# Patient Record
Sex: MALE | Race: WHITE | Employment: UNEMPLOYED | URBAN - METROPOLITAN AREA
[De-identification: names, ages, dates, MRNs, and addresses within clinical notes are randomized per-mention and may not be internally consistent; named-entity substitution may affect disease eponyms.]

---

## 2019-06-19 ENCOUNTER — HOSPITAL ENCOUNTER (EMERGENCY)
Age: 59
Discharge: HOME | End: 2019-06-20
Attending: EMERGENCY MEDICINE | Admitting: EMERGENCY MEDICINE
Payer: MEDICARE

## 2019-06-19 ENCOUNTER — APPOINTMENT (OUTPATIENT)
Dept: GENERAL RADIOLOGY | Age: 59
End: 2019-06-19
Attending: EMERGENCY MEDICINE
Payer: MEDICARE

## 2019-06-19 ENCOUNTER — APPOINTMENT (OUTPATIENT)
Dept: CT IMAGING | Age: 59
End: 2019-06-19
Attending: EMERGENCY MEDICINE
Payer: MEDICARE

## 2019-06-19 DIAGNOSIS — R53.1 GENERALIZED WEAKNESS: Primary | ICD-10-CM

## 2019-06-19 DIAGNOSIS — F10.920 ALCOHOLIC INTOXICATION WITHOUT COMPLICATION (HCC): ICD-10-CM

## 2019-06-19 LAB
ALBUMIN SERPL-MCNC: 3.3 G/DL (ref 3.4–5)
ALBUMIN/GLOB SERPL: 0.8 {RATIO} (ref 0.8–1.7)
ALP SERPL-CCNC: 109 U/L (ref 45–117)
ALT SERPL-CCNC: 39 U/L (ref 16–61)
AMPHET UR QL SCN: NEGATIVE
ANION GAP SERPL CALC-SCNC: 11 MMOL/L (ref 3–18)
APPEARANCE UR: ABNORMAL
AST SERPL-CCNC: 47 U/L (ref 15–37)
BARBITURATES UR QL SCN: NEGATIVE
BASOPHILS # BLD: 0.1 K/UL (ref 0–0.1)
BASOPHILS NFR BLD: 1 % (ref 0–2)
BENZODIAZ UR QL: NEGATIVE
BILIRUB SERPL-MCNC: 0.4 MG/DL (ref 0.2–1)
BILIRUB UR QL: NEGATIVE
BUN SERPL-MCNC: 7 MG/DL (ref 7–18)
BUN/CREAT SERPL: 8 (ref 12–20)
CALCIUM SERPL-MCNC: 9.3 MG/DL (ref 8.5–10.1)
CANNABINOIDS UR QL SCN: NEGATIVE
CHLORIDE SERPL-SCNC: 98 MMOL/L (ref 100–108)
CK MB CFR SERPL CALC: 3.3 % (ref 0–4)
CK MB SERPL-MCNC: 1.4 NG/ML (ref 5–25)
CK SERPL-CCNC: 42 U/L (ref 39–308)
CO2 SERPL-SCNC: 27 MMOL/L (ref 21–32)
COCAINE UR QL SCN: NEGATIVE
COLOR UR: YELLOW
CREAT SERPL-MCNC: 0.85 MG/DL (ref 0.6–1.3)
DIFFERENTIAL METHOD BLD: ABNORMAL
EOSINOPHIL # BLD: 0.3 K/UL (ref 0–0.4)
EOSINOPHIL NFR BLD: 4 % (ref 0–5)
ERYTHROCYTE [DISTWIDTH] IN BLOOD BY AUTOMATED COUNT: 14.3 % (ref 11.6–14.5)
ETHANOL SERPL-MCNC: 233 MG/DL (ref 0–3)
GLOBULIN SER CALC-MCNC: 4 G/DL (ref 2–4)
GLUCOSE SERPL-MCNC: 134 MG/DL (ref 74–99)
GLUCOSE UR STRIP.AUTO-MCNC: NEGATIVE MG/DL
HCT VFR BLD AUTO: 42.7 % (ref 36–48)
HDSCOM,HDSCOM: NORMAL
HGB BLD-MCNC: 14.5 G/DL (ref 13–16)
HGB UR QL STRIP: NEGATIVE
INR PPP: 1 (ref 0.8–1.2)
KETONES UR QL STRIP.AUTO: ABNORMAL MG/DL
LEUKOCYTE ESTERASE UR QL STRIP.AUTO: NEGATIVE
LIPASE SERPL-CCNC: 143 U/L (ref 73–393)
LYMPHOCYTES # BLD: 2 K/UL (ref 0.9–3.6)
LYMPHOCYTES NFR BLD: 27 % (ref 21–52)
MAGNESIUM SERPL-MCNC: 2 MG/DL (ref 1.6–2.6)
MCH RBC QN AUTO: 32.4 PG (ref 24–34)
MCHC RBC AUTO-ENTMCNC: 34 G/DL (ref 31–37)
MCV RBC AUTO: 95.3 FL (ref 74–97)
METHADONE UR QL: NEGATIVE
MONOCYTES # BLD: 0.5 K/UL (ref 0.05–1.2)
MONOCYTES NFR BLD: 7 % (ref 3–10)
NEUTS SEG # BLD: 4.5 K/UL (ref 1.8–8)
NEUTS SEG NFR BLD: 61 % (ref 40–73)
NITRITE UR QL STRIP.AUTO: NEGATIVE
OPIATES UR QL: NEGATIVE
PCP UR QL: NEGATIVE
PH UR STRIP: 5 [PH] (ref 5–8)
PLATELET # BLD AUTO: 154 K/UL (ref 135–420)
PMV BLD AUTO: 9.4 FL (ref 9.2–11.8)
POTASSIUM SERPL-SCNC: 4.4 MMOL/L (ref 3.5–5.5)
PROT SERPL-MCNC: 7.3 G/DL (ref 6.4–8.2)
PROT UR STRIP-MCNC: NEGATIVE MG/DL
PROTHROMBIN TIME: 12.5 SEC (ref 11.5–15.2)
RBC # BLD AUTO: 4.48 M/UL (ref 4.7–5.5)
SODIUM SERPL-SCNC: 136 MMOL/L (ref 136–145)
SP GR UR REFRACTOMETRY: 1.01 (ref 1–1.03)
TROPONIN I SERPL-MCNC: <0.02 NG/ML (ref 0–0.04)
TSH SERPL DL<=0.05 MIU/L-ACNC: 2.35 UIU/ML (ref 0.36–3.74)
UROBILINOGEN UR QL STRIP.AUTO: 1 EU/DL (ref 0.2–1)
WBC # BLD AUTO: 7.3 K/UL (ref 4.6–13.2)

## 2019-06-19 PROCEDURE — 85610 PROTHROMBIN TIME: CPT

## 2019-06-19 PROCEDURE — 84443 ASSAY THYROID STIM HORMONE: CPT

## 2019-06-19 PROCEDURE — 99285 EMERGENCY DEPT VISIT HI MDM: CPT

## 2019-06-19 PROCEDURE — 83690 ASSAY OF LIPASE: CPT

## 2019-06-19 PROCEDURE — 71045 X-RAY EXAM CHEST 1 VIEW: CPT

## 2019-06-19 PROCEDURE — 80307 DRUG TEST PRSMV CHEM ANLYZR: CPT

## 2019-06-19 PROCEDURE — 81003 URINALYSIS AUTO W/O SCOPE: CPT

## 2019-06-19 PROCEDURE — 70450 CT HEAD/BRAIN W/O DYE: CPT

## 2019-06-19 PROCEDURE — 82550 ASSAY OF CK (CPK): CPT

## 2019-06-19 PROCEDURE — 85025 COMPLETE CBC W/AUTO DIFF WBC: CPT

## 2019-06-19 PROCEDURE — 80053 COMPREHEN METABOLIC PANEL: CPT

## 2019-06-19 PROCEDURE — 83735 ASSAY OF MAGNESIUM: CPT

## 2019-06-20 VITALS
RESPIRATION RATE: 24 BRPM | WEIGHT: 250 LBS | SYSTOLIC BLOOD PRESSURE: 148 MMHG | BODY MASS INDEX: 32.08 KG/M2 | TEMPERATURE: 97.8 F | DIASTOLIC BLOOD PRESSURE: 83 MMHG | OXYGEN SATURATION: 92 % | HEART RATE: 109 BPM | HEIGHT: 74 IN

## 2019-06-20 NOTE — ED TRIAGE NOTES
Pt brought in by [facility] from home. Per EMS pt was walking up the stairs and fell. Pt states he has been falling lately because his legs feel weak. Pt admits to drinking alcohol. Pt A &O X 3, follows commands, no distress noted.

## 2019-06-20 NOTE — DISCHARGE INSTRUCTIONS
Patient Education   Patient armband removed and shredded    DISCHARGE SUMMARY from Nurse    PATIENT INSTRUCTIONS:    After general anesthesia or intravenous sedation, for 24 hours or while taking prescription Narcotics:  · Limit your activities  · Do not drive and operate hazardous machinery  · Do not make important personal or business decisions  · Do  not drink alcoholic beverages  · If you have not urinated within 8 hours after discharge, please contact your surgeon on call. Report the following to your surgeon:  · Excessive pain, swelling, redness or odor of or around the surgical area  · Temperature over 100.5  · Nausea and vomiting lasting longer than 4 hours or if unable to take medications  · Any signs of decreased circulation or nerve impairment to extremity: change in color, persistent  numbness, tingling, coldness or increase pain  · Any questions    What to do at Home:  Recommended activity: Activity as tolerated,     If you experience any of the following symptoms chest pain, SOB or any other concerns, please follow up with pcp. *  Please give a list of your current medications to your Primary Care Provider. *  Please update this list whenever your medications are discontinued, doses are      changed, or new medications (including over-the-counter products) are added. *  Please carry medication information at all times in case of emergency situations. These are general instructions for a healthy lifestyle:    No smoking/ No tobacco products/ Avoid exposure to second hand smoke  Surgeon General's Warning:  Quitting smoking now greatly reduces serious risk to your health.     Obesity, smoking, and sedentary lifestyle greatly increases your risk for illness    A healthy diet, regular physical exercise & weight monitoring are important for maintaining a healthy lifestyle    You may be retaining fluid if you have a history of heart failure or if you experience any of the following symptoms: Weight gain of 3 pounds or more overnight or 5 pounds in a week, increased swelling in our hands or feet or shortness of breath while lying flat in bed. Please call your doctor as soon as you notice any of these symptoms; do not wait until your next office visit. The discharge information has been reviewed with the patient. The patient verbalized understanding. Discharge medications reviewed with the {[facility] meds reviewed [id number]} and appropriate educational materials and side effects teaching were provided. ___________________________________________________________________________________________________________________________________       Muscle Conditioning: Exercises  Your Care Instructions  Here are some examples of exercises for muscle conditioning. Start each exercise slowly. Ease off the exercise if you start to have pain. Your doctor or physical therapist will tell you when you can start these exercises and which ones will work best for you. How to do the exercises  Wall push-ups    6. Stand facing a wall, about 12 to 18 inches away. 7. Place your hands on the wall at shoulder height. 8. Slowly bend your elbows and bring your face toward the wall, moving your hips and shoulders forward together. 9. Push slowly back to the starting position. 10. Start with 5 repetitions and work up to 8 to 12.  11. Rest for a minute, and repeat the exercise. Knee extension    6. While sitting in a chair, straighten one leg and hold while you slowly count to 5. Be sure you do not lock your knee. 7. Repeat 8 to 12 times. 8. Rest for a minute, and repeat the exercise. 9. Do the same exercise with the other leg. Side-lying leg lift    1. Lie on your side, with your legs extended. Keep your hips straight up and down during this exercise. Do not let your top hip rock toward the back.  Support your head with your hand, and place the other hand on the floor near your waist.  2. Slowly raise your upper leg until it is about in line with your shoulder. Keep your toes pointed forward. 3. Slowly lower your leg to the starting position. 4. Repeat 8 to 12 times. 5. Rest for a minute, and repeat the exercise. 6. Turn to your other side and do the same exercise with your other leg. Shallow standing knee bends    1. Stand with your hands lightly resting on a counter or chair in front of you with your feet shoulder-width apart. 2. Slowly bend your knees so that you squat down just like you were going to sit in a chair. Make sure your knees do not go in front of your toes. 3. Lower yourself about 6 inches. Your heels should remain on the floor at all times. 4. Rise slowly to a standing position. 5. Repeat 8 to 12 times. 6. Rest for a minute, and repeat the exercise. Follow-up care is a key part of your treatment and safety. Be sure to make and go to all appointments, and call your doctor if you are having problems. It's also a good idea to know your test results and keep a list of the medicines you take. Where can you learn more? Go to http://meghan-lan.info/. Enter S567 in the search box to learn more about \"Muscle Conditioning: Exercises. \"  Current as of: August 19, 2018  Content Version: 11.9  © 5482-1131 [employer], [employer]orporated. Care instructions adapted under license by [employer] (which disclaims liability or warranty for this information). If you have questions about a medical condition or this instruction, always ask your healthcare professional. [place] [address] any warranty or liability for your use of this information. Patient Education        Weakness: Care Instructions  Your Care Instructions    Weakness is a lack of physical or muscle strength. You may feel that you need to make extra effort to move your arms, legs, or other muscles. Generalized weakness means that you feel weak in most areas of your body.  Another type of weakness may affect just one muscle or group of muscles. You may feel weak and tired after you have done too much activity, such as taking an extra-long hike. This is not a serious problem. It often goes away on its own. Feeling weak can also be caused by medical conditions like thyroid problems, depression, or a virus. Sometimes the cause can be serious. Your doctor may want to do more tests to try to find the cause of the weakness. The doctor has checked you carefully, but problems can develop later. If you notice any problems or new symptoms, get medical treatment right away. Follow-up care is a key part of your treatment and safety. Be sure to make and go to all appointments, and call your doctor if you are having problems. It's also a good idea to know your test results and keep a list of the medicines you take. How can you care for yourself at home? · Rest when you feel tired. · Be safe with medicines. If your doctor prescribed medicine, take it exactly as prescribed. Call your doctor if you think you are having a problem with your medicine. You will get more details on the specific medicines your doctor prescribes. · Do not skip meals. Eating a balanced diet may increase your energy level. · Get some physical activity every day, but do not get too tired. When should you call for help? Call your doctor now or seek immediate medical care if:    · You have new or worse weakness.     · You are dizzy or lightheaded, or you feel like you may faint.    Watch closely for changes in your health, and be sure to contact your doctor if:    · You do not get better as expected. Where can you learn more? Go to http://meghan-lan.info/. Enter 495 [id number] [id number] in the search box to learn more about \"Weakness: Care Instructions. \"  Current as of: September 23, 2018  Content Version: 11.9  © 3212-5457 [employer].  Care instructions adapted under license by [employer] (which disclaims liability or warranty for this information). If you have questions about a medical condition or this instruction, always ask your healthcare professional. [address] any warranty or liability for your use of this information.

## 2019-06-20 NOTE — ED PROVIDER NOTES
ER17/17    61 y.o. WHITE OR  male    Presents to the ED with   Chief Complaint   Patient presents with   [facility] Extremity Weakness     legs         HPI: 9:14 PM  09:13PM 06-  This record was completed using an electronic medical record, and dictation software. It may contain unintended, unedited transcription errors. This is a 51-year-old male who presents to emergency department for evaluation of feeling weak. Patient is a poor historian. According to the patient, and from the EMS report, the patient was walking to [place] and began to feel weak, and his legs gave out. He denies actual syncope. He admits to drinking at least 4 beers this afternoon. He denies any history of prior syncope, heart attack, or any other medical problems.   Symptoms are constant, moderate, with no other relieving or exacerbating factors    ROS:  14 organ system review of systems is complete and is negative except as addressed in the HPI        Social History:   Social History     Socioeconomic History    Marital status:      Spouse name: Not on file    Number of children: Not on file    Years of education: Not on file    Highest education level: Not on file   Occupational History    Not on file   Social Needs    Financial resource strain: Not on file    Food insecurity:     Worry: Not on file     Inability: Not on file    Transportation needs:     Medical: Not on file     Non-medical: Not on file   Tobacco Use    Smoking status: Not on file   Substance and Sexual Activity    Alcohol use: Not on file    Drug use: Not on file    Sexual activity: Not on file   Lifestyle    Physical activity:     Days per week: Not on file     Minutes per session: Not on file    Stress: Not on file   Relationships    Social connections:     Talks on phone: Not on file     Gets together: Not on file     Attends Zoroastrian service: Not on file     Active member of club or organization: Not on file     Attends meetings of clubs or organizations: Not on file     Relationship status: Not on file    Intimate partner violence:     Fear of current or ex partner: Not on file     Emotionally abused: Not on file     Physically abused: Not on file     Forced sexual activity: Not on file   Other Topics Concern    Not on file   Social History Narrative    Not on file      has no tobacco history on file. Family History: History reviewed. No pertinent family history. Past Medical History: History reviewed. No pertinent past medical history. Past Surgical History: History reviewed. No pertinent surgical history. Primary Care: None    Immunizations:     Medications: No current facility-administered medications for this encounter. No current outpatient medications on file. Allergies: No Known Allergies      Last Cath       Last Stress Test       Prior:ECHO               Physical Exam:  . Patient Vitals for the past 12 hrs:   Temp Pulse Resp BP SpO2   06/20/19 0000 97.5 °F (36.4 °C) (!) 108 29 135/76 91 %   06/19/19 2100 97.2 °F (36.2 °C) (!) 111 29 122/70 91 %   06/19/19 2045 -- (!) 108 24 (!) 104/36 90 %     Gen: Well developed, well nourished 61 y.o. male  HEENT: Normocephalic, atraumatic. No scleral icterus. Extraocular movements intact. .  Normal mucous membranes. Uvula midline. Airway widely patent. Respiratory: No accessory muscle use. No wheeze, No rales, No rhonchi. Normal chest wall excursion. No subcutaneous air, no rib crepitus  Cardiovascular: Regular rhythm and rate, Normal pulses, Normal perfusion. No edema. Gastrointestinal: Non distended, Non tender, No masses. No ascites. No organomegaly. No evidence of trauma  Musculoskeletal: Full range of motion at all other tested joints. No joint effusions. Neurological: Normal strength, Normal sensation. Normal speech. No ataxia. Cranial nerves II-XII normal as tested. Skin: No rash, petechia or purpura.  Warm and dry  Psychiatric: No suicidal ideation, No homicidal ideation. No hallucinations. Organized thoughts. Normal mood. normal affect. Heme: No lymphadenopathy. : Deferred    Orders:   Orders Placed This Encounter    XR CHEST SNGL V    CT HEAD WO CONT    CBC WITH AUTOMATED DIFF    METABOLIC PANEL, COMPREHENSIVE    CARDIAC PANEL,(CK, CKMB & TROPONIN)    PT    MAGNESIUM    LIPASE    DRUG SCREEN, URINE    URINALYSIS W/ RFLX MICROSCOPIC    ETHYL ALCOHOL    TSH 3RD GENERATION    EKG, 12 LEAD, INITIAL       ECG:   Current:      Comparison: .    Imaging: Chest x-ray shows no pneumonia, no pneumothorax, no acute disease. Ct Head Wo Cont    Result Date: 6/20/2019  IMPRESSION[de-identified] 1.  No acute intracranial pathology. Thank you for this referral.      Labs:  Labs Reviewed   CBC WITH AUTOMATED DIFF - Abnormal; Notable for the following components:       Result Value    RBC 4.48 (*)     All other components within normal limits   METABOLIC PANEL, COMPREHENSIVE - Abnormal; Notable for the following components:    Chloride 98 (*)     Glucose 134 (*)     BUN/Creatinine ratio 8 (*)     AST (SGOT) 47 (*)     Albumin 3.3 (*)     All other components within normal limits   URINALYSIS W/ RFLX MICROSCOPIC - Abnormal; Notable for the following components:    Ketone TRACE (*)     All other components within normal limits   ETHYL ALCOHOL - Abnormal; Notable for the following components:    ALCOHOL(ETHYL),SERUM 233 (*)     All other components within normal limits   CARDIAC PANEL,(CK, CKMB & TROPONIN)   PROTHROMBIN TIME + INR   MAGNESIUM   LIPASE   DRUG SCREEN, URINE   TSH 3RD GENERATION       EMERGENCY DEPARTMENT COURSE  The patient was observed in the emergency department without any clinically significant deterioration of their condition. Laboratory data, appropriate imaging studies, and vital signs were reviewed.   I had a lengthy discussion with the patient regarding the risks and benefits of further treatment and observation emergency department, admission to the hospital, and being discharged home. Ultimately, the patient decided that they want to be discharged home. I counseled him that they changed her mind about wanting further treatment or observation emergency department and they can return at any time. They are low risk for outpatient management              Diagnosis:   1. Generalized weakness          Disposition:        Discharge Medications: There are no discharge medications for this patient. Referral:     Follow-up Information    None            (This chart was created with dictation software and an EHR.   It may contain unintended unedited historical and or dictation errors)

## 2019-09-04 ENCOUNTER — HOSPITAL ENCOUNTER (OUTPATIENT)
Dept: ULTRASOUND IMAGING | Age: 59
Discharge: HOME | End: 2019-09-04
Attending: FAMILY MEDICINE
Payer: MEDICARE

## 2019-09-04 ENCOUNTER — HOSPITAL ENCOUNTER (OUTPATIENT)
Dept: NON INVASIVE DIAGNOSTICS | Age: 59
Discharge: HOME | End: 2019-09-04
Attending: FAMILY MEDICINE
Payer: MEDICARE

## 2019-09-04 VITALS
BODY MASS INDEX: 32.08 KG/M2 | DIASTOLIC BLOOD PRESSURE: 70 MMHG | HEIGHT: 74 IN | SYSTOLIC BLOOD PRESSURE: 130 MMHG | WEIGHT: 250 LBS

## 2019-09-04 DIAGNOSIS — R79.89 ELEVATED LFTS: ICD-10-CM

## 2019-09-04 DIAGNOSIS — R60.0 BILATERAL EDEMA OF LOWER EXTREMITY: ICD-10-CM

## 2019-09-04 DIAGNOSIS — F10.10 ALCOHOL ABUSE: ICD-10-CM

## 2019-09-04 LAB
ECHO AO ROOT DIAM: 3.87 CM
ECHO LA AREA 4C: 15.7 CM2
ECHO LA VOL 2C: 34.78 ML (ref 18–58)
ECHO LA VOL 4C: 41.08 ML (ref 18–58)
ECHO LA VOL BP: 41.37 ML (ref 18–58)
ECHO LA VOL/BSA BIPLANE: 17.31 ML/M2 (ref 16–28)
ECHO LA VOLUME INDEX A2C: 14.56 ML/M2 (ref 16–28)
ECHO LA VOLUME INDEX A4C: 17.19 ML/M2 (ref 16–28)
ECHO LV INTERNAL DIMENSION DIASTOLIC: 4.59 CM (ref 4.2–5.9)
ECHO LV INTERNAL DIMENSION SYSTOLIC: 3.05 CM
ECHO LV IVSD: 0.88 CM (ref 0.6–1)
ECHO LV MASS 2D: 165.1 G (ref 88–224)
ECHO LV MASS INDEX 2D: 69.1 G/M2 (ref 49–115)
ECHO LV POSTERIOR WALL DIASTOLIC: 0.98 CM (ref 0.6–1)
ECHO LVOT DIAM: 2.54 CM
ECHO LVOT PEAK GRADIENT: 1.7 MMHG
ECHO LVOT PEAK VELOCITY: 64.47 CM/S
ECHO LVOT VTI: 14 CM
ECHO MV A VELOCITY: 67.64 CM/S
ECHO MV E DECELERATION TIME (DT): 195.9 MS
ECHO MV E VELOCITY: 53.29 CM/S
ECHO MV E/A RATIO: 0.79

## 2019-09-04 PROCEDURE — 76705 ECHO EXAM OF ABDOMEN: CPT

## 2019-09-04 PROCEDURE — 93306 TTE W/DOPPLER COMPLETE: CPT

## 2020-02-23 ENCOUNTER — APPOINTMENT (OUTPATIENT)
Dept: GENERAL RADIOLOGY | Age: 60
End: 2020-02-23
Attending: NURSE PRACTITIONER
Payer: MEDICARE

## 2020-02-23 ENCOUNTER — HOSPITAL ENCOUNTER (EMERGENCY)
Age: 60
Discharge: HOME | End: 2020-02-23
Attending: EMERGENCY MEDICINE
Payer: MEDICARE

## 2020-02-23 VITALS
DIASTOLIC BLOOD PRESSURE: 80 MMHG | SYSTOLIC BLOOD PRESSURE: 115 MMHG | WEIGHT: 250 LBS | BODY MASS INDEX: 32.08 KG/M2 | HEIGHT: 74 IN | HEART RATE: 88 BPM | RESPIRATION RATE: 16 BRPM | OXYGEN SATURATION: 94 % | TEMPERATURE: 98.7 F

## 2020-02-23 DIAGNOSIS — S30.0XXA CONTUSION OF LOWER BACK, INITIAL ENCOUNTER: Primary | ICD-10-CM

## 2020-02-23 PROCEDURE — 99283 EMERGENCY DEPT VISIT LOW MDM: CPT

## 2020-02-23 PROCEDURE — 93005 ELECTROCARDIOGRAM TRACING: CPT

## 2020-02-23 PROCEDURE — 72110 X-RAY EXAM L-2 SPINE 4/>VWS: CPT

## 2020-02-23 RX ORDER — IBUPROFEN 800 MG/1
800 TABLET ORAL
Qty: 20 TAB | Refills: 0 | Status: SHIPPED | OUTPATIENT
Start: 2020-02-23 | End: 2020-03-12

## 2020-02-23 NOTE — ED PROVIDER NOTES
EMERGENCY DEPARTMENT HISTORY AND PHYSICAL EXAM    6:40 PM      Date: 2/23/2020  Patient Name: Evangelina Morales    History of Presenting Illness     Chief Complaint   Patient presents with    Back Pain         History Provided By: Patient      Additional History (Context): Evangelina Morales is a 61 y.o. male with diabetes, hypertension and alcohol abuse who presents with intermittent 4 days of aching/sharp lower back pain occurring after he slipped on his air bed and fell onto the floor. Denies head injury or LOC. Pain is worse with positional movements and minimal when sitting. He reports took two OTC yesterday Tylenol at home and drinking 4-5 shots of liquor a day for the pain. States he drinks 4-5 shots per day almost every day even prior to the back pain. Denies fevers, chills, numbness/tingling, back pain radiating down legs, loss of bowel/bladder, weakness, abdominal pain, n/v/d, chest pain or shortness of breath. PCP: Karson Ospina MD    Chief Complaint: lower back pain  Duration:  Days  Timing:  Acute  Location: Lumbar back  Quality: Sharp and aching  Severity: 6 out of 10  Modifying Factors: OTC 2 tablets yesterday  tylenol and drinking alcohol  Associated Symptoms: denies any other associated signs or symptoms        Past History     Past Medical History:  No past medical history on file. Past Surgical History:  No past surgical history on file. Family History:  No family history on file. Social History:  Social History     Tobacco Use    Smoking status: Not on file   Substance Use Topics    Alcohol use: Not on file    Drug use: Not on file       Allergies:  No Known Allergies      Review of Systems     Review of Systems   Constitutional: Negative for activity change, appetite change, chills, fatigue and fever. Respiratory: Negative for cough, chest tightness, shortness of breath and wheezing. Cardiovascular: Negative for chest pain and palpitations.    Gastrointestinal: Negative for abdominal pain, diarrhea, nausea and vomiting. Genitourinary: Negative for difficulty urinating, dysuria and flank pain. Musculoskeletal: Positive for back pain (lower ). Negative for gait problem, neck pain and neck stiffness. Skin: Negative for rash and wound. Neurological: Negative for dizziness, weakness, light-headedness and numbness. Physical Exam     Visit Vitals  /80 (BP 1 Location: Left arm, BP Patient Position: At rest)   Pulse 88   Temp 98.7 °F (37.1 °C)   Resp 16   Ht 6' 2\" (1.88 m)   Wt 113.4 kg (250 lb)   SpO2 94%   BMI 32.10 kg/m²       Physical Exam  Vitals signs and nursing note reviewed. Constitutional:       General: He is not in acute distress. Appearance: Normal appearance. He is not ill-appearing, toxic-appearing or diaphoretic. HENT:      Head: Normocephalic and atraumatic. Eyes:      Conjunctiva/sclera: Conjunctivae normal.      Pupils: Pupils are equal, round, and reactive to light. Neck:      Musculoskeletal: Full passive range of motion without pain, normal range of motion and neck supple. No neck rigidity, crepitus, pain with movement, spinous process tenderness or muscular tenderness. Cardiovascular:      Rate and Rhythm: Normal rate and regular rhythm. Pulses: Normal pulses. Heart sounds: Normal heart sounds. No murmur. No friction rub. No gallop. Pulmonary:      Effort: Pulmonary effort is normal. No respiratory distress. Breath sounds: Normal breath sounds. No stridor. No wheezing, rhonchi or rales. Chest:      Chest wall: No tenderness. Abdominal:      General: Bowel sounds are normal. There is no distension. Palpations: Abdomen is soft. There is no mass. Tenderness: There is no abdominal tenderness. There is no right CVA tenderness, left CVA tenderness, guarding or rebound. Hernia: No hernia is present. Musculoskeletal: Normal range of motion.       Cervical back: Normal.      Thoracic back: He exhibits tenderness and bony tenderness (L4 and L5). He exhibits normal range of motion, no swelling and no edema. Lumbar back: Normal.        Back:    Skin:     General: Skin is warm and dry. Capillary Refill: Capillary refill takes less than 2 seconds. Findings: No bruising, erythema or lesion. Neurological:      General: No focal deficit present. Mental Status: He is alert and oriented to person, place, and time. Sensory: Sensation is intact. Motor: Motor function is intact. Gait: Gait normal.      Deep Tendon Reflexes:      Reflex Scores:       Patellar reflexes are 2+ on the right side and 2+ on the left side. Achilles reflexes are 2+ on the right side and 2+ on the left side. Psychiatric:         Attention and Perception: Attention and perception normal.         Mood and Affect: Mood normal.         Speech: Speech normal.         Behavior: Behavior normal. Behavior is cooperative. Thought Content: Thought content normal.         Cognition and Memory: Cognition and memory normal.         Judgment: Judgment normal.           Diagnostic Study Results     Labs -no lab  Recent Results (from the past 12 hour(s))   EKG, 12 LEAD, INITIAL    Collection Time: 02/23/20  8:08 PM   Result Value Ref Range    Ventricular Rate 97 BPM    Atrial Rate 97 BPM    P-R Interval 128 ms    QRS Duration 70 ms    Q-T Interval 350 ms    QTC Calculation (Bezet) 444 ms    Calculated P Axis 58 degrees    Calculated R Axis 78 degrees    Calculated T Axis 76 degrees    Diagnosis       Normal sinus rhythm  Normal ECG  When compared with ECG of 06-DEC-2011 23:17,  T wave amplitude has increased in Inferior leads         Radiologic Studies - no acute findings  XR SPINE LUMB MIN 4 V    (Results Pending)         Medical Decision Making     It should be noted that RUCHI BROWN NP will be the provider of record for this patient.     I reviewed the vital signs, available nursing notes, past medical history, past surgical history, family history and social history. Vital Signs-Reviewed the patient's vital signs. Records Reviewed: Old Medical Records (Time of Review: 6:40 PM)    ED Course: Progress Notes, Reevaluation, and Consults:      Provider Notes (Medical Decision Making):   DDX:  Muscular Strain, Spinal Stenosis, Sciatica, Compression FX, DJD, Spondylolysis, Inflammatory Spondyloarthropathy, Cauda Equina Syndrome    Clinical Impression/plan:  Patient stable condition. Reviewed x-ray with patient and answered questions. Will prescribe NSAID. Encourage and education patient to stop drinking. Follow up with PCP in 2-4 days without failure. Return to ER immediately for any worsening symptoms or concerns. Patient verbalizes d/c instructions. Diagnosis     Clinical Impression:   1. Contusion of lower back, initial encounter        Disposition: home    Follow-up Information     Follow up With Specialties Details Why Contact Info    Adonis Tay MD Internal Medicine Schedule an appointment as soon as possible for a visit in 2 days  [address], [address] [facility] Orth[facility] Surgery Schedule an appointment as soon as possible for a visit in 2 days  [address] [place] [address]  [phone]    Return to ER immediately for any worserning symptoms or concerns. Patient's Medications   Start Taking    IBUPROFEN (MOTRIN) 800 MG TABLET    Take 1 Tab by mouth every six (6) hours as needed for Pain for up to 7 days.    Continue Taking    No medications on file   These Medications have changed    No medications on file   Stop Taking    No medications on file     _______________________________

## 2020-02-23 NOTE — ED TRIAGE NOTES
PT arrived via medic from [facility]. PT reports  Lower back pain X 4 days. Has taken acetaminophen OTC, has helped to resolve the pain some. PT reports \"self-medicating with alcohol to help with the back pain\". Rates back pain 7 out of 10.

## 2020-02-24 LAB
ATRIAL RATE: 97 BPM
CALCULATED P AXIS, ECG09: 58 DEGREES
CALCULATED R AXIS, ECG10: 78 DEGREES
CALCULATED T AXIS, ECG11: 76 DEGREES
DIAGNOSIS, 93000: NORMAL
P-R INTERVAL, ECG05: 128 MS
Q-T INTERVAL, ECG07: 350 MS
QRS DURATION, ECG06: 70 MS
QTC CALCULATION (BEZET), ECG08: 444 MS
VENTRICULAR RATE, ECG03: 97 BPM

## 2020-02-24 NOTE — DISCHARGE INSTRUCTIONS
Patient Education        Low Back Contusion: Care Instructions  Your Care Instructions    Contusion is the medical term for a bruise. When you have a low back bruise, it's often caused by a direct blow or an impact, such as falling against a counter or table. Bruises are common sports injuries. Most people think of a bruise as a black-and-blue spot. This happens when small blood vessels get torn and leak blood under the skin. But bones, muscles, and organs can also get bruised. If these deep tissues are damaged, you may not always see a bruise. The doctor will examine your bruise. You may also have tests to make sure you do not have a more serious injury, such as a broken bone or nerve damage. Tests may include X-rays or other imaging tests like a CT scan or MRI. Low back bruises may cause pain and swelling. But if there is no serious damage, they will often get better with home treatment in several days to a few weeks. The doctor has checked you carefully, but problems can develop later. If you notice any problems or new symptoms, get medical treatment right away. Follow-up care is a key part of your treatment and safety. Be sure to make and go to all appointments, and call your doctor if you are having problems. It's also a good idea to know your test results and keep a list of the medicines you take. How can you care for yourself at home? · Put ice or a cold pack on the sore area for 10 to 20 minutes at a time to stop swelling. Put a thin cloth between the ice pack and your skin. · Be safe with medicines. Read and follow all instructions on the label. ? If the doctor gave you a prescription medicine for pain, take it as prescribed. ? If you are not taking a prescription pain medicine, ask your doctor if you can take an over-the-counter medicine. · For the first day or two of pain, take it easy. But as soon as possible, get back to your normal daily life and activities.   · Get gentle exercise, such as walking. Movement keeps your spine flexible and helps your muscles stay strong. When should you call for help? Call 911 anytime you think you may need emergency care. For example, call if:    · You are unable to move a leg at all.   [facility] your doctor now or seek immediate medical care if:    · You have new or worse symptoms in your legs or buttocks. Symptoms may include:  ? Numbness or tingling. ? Weakness. ? Pain.     · You lose bladder or bowel control.     · You have blood in your urine.    Watch closely for changes in your health, and be sure to contact your doctor if:    · You do not get better as expected. Where can you learn more? Go to http://meghan-lan.info/. Enter V337 in the search box to learn more about \"Low Back Contusion: Care Instructions. \"  Current as of: June 26, 2019  Content Version: 12.2  © 7766-5804 [employer]. Care instructions adapted under license by [employer] (which disclaims liability or warranty for this information). If you have questions about a medical condition or this instruction, always ask your healthcare professional. [address] any warranty or liability for your use of this information. [employer] Activation    Thank you for requesting access to [employer]. Please follow the instructions below to securely access and download your online medical record. [employer] allows you to send messages to your doctor, view your test results, renew your prescriptions, schedule appointments, and more. How Do I Sign Up? 1. In your internet browser, go to [email or web address]  2. Click on the First Time User? Click Here link in the Sign In box. You will be redirect to the New Member Sign Up page. 3. Enter your [employer] Access Code exactly as it appears below. You will not need to use this code after youve completed the sign-up process.  If you do not sign up before the expiration date, you must request a new code.    [employer] Access Code: [email or web address]  Expires: 2020  5:48 PM (This is the date your [employer] access code will )    4. Enter the last four digits of your Social Security Number (xxxx) and Date of Birth (mm/dd/yyyy) as indicated and click Submit. You will be taken to the next sign-up page. 5. Create a [employer] ID. This will be your [employer] login ID and cannot be changed, so think of one that is secure and easy to remember. 6. Create a [employer] password. You can change your password at any time. 7. Enter your Password Reset Question and Answer. This can be used at a later time if you forget your password. 8. Enter your e-mail address. You will receive e-mail notification when new information is available in [address]. 9. Click Sign Up. You can now view and download portions of your medical record. 10. Click the Download Summary menu link to download a portable copy of your medical information. Additional Information    If you have questions, please visit the Frequently Asked Questions section of the [employer] website at [email or web address] Remember, [employer] is NOT to be used for urgent needs. For medical emergencies, dial 911.

## 2020-02-25 NOTE — CALL BACK NOTE
Called patient back to follow up on ER visit and final result of x-ray discussed with patient. Patient repots he did receive a call and has made plans to f/u with his Orthopedic Physician. Patient reports back pain has improved and has been walking well. Denies back pain radiating down leg/legs, numbness/tingling or loss of bowel/bladder. Denies any concerns

## 2020-02-25 NOTE — CALL BACK NOTE
9:30 AM 
Discussed findings of compression fracture of L3 with patient. Understands results. He states pain has improved and he has made arrangements to follow up with ortho and pcp.   
 
Cj Alvarenga PA-C

## 2020-02-29 ENCOUNTER — APPOINTMENT (OUTPATIENT)
Dept: GENERAL RADIOLOGY | Age: 60
DRG: 854 | End: 2020-02-29
Attending: PHYSICIAN ASSISTANT
Payer: MEDICARE

## 2020-02-29 ENCOUNTER — HOSPITAL ENCOUNTER (INPATIENT)
Age: 60
LOS: 12 days | Discharge: NURSING, REHAB OR LONG-TERM CARE | DRG: 854 | End: 2020-03-12
Attending: EMERGENCY MEDICINE | Admitting: FAMILY MEDICINE
Payer: MEDICARE

## 2020-02-29 ENCOUNTER — APPOINTMENT (OUTPATIENT)
Dept: VASCULAR SURGERY | Age: 60
DRG: 854 | End: 2020-02-29
Attending: PHYSICIAN ASSISTANT
Payer: MEDICARE

## 2020-02-29 DIAGNOSIS — M79.605 ACUTE LEG PAIN, LEFT: ICD-10-CM

## 2020-02-29 DIAGNOSIS — L03.116 CELLULITIS OF LEFT LOWER EXTREMITY: Primary | ICD-10-CM

## 2020-02-29 DIAGNOSIS — I95.9 HYPOTENSION, UNSPECIFIED HYPOTENSION TYPE: ICD-10-CM

## 2020-02-29 DIAGNOSIS — S32.030A CLOSED COMPRESSION FRACTURE OF L3 LUMBAR VERTEBRA, INITIAL ENCOUNTER (HCC): ICD-10-CM

## 2020-02-29 DIAGNOSIS — L03.90 SEPSIS DUE TO CELLULITIS (HCC): ICD-10-CM

## 2020-02-29 DIAGNOSIS — N17.9 ACUTE KIDNEY INJURY (HCC): ICD-10-CM

## 2020-02-29 DIAGNOSIS — A41.9 SEPSIS DUE TO CELLULITIS (HCC): ICD-10-CM

## 2020-02-29 PROBLEM — D72.829 LEUKOCYTOSIS: Status: ACTIVE | Noted: 2020-02-29

## 2020-02-29 LAB
ALBUMIN SERPL-MCNC: 2.8 G/DL (ref 3.4–5)
ALBUMIN/GLOB SERPL: 0.6 {RATIO} (ref 0.8–1.7)
ALP SERPL-CCNC: 178 U/L (ref 45–117)
ALT SERPL-CCNC: 77 U/L (ref 16–61)
ANION GAP SERPL CALC-SCNC: 9 MMOL/L (ref 3–18)
APPEARANCE UR: CLEAR
AST SERPL-CCNC: 65 U/L (ref 10–38)
BACTERIA URNS QL MICRO: ABNORMAL /HPF
BASOPHILS # BLD: 0 K/UL (ref 0–0.06)
BASOPHILS NFR BLD: 0 % (ref 0–3)
BILIRUB DIRECT SERPL-MCNC: 0.6 MG/DL (ref 0–0.2)
BILIRUB SERPL-MCNC: 0.8 MG/DL (ref 0.2–1)
BILIRUB UR QL: NEGATIVE
BUN SERPL-MCNC: 36 MG/DL (ref 7–18)
BUN/CREAT SERPL: 21 (ref 12–20)
CALCIUM SERPL-MCNC: 9.6 MG/DL (ref 8.5–10.1)
CHLORIDE SERPL-SCNC: 97 MMOL/L (ref 100–111)
CO2 SERPL-SCNC: 25 MMOL/L (ref 21–32)
COLOR UR: ABNORMAL
CREAT SERPL-MCNC: 1.72 MG/DL (ref 0.6–1.3)
DIFFERENTIAL METHOD BLD: ABNORMAL
EOSINOPHIL # BLD: 0 K/UL (ref 0–0.4)
EOSINOPHIL NFR BLD: 0 % (ref 0–5)
EPITH CASTS URNS QL MICRO: NEGATIVE /LPF (ref 0–5)
ERYTHROCYTE [DISTWIDTH] IN BLOOD BY AUTOMATED COUNT: 14.8 % (ref 11.6–14.5)
ERYTHROCYTE [SEDIMENTATION RATE] IN BLOOD: 10 MM/HR (ref 0–20)
ETHANOL SERPL-MCNC: <3 MG/DL (ref 0–3)
FOLATE SERPL-MCNC: 6.4 NG/ML (ref 3.1–17.5)
GLOBULIN SER CALC-MCNC: 4.6 G/DL (ref 2–4)
GLUCOSE SERPL-MCNC: 180 MG/DL (ref 74–99)
GLUCOSE UR STRIP.AUTO-MCNC: NEGATIVE MG/DL
HCT VFR BLD AUTO: 42.8 % (ref 36–48)
HGB BLD-MCNC: 14.9 G/DL (ref 13–16)
HGB UR QL STRIP: NEGATIVE
HYALINE CASTS URNS QL MICRO: ABNORMAL /LPF (ref 0–2)
KETONES UR QL STRIP.AUTO: ABNORMAL MG/DL
LACTATE SERPL-SCNC: 1.7 MMOL/L (ref 0.4–2)
LEUKOCYTE ESTERASE UR QL STRIP.AUTO: NEGATIVE
LYMPHOCYTES # BLD: 2.2 K/UL (ref 0.8–3.5)
LYMPHOCYTES NFR BLD: 8 % (ref 20–51)
MCH RBC QN AUTO: 33.1 PG (ref 24–34)
MCHC RBC AUTO-ENTMCNC: 34.8 G/DL (ref 31–37)
MCV RBC AUTO: 95.1 FL (ref 74–97)
MONOCYTES # BLD: 0.8 K/UL (ref 0–1)
MONOCYTES NFR BLD: 3 % (ref 2–9)
MUCOUS THREADS URNS QL MICRO: ABNORMAL /LPF
NEUTS BAND NFR BLD MANUAL: 3 % (ref 0–5)
NEUTS SEG # BLD: 24.1 K/UL (ref 1.8–8)
NEUTS SEG NFR BLD: 86 % (ref 42–75)
NITRITE UR QL STRIP.AUTO: NEGATIVE
PH UR STRIP: 5 [PH] (ref 5–8)
PLATELET # BLD AUTO: 212 K/UL (ref 135–420)
PLATELET COMMENTS,PCOM: ABNORMAL
PMV BLD AUTO: 9.8 FL (ref 9.2–11.8)
POTASSIUM SERPL-SCNC: 4.2 MMOL/L (ref 3.5–5.5)
PROT SERPL-MCNC: 7.4 G/DL (ref 6.4–8.2)
PROT UR STRIP-MCNC: ABNORMAL MG/DL
RBC # BLD AUTO: 4.5 M/UL (ref 4.7–5.5)
RBC #/AREA URNS HPF: NEGATIVE /HPF (ref 0–5)
RBC MORPH BLD: ABNORMAL
SODIUM SERPL-SCNC: 131 MMOL/L (ref 136–145)
SP GR UR REFRACTOMETRY: 1.02 (ref 1–1.03)
UROBILINOGEN UR QL STRIP.AUTO: 2 EU/DL (ref 0.2–1)
VIT B12 SERPL-MCNC: 236 PG/ML (ref 211–911)
WBC # BLD AUTO: 27.1 K/UL (ref 4.6–13.2)
WBC MORPH BLD: ABNORMAL
WBC URNS QL MICRO: ABNORMAL /HPF (ref 0–4)

## 2020-02-29 PROCEDURE — 87086 URINE CULTURE/COLONY COUNT: CPT

## 2020-02-29 PROCEDURE — 80307 DRUG TEST PRSMV CHEM ANLYZR: CPT

## 2020-02-29 PROCEDURE — 81001 URINALYSIS AUTO W/SCOPE: CPT

## 2020-02-29 PROCEDURE — 65270000029 HC RM PRIVATE

## 2020-02-29 PROCEDURE — 86141 C-REACTIVE PROTEIN HS: CPT

## 2020-02-29 PROCEDURE — 87040 BLOOD CULTURE FOR BACTERIA: CPT

## 2020-02-29 PROCEDURE — 74011250636 HC RX REV CODE- 250/636: Performed by: STUDENT IN AN ORGANIZED HEALTH CARE EDUCATION/TRAINING PROGRAM

## 2020-02-29 PROCEDURE — 82746 ASSAY OF FOLIC ACID SERUM: CPT

## 2020-02-29 PROCEDURE — 82607 VITAMIN B-12: CPT

## 2020-02-29 PROCEDURE — 80076 HEPATIC FUNCTION PANEL: CPT

## 2020-02-29 PROCEDURE — 73630 X-RAY EXAM OF FOOT: CPT

## 2020-02-29 PROCEDURE — 83605 ASSAY OF LACTIC ACID: CPT

## 2020-02-29 PROCEDURE — 74011250636 HC RX REV CODE- 250/636: Performed by: PHYSICIAN ASSISTANT

## 2020-02-29 PROCEDURE — 80048 BASIC METABOLIC PNL TOTAL CA: CPT

## 2020-02-29 PROCEDURE — 74011250637 HC RX REV CODE- 250/637: Performed by: STUDENT IN AN ORGANIZED HEALTH CARE EDUCATION/TRAINING PROGRAM

## 2020-02-29 PROCEDURE — 71045 X-RAY EXAM CHEST 1 VIEW: CPT

## 2020-02-29 PROCEDURE — 85652 RBC SED RATE AUTOMATED: CPT

## 2020-02-29 PROCEDURE — 85025 COMPLETE CBC W/AUTO DIFF WBC: CPT

## 2020-02-29 PROCEDURE — 74011000250 HC RX REV CODE- 250: Performed by: PHYSICIAN ASSISTANT

## 2020-02-29 PROCEDURE — 74011250637 HC RX REV CODE- 250/637: Performed by: EMERGENCY MEDICINE

## 2020-02-29 PROCEDURE — 93971 EXTREMITY STUDY: CPT

## 2020-02-29 PROCEDURE — 36415 COLL VENOUS BLD VENIPUNCTURE: CPT

## 2020-02-29 PROCEDURE — 99284 EMERGENCY DEPT VISIT MOD MDM: CPT

## 2020-02-29 PROCEDURE — 93005 ELECTROCARDIOGRAM TRACING: CPT

## 2020-02-29 RX ORDER — LORAZEPAM 2 MG/ML
3 INJECTION INTRAMUSCULAR
Status: DISCONTINUED | OUTPATIENT
Start: 2020-02-29 | End: 2020-03-03

## 2020-02-29 RX ORDER — LORAZEPAM 1 MG/1
2 TABLET ORAL
Status: DISCONTINUED | OUTPATIENT
Start: 2020-02-29 | End: 2020-03-03

## 2020-02-29 RX ORDER — ACETAMINOPHEN 325 MG/1
650 TABLET ORAL
Status: DISCONTINUED | OUTPATIENT
Start: 2020-02-29 | End: 2020-03-07

## 2020-02-29 RX ORDER — LORAZEPAM 2 MG/ML
2 INJECTION INTRAMUSCULAR
Status: DISCONTINUED | OUTPATIENT
Start: 2020-02-29 | End: 2020-03-03

## 2020-02-29 RX ORDER — SODIUM CHLORIDE 0.9 % (FLUSH) 0.9 %
5-10 SYRINGE (ML) INJECTION AS NEEDED
Status: DISCONTINUED | OUTPATIENT
Start: 2020-02-29 | End: 2020-03-12 | Stop reason: HOSPADM

## 2020-02-29 RX ORDER — SODIUM CHLORIDE 9 MG/ML
125 INJECTION, SOLUTION INTRAVENOUS CONTINUOUS
Status: DISCONTINUED | OUTPATIENT
Start: 2020-03-01 | End: 2020-03-01

## 2020-02-29 RX ORDER — SODIUM CHLORIDE 0.9 % (FLUSH) 0.9 %
5-40 SYRINGE (ML) INJECTION AS NEEDED
Status: DISCONTINUED | OUTPATIENT
Start: 2020-02-29 | End: 2020-03-12 | Stop reason: HOSPADM

## 2020-02-29 RX ORDER — METFORMIN HYDROCHLORIDE 750 MG/1
1500 TABLET, EXTENDED RELEASE ORAL DAILY
Status: ON HOLD | COMMUNITY
End: 2020-03-11 | Stop reason: SDUPTHER

## 2020-02-29 RX ORDER — HEPARIN SODIUM 5000 [USP'U]/ML
5000 INJECTION, SOLUTION INTRAVENOUS; SUBCUTANEOUS EVERY 8 HOURS
Status: DISCONTINUED | OUTPATIENT
Start: 2020-02-29 | End: 2020-03-12 | Stop reason: HOSPADM

## 2020-02-29 RX ORDER — VANCOMYCIN 2 GRAM/500 ML IN 0.9 % SODIUM CHLORIDE INTRAVENOUS
2000 ONCE
Status: COMPLETED | OUTPATIENT
Start: 2020-02-29 | End: 2020-02-29

## 2020-02-29 RX ORDER — TRAMADOL HYDROCHLORIDE 50 MG/1
50 TABLET ORAL
Status: DISCONTINUED | OUTPATIENT
Start: 2020-02-29 | End: 2020-02-29

## 2020-02-29 RX ORDER — VANCOMYCIN HYDROCHLORIDE
1250
Status: DISCONTINUED | OUTPATIENT
Start: 2020-03-01 | End: 2020-03-02

## 2020-02-29 RX ORDER — SIMVASTATIN 20 MG/1
20 TABLET, FILM COATED ORAL
COMMUNITY
End: 2020-03-12

## 2020-02-29 RX ORDER — LISINOPRIL 10 MG/1
10 TABLET ORAL DAILY
COMMUNITY
End: 2020-03-12

## 2020-02-29 RX ORDER — LORAZEPAM 2 MG/ML
1 INJECTION INTRAMUSCULAR
Status: DISCONTINUED | OUTPATIENT
Start: 2020-02-29 | End: 2020-03-03

## 2020-02-29 RX ORDER — OXYCODONE HYDROCHLORIDE 5 MG/1
5 TABLET ORAL
Status: COMPLETED | OUTPATIENT
Start: 2020-02-29 | End: 2020-02-29

## 2020-02-29 RX ORDER — LISINOPRIL 10 MG/1
10 TABLET ORAL DAILY
Status: DISCONTINUED | OUTPATIENT
Start: 2020-03-01 | End: 2020-03-12 | Stop reason: HOSPADM

## 2020-02-29 RX ORDER — LORAZEPAM 1 MG/1
1 TABLET ORAL
Status: DISCONTINUED | OUTPATIENT
Start: 2020-02-29 | End: 2020-03-03

## 2020-02-29 RX ORDER — LEVOFLOXACIN 5 MG/ML
750 INJECTION, SOLUTION INTRAVENOUS EVERY 24 HOURS
Status: DISCONTINUED | OUTPATIENT
Start: 2020-02-29 | End: 2020-03-03

## 2020-02-29 RX ORDER — CHLORPHENIRAMINE MALEATE 4 MG
TABLET ORAL 2 TIMES DAILY
Status: DISCONTINUED | OUTPATIENT
Start: 2020-03-01 | End: 2020-03-12 | Stop reason: HOSPADM

## 2020-02-29 RX ORDER — HYDROCODONE BITARTRATE AND ACETAMINOPHEN 5; 325 MG/1; MG/1
1 TABLET ORAL ONCE
Status: DISCONTINUED | OUTPATIENT
Start: 2020-02-29 | End: 2020-02-29

## 2020-02-29 RX ORDER — SODIUM CHLORIDE 0.9 % (FLUSH) 0.9 %
5-40 SYRINGE (ML) INJECTION EVERY 8 HOURS
Status: DISCONTINUED | OUTPATIENT
Start: 2020-02-29 | End: 2020-03-12 | Stop reason: HOSPADM

## 2020-02-29 RX ORDER — ATORVASTATIN CALCIUM 10 MG/1
10 TABLET, FILM COATED ORAL DAILY
Status: DISCONTINUED | OUTPATIENT
Start: 2020-03-01 | End: 2020-03-12 | Stop reason: HOSPADM

## 2020-02-29 RX ADMIN — ACETAMINOPHEN 650 MG: 325 TABLET ORAL at 19:03

## 2020-02-29 RX ADMIN — VANCOMYCIN HYDROCHLORIDE 2000 MG: 10 INJECTION, POWDER, LYOPHILIZED, FOR SOLUTION INTRAVENOUS at 14:40

## 2020-02-29 RX ADMIN — OXYCODONE HYDROCHLORIDE 5 MG: 5 TABLET ORAL at 13:28

## 2020-02-29 RX ADMIN — SODIUM CHLORIDE 1000 ML: 900 INJECTION, SOLUTION INTRAVENOUS at 11:26

## 2020-02-29 RX ADMIN — LEVOFLOXACIN 750 MG: 5 INJECTION, SOLUTION INTRAVENOUS at 20:41

## 2020-02-29 RX ADMIN — CEFTRIAXONE SODIUM 1 G: 1 INJECTION, POWDER, FOR SOLUTION INTRAMUSCULAR; INTRAVENOUS at 13:35

## 2020-02-29 RX ADMIN — SODIUM CHLORIDE 1000 ML: 900 INJECTION, SOLUTION INTRAVENOUS at 10:56

## 2020-02-29 RX ADMIN — Medication 10 ML: at 21:12

## 2020-02-29 NOTE — ED TRIAGE NOTES
Pt presents via EMS with c/o LLE pain, redness, and swelling that he stated he noticed yesterday. Pt stated open wound on his L great toe for a year. Pt LLE has errythema, edema, and warmth noted. Pt tachycardic otherwise VSS at this time and GCS 15.

## 2020-02-29 NOTE — ROUTINE PROCESS
TRANSFER - IN REPORT:    Verbal report received from Jannie Hatfield RN(name) on Zoe Graven  being received from PACU(unit) for routine post - op      Report consisted of patients Situation, Background, Assessment and   Recommendations(SBAR). Information from the following report(s) SBAR, Kardex and MAR was reviewed with the receiving nurse. Opportunity for questions and clarification was provided. Assessment completed upon patients arrival to unit and care assumed.

## 2020-02-29 NOTE — H&P
Intern Admission History and Physical  [facility]      Patient: Hiwot Talley MRN: [id number]  [facility]: [id number]    YOB: 1960  Age: 61 y.o. Sex: male      Admission Date: 2/29/2020       HPI:     Hiwot Talley is a 61 y.o. male with PMH HTN, T2DM, now admitted with cellulitis. He said he started to notice redness and swelling on 2/23 and finally decided to come in when he noticed that his slipper was tight on that foot and loose on the other. He says it continued to get more swollen and painful since that time. He did have some associated nausea and chills. He has no SOB or chest pain and has no recent history of hospitalizations, surgeries or long car or air plane trips. He says he has had cellulitis before years ago. He does not recall any injury or trauma to the area.  He denies dysuria but reports recent change to brown urine    ED Course (See objective for values/interpretations):  Labs obtained: CMP, CBC, lactate, blood cultures  Medications administered:  Imaging obtained: Venous duplex, foot x-ray, CXR    Review of Systems positive in bold  General ROS: negative for  - chills, fever, night sweats, weight gain and weight loss  Psychological ROS: negative for - anxiety and depression  Ophthalmic ROS: negative for - blurry vision, decreased vision or loss of vision  ENT ROS: negative for - headaches, hearing change or visual changes  Hematological and Lymphatic ROS: negative for - bruising, jaundice  Respiratory ROS: negative for - cough, hemoptysis, shortness of breath, orthopnea, paroxysmal dyspnea, or wheezing  Cardiovascular ROS: negative for - chest pain, dyspnea on exertion, edema, loss of consciousness, or palpitations   Gastrointestinal ROS: negative for - abdominal pain, blood in stools, change in stools, constipation, diarrhea, hematemesis, melena, nausea/vomiting or swallowing difficulty/pain  Genito-Urinary ROS: negative for - dysuria, hematuria or urinary frequency/urgency  Musculoskeletal ROS: negative for - joint pain, joint swelling or muscle pain  Neurological ROS: negative for - dizziness, headaches, numbness/tingling or weakness  Dermatological ROS: negative for - rash or skin lesion changes      Drinks ~4 beers daily    Smokes cigarettes     No past medical history on file. No past surgical history on file. No family history on file. Social History     Patient does not qualify to have social determinant information on file (likely too young). Socioeconomic History    Marital status:      Spouse name: Not on file    Number of children: Not on file    Years of education: Not on file    Highest education level: Not on file       No Known Allergies    Prior to Admission Medications   Prescriptions Last Dose Informant Patient Reported? Taking?   ibuprofen (MOTRIN) 800 mg tablet   No No   Sig: Take 1 Tab by mouth every six (6) hours as needed for Pain for up to 7 days. Facility-Administered Medications: None       Physical Exam:     Patient Vitals for the past 24 hrs:   Temp Pulse Resp BP SpO2   02/29/20 1420 -- 99 -- 102/60 --   02/29/20 1032 98.2 °F (36.8 °C) (!) 101 16 96/62 97 %       Physical Exam:   General:  AAOx3, NAD   HEENT: Conjunctiva pink, sclera anicteric. PERRL. EOMI. Pharynx moist, nonerythematous. Dry mucous membranes. Thyroid not enlarged, no nodules. No cervical, supraclavicular, occipital or submandibular lymphadenopathy. No other gross abnormalities present. CV:  RRR, no murmurs. No visible pulsations or thrills. RESP:  Unlabored breathing. Lungs clear to auscultation without adventitious breath sounds. Equal expansion bilaterally. ABD:  Soft, nontender, nondistended. BS (+). No hepatosplenomegaly. No suprapubic tenderness. MS:  No joint deformity or instability. No atrophy. Neuro:  CN II-XII grossly intact. 5/5 strength bilateral upper extremities and lower extremities.  LLE pain limited  Ext:  2+ pitting edema LLE. 2+ radial and dp pulses bilaterally. Skin:  Significant erythema and swelling LLE to mid calf, picture in chart      Chemistry Recent Labs     02/29/20  1111   *   *   K 4.2   CL 97*   CO2 25   BUN 36*   CREA 1.72*   CA 9.6   AGAP 9   BUCR 21*   *   TP 7.4   ALB 2.8*   GLOB 4.6*   AGRAT 0.6*        CBC w/Diff Recent Labs     02/29/20  1111   WBC 27.1*   RBC 4.50*   HGB 14.9   HCT 42.8      GRANS 86*   LYMPH 8*   EOS 0        Liver Enzymes Protein, total   Date Value Ref Range Status   02/29/2020 7.4 6.4 - 8.2 g/dL Final     Albumin   Date Value Ref Range Status   02/29/2020 2.8 (L) 3.4 - 5.0 g/dL Final     Globulin   Date Value Ref Range Status   02/29/2020 4.6 (H) 2.0 - 4.0 g/dL Final     A-G Ratio   Date Value Ref Range Status   02/29/2020 0.6 (L) 0.8 - 1.7   Final     AST (SGOT)   Date Value Ref Range Status   02/29/2020 65 (H) 10 - 38 U/L Final     Alk. phosphatase   Date Value Ref Range Status   02/29/2020 178 (H) 45 - 117 U/L Final     Recent Labs     02/29/20  1111   TP 7.4   ALB 2.8*   GLOB 4.6*   AGRAT 0.6*   SGOT 65*   *        Lactic Acid Lactic acid   Date Value Ref Range Status   02/29/2020 1.7 0.4 - 2.0 MMOL/L Final     Recent Labs     02/29/20  1111   LAC 1.7        BNP No results found for: BNP, BNPP, XBNPT     Cardiac Enzymes No results found for: CPK, CK, CKMMB, CKMB, RCK3, CKMBT, CKNDX, CKND1, TIANA, TROPT, TROIQ, ESTHER, TROPT, TNIPOC, BNP, BNPP     Coagulation No results for input(s): PTP, INR, APTT, INREXT in the last 72 hours. Thyroid  Lab Results   Component Value Date/Time    TSH 2.35 06/19/2019 09:15 PM          Lipid Panel No results found for: CHOL, CHOLPOCT, CHOLX, CHLST, CHOLV, 862645, HDL, HDLP, LDL, LDLC, DLDLP, 815986, VLDLC, VLDL, TGLX, TRIGL, TRIGP, TGLPOCT, CHHD, CHHDX     ABG No results for input(s): PHI, PHI, POC2, PCO2I, PO2, PO2I, HCO3, HCO3I, FIO2, FIO2I in the last 72 hours.      Urinalysis Lab Results   Component Value Date/Time    Color YELLOW 06/19/2019 09:30 PM    Appearance CLOUDY 06/19/2019 09:30 PM    Specific gravity 1.013 06/19/2019 09:30 PM    pH (UA) 5.0 06/19/2019 09:30 PM    Protein NEGATIVE  06/19/2019 09:30 PM    Glucose NEGATIVE  06/19/2019 09:30 PM    Ketone TRACE (A) 06/19/2019 09:30 PM    Bilirubin NEGATIVE  06/19/2019 09:30 PM    Urobilinogen 1.0 06/19/2019 09:30 PM    Nitrites NEGATIVE  06/19/2019 09:30 PM    Leukocyte Esterase NEGATIVE  06/19/2019 09:30 PM    Epithelial cells FEW 01/19/2011 10:27 AM    Bacteria 1+ (A) 01/19/2011 10:27 AM    WBC 0 to 3 01/19/2011 10:27 AM        Micro No results for input(s): SDES, CULT in the last 72 hours. No results for input(s): CULT in the last 72 hours. Imaging:  XR (Most Recent). Results from [place] encounter on 02/29/20   XR CHEST PORT    Narrative AP CHEST, PORTABLE    CPT CODE: 32877    INDICATION: Above. Meets SIRS criteria. COMPARISON: 6/19/2019 AP portable, 6/23/2009 PA and lateral.    TECHNIQUE: Portable AP chest radiograph is reviewed. FINDINGS:    There is focal irregular increased opacity projected over the left lung base  laterally concerning for focal lung opacity due to airspace disease or  atelectasis. As usual in an adult patient, radiographic follow-up is recommended  to confirm resolution. Some of this density could also relate to the underlying  rib, potentially callus at site of healing rib fracture. Mild vague diffuse reticulonodular prominence of the interstitial markings  without other evidence for focal pulmonary consolidation. No evidence of  pneumothorax. The costophrenic sulci appear sharp. The cardiac silhouette is within normal limits in size. Aortic atherosclerotic  calcification noted best seen at the level of the aortic arch. Diffuse radiographic osteopenia. Old right rib fractures noted.       Impression IMPRESSION:     Focal irregular increased opacity projects at the left lung base partially  overlapping the anterior sixth rib shadow, concerning for focal airspace disease  versus atelectasis. Some of this may also relate to the underlying rib as  described. Mild diffuse reticulonodular prominence of the interstitial markings. CT (Most Recent) Results from Hospital Encounter encounter on 06/19/19   CT HEAD WO CONT    Narrative CT HEAD WO CONT    History: fall , lower extremity weakness    Technique: 5 mm axial images acquired through the brain. CT scans at this facility are performed using dose optimization technique as  appropriate with performed exam, to include automated exposure control,  adjustment of mA and/or kV according to patient's size (including appropriate  matching for site-specific examinations), or use of iterative reconstruction  technique. Comparison: December 2011    Findings:    Soft tissues: No significant findings. Skull base/calvarium: Unremarkable. Brain: There is no acute intracranial hemorrhage or territorial infarction. Ventricles and cisterns: Unremarkable for age. Orbits: Unremarkable. Paranasal Sinuses: Clear     Other findings: None      Impression IMPRESSION[de-identified]  1.  No acute intracranial pathology. Thank you for this referral.        ECHO No results found for this or any previous visit. EKG No results found for this or any previous visit. Recent Results (from the past 12 hour(s))   CBC WITH AUTOMATED DIFF    Collection Time: 02/29/20 11:11 AM   Result Value Ref Range    WBC 27.1 (H) 4.6 - 13.2 K/uL    RBC 4.50 (L) 4.70 - 5.50 M/uL    HGB 14.9 13.0 - 16.0 g/dL    HCT 42.8 36.0 - 48.0 %    MCV 95.1 74.0 - 97.0 FL    MCH 33.1 24.0 - 34.0 PG    MCHC 34.8 31.0 - 37.0 g/dL    RDW 14.8 (H) 11.6 - 14.5 %    PLATELET 028 743 - 989 K/uL    MPV 9.8 9.2 - 11.8 FL    NEUTROPHILS 86 (H) 42 - 75 %    BAND NEUTROPHILS 3 0 - 5 %    LYMPHOCYTES 8 (L) 20 - 51 %    MONOCYTES 3 2 - 9 %    EOSINOPHILS 0 0 - 5 %    BASOPHILS 0 0 - 3 %    ABS. NEUTROPHILS 24.1 (H) 1.8 - 8.0 K/UL    ABS. LYMPHOCYTES 2.2 0.8 - 3.5 K/UL    ABS. MONOCYTES 0.8 0 - 1.0 K/UL    ABS. EOSINOPHILS 0.0 0.0 - 0.4 K/UL    ABS. BASOPHILS 0.0 0.0 - 0.06 K/UL    DF MANUAL      PLATELET COMMENTS ADEQUATE PLATELETS      RBC COMMENTS ANISOCYTOSIS  1+        WBC COMMENTS VACUOLATED POLYS     METABOLIC PANEL, BASIC    Collection Time: 02/29/20 11:11 AM   Result Value Ref Range    Sodium 131 (L) 136 - 145 mmol/L    Potassium 4.2 3.5 - 5.5 mmol/L    Chloride 97 (L) 100 - 111 mmol/L    CO2 25 21 - 32 mmol/L    Anion gap 9 3.0 - 18 mmol/L    Glucose 180 (H) 74 - 99 mg/dL    BUN 36 (H) 7.0 - 18 MG/DL    Creatinine 1.72 (H) 0.6 - 1.3 MG/DL    BUN/Creatinine ratio 21 (H) 12 - 20      GFR est AA 49 (L) >60 ml/min/1.73m2    GFR est non-AA 41 (L) >60 ml/min/1.73m2    Calcium 9.6 8.5 - 10.1 MG/DL   LACTIC ACID    Collection Time: 02/29/20 11:11 AM   Result Value Ref Range    Lactic acid 1.7 0.4 - 2.0 MMOL/L   HEPATIC FUNCTION PANEL    Collection Time: 02/29/20 11:11 AM   Result Value Ref Range    Protein, total 7.4 6.4 - 8.2 g/dL    Albumin 2.8 (L) 3.4 - 5.0 g/dL    Globulin 4.6 (H) 2.0 - 4.0 g/dL    A-G Ratio 0.6 (L) 0.8 - 1.7      Bilirubin, total 0.8 0.2 - 1.0 MG/DL    Bilirubin, direct 0.6 (H) 0.0 - 0.2 MG/DL    Alk. phosphatase 178 (H) 45 - 117 U/L    AST (SGOT) 65 (H) 10 - 38 U/L    ALT (SGPT) 77 (H) 16 - 61 U/L       Assessment/Plan:   61 y.o. male with PMH alcohol abuse, HTN, T2DM, now admitted with sepsis 2/2 cellulitis. Sepsis 2/2 cellulitis  At admission, WBC 27.1, 3 bands, tachy to ~101, soft BPs ~100/60, lactate 1.7. Pt with ~6 days of worsening pain, redness and swelling in his LLE associated with some nausea, vomiting, fevers and chills. DDx includes DVT, possible given extent of unilateral extremity edema, less likely with no history of prevoking factors. Necrotizing fasciitis less likely given proportionate pain, slow course of spread.  Strong DP pulses, toe movement without significant pain, good cap refill, no SOB. Blood cultures pending, no lesion concerning for osteo. CXR mildly concerning for pneumonia and cough on exam. Other septic sources being evaluated with UA and urine culture. - Admit to floor  - PT/OT/CM  - Continue Vanc and cefepime until urine culture results  - Add Levaquin to cover respiratory source  - Vital signs per unit routine  - 2898 mL NS via sepsis bundle ordered  - Monitor rash progression  - Pain control with tylenol   - Fall precautions  - Daily [facility] CMP  - Follow PVLs    GAIL  Baseline creatinine <1.0. 1.72 at admission. Sepsis bolus ordered in ED  - Fluids as above, add IV fluids if not good PO intake  - Avoid nephrotoxic meds when possible    Alcohol use disorder  Drinks ~4 drinks daily, had some beer this AM. No history of DT's or serious withdrawal. AST and ALT very slightly increased at 65 and 77 respectively at admission.  - CIWA protocol  - Trend transaminases    Acute L3 compression fracture  Seen in ED, 2/23 told to FU with ortho OP  - Pain control as needed  - Ortho consult 3/2 AM    HTN  Previously well controlled on lisinopril 10 outpatient  - Hold home lisinopril, monitor response to fluids for restart    DM  - Last A1c Nov 5 2019 5.5. On 1500 metformin daily  - SSI, hold metformin      Diet Regular   DVT Prophylaxis SQH   GI Prophylaxis None   Code status Full   Disposition 2-3 days then home with [address]   Relationship: Brother  [phone]        Garrett Mcconnell MD , PGY-1   [address]   Intern Pager: [phone]   February 29, 2020, 2:40 PM         Moraima Perrypool is a 61 y.o. male with PMH HTN, HLD, ETOH abuse, now presenting with complaint of left leg pain and swelling.     Patient states he developed left lower extremity pain and swelling about a week ago. He denies any trauma to his leg however he does report having a lesion on his left great toe that is chronic for him.  He also reports history of cellulitis in his leg about 30 years ago. He does report fevers at home. He states he has taken \"generic medication for this problem\". He denies any recent surgery, prolonged travel, or history of cancer. Other problems include dark colored urine however he denies any abdominal pain or pain with urination. Patient has history of alcohol abuse and drinks about 2 beers/day and occasional 2 drinks of liquor per day. His last drink was this morning and he says he drank approx 1/2 a beer.          ED Course:      CBC  CMP   Sepsis bundle   Normal saline   Ceftriaxone, vancomycin   Bcx   EKG   PVL      Review of Systems  Constitutional: Positive for fevers. HENT: Negative for hearing loss and sore throat. Eyes: Negative for blurred vision and double vision. Respiratory: Positive for cough. Cardiovascular: Negative for chest pain and palpitations. Gastrointestinal: Negative for nausea and vomiting. Genitourinary: Negative for dysuria and hematuria. Positive for dark colored urine. Musculoskeletal: left lower extremity pain   Skin: Negative for itching and rash. Neurological: Negative for dizziness and headaches. Psychiatric: Negative for depression and suicidal ideas.         PMH: HTN, HLD, ETOH abuse      PSH: left index finger surgery      FH: non-contributory      Social History               Patient does not qualify to have social determinant information on file (likely too young). Socioeconomic History    Marital status:        Spouse name: Not on file    Number of children: Not on file    Years of education: Not on file    Highest education level: Not on file            No Known Allergies     Prior to Admission Medications   Prescriptions Last Dose Informant Patient Reported? Taking?   ibuprofen (MOTRIN) 800 mg tablet 2/29/2020 at Unknown time   No Yes   Sig: Take 1 Tab by mouth every six (6) hours as needed for Pain for up to 7 days.    lisinopril (PRINIVIL, ZESTRIL) 10 mg tablet 2/29/2020 at Unknown time   Yes Yes   Sig: Take 10 mg by mouth daily. metFORMIN ER (GLUCOPHAGE XR) 750 mg tablet 2/29/2020 at Unknown time   Yes Yes   Sig: Take 1,500 mg by mouth daily. simvastatin (ZOCOR) 20 mg tablet 2/28/2020 at Unknown time   Yes Yes   Sig: Take 20 mg by mouth nightly. Facility-Administered Medications: None         Physical Exam:      Patient Vitals for the past 24 hrs:    Temp Pulse Resp BP SpO2   02/29/20 1515 -- (!) 106 22 125/74 --   02/29/20 1500 -- (!) 105 23 114/68 --   02/29/20 1445 -- (!) 102 18 113/68 --   02/29/20 1430 -- 100 24 111/72 --   02/29/20 1420 -- 99 -- 102/60 --   02/29/20 1032 98.2 °F (36.8 °C) (!) 101 16 96/62 97 %         Physical Exam:  General:  Alert and Responsive and in No acute distress. disheveled appearing   HEENT: sclera anicteric. EOMI. PERRL. mucous membranes dry. Thyroid not enlarged, no nodules. No cervical, supraclavicular, occipital or submandibular lymphadenopathy. No other gross abnormalities appreciated. CV:  RRR. No murmurs, rubs, or gallops appreciated. No visible pulsations or thrills. RESP: dry cough on examination. Unlabored breathing. Lungs clear to auscultation. No wheeze, rales, or rhonchi. Equal expansion bilaterally. ABD:  Soft, nontender, nondistended. Normoactive bowel sounds. No hepatosplenomegaly. No suprapubic tenderness. MS:  LLE with significant erythema extending from his left ankle to his left knee   Neuro:  Some limited ROM in his left lower extremity due to pain, otherwise moving extremities grossly   Ext:  1-2+ edema in his left lower extremity, 2+ DP pulse   Skin:  No rashes, lesions, or ulcers. Good turgor.     IMAGING: Xr Foot Lt Min 3 V     Result Date: 2/29/2020  Impression: Soft tissue lucency along the plantar great toe suspicious for reported ulcer/wound.  Punctate opacity suspicious for radiopaque foreign body projects close to the site of this lucency along the plantar aspect of the left great toe best demonstrated on lateral view, clinical correlation is needed, please see above. Soft tissue swelling as described. No evidence of acute fracture or dislocation. The patient has been admitted.     Xr Chest Port     Result Date: 2/29/2020  IMPRESSION: Focal irregular increased opacity projects at the left lung base partially overlapping the anterior sixth rib shadow, concerning for focal airspace disease versus atelectasis. Some of this may also relate to the underlying rib as described. Mild diffuse reticulonodular prominence of the interstitial markings.         Recent Results         Recent Results (from the past 24 hour(s))   CBC WITH AUTOMATED DIFF     Collection Time: 02/29/20 11:11 AM   Result Value Ref Range     WBC 27.1 (H) 4.6 - 13.2 K/uL     RBC 4.50 (L) 4.70 - 5.50 M/uL     HGB 14.9 13.0 - 16.0 g/dL     HCT 42.8 36.0 - 48.0 %     MCV 95.1 74.0 - 97.0 FL     MCH 33.1 24.0 - 34.0 PG     MCHC 34.8 31.0 - 37.0 g/dL     RDW 14.8 (H) 11.6 - 14.5 %     PLATELET 861 036 - 685 K/uL     MPV 9.8 9.2 - 11.8 FL     NEUTROPHILS 86 (H) 42 - 75 %     BAND NEUTROPHILS 3 0 - 5 %     LYMPHOCYTES 8 (L) 20 - 51 %     MONOCYTES 3 2 - 9 %     EOSINOPHILS 0 0 - 5 %     BASOPHILS 0 0 - 3 %     ABS. NEUTROPHILS 24.1 (H) 1.8 - 8.0 K/UL     ABS. LYMPHOCYTES 2.2 0.8 - 3.5 K/UL     ABS. MONOCYTES 0.8 0 - 1.0 K/UL     ABS. EOSINOPHILS 0.0 0.0 - 0.4 K/UL     ABS.  BASOPHILS 0.0 0.0 - 0.06 K/UL     DF MANUAL       PLATELET COMMENTS ADEQUATE PLATELETS       RBC COMMENTS ANISOCYTOSIS  1+          WBC COMMENTS VACUOLATED POLYS     METABOLIC PANEL, BASIC     Collection Time: 02/29/20 11:11 AM   Result Value Ref Range     Sodium 131 (L) 136 - 145 mmol/L     Potassium 4.2 3.5 - 5.5 mmol/L     Chloride 97 (L) 100 - 111 mmol/L     CO2 25 21 - 32 mmol/L     Anion gap 9 3.0 - 18 mmol/L     Glucose 180 (H) 74 - 99 mg/dL     BUN 36 (H) 7.0 - 18 MG/DL     Creatinine 1.72 (H) 0.6 - 1.3 MG/DL     BUN/Creatinine ratio 21 (H) 12 - 20       GFR est AA 49 (L) >60 ml/min/1.73m2     GFR est non-AA 41 (L) >60 ml/min/1.73m2     Calcium 9.6 8.5 - 10.1 MG/DL   LACTIC ACID     Collection Time: 02/29/20 11:11 AM   Result Value Ref Range     Lactic acid 1.7 0.4 - 2.0 MMOL/L   HEPATIC FUNCTION PANEL     Collection Time: 02/29/20 11:11 AM   Result Value Ref Range     Protein, total 7.4 6.4 - 8.2 g/dL     Albumin 2.8 (L) 3.4 - 5.0 g/dL     Globulin 4.6 (H) 2.0 - 4.0 g/dL     A-G Ratio 0.6 (L) 0.8 - 1.7       Bilirubin, total 0.8 0.2 - 1.0 MG/DL     Bilirubin, direct 0.6 (H) 0.0 - 0.2 MG/DL     Alk. phosphatase 178 (H) 45 - 117 U/L     AST (SGOT) 65 (H) 10 - 38 U/L     ALT (SGPT) 77 (H) 16 - 61 U/L               Assessment/Plan:   61 y.o. male with PMH HTN, HLD, ETOH abuse, now admitted with LLE cellulitis     Sepsis/LLE Cellulitis    Meets 2/4 SIRS criteria on admission with tachycardia and elevated WBC to 27 with left shift. He has significant circumferential erythema to his left lower extremity from his left foot to his knee. He is afebrile and otherwise HD stable. There is no pain out of proportion to physical exam findings, no bullae, crepitus, and subacute duration rule against necrotizing fasciitis. He was started on sepsis bundle in the emergency department. X ray of the left foot shows: Soft tissue lucency along the plantar great toe suspicious for reported ulcer/wound. Punctate opacity suspicious for radiopaque foreign body projects close to the site of this lucency along the plantar aspect of the left great toe  Blood cultures taken. He does have a chronic dark brown appearing callus on his left great toe. He is neurovascularly intact and cap refill is <2 secs. Started on ceftriaxone and vancomycin in the ED. Patient also reporting some dark colored urine but denies dysuria. CXR in ED shows concern for possible opacity in left lower lobe, coughing on examination.     - admit to medicine   - continue with broad spectrum antibiotics, will consider addition of Levaquin given concern for LLL PNA   - continue with IV fluids   - pain control with tylenol, will be cautious has he may have liver impairment given alcohol abuse, mild elevation in transaminases   - daily CBC, CMP   - follow blood cultures      GAIL    DDX prerenal azotemia as patient appears dry on examination. - will check daily CMP   - avoid NSAIDs for pain control and other nephrotoxic medications   - continue IV fluids for now      HTN   EKG on admission withouth ST segment changes, no chest pain currently. Takes lisinopril at home. - hold lisinopril for borderline soft pressures, and in setting of GAIL    - monitor BP      Alcohol Abuse   Last drink was half a beer this am. Patient drinks about 4 drinks/day. Denies history of DTs or alcohol withdrawal.   - will place on CIWA protocol      L3 Vertebral Compression Fracture  Found on lumbar radiograph in [facility] ED S/p fall and presented to ED on 2/23.  Patient was to follow up with ortho outpatient   - pain control with tylenol PRN   - consider ortho consult while inpatient      Please refer to intern not for further discussion of chronic management.      Disposition and anticipated LOS: 2 Midnights      Ezio Goldstein MD PGY-2  [id number] [facility]

## 2020-02-29 NOTE — ED PROVIDER NOTES
EMERGENCY DEPARTMENT HISTORY AND PHYSICAL EXAM    11:18 AM      Date: 2/29/2020  Patient Name: Zeny Mosquera    History of Presenting Illness     Chief Complaint   Patient presents with    Leg Pain     Left LLE pain and swelling          History Provided By: Patient    Additional History (Context): Zeny Mosquera is a 61 y.o. male with Past medical history of leg edema, alcohol abuse, elevated LFTs who presents with chief complaint of moderate to severe left leg pain for the past 3 days. Associated symptoms are swelling and redness. Patient does report having a wound on his left great toe for almost a year. He denies any trauma, fever, chills, nausea, chest pain, shortness of breath, numbness, and no other complaint. He does not recall stepping on any object in the recent past or remote past.  Has not taken anything for his symptoms. PCP: Consuelo Joyner MD        Past History     Past Medical History:  No past medical history on file. Past Surgical History:  No past surgical history on file. Family History:  No family history on file. Social History:  Social History     Tobacco Use    Smoking status: Not on file   Substance Use Topics    Alcohol use: Not on file    Drug use: Not on file       Allergies:  No Known Allergies      Review of Systems       Review of Systems   Constitutional: Negative for chills and fever. HENT: Negative for congestion, rhinorrhea, sore throat and trouble swallowing. Respiratory: Negative for cough, shortness of breath and wheezing. Cardiovascular: Positive for leg swelling. Negative for chest pain. Gastrointestinal: Negative for abdominal pain, nausea and vomiting. Genitourinary: Negative for difficulty urinating and dysuria. Musculoskeletal: Negative for arthralgias, neck pain and neck stiffness. Left leg redness and leg pain   Skin: Negative for rash. Neurological: Negative for dizziness, weakness, numbness and headaches.    Hematological: Does not bruise/bleed easily. Psychiatric/Behavioral: Negative for confusion and dysphoric mood. All other systems reviewed and are negative. Physical Exam     Visit Vitals  BP 96/62 (BP 1 Location: Left arm, BP Patient Position: Sitting)   Pulse (!) 101   Temp 98.2 °F (36.8 °C)   Resp 16   Ht 6' 2\" (1.88 m)   Wt 96.6 kg (213 lb)   SpO2 97%   BMI 27.35 kg/m²         Physical Exam  Vitals signs and nursing note reviewed. Constitutional:       General: He is not in acute distress. Appearance: He is well-developed. He is not diaphoretic. HENT:      Head: Normocephalic and atraumatic. Nose: Nose normal.      Mouth/Throat:      Mouth: Mucous membranes are moist.   Eyes:      General: No scleral icterus. Conjunctiva/sclera: Conjunctivae normal.      Pupils: Pupils are equal, round, and reactive to light. Neck:      Musculoskeletal: Normal range of motion and neck supple. Cardiovascular:      Rate and Rhythm: Normal rate. Heart sounds: Normal heart sounds. No gallop. Comments: Capillary refill < 3 seconds  Pulmonary:      Effort: Pulmonary effort is normal. No respiratory distress. Breath sounds: Normal breath sounds. No wheezing or rales. Abdominal:      General: Bowel sounds are normal. There is no distension. Palpations: Abdomen is soft. Tenderness: There is no abdominal tenderness. Musculoskeletal: Normal range of motion. General: Swelling (Left lower extremity redness and swelling, and feeling of greater warmth compared to right lower extremity) and tenderness present. No signs of injury.       Comments: Tender to palpate left lower extremity from proximal foot to tib-fib and calf region    The erythema is circumferential    Callus on the bottom of left great toe    No knee or thigh tenderness to palpation    Has symmetric dorsalis pedal pulses  Capillary refill is less than 3 seconds  Foot and leg sensation intact  No necrosis noted Lymphadenopathy:      Cervical: No cervical adenopathy. Skin:     General: Skin is warm and dry. Findings: Erythema present. Neurological:      Mental Status: He is alert and oriented to person, place, and time. Cranial Nerves: No cranial nerve deficit. Diagnostic Study Results     Labs -  Recent Results (from the past 12 hour(s))   CBC WITH AUTOMATED DIFF    Collection Time: 02/29/20 11:11 AM   Result Value Ref Range    WBC 27.1 (H) 4.6 - 13.2 K/uL    RBC 4.50 (L) 4.70 - 5.50 M/uL    HGB 14.9 13.0 - 16.0 g/dL    HCT 42.8 36.0 - 48.0 %    MCV 95.1 74.0 - 97.0 FL    MCH 33.1 24.0 - 34.0 PG    MCHC 34.8 31.0 - 37.0 g/dL    RDW 14.8 (H) 11.6 - 14.5 %    PLATELET 540 871 - 738 K/uL    MPV 9.8 9.2 - 11.8 FL    NEUTROPHILS 86 (H) 42 - 75 %    BAND NEUTROPHILS 3 0 - 5 %    LYMPHOCYTES 8 (L) 20 - 51 %    MONOCYTES 3 2 - 9 %    EOSINOPHILS 0 0 - 5 %    BASOPHILS 0 0 - 3 %    ABS. NEUTROPHILS 24.1 (H) 1.8 - 8.0 K/UL    ABS. LYMPHOCYTES 2.2 0.8 - 3.5 K/UL    ABS. MONOCYTES 0.8 0 - 1.0 K/UL    ABS. EOSINOPHILS 0.0 0.0 - 0.4 K/UL    ABS.  BASOPHILS 0.0 0.0 - 0.06 K/UL    DF MANUAL      PLATELET COMMENTS ADEQUATE PLATELETS      RBC COMMENTS ANISOCYTOSIS  1+        WBC COMMENTS VACUOLATED POLYS     METABOLIC PANEL, BASIC    Collection Time: 02/29/20 11:11 AM   Result Value Ref Range    Sodium 131 (L) 136 - 145 mmol/L    Potassium 4.2 3.5 - 5.5 mmol/L    Chloride 97 (L) 100 - 111 mmol/L    CO2 25 21 - 32 mmol/L    Anion gap 9 3.0 - 18 mmol/L    Glucose 180 (H) 74 - 99 mg/dL    BUN 36 (H) 7.0 - 18 MG/DL    Creatinine 1.72 (H) 0.6 - 1.3 MG/DL    BUN/Creatinine ratio 21 (H) 12 - 20      GFR est AA 49 (L) >60 ml/min/1.73m2    GFR est non-AA 41 (L) >60 ml/min/1.73m2    Calcium 9.6 8.5 - 10.1 MG/DL   LACTIC ACID    Collection Time: 02/29/20 11:11 AM   Result Value Ref Range    Lactic acid 1.7 0.4 - 2.0 MMOL/L   HEPATIC FUNCTION PANEL    Collection Time: 02/29/20 11:11 AM   Result Value Ref Range    Protein, total 7.4 6.4 - 8.2 g/dL    Albumin 2.8 (L) 3.4 - 5.0 g/dL    Globulin 4.6 (H) 2.0 - 4.0 g/dL    A-G Ratio 0.6 (L) 0.8 - 1.7      Bilirubin, total 0.8 0.2 - 1.0 MG/DL    Bilirubin, direct 0.6 (H) 0.0 - 0.2 MG/DL    Alk. phosphatase 178 (H) 45 - 117 U/L    AST (SGOT) 65 (H) 10 - 38 U/L    ALT (SGPT) 77 (H) 16 - 61 U/L       Radiologic Studies -   XR CHEST PORT   Final Result   IMPRESSION:       Focal irregular increased opacity projects at the left lung base partially   overlapping the anterior sixth rib shadow, concerning for focal airspace disease   versus atelectasis. Some of this may also relate to the underlying rib as   described. Mild diffuse reticulonodular prominence of the interstitial markings. XR FOOT LT MIN 3 V   Final Result   Impression:       Soft tissue lucency along the plantar great toe suspicious for reported   ulcer/wound. Punctate opacity suspicious for radiopaque foreign body projects   close to the site of this lucency along the plantar aspect of the left great toe   best demonstrated on lateral view, clinical correlation is needed, please see   above. Soft tissue swelling as described. No evidence of acute fracture or dislocation. The patient has been admitted. Vascular study:   · The left peroneal veins is grossly patent, however, cannot rule out a non-occlusive thrombosis in these segments. · Two non-vascularized structures noted in the left groin measuring 1.12 cm x 0.84 cm and 0.84 cm x 1.12 cm x 0.84 cm. · Technically difficult study due to calf edema. Lower Extremity Venous Findings     Right Lower Venous     For comparison purposes, the right common femoral vein was briefly interrogated. The vein demonstrates normal color filling and compressibility. Doppler flow was phasic and spontaneous. Left Lower Venous     Technically difficult exam due to: marked edema. The peroneal vein(s) are grossly patent but cannot exclude non-occlusive thrombus. The common femoral, greater saphenous, femoral, popliteal and posterior tibial vein(s) were imaged in the transverse and longitudinal planes. The vessels showed normal color filling and compressibility. Doppler interrogation of the veins showed phasic and spontaneous flow. The left posterior tibial artery has triphasic waveforms. Two non-vascularized structures noted in the left groin measuring 1.12 cm x 0.84 cm and 0.84 cm x 1.12 cm x 0.84 cm. Medical Decision Making   I am the first provider for this patient. I reviewed the vital signs, available nursing notes, past medical history, past surgical history, family history and social history. Vital Signs-Reviewed the patient's vital signs.         Records Reviewed: Nursing Notes and Old Medical Records (Time of Review: 11:18 AM)    Provider Notes (Medical Decision Making): DDX: Cellulitis, DVT, edema, metabolic    Get labs, vascular study, concern for cellulitis, will start antibiotics  Gave analgesia as well    MDM    Medications   sodium chloride (NS) flush 5-10 mL (has no administration in time range)   cefTRIAXone (ROCEPHIN) 1 g in sterile water (preservative free) 10 mL IV syringe (1 g IntraVENous Given 2/29/20 1335)   sodium chloride 0.9 % bolus infusion 1,000 mL (has no administration in time range)     Followed by   sodium chloride 0.9 % bolus infusion 1,000 mL (has no administration in time range)     Followed by   sodium chloride 0.9 % bolus infusion 898 mL (has no administration in time range)   vancomycin (VANCOCIN) 2000 mg in  ml infusion (has no administration in time range)   oxyCODONE IR (ROXICODONE) tablet 5 mg (5 mg Oral Given 2/29/20 1328)         ED Course: Progress Notes, Reevaluation, and Consults:  WBC 27  Lactic acid 1.7    BUN 36, creatinine 1.7    No acute occlusive DVT seen on vascular study although difficult study due to edema    Consult:  Discussed care with Dr. Alicia Ramirez, Specialty: [facility] medicine resident on-call for attending Dr. Sil Silvestre standard discussion; including history of patients chief complaint, available diagnostic results, and treatment course. He accepts admission  1:20 PM, 2/29/2020       I did the sepsis reassessment at 1:37 PM  Mayte Tee DO    I discussed with patient diagnosis, results, plan for admission and he agrees. Critical care time:   I have spent 41 minutes of critical care time involved in lab review, consultations with specialist, family decision making, and documentation. During this entire length of time I was immediately available to the patient. Critical care: The reason for providing this level of medical care for this critically ill patient was due to a critical illness that impaired one or more vital organ systems such that there was a high probability of imminent or life threatening deterioration in the patients condition. This care involved high complexity decision making to assess, manipulate and support vital system functions, to treat this degree vital organ system failure and to prevent further life threatening deterioration of the patient's condition. Diagnosis     Clinical Impression:   1. Cellulitis of left lower extremity    2. Acute kidney injury ([address]    3. Hypotension, unspecified hypotension type    4. Sepsis due to cellulitis ([address]    5. Acute leg pain, left        Disposition: Admitted    Follow-up Information    None              Mayte Tee DO    Dragon medical dictation software was used for portions of this report. Unintended transcription errors may occur. My signature above authenticates this document and my orders, the final    diagnosis (es), discharge prescription (s), and instructions in the Epic    record.

## 2020-03-01 ENCOUNTER — APPOINTMENT (OUTPATIENT)
Dept: MRI IMAGING | Age: 60
DRG: 854 | End: 2020-03-01
Attending: STUDENT IN AN ORGANIZED HEALTH CARE EDUCATION/TRAINING PROGRAM
Payer: MEDICARE

## 2020-03-01 ENCOUNTER — APPOINTMENT (OUTPATIENT)
Dept: GENERAL RADIOLOGY | Age: 60
DRG: 854 | End: 2020-03-01
Attending: STUDENT IN AN ORGANIZED HEALTH CARE EDUCATION/TRAINING PROGRAM
Payer: MEDICARE

## 2020-03-01 LAB
ALBUMIN SERPL-MCNC: 1.9 G/DL (ref 3.4–5)
ALBUMIN/GLOB SERPL: 0.5 {RATIO} (ref 0.8–1.7)
ALP SERPL-CCNC: 196 U/L (ref 45–117)
ALT SERPL-CCNC: 47 U/L (ref 16–61)
ANION GAP SERPL CALC-SCNC: 7 MMOL/L (ref 3–18)
AST SERPL-CCNC: 35 U/L (ref 10–38)
ATRIAL RATE: 98 BPM
BASOPHILS # BLD: 0 K/UL (ref 0–0.06)
BASOPHILS NFR BLD: 0 % (ref 0–3)
BILIRUB SERPL-MCNC: 0.7 MG/DL (ref 0.2–1)
BUN SERPL-MCNC: 28 MG/DL (ref 7–18)
BUN/CREAT SERPL: 27 (ref 12–20)
CALCIUM SERPL-MCNC: 8.3 MG/DL (ref 8.5–10.1)
CALCULATED P AXIS, ECG09: 32 DEGREES
CALCULATED R AXIS, ECG10: 82 DEGREES
CALCULATED T AXIS, ECG11: 79 DEGREES
CHLORIDE SERPL-SCNC: 103 MMOL/L (ref 100–111)
CO2 SERPL-SCNC: 24 MMOL/L (ref 21–32)
CREAT SERPL-MCNC: 1.03 MG/DL (ref 0.6–1.3)
CRP SERPL HS-MCNC: >9.5 MG/L
DIAGNOSIS, 93000: NORMAL
DIFFERENTIAL METHOD BLD: ABNORMAL
EOSINOPHIL # BLD: 0 K/UL (ref 0–0.4)
EOSINOPHIL NFR BLD: 0 % (ref 0–5)
ERYTHROCYTE [DISTWIDTH] IN BLOOD BY AUTOMATED COUNT: 15 % (ref 11.6–14.5)
GLOBULIN SER CALC-MCNC: 4.2 G/DL (ref 2–4)
GLUCOSE SERPL-MCNC: 104 MG/DL (ref 74–99)
HCT VFR BLD AUTO: 36.7 % (ref 36–48)
HGB BLD-MCNC: 12.4 G/DL (ref 13–16)
LYMPHOCYTES # BLD: 1.7 K/UL (ref 0.8–3.5)
LYMPHOCYTES NFR BLD: 6 % (ref 20–51)
MCH RBC QN AUTO: 32.4 PG (ref 24–34)
MCHC RBC AUTO-ENTMCNC: 33.8 G/DL (ref 31–37)
MCV RBC AUTO: 95.8 FL (ref 74–97)
MONOCYTES # BLD: 3.6 K/UL (ref 0–1)
MONOCYTES NFR BLD: 13 % (ref 2–9)
NEUTS BAND NFR BLD MANUAL: 2 % (ref 0–5)
NEUTS SEG # BLD: 22.5 K/UL (ref 1.8–8)
NEUTS SEG NFR BLD: 79 % (ref 42–75)
P-R INTERVAL, ECG05: 126 MS
PLATELET # BLD AUTO: 194 K/UL (ref 135–420)
PLATELET COMMENTS,PCOM: ABNORMAL
PMV BLD AUTO: 9.8 FL (ref 9.2–11.8)
POTASSIUM SERPL-SCNC: 3.8 MMOL/L (ref 3.5–5.5)
PROT SERPL-MCNC: 6.1 G/DL (ref 6.4–8.2)
Q-T INTERVAL, ECG07: 350 MS
QRS DURATION, ECG06: 74 MS
QTC CALCULATION (BEZET), ECG08: 446 MS
RBC # BLD AUTO: 3.83 M/UL (ref 4.7–5.5)
RBC MORPH BLD: ABNORMAL
SODIUM SERPL-SCNC: 134 MMOL/L (ref 136–145)
VENTRICULAR RATE, ECG03: 98 BPM
WBC # BLD AUTO: 27.8 K/UL (ref 4.6–13.2)

## 2020-03-01 PROCEDURE — 74011250636 HC RX REV CODE- 250/636: Performed by: FAMILY MEDICINE

## 2020-03-01 PROCEDURE — 74011250637 HC RX REV CODE- 250/637: Performed by: STUDENT IN AN ORGANIZED HEALTH CARE EDUCATION/TRAINING PROGRAM

## 2020-03-01 PROCEDURE — 74011000250 HC RX REV CODE- 250: Performed by: STUDENT IN AN ORGANIZED HEALTH CARE EDUCATION/TRAINING PROGRAM

## 2020-03-01 PROCEDURE — 74011250636 HC RX REV CODE- 250/636: Performed by: STUDENT IN AN ORGANIZED HEALTH CARE EDUCATION/TRAINING PROGRAM

## 2020-03-01 PROCEDURE — A9575 INJ GADOTERATE MEGLUMI 0.1ML: HCPCS | Performed by: STUDENT IN AN ORGANIZED HEALTH CARE EDUCATION/TRAINING PROGRAM

## 2020-03-01 PROCEDURE — 73720 MRI LWR EXTREMITY W/O&W/DYE: CPT

## 2020-03-01 PROCEDURE — 65270000029 HC RM PRIVATE

## 2020-03-01 PROCEDURE — 73630 X-RAY EXAM OF FOOT: CPT

## 2020-03-01 PROCEDURE — 85025 COMPLETE CBC W/AUTO DIFF WBC: CPT

## 2020-03-01 PROCEDURE — 80053 COMPREHEN METABOLIC PANEL: CPT

## 2020-03-01 PROCEDURE — 36415 COLL VENOUS BLD VENIPUNCTURE: CPT

## 2020-03-01 RX ORDER — MORPHINE SULFATE 2 MG/ML
1 INJECTION, SOLUTION INTRAMUSCULAR; INTRAVENOUS
Status: DISCONTINUED | OUTPATIENT
Start: 2020-03-01 | End: 2020-03-03

## 2020-03-01 RX ORDER — GADOTERATE MEGLUMINE 376.9 MG/ML
15 INJECTION INTRAVENOUS
Status: COMPLETED | OUTPATIENT
Start: 2020-03-01 | End: 2020-03-01

## 2020-03-01 RX ADMIN — CLOTRIMAZOLE: 10 CREAM TOPICAL at 08:35

## 2020-03-01 RX ADMIN — Medication 10 ML: at 14:00

## 2020-03-01 RX ADMIN — MORPHINE SULFATE 1 MG: 2 INJECTION, SOLUTION INTRAMUSCULAR; INTRAVENOUS at 19:55

## 2020-03-01 RX ADMIN — Medication 10 ML: at 22:00

## 2020-03-01 RX ADMIN — HEPARIN SODIUM 5000 UNITS: 5000 INJECTION INTRAVENOUS; SUBCUTANEOUS at 00:27

## 2020-03-01 RX ADMIN — Medication 10 ML: at 06:12

## 2020-03-01 RX ADMIN — VANCOMYCIN HYDROCHLORIDE 1250 MG: 10 INJECTION, POWDER, LYOPHILIZED, FOR SOLUTION INTRAVENOUS at 08:35

## 2020-03-01 RX ADMIN — GADOTERATE MEGLUMINE 15 ML: 376.9 INJECTION INTRAVENOUS at 15:55

## 2020-03-01 RX ADMIN — HEPARIN SODIUM 5000 UNITS: 5000 INJECTION INTRAVENOUS; SUBCUTANEOUS at 23:34

## 2020-03-01 RX ADMIN — CEFTRIAXONE SODIUM 1 G: 1 INJECTION, POWDER, FOR SOLUTION INTRAMUSCULAR; INTRAVENOUS at 11:09

## 2020-03-01 RX ADMIN — LEVOFLOXACIN 750 MG: 5 INJECTION, SOLUTION INTRAVENOUS at 18:15

## 2020-03-01 RX ADMIN — ATORVASTATIN CALCIUM 10 MG: 10 TABLET, FILM COATED ORAL at 08:28

## 2020-03-01 RX ADMIN — SODIUM CHLORIDE 125 ML/HR: 900 INJECTION, SOLUTION INTRAVENOUS at 08:33

## 2020-03-01 RX ADMIN — ACETAMINOPHEN 650 MG: 325 TABLET ORAL at 06:16

## 2020-03-01 RX ADMIN — HEPARIN SODIUM 5000 UNITS: 5000 INJECTION INTRAVENOUS; SUBCUTANEOUS at 08:28

## 2020-03-01 RX ADMIN — CLOTRIMAZOLE: 10 CREAM TOPICAL at 18:16

## 2020-03-01 RX ADMIN — HEPARIN SODIUM 5000 UNITS: 5000 INJECTION INTRAVENOUS; SUBCUTANEOUS at 18:15

## 2020-03-01 RX ADMIN — SODIUM CHLORIDE 125 ML/HR: 900 INJECTION, SOLUTION INTRAVENOUS at 00:29

## 2020-03-01 RX ADMIN — MORPHINE SULFATE 1 MG: 2 INJECTION, SOLUTION INTRAMUSCULAR; INTRAVENOUS at 23:39

## 2020-03-01 NOTE — PROGRESS NOTES
Problem: General Medical Care Plan  Goal: *Vital signs within specified parameters  Outcome: Progressing Towards Goal  Goal: *Labs within defined limits  Outcome: Progressing Towards Goal  Goal: *Absence of infection signs and symptoms  Outcome: Progressing Towards Goal  Goal: *Optimal pain control at patient's stated goal  Outcome: Progressing Towards Goal  Goal: *Skin integrity maintained  Outcome: Progressing Towards Goal  Goal: *Fluid volume balance  Outcome: Progressing Towards Goal  Goal: *Optimize nutritional status  Outcome: Progressing Towards Goal  Goal: *Anxiety reduced or absent  Outcome: Progressing Towards Goal  Goal: *Progressive mobility and function (eg: ADL's)  Outcome: Progressing Towards Goal     Problem: Pain  Goal: *Control of Pain  Outcome: Progressing Towards Goal     Problem: Falls - Risk of  Goal: *Absence of Falls  Description  Document Samantha Fall Risk and appropriate interventions in the flowsheet.   Outcome: Progressing Towards Goal  Note: Fall Risk Interventions:  Mobility Interventions: Patient to call before getting OOB         Medication Interventions: Patient to call before getting OOB                   Problem: Patient Education: Go to Patient Education Activity  Goal: Patient/Family Education  Outcome: Progressing Towards Goal     Problem: Cellulitis Care Plan (Adult)  Goal: *Control of acute pain  Outcome: Progressing Towards Goal  Goal: *Skin integrity maintained  Outcome: Progressing Towards Goal  Goal: *Absence of infection signs and symptoms  Outcome: Progressing Towards Goal     Problem: Patient Education: Go to Patient Education Activity  Goal: Patient/Family Education  Outcome: Progressing Towards Goal

## 2020-03-01 NOTE — ROUTINE PROCESS
Bedside and Verbal shift change report given to Anurag Dow RN (oncoming nurse) by [facility], RN (offgoing nurse). Report included the following information SBAR, Kardex and MAR.

## 2020-03-01 NOTE — ROUTINE PROCESS
Bedside and Verbal shift change report given by Anurag Mesa RN (offgoing nurse) to Fannie Mendez RN (oncoming nurse). Report included the following information SBAR, Kardex and MAR.

## 2020-03-01 NOTE — ROUTINE PROCESS
Please complete MRI screening form with patient or knowledgeable family member and fax to MRI.   Thanks

## 2020-03-01 NOTE — ROUTINE PROCESS
2200 Patient in bed AAOx4 resting quietly, denies pain at this time. LLE tender, reddened,swelling and hot to touch, elevated with pillows. Redenness to bilateral groins noted,shade care given. 9948 Patient had tylenol for pain in the left leg. CIWA score=o

## 2020-03-01 NOTE — PROGRESS NOTES
Brief Progress Note     S: went to patients bedside to reevaluate LLE cellulitis and amira borders of erythema now that he was in hospital gown. Patient resting comfortably. NAD. States the soup he had for dinner was bad. O:   General: NAD   CV: RRR, no MRG   Chest: CTAB, no wheeze, rales or rhonchi   Extremities: erythematous rash extends up patients left thigh and into left groin, he has erythema to in his inguinal folds bilaterally with central clearing consistent with tinea cruris, scrotum not involved and no pain in his scrotum     A/p   Cellulitis. Marked boarders up to his left thigh and groin. Clear demarcation between erythema from cellulitis and are of tinea cruris in his groin. No scrotal involvement or pain concerning for [place] gangrene.  Patient had temp to 100.9 earlier this evening that resolved with tylenol.   - will add topical antifungal for tinea   - monitor for fevers   - continue plan as outlined in H&P     Meg Ruth MD PGY-2  [address]

## 2020-03-01 NOTE — PROGRESS NOTES
[address] [facility]  Intern Progress Note    Patient: Lenore Wong MRN: [id number]   SSN: [id number]  YOB: 1960   Age: 61 y.o. Sex: male      Admit Date: 2/29/2020    LOS: 1 day   Chief Complaint   Patient presents with    Leg Pain     Left LLE pain and swelling        Subjective:     Patient seen this morning. Complaining of lower back pain. Otherwise doing well. No events overnight    ROS:   Denies:   - fevers/chill  - CP  - SOB  - N/V or abdominal pain   Endorses leg pain    Objective:     PE:  - General: patient is in no acute distress  - Cv: RRR, no murmurs/gallops/rubs, peripheral pulses intact  - Resp: Lungs CTA   - Abdominal: Soft, non-tender, non-distended,  - MSK: 2+ edema in left lower extremity, erythema up to mid thigh, warm to touch, tender to touch    Vitals:   Patient Vitals for the past 24 hrs:   Temp Pulse Resp BP SpO2   03/01/20 0832 97.8 °F (36.6 °C) 97 16 111/67 95 %   03/01/20 0607 98.1 °F (36.7 °C) 99 18 124/78 95 %   02/29/20 2053 97.5 °F (36.4 °C) (!) 112 18 118/70 97 %   02/29/20 1841 (!) 100.9 °F (38.3 °C) (!) 113 19 141/89 98 %   02/29/20 1654 97.9 °F (36.6 °C) 99 21 125/82 98 %   02/29/20 1645 -- (!) 107 20 125/82 --   02/29/20 1630 -- (!) 106 21 132/78 --   02/29/20 1615 -- (!) 107 20 125/74 --   02/29/20 1600 -- (!) 107 23 129/80 --   02/29/20 1545 -- (!) 105 18 118/72 --   02/29/20 1530 -- (!) 106 28 147/72 --   02/29/20 1515 -- (!) 106 22 125/74 --   02/29/20 1500 -- (!) 105 23 114/68 --   02/29/20 1445 -- (!) 102 18 113/68 --   02/29/20 1430 -- 100 24 111/72 --   02/29/20 1420 -- 99 -- 102/60 --         Intake/Output Summary (Last 24 hours) at 3/1/2020 1121  Last data filed at 3/1/2020 1114  Gross per 24 hour   Intake 1321.67 ml   Output 1175 ml   Net 146.67 ml       Labs reviewed.    Pertinent labs: WBC 27.8, Cr 1.03, CRP >9.5, UCx NG 11h, BCx NG 18h  Pertinent studies:     PVLs pending        Assessment/Plan:   61 y.o. male with PMH alcohol abuse, HTN, T2DM, now admitted with sepsis 2/2 cellulitis. Sepsis 2/2 cellulitis- patient presents with ~6 days of worsening pain, redness and swelling in his LLE associated with some nausea, vomiting, fevers and chills. Red leg erythematous extending all the way up to groin. Tachycardic on presentation that has since resolved. Leukocytosis of 27.1 with left shift. No lactic acidosis. Urine and blood cultures pending, so far no growth. ESR WNLs, CRP elevatied. Initial XR of left foot without signs of osteomyelitis. - Continue vancomycin and cefepime. - Will get MRI of leg and foot today  - May need to consult ortho once MRI results return  - trend leukocytosis and fevers    Possible CAP- CXR on admission with Mild diffuse reticulonodular prominence of the interstitial markings and focal irregular increased opacity projects at the left lung base partially overlapping the anterior sixth rib shadow, concerning for focal airspace disease versus atelectasis. Patient has productive cough, but lungs overall sound clear on exam- do not think this is the cause of his sepsis   - continue Levaquin for now, consider de-escalating   - repeat CXR prior to discharge    GAIL- resolved. Elevated to 1.72 on admission, baseline today after fluid recussitation  - Daily BMP  - I&Os     Alcohol use disorder- hx of drinking 4 beers/day. LFTs slightly elevated on admission, resolved today  - CIWA protocol  - Trend transaminases    Acute L3 compression fracture- Seen in ED, 2/23 told to FU with ortho OP  - Pain control as needed  - Will get MRI of back today- as we are already imaging leg/foot- will reduce contrast load  - Ortho consult 3/2 AM    Hx of HTN- BP stable overnight  - continue to hold home Lisinopril for now given nrml Bps    DM- Last A1c Nov 5 2019 5.5.  On 1500 metformin daily  - Hold metformin  - SSI   - POC glucose ACHS   - diabetic diet    Tinea Cruris- groin  - Continue Clomitrazole cream     Diet Regular   DVT Prophylaxis SQH GI Prophylaxis None   Code status Full   Disposition 2-3 days then home with [facility]   Relationship: Brother  [phone]         Signed By: Rolly Brown MD     March 1, 2020

## 2020-03-01 NOTE — ROUTINE PROCESS
Patient came back from MRI stating his back is in so much pain from sitting on the hard table. Patient states he does not want no tylenol. He needs something stronger. Notified [facility]. New orders will be placed in the chart.

## 2020-03-01 NOTE — ROUTINE PROCESS
Bedside and Verbal shift change report given to 19 Nilam León rn (oncoming nurse) by Naldo Garcia RN (offgoing nurse). Report included the following information Kardex, Intake/Output, MAR and Recent Results.

## 2020-03-01 NOTE — ROUTINE PROCESS
Patient had a mew score of 3. Temp 100.9 and heart rate 113. Notified [employer]. Inquired about patient bolus. Asked Ed nurse. 2 bolus was given in the Ed. Started 3 bolus and gave patient tylenol. Obtain urine sample and sent it down. New orders will be placed in chart per [employer].

## 2020-03-02 ENCOUNTER — APPOINTMENT (OUTPATIENT)
Dept: MRI IMAGING | Age: 60
DRG: 854 | End: 2020-03-02
Attending: ORTHOPAEDIC SURGERY
Payer: MEDICARE

## 2020-03-02 ENCOUNTER — APPOINTMENT (OUTPATIENT)
Dept: VASCULAR SURGERY | Age: 60
DRG: 854 | End: 2020-03-02
Attending: ORTHOPAEDIC SURGERY
Payer: MEDICARE

## 2020-03-02 LAB
ALBUMIN SERPL-MCNC: 1.7 G/DL (ref 3.4–5)
ALBUMIN/GLOB SERPL: 0.4 {RATIO} (ref 0.8–1.7)
ALP SERPL-CCNC: 146 U/L (ref 45–117)
ALT SERPL-CCNC: 39 U/L (ref 16–61)
ANION GAP SERPL CALC-SCNC: 7 MMOL/L (ref 3–18)
AST SERPL-CCNC: 35 U/L (ref 10–38)
BACTERIA SPEC CULT: NORMAL
BASOPHILS # BLD: 0 K/UL (ref 0–0.06)
BASOPHILS NFR BLD: 0 % (ref 0–3)
BILIRUB SERPL-MCNC: 1 MG/DL (ref 0.2–1)
BUN SERPL-MCNC: 14 MG/DL (ref 7–18)
BUN/CREAT SERPL: 22 (ref 12–20)
CALCIUM SERPL-MCNC: 8.5 MG/DL (ref 8.5–10.1)
CHLORIDE SERPL-SCNC: 103 MMOL/L (ref 100–111)
CO2 SERPL-SCNC: 24 MMOL/L (ref 21–32)
CREAT SERPL-MCNC: 0.65 MG/DL (ref 0.6–1.3)
DATE LAST DOSE: NORMAL
DIFFERENTIAL METHOD BLD: ABNORMAL
EOSINOPHIL # BLD: 0 K/UL (ref 0–0.4)
EOSINOPHIL NFR BLD: 0 % (ref 0–5)
ERYTHROCYTE [DISTWIDTH] IN BLOOD BY AUTOMATED COUNT: 14.9 % (ref 11.6–14.5)
EST. AVERAGE GLUCOSE BLD GHB EST-MCNC: 120 MG/DL
GLOBULIN SER CALC-MCNC: 4.3 G/DL (ref 2–4)
GLUCOSE BLD STRIP.AUTO-MCNC: 114 MG/DL (ref 70–110)
GLUCOSE BLD STRIP.AUTO-MCNC: 115 MG/DL (ref 70–110)
GLUCOSE BLD STRIP.AUTO-MCNC: 127 MG/DL (ref 70–110)
GLUCOSE SERPL-MCNC: 109 MG/DL (ref 74–99)
HBA1C MFR BLD: 5.8 % (ref 4.2–5.6)
HCT VFR BLD AUTO: 35.8 % (ref 36–48)
HGB BLD-MCNC: 12.1 G/DL (ref 13–16)
LYMPHOCYTES # BLD: 2.3 K/UL (ref 0.8–3.5)
LYMPHOCYTES NFR BLD: 9 % (ref 20–51)
MCH RBC QN AUTO: 32.4 PG (ref 24–34)
MCHC RBC AUTO-ENTMCNC: 33.8 G/DL (ref 31–37)
MCV RBC AUTO: 95.7 FL (ref 74–97)
MONOCYTES # BLD: 1.8 K/UL (ref 0–1)
MONOCYTES NFR BLD: 7 % (ref 2–9)
NEUTS BAND NFR BLD MANUAL: 4 % (ref 0–5)
NEUTS SEG # BLD: 22 K/UL (ref 1.8–8)
NEUTS SEG NFR BLD: 80 % (ref 42–75)
PLATELET # BLD AUTO: 213 K/UL (ref 135–420)
PLATELET COMMENTS,PCOM: ABNORMAL
PMV BLD AUTO: 9.6 FL (ref 9.2–11.8)
POTASSIUM SERPL-SCNC: 3.6 MMOL/L (ref 3.5–5.5)
PROT SERPL-MCNC: 6 G/DL (ref 6.4–8.2)
RBC # BLD AUTO: 3.74 M/UL (ref 4.7–5.5)
RBC MORPH BLD: ABNORMAL
REPORTED DOSE,DOSE: NORMAL UNITS
REPORTED DOSE/TIME,TMG: 300
SERVICE CMNT-IMP: NORMAL
SODIUM SERPL-SCNC: 134 MMOL/L (ref 136–145)
VANCOMYCIN TROUGH SERPL-MCNC: 17.1 UG/ML (ref 10–20)
WBC # BLD AUTO: 26.1 K/UL (ref 4.6–13.2)

## 2020-03-02 PROCEDURE — 93923 UPR/LXTR ART STDY 3+ LVLS: CPT

## 2020-03-02 PROCEDURE — 74011250636 HC RX REV CODE- 250/636: Performed by: FAMILY MEDICINE

## 2020-03-02 PROCEDURE — 74011250636 HC RX REV CODE- 250/636: Performed by: STUDENT IN AN ORGANIZED HEALTH CARE EDUCATION/TRAINING PROGRAM

## 2020-03-02 PROCEDURE — 36415 COLL VENOUS BLD VENIPUNCTURE: CPT

## 2020-03-02 PROCEDURE — 85025 COMPLETE CBC W/AUTO DIFF WBC: CPT

## 2020-03-02 PROCEDURE — 65270000029 HC RM PRIVATE

## 2020-03-02 PROCEDURE — 74011250636 HC RX REV CODE- 250/636: Performed by: ORTHOPAEDIC SURGERY

## 2020-03-02 PROCEDURE — 74011250637 HC RX REV CODE- 250/637: Performed by: STUDENT IN AN ORGANIZED HEALTH CARE EDUCATION/TRAINING PROGRAM

## 2020-03-02 PROCEDURE — 82962 GLUCOSE BLOOD TEST: CPT

## 2020-03-02 PROCEDURE — 80053 COMPREHEN METABOLIC PANEL: CPT

## 2020-03-02 PROCEDURE — 83036 HEMOGLOBIN GLYCOSYLATED A1C: CPT

## 2020-03-02 PROCEDURE — 80202 ASSAY OF VANCOMYCIN: CPT

## 2020-03-02 PROCEDURE — 74011000250 HC RX REV CODE- 250: Performed by: STUDENT IN AN ORGANIZED HEALTH CARE EDUCATION/TRAINING PROGRAM

## 2020-03-02 PROCEDURE — 72148 MRI LUMBAR SPINE W/O DYE: CPT

## 2020-03-02 RX ORDER — DEXTROSE 50 % IN WATER (D50W) INTRAVENOUS SYRINGE
25-50 AS NEEDED
Status: DISCONTINUED | OUTPATIENT
Start: 2020-03-02 | End: 2020-03-02

## 2020-03-02 RX ORDER — LANOLIN ALCOHOL/MO/W.PET/CERES
100 CREAM (GRAM) TOPICAL DAILY
Status: DISCONTINUED | OUTPATIENT
Start: 2020-03-03 | End: 2020-03-12 | Stop reason: HOSPADM

## 2020-03-02 RX ORDER — INSULIN LISPRO 100 [IU]/ML
INJECTION, SOLUTION INTRAVENOUS; SUBCUTANEOUS
Status: DISCONTINUED | OUTPATIENT
Start: 2020-03-02 | End: 2020-03-04

## 2020-03-02 RX ORDER — MAGNESIUM SULFATE 100 %
4 CRYSTALS MISCELLANEOUS AS NEEDED
Status: DISCONTINUED | OUTPATIENT
Start: 2020-03-02 | End: 2020-03-12 | Stop reason: HOSPADM

## 2020-03-02 RX ORDER — VANCOMYCIN/0.9 % SOD CHLORIDE 1.5G/250ML
1500 PLASTIC BAG, INJECTION (ML) INTRAVENOUS EVERY 8 HOURS
Status: DISCONTINUED | OUTPATIENT
Start: 2020-03-02 | End: 2020-03-03 | Stop reason: DRUGHIGH

## 2020-03-02 RX ORDER — DEXTROSE MONOHYDRATE 100 MG/ML
125-250 INJECTION, SOLUTION INTRAVENOUS AS NEEDED
Status: DISCONTINUED | OUTPATIENT
Start: 2020-03-02 | End: 2020-03-12 | Stop reason: HOSPADM

## 2020-03-02 RX ADMIN — HEPARIN SODIUM 5000 UNITS: 5000 INJECTION INTRAVENOUS; SUBCUTANEOUS at 17:02

## 2020-03-02 RX ADMIN — CEFTRIAXONE SODIUM 1 G: 1 INJECTION, POWDER, FOR SOLUTION INTRAMUSCULAR; INTRAVENOUS at 11:25

## 2020-03-02 RX ADMIN — HEPARIN SODIUM 5000 UNITS: 5000 INJECTION INTRAVENOUS; SUBCUTANEOUS at 09:33

## 2020-03-02 RX ADMIN — VANCOMYCIN HYDROCHLORIDE 1250 MG: 10 INJECTION, POWDER, LYOPHILIZED, FOR SOLUTION INTRAVENOUS at 04:30

## 2020-03-02 RX ADMIN — MORPHINE SULFATE 1 MG: 2 INJECTION, SOLUTION INTRAMUSCULAR; INTRAVENOUS at 09:35

## 2020-03-02 RX ADMIN — ATORVASTATIN CALCIUM 10 MG: 10 TABLET, FILM COATED ORAL at 09:33

## 2020-03-02 RX ADMIN — CLOTRIMAZOLE: 10 CREAM TOPICAL at 17:03

## 2020-03-02 RX ADMIN — Medication 10 ML: at 05:25

## 2020-03-02 RX ADMIN — VANCOMYCIN HYDROCHLORIDE 1500 MG: 10 INJECTION, POWDER, LYOPHILIZED, FOR SOLUTION INTRAVENOUS at 22:00

## 2020-03-02 RX ADMIN — CLOTRIMAZOLE: 10 CREAM TOPICAL at 09:37

## 2020-03-02 RX ADMIN — LEVOFLOXACIN 750 MG: 5 INJECTION, SOLUTION INTRAVENOUS at 17:27

## 2020-03-02 RX ADMIN — Medication 10 ML: at 22:06

## 2020-03-02 RX ADMIN — VANCOMYCIN HYDROCHLORIDE 1500 MG: 10 INJECTION, POWDER, LYOPHILIZED, FOR SOLUTION INTRAVENOUS at 13:19

## 2020-03-02 NOTE — PROGRESS NOTES
[facility]  Intern Progress Note    Patient: Xiao Joe MRN: [id number]   SSN: [id number]  YOB: 1960   Age: 61 y.o. Sex: male      Admit Date: 2/29/2020    LOS: 2 days   Chief Complaint   Patient presents with    Leg Pain     Left LLE pain and swelling        Subjective:     Patient seen at bedside this morning eating breakfast. Pain is controlled in his leg, but complaining of severe back pain  This morning. ROS:   Denies:   - fevers/chill  - CP  - SOB  - N/V abdominal pain     Objective:     PE:  - General: patient is in no acute distress  - Cv: RRR, no murmurs/gallops/rubs, radial pulses intact  - Resp: Lungs CTA   - Abdominal: Soft, non-tender, non-distended  - MSK: 2+ swelling in LLE up to knee. Has regressed from previous marking line. Still warm, erythematous and painful to touch. Scabbed over coin size ulcer on dorsal surface of left great toe. Vitals:   Patient Vitals for the past 24 hrs:   Temp Pulse Resp BP SpO2   03/02/20 0510 99 °F (37.2 °C) 100 18 135/75 93 %   03/01/20 2222 98.7 °F (37.1 °C) (!) 110 16 113/59 92 %   03/01/20 1831 98.8 °F (37.1 °C) (!) 108 16 161/90 97 %   03/01/20 0832 97.8 °F (36.6 °C) 97 16 111/67 95 %         Intake/Output Summary (Last 24 hours) at 3/2/2020 0820  Last data filed at 3/2/2020 0501  Gross per 24 hour   Intake 820 ml   Output 880 ml   Net -60 ml       Labs reviewed. Pertinent labs: WBC 26.1  Pertinent studies:     MRI left foot:  IMPRESSION:      1. Nonspecific severe dorsal subcutaneous inflammation/edema with no abscess. No osteomyelitis or fracture. Minimal plantar soft tissue edema and flexor  tenosynovitis. 2. Focal ulcer along the medial aspect of the first toe with tiny superficial  foreign body. MRI tib/fib left foot  IMPRESSION:     1. Severe circumferential subcutaneous swelling/cellulitis in the calf with no  abscess. Musculature is unremarkable.     2. No osteomyelitis in the tibia or fibula. Assessment/Plan:   61 y. o. male with PMH alcohol abuse, HTN, T2DM, now admitted with sepsis 2/2 cellulitis.     Sepsis 2/2 cellulitis- patient presents with ~6 days of worsening pain, redness and swelling in his LLE associated with some nausea, vomiting, fevers and chills. Red leg erythematous extending all the way up to groin. Tachycardic on presentation that has since resolved. Leukocytosis of 27.1 with left shift which has been downtrending. No lactic acidosis. Urine and blood cultures pending, so far no growth. ESR WNLs, CRP elevatied. Initial XR of left foot without signs of osteomyelitis. MRI foot and MRI tib/fib without sings of osteomyelitis. Erythema is regressing and patient is feeling overall better. - Continue vancomycin and cefepime. - trend leukocytosis and fevers  - VS per nursing unit      Possible CAP- CXR on admission with Mild diffuse reticulonodular prominence of the interstitial markings and focal irregular increased opacity projects at the left lung base partially overlapping the anterior sixth rib shadow, concerning for focal airspace disease versus atelectasis. Patient has productive cough, but lungs overall sound clear on exam- do not think this is the cause of his sepsis. Is also long term smoker and has persistent daily cough  - continue Levaquin for now  - repeat CXR prior to discharge     GAIL- resolved. Elevated to 1.72 on admission, baseline today after fluid recussitation  - Daily BMP  - I&Os      Alcohol use disorder- hx of drinking 4 beers/day. LFTs slightly elevated on admission, resolved today  - [facility] protocol  - Trend transaminases- have normalized     Acute L3 compression fracture- Seen in ED, 2/23 told to FU with ortho OP  - Pain control as needed  - Will get MRI of back today  - Ortho consult 3/2 AM     Hx of HTN- BP stable overnight  - continue to hold home Lisinopril for now given nrml Bps     DM- Last A1c Nov 5 2019 5.5.  On 1500 metformin daily  - Hold metformin  - SSI   - POC glucose ACHS   - diabetic diet     Tinea Cruris- groin  - Continue Clomitrazole cream     Diet Regular   DVT Prophylaxis SQH   GI Prophylaxis None   Code status Full   Disposition 2-3 days then home with [facility]      Point of Contact Julius   Relationship: Brother  [phone]         Signed By: Liliana Kirk MD     March 2, 2020

## 2020-03-02 NOTE — ROUTINE PROCESS
Bedside and Verbal shift change report given to [address] [place] [address] (oncoming nurse) by Osmin Martines RN (offgoing nurse). Report included the following information SBAR, Kardex, Intake/Output, MAR and Recent Results.

## 2020-03-02 NOTE — PROGRESS NOTES
Studies reviewed   Arterial study without evidence of insufficiency. MRI lumbar spine indicating benign minimal compression fracture of L3 -no intervention indicated. Just progressively mobilize as cellulitis allows. Will sign off.

## 2020-03-02 NOTE — ROUTINE PROCESS
Bedside shift change report given to Soha Angeles RN (oncoming nurse) by [facility] [address], RN (offgoing nurse). Report included the following information SBAR, Kardex and MAR.

## 2020-03-02 NOTE — ROUTINE PROCESS
Bedside and Verbal shift change report given by Manan Carrasquillo RN (offgoing nurse) to Florinda Abdullahi RN (oncoming nurse). Report included the following information SBAR, Kardex and MAR.

## 2020-03-02 NOTE — DIABETES MGMT
Glycemic Control Plan of Care    T2DM with current A1c level of 5.8% (03/02/2020). Home diabetes medications: Patient reported on 03/02/2020:  Metformin 750 mg tab and taking 1500 mg daily. Noted history of alcohol disorder: 4 beers/daily. Risk for hypoglycemia and lactic acidosis. No POC BG report for 03/01/2020. POC BG report on 03/02/2020 at time of review: 127. Called Dr. Edyta Peace for sliding scale order. Recommendation(s):  1.) glycemic monitoring and correctional lispro insulin. Assessment:  Patient is 61year old with past medical history including type 2 diabetes mellitus, hypertension and drinks 4 beers daily - was admitted on 02/29/2020 with report of redness and swelling LLE, nausea, vomiting, fevers and chills. Noted:  Cellulitis LLE. Alcohol disorder: 4 beers daily. Compression fracture L3. Ortho following, not candidate for cement augmentation.  T2DM. Most recent blood glucose values:    No POC BG report for 03/01/2020. Obtained order 03/02/2020. Results for Dawood Chopra (MRN [id number]) as of 3/2/2020 14:53   Ref. Range 3/2/2020 11:28   GLUCOSE,FAST - POC Latest Ref Range: 70 - 110 mg/dL 127 (H)     Current A1C: 5.8% (03/02/2020) which is equivalent to estimated average blood glucose of 120 mg/dL during the past 2-3 months. Current hospital diabetes medications:  Correctional lispro insulin ACHS. Normal sensitivity dose. Total daily dose insulin requirement previous day: 03/01/2020:  None. Diet: Diabetic consistent carb regular.     Goals:  Blood glucose will be within target range of  mg/dL by 03/05/2020    Education:  ___  Refer to Diabetes Education Record             _X__  Education not indicated at this time    Darwin Bojorquez RN [facility]  Pager: [phone]

## 2020-03-02 NOTE — ROUTINE PROCESS
2000 Patient complaining 9/10 back and left lower pain, medicated with morphine 1mg iv, LLE elevated on pillows. CIWA score=0 no acute distress noted. [id number] Patient requesting pain med ,morphine 1mg iv given.

## 2020-03-02 NOTE — PROGRESS NOTES
conducted an initial consultation and Spiritual Assessment for Sadia Cleaning, who is a 61 y.o.,male. Patients Primary Language is: [place]. According to the patients EMR Restorationist Affiliation is: Druze. The reason the Patient came to the hospital is:   Patient Active Problem List    Diagnosis Date Noted    Leukocytosis 02/29/2020    Hypotension 02/29/2020    Sepsis ([facility] [address] 02/29/2020    GAIL (acute kidney injury) ([facility] [address] 02/29/2020    Cellulitis of left lower extremity 02/29/2020    Acute leg pain, left 02/29/2020    Cellulitis 02/29/2020        The  provided the following Interventions:  Initiated a relationship of care and support. Provided information about Spiritual Care Services. Chart reviewed. Assessment:  Patient is not happy about his diagnosis. Tried to encouraged him in his recovery. Plan:  Chaplains will continue to follow and will provide pastoral care on an as needed/requested basis.     [address]  [phone])

## 2020-03-02 NOTE — CONSULTS
Consult    Patient: Glo Page MRN: [id number]  SSN: [id number]    YOB: 1960  Age: 61 y.o. Sex: male      Subjective:      Glo Page is a 61 y.o. male who is being seen for back pain. Currently adm for cellulitis. History of . ETOH, HTN, GAIL, DM. Reports fall some time ago but more recent flare of back pain without radiculopathy or recent recalled trauma. No past medical history on file. No past surgical history on file. No family history on file.   Social History     Tobacco Use    Smoking status: Not on file   Substance Use Topics    Alcohol use: Not on file      Current Facility-Administered Medications   Medication Dose Route Frequency Provider Last Rate Last Dose    VANCOMYCIN TROUGH DUE   Other Daily Krishna Granados MD        morphine injection 1 mg  1 mg IntraVENous Q4H PRN Yasmine Apa DO   1 mg at 03/01/20 2339    sodium chloride (NS) flush 5-10 mL  5-10 mL IntraVENous PRN Krishna Granados MD        cefTRIAXone (ROCEPHIN) 1 g in sterile water (preservative free) 10 mL IV syringe  1 g IntraVENous Q24H Krishna Granados MD   1 g at 03/01/20 1109    [Held by provider] lisinopril (PRINIVIL, ZESTRIL) tablet 10 mg  10 mg Oral DAILY Krishna Granados MD        atorvastatin (LIPITOR) tablet 10 mg  10 mg Oral DAILY Krishna Granados MD   10 mg at 03/01/20 3365    heparin (porcine) injection 5,000 Units  5,000 Units SubCUTAneous Q8H Krishna Granados MD   5,000 Units at 03/01/20 2334    sodium chloride (NS) flush 5-40 mL  5-40 mL IntraVENous Q8H Krishna Granados MD   10 mL at 03/02/20 0525    sodium chloride (NS) flush 5-40 mL  5-40 mL IntraVENous PRN Krishna Granados MD        LORazepam (ATIVAN) tablet 1 mg  1 mg Oral Q1H PRN Krishna Granados MD        Or    LORazepam (ATIVAN) injection 1 mg  1 mg IntraVENous Q1H PRN Krishna Granados MD        LORazepam (ATIVAN) tablet 2 mg  2 mg Oral Q1H PRN Krishna Granados MD        Or    LORazepam (ATIVAN) injection 2 mg  2 mg IntraVENous Q1H PRN Suzanne Prabhakar MD        LORazepam (ATIVAN) injection 3 mg  3 mg IntraVENous Q15MIN PRN Suzanne Prabhakar MD        acetaminophen (TYLENOL) tablet 650 mg  650 mg Oral Q6H PRN Suzanne Prabhakar MD   650 mg at 03/01/20 0616    vancomycin (VANCOCIN) 1250 mg in  ml infusion  1,250 mg IntraVENous Q18H Suzanne Prabhakar .7 mL/hr at 03/02/20 0430 1,250 mg at 03/02/20 0430    levoFLOXacin (LEVAQUIN) 750 mg in D5W IVPB  750 mg IntraVENous Q24H Monica Dickinson  mL/hr at 03/01/20 1815 750 mg at 03/01/20 1815    clotrimazole (LOTRIMIN) 1 % cream   Topical BID Monica Dickinson MD            No Known Allergies    Review of Systems:  Pertinent items are noted in the History of Present Illness. Objective:     Vitals:    03/01/20 1831 03/01/20 2222 03/02/20 0510 03/02/20 0845   BP: 161/90 113/59 135/75 134/79   Pulse: (!) 108 (!) 110 100 99   Resp: 16 16 18 18   Temp: 98.8 °F (37.1 °C) 98.7 °F (37.1 °C) 99 °F (37.2 °C) 97.1 °F (36.2 °C)   SpO2: 97% 92% 93% 94%   Weight:       Height:            Physical Exam:  General:  Alert, cooperative, no distress, appears stated age. Eyes:  Conjunctivae/corneas clear. PERRL, EOMs intact. Fundi benign   Ears:  Normal TMs and external ear canals both ears. Nose: Nares normal. Septum midline. Mucosa normal. No drainage or sinus tenderness. Mouth/Throat: Lips, mucosa, and tongue normal. Teeth and gums normal.   Neck: Supple, symmetrical, trachea midline, no adenopathy, thyroid: no enlargment/tenderness/nodules, no carotid bruit and no JVD. Back:   Symmetric, no curvature. ROM normal. No CVA tenderness. Lungs:   Clear to auscultation bilaterally. Heart:  Regular rate and rhythm, S1, S2 normal, no murmur, click, rub or gallop. Abdomen:   Soft, non-tender. Bowel sounds normal. No masses,  No organomegaly. Extremities: Extremities swollen edematous, cellulitic.    Pulses: Not palpable   Skin: Skin color, texture, turgor normal. No rashes or lesions   Lymph nodes: Cervical, supraclavicular, and axillary nodes normal.   Neurologic: CNII-XII intact. Pain inhibition, moves everything, no reflexes       Xray ls spine 2/23 compression fracture L3 age indeterminate. Severe vascular disease noted on xray. Assessment:       Patient with \"touchy\" back, recent flare no recalled trauma and L3 compression fracture. No obvious neurology. Compression fracture, probable benign. Not candidate for cement augmentation because of ongoing infection. Plan: Mobilize as tolerated  MRI of LS spine to evaluate fracture. Would obtain vascular (arterial ) studies given poor pulses, edema and infection.   Not a candidate for cement augmentation       Signed By: Chantel Kirby MD     March 2, 2020

## 2020-03-02 NOTE — PROGRESS NOTES
CDMP query response     Patient admitted with sepsis 2/2 cellulitis without organ dysfunction as outlined by the CMS guidelines.      Xiomara Ness, PGY-1   [address] [facility]   Intern Pager: [phone]   March 2, 2020, 4:13 PM

## 2020-03-02 NOTE — PROGRESS NOTES
Discharge planning      Reason for Admission:  Cellulitis of left lower extremity [L03.116]  Leukocytosis [D72.829]  Hypotension [I95.9]  GAIL (acute kidney injury) ([facility] [address] [N17.9]  Sepsis ([facility] [address] [A41.9]  Acute leg pain, left [M79.605]  Cellulitis [L03.90]                 RRAT Score:    10%           Plan for utilizing home health:    No orders at this time. Likelihood of Readmission:   LOW                         Transition of Care Plan:              Initial assessment completed with patient. Cognitive status of patient: oriented to time, place, person and situation. Face sheet information confirmed:  yes. The patient designates brother Paulino ([phone]) to participate in his discharge plan and to receive any needed information. This patient lives in a boarding home. Patient wasable to navigate steps as needed. Prior to hospitalization, patient was considered to be independent with ADLs/IADLS : yes . Patient has a current ACP document on file: no  The patient will need a taxi at discharge. The patient reported no medical equipment available in the home. Patient is not currently active with home health. Patient has not stayed in a skilled nursing facility or rehab. This patient is on dialysis :no       Freedom of choice signed: no.     Currently, the discharge plan is Home. Patient lives in a boarding home owned by [facility]([phone]  CM will continue to monitor for transitional needs. The patient states that he can obtain his medications from the pharmacy, and take his medications as directed.     Patient's current insurance is [facility] [place]       Care Management Interventions  PCP Verified by CM: Yes(per patient, saw pcp 1 month ago.)  Mode of Transport at Discharge: Self  Transition of Care Consult (CM Consult): Discharge Planning  Current Support Network: (Lives at a boarding house.)  Confirm Follow Up Transport: Cab  Discharge Location  Discharge Placement: (Return to boarding home.)        CHANO MartinezN, RN  Pager # [phone]  Care Manager

## 2020-03-02 NOTE — CDMP QUERY
Patient admitted with sepsis and documented in the H&P. Noted documentation of GAIL in H&P. Please document in progress notes clinical indicators (such as the ones below) to support a link to any organ dysfunction: 1. Sepsis without organ dysfunction 2. Sepsis due to GAIL:   
3. Other (please specify): 4. Unknown/unable to determine:   
 
Documented Organ Dysfunction by any ONE of the following. (CMS) 
     
 the CMS guidelines 
-AMS (from baseline if known) -SBP<90 or MAP<65 
-Documentation of respiratory failure and use of either invasive mechanical ventilation (intubation) or non-invasive mechanical ventilation (CPAP/BIPAP) -Creat. > 2.0 (with no history of Chronic Kidney Disease) -Bilirubin > 2 mg / dl (with no history of liver disease) -PLT < 100,000 (with no history of thrombocytopenia) -INR > 1.5 or aPTT > 60 sec (for patients not on Warfarin therapy) -Lactic Acid > 2 mmol/ L The medical record reflects the following: 
   Risk Factors: GAIL, possible CAP, cellulitis, diabetes Clinical Indicators: Creatinine 1.72 on 2/29/2020 at 1111 Bilirubin:  1 on 3/2/2020 Platelets range:  766-343 on 2/29/2020 to 3/2/2020 Lactic acid:  1/7 on 2/29/2020 MAP range:   on 2/29/2020 at 1031 to 3/2/2020 2 0845 Treatment: vancomycin 1500mg IV q8h start 3/2/2020 at 1230 Rocephin 1g IV q24h start 2/29/2020 at 1054 Levofloxacin 750mg IV q24h start 2/29/2020 at 3533 [facility] Vancomycin 1250mg IV q18h start 3/1/2020 at 0900 stop 3/2/2020 at 1010 Normal saline IV at 125 ml/hr start 3/1/2020 at 0000 stop 3/1/2020 at 1420 Thank you, [address], [address] [facility], [phone]

## 2020-03-03 ENCOUNTER — APPOINTMENT (OUTPATIENT)
Dept: GENERAL RADIOLOGY | Age: 60
DRG: 854 | End: 2020-03-03
Attending: STUDENT IN AN ORGANIZED HEALTH CARE EDUCATION/TRAINING PROGRAM
Payer: MEDICARE

## 2020-03-03 LAB
ALBUMIN SERPL-MCNC: 1.7 G/DL (ref 3.4–5)
ALBUMIN/GLOB SERPL: 0.4 {RATIO} (ref 0.8–1.7)
ALP SERPL-CCNC: 167 U/L (ref 45–117)
ALT SERPL-CCNC: 42 U/L (ref 16–61)
ANION GAP SERPL CALC-SCNC: 10 MMOL/L (ref 3–18)
AST SERPL-CCNC: 49 U/L (ref 10–38)
BASOPHILS # BLD: 0 K/UL (ref 0–0.06)
BASOPHILS NFR BLD: 0 % (ref 0–3)
BILIRUB SERPL-MCNC: 0.8 MG/DL (ref 0.2–1)
BUN SERPL-MCNC: 10 MG/DL (ref 7–18)
BUN/CREAT SERPL: 16 (ref 12–20)
CALCIUM SERPL-MCNC: 8.8 MG/DL (ref 8.5–10.1)
CHLORIDE SERPL-SCNC: 102 MMOL/L (ref 100–111)
CO2 SERPL-SCNC: 23 MMOL/L (ref 21–32)
CREAT SERPL-MCNC: 0.61 MG/DL (ref 0.6–1.3)
DATE LAST DOSE: NORMAL
DIFFERENTIAL METHOD BLD: ABNORMAL
EOSINOPHIL # BLD: 0 K/UL (ref 0–0.4)
EOSINOPHIL NFR BLD: 0 % (ref 0–5)
ERYTHROCYTE [DISTWIDTH] IN BLOOD BY AUTOMATED COUNT: 14.7 % (ref 11.6–14.5)
GLOBULIN SER CALC-MCNC: 4.4 G/DL (ref 2–4)
GLUCOSE BLD STRIP.AUTO-MCNC: 104 MG/DL (ref 70–110)
GLUCOSE BLD STRIP.AUTO-MCNC: 108 MG/DL (ref 70–110)
GLUCOSE BLD STRIP.AUTO-MCNC: 127 MG/DL (ref 70–110)
GLUCOSE BLD STRIP.AUTO-MCNC: 134 MG/DL (ref 70–110)
GLUCOSE SERPL-MCNC: 111 MG/DL (ref 74–99)
HCT VFR BLD AUTO: 36.2 % (ref 36–48)
HGB BLD-MCNC: 11.9 G/DL (ref 13–16)
LEFT ABI: 1.24
LEFT ANTERIOR TIBIAL: 192 MMHG
LEFT ARM BP: 150 MMHG
LEFT POSTERIOR TIBIAL: 194 MMHG
LYMPHOCYTES # BLD: 3 K/UL (ref 0.8–3.5)
LYMPHOCYTES NFR BLD: 12 % (ref 20–51)
MAGNESIUM SERPL-MCNC: 1.5 MG/DL (ref 1.6–2.6)
MAGNESIUM SERPL-MCNC: 1.8 MG/DL (ref 1.6–2.6)
MCH RBC QN AUTO: 31.6 PG (ref 24–34)
MCHC RBC AUTO-ENTMCNC: 32.9 G/DL (ref 31–37)
MCV RBC AUTO: 96 FL (ref 74–97)
MONOCYTES # BLD: 2.7 K/UL (ref 0–1)
MONOCYTES NFR BLD: 11 % (ref 2–9)
NEUTS BAND NFR BLD MANUAL: 8 % (ref 0–5)
NEUTS SEG # BLD: 19.1 K/UL (ref 1.8–8)
NEUTS SEG NFR BLD: 69 % (ref 42–75)
NRBC BLD-RTO: 1 PER 100 WBC
PLATELET # BLD AUTO: 223 K/UL (ref 135–420)
PLATELET COMMENTS,PCOM: ABNORMAL
PMV BLD AUTO: 9.7 FL (ref 9.2–11.8)
POTASSIUM SERPL-SCNC: 3.3 MMOL/L (ref 3.5–5.5)
POTASSIUM SERPL-SCNC: 4 MMOL/L (ref 3.5–5.5)
PROT SERPL-MCNC: 6.1 G/DL (ref 6.4–8.2)
RBC # BLD AUTO: 3.77 M/UL (ref 4.7–5.5)
RBC MORPH BLD: ABNORMAL
REPORTED DOSE,DOSE: NORMAL UNITS
REPORTED DOSE/TIME,TMG: 616
RIGHT ABI: 1.26
RIGHT ANTERIOR TIBIAL: 194 MMHG
RIGHT ARM BP: 156 MMHG
RIGHT CALF PRESSURE: 194 MMHG
RIGHT POSTERIOR TIBIAL: 197 MMHG
SODIUM SERPL-SCNC: 135 MMOL/L (ref 136–145)
VANCOMYCIN TROUGH SERPL-MCNC: 19.5 UG/ML (ref 10–20)
WBC # BLD AUTO: 24.8 K/UL (ref 4.6–13.2)

## 2020-03-03 PROCEDURE — 86215 DEOXYRIBONUCLEASE ANTIBODY: CPT

## 2020-03-03 PROCEDURE — 74011250636 HC RX REV CODE- 250/636: Performed by: ORTHOPAEDIC SURGERY

## 2020-03-03 PROCEDURE — 74011000250 HC RX REV CODE- 250: Performed by: STUDENT IN AN ORGANIZED HEALTH CARE EDUCATION/TRAINING PROGRAM

## 2020-03-03 PROCEDURE — 74011250636 HC RX REV CODE- 250/636: Performed by: STUDENT IN AN ORGANIZED HEALTH CARE EDUCATION/TRAINING PROGRAM

## 2020-03-03 PROCEDURE — 74011250637 HC RX REV CODE- 250/637: Performed by: STUDENT IN AN ORGANIZED HEALTH CARE EDUCATION/TRAINING PROGRAM

## 2020-03-03 PROCEDURE — 71046 X-RAY EXAM CHEST 2 VIEWS: CPT

## 2020-03-03 PROCEDURE — 36415 COLL VENOUS BLD VENIPUNCTURE: CPT

## 2020-03-03 PROCEDURE — 85025 COMPLETE CBC W/AUTO DIFF WBC: CPT

## 2020-03-03 PROCEDURE — 74011000250 HC RX REV CODE- 250: Performed by: INTERNAL MEDICINE

## 2020-03-03 PROCEDURE — 74011250636 HC RX REV CODE- 250/636: Performed by: FAMILY MEDICINE

## 2020-03-03 PROCEDURE — 87040 BLOOD CULTURE FOR BACTERIA: CPT

## 2020-03-03 PROCEDURE — 84132 ASSAY OF SERUM POTASSIUM: CPT

## 2020-03-03 PROCEDURE — 74011000258 HC RX REV CODE- 258: Performed by: INTERNAL MEDICINE

## 2020-03-03 PROCEDURE — 83735 ASSAY OF MAGNESIUM: CPT

## 2020-03-03 PROCEDURE — 65270000029 HC RM PRIVATE

## 2020-03-03 PROCEDURE — 82962 GLUCOSE BLOOD TEST: CPT

## 2020-03-03 PROCEDURE — 80202 ASSAY OF VANCOMYCIN: CPT

## 2020-03-03 RX ORDER — MORPHINE SULFATE 2 MG/ML
1 INJECTION, SOLUTION INTRAMUSCULAR; INTRAVENOUS
Status: DISCONTINUED | OUTPATIENT
Start: 2020-03-03 | End: 2020-03-07

## 2020-03-03 RX ORDER — VANCOMYCIN HYDROCHLORIDE
1250 EVERY 8 HOURS
Status: DISCONTINUED | OUTPATIENT
Start: 2020-03-03 | End: 2020-03-04

## 2020-03-03 RX ORDER — L. ACIDOPHILUS/L.BULGARICUS 100MM CELL
1 GRANULES IN PACKET (EA) ORAL 2 TIMES DAILY
Status: DISCONTINUED | OUTPATIENT
Start: 2020-03-03 | End: 2020-03-03

## 2020-03-03 RX ORDER — POLYETHYLENE GLYCOL 3350 17 G/17G
17 POWDER, FOR SOLUTION ORAL DAILY
Status: DISCONTINUED | OUTPATIENT
Start: 2020-03-03 | End: 2020-03-12 | Stop reason: HOSPADM

## 2020-03-03 RX ORDER — POLYETHYLENE GLYCOL 3350 17 G/17G
17 POWDER, FOR SOLUTION ORAL DAILY PRN
Status: DISCONTINUED | OUTPATIENT
Start: 2020-03-03 | End: 2020-03-11

## 2020-03-03 RX ORDER — POLYETHYLENE GLYCOL 3350 17 G/17G
17 POWDER, FOR SOLUTION ORAL
Status: DISCONTINUED | OUTPATIENT
Start: 2020-03-04 | End: 2020-03-04 | Stop reason: SDUPTHER

## 2020-03-03 RX ORDER — POTASSIUM CHLORIDE 20 MEQ/1
40 TABLET, EXTENDED RELEASE ORAL EVERY 4 HOURS
Status: COMPLETED | OUTPATIENT
Start: 2020-03-03 | End: 2020-03-03

## 2020-03-03 RX ORDER — MAGNESIUM SULFATE HEPTAHYDRATE 40 MG/ML
2 INJECTION, SOLUTION INTRAVENOUS ONCE
Status: COMPLETED | OUTPATIENT
Start: 2020-03-03 | End: 2020-03-03

## 2020-03-03 RX ADMIN — HEPARIN SODIUM 5000 UNITS: 5000 INJECTION INTRAVENOUS; SUBCUTANEOUS at 00:29

## 2020-03-03 RX ADMIN — CLOTRIMAZOLE: 10 CREAM TOPICAL at 09:11

## 2020-03-03 RX ADMIN — CLINDAMYCIN PHOSPHATE 900 MG: 150 INJECTION, SOLUTION, CONCENTRATE INTRAVENOUS at 18:07

## 2020-03-03 RX ADMIN — CLOTRIMAZOLE: 10 CREAM TOPICAL at 18:11

## 2020-03-03 RX ADMIN — Medication 100 MG: at 09:11

## 2020-03-03 RX ADMIN — Medication 10 ML: at 06:17

## 2020-03-03 RX ADMIN — POTASSIUM CHLORIDE 40 MEQ: 1500 TABLET, EXTENDED RELEASE ORAL at 09:10

## 2020-03-03 RX ADMIN — ATORVASTATIN CALCIUM 10 MG: 10 TABLET, FILM COATED ORAL at 09:10

## 2020-03-03 RX ADMIN — HEPARIN SODIUM 5000 UNITS: 5000 INJECTION INTRAVENOUS; SUBCUTANEOUS at 18:07

## 2020-03-03 RX ADMIN — POTASSIUM CHLORIDE 40 MEQ: 1500 TABLET, EXTENDED RELEASE ORAL at 12:32

## 2020-03-03 RX ADMIN — LACTOBACILLUS ACIDOPHILUS / LACTOBACILLUS BULGARICUS 1 PACKET: 100 MILLION CFU STRENGTH GRANULES at 12:32

## 2020-03-03 RX ADMIN — HEPARIN SODIUM 5000 UNITS: 5000 INJECTION INTRAVENOUS; SUBCUTANEOUS at 09:09

## 2020-03-03 RX ADMIN — MORPHINE SULFATE 1 MG: 2 INJECTION, SOLUTION INTRAMUSCULAR; INTRAVENOUS at 12:32

## 2020-03-03 RX ADMIN — VANCOMYCIN HYDROCHLORIDE 1500 MG: 10 INJECTION, POWDER, LYOPHILIZED, FOR SOLUTION INTRAVENOUS at 06:16

## 2020-03-03 RX ADMIN — POLYETHYLENE GLYCOL 3350 17 G: 17 POWDER, FOR SOLUTION ORAL at 12:32

## 2020-03-03 RX ADMIN — LISINOPRIL 10 MG: 10 TABLET ORAL at 09:09

## 2020-03-03 RX ADMIN — CEFTRIAXONE SODIUM 1 G: 1 INJECTION, POWDER, FOR SOLUTION INTRAMUSCULAR; INTRAVENOUS at 10:21

## 2020-03-03 RX ADMIN — Medication 10 ML: at 14:00

## 2020-03-03 RX ADMIN — ACETAMINOPHEN 650 MG: 325 TABLET ORAL at 18:14

## 2020-03-03 RX ADMIN — VANCOMYCIN HYDROCHLORIDE 1250 MG: 10 INJECTION, POWDER, LYOPHILIZED, FOR SOLUTION INTRAVENOUS at 18:07

## 2020-03-03 RX ADMIN — MAGNESIUM SULFATE HEPTAHYDRATE 2 G: 40 INJECTION, SOLUTION INTRAVENOUS at 12:32

## 2020-03-03 RX ADMIN — Medication 10 ML: at 09:22

## 2020-03-03 RX ADMIN — Medication 10 ML: at 22:02

## 2020-03-03 RX ADMIN — MORPHINE SULFATE 1 MG: 2 INJECTION, SOLUTION INTRAMUSCULAR; INTRAVENOUS at 06:41

## 2020-03-03 NOTE — ROUTINE PROCESS
Bedside and Verbal shift change report given to Fidel Myers RN (oncoming nurse) by Thyra Dance, RN (offgoing nurse). Report included the following information SBAR, Kardex and MAR.

## 2020-03-03 NOTE — PROGRESS NOTES
Problem: General Medical Care Plan  Goal: *Vital signs within specified parameters  Outcome: Progressing Towards Goal  Goal: *Labs within defined limits  Outcome: Progressing Towards Goal  Goal: *Absence of infection signs and symptoms  Outcome: Progressing Towards Goal  Goal: *Optimal pain control at patient's stated goal  Outcome: Progressing Towards Goal  Goal: *Skin integrity maintained  Outcome: Progressing Towards Goal  Goal: *Fluid volume balance  Outcome: Progressing Towards Goal  Goal: *Optimize nutritional status  Outcome: Progressing Towards Goal  Goal: *Anxiety reduced or absent  Outcome: Progressing Towards Goal  Goal: *Progressive mobility and function (eg: ADL's)  Outcome: Progressing Towards Goal     Problem: Patient Education: Go to Patient Education Activity  Goal: Patient/Family Education  Outcome: Progressing Towards Goal     Problem: Pain  Goal: *Control of Pain  Outcome: Progressing Towards Goal     Problem: Patient Education: Go to Patient Education Activity  Goal: Patient/Family Education  Outcome: Progressing Towards Goal     Problem: Falls - Risk of  Goal: *Absence of Falls  Description  Document Joelandre Hubbard Fall Risk and appropriate interventions in the flowsheet.   Outcome: Progressing Towards Goal  Note: Fall Risk Interventions:  Mobility Interventions: Assess mobility with egress test, Communicate number of staff needed for ambulation/transfer, OT consult for ADLs, Patient to call before getting OOB, PT Consult for mobility concerns         Medication Interventions: Assess postural VS orthostatic hypotension, Evaluate medications/consider consulting pharmacy, Patient to call before getting OOB, Teach patient to arise slowly    Elimination Interventions: Call light in reach, Patient to call for help with toileting needs, Toilet paper/wipes in reach, Toileting schedule/hourly rounds, Urinal in reach              Problem: Patient Education: Go to Patient Education Activity  Goal: Patient/Family Education  Outcome: Progressing Towards Goal     Problem: Patient Education: Go to Patient Education Activity  Goal: Patient/Family Education  Outcome: Progressing Towards Goal     Problem: Pressure Injury - Risk of  Goal: *Prevention of pressure injury  Description  Document Kahlil Scale and appropriate interventions in the flowsheet.   Outcome: Progressing Towards Goal  Note: Pressure Injury Interventions:  Sensory Interventions: Assess changes in LOC, Avoid rigorous massage over bony prominences, Check visual cues for pain, Discuss PT/OT consult with provider, Keep linens dry and wrinkle-free    Moisture Interventions: Absorbent underpads, Assess need for specialty bed    Activity Interventions: Assess need for specialty bed, Increase time out of bed, Pressure redistribution bed/mattress(bed type), PT/OT evaluation    Mobility Interventions: Assess need for specialty bed, HOB 30 degrees or less, Pressure redistribution bed/mattress (bed type), PT/OT evaluation    Nutrition Interventions: Document food/fluid/supplement intake, Offer support with meals,snacks and hydration                     Problem: Patient Education: Go to Patient Education Activity  Goal: Patient/Family Education  Outcome: Progressing Towards Goal

## 2020-03-03 NOTE — ROUTINE PROCESS
Patient is alert and oriented times three with no signs or symptoms of distress. Leg is hot to touch and has some blistering. Pulse is palpable but weak.

## 2020-03-03 NOTE — ROUTINE PROCESS
Bedside and Verbal shift change report given to Michel Barney RN (oncoming nurse) by Sam Nixon RN (offgoing nurse). Report included the following information SBAR, Kardex and MAR.

## 2020-03-03 NOTE — PROGRESS NOTES
[facility]  Intern Progress Note    Patient: Curt Bowman MRN: [id number]   SSN: [id number]  YOB: 1960   Age: 61 y.o. Sex: male      Admit Date: 2/29/2020    LOS: 3 days   Chief Complaint   Patient presents with    Leg Pain     Left LLE pain and swelling        Subjective:     Patient seen at bedside this morning. No events overnight. Still coughing, but this is baseline for him. Still complaining of pain in his lower back. Pain in foot is controlled. ROS:   Denies:   - fevers/chill  - CP  - SOB  - N/V/diarrhea or abdominal pain   - leg pain    Objective:     PE:  - General: patient is in no acute distress  - Cv: RRR, no murmurs/gallops/rubs, radial pulses intact  - Resp: Lungs CTA   - Abdominal: Soft, non-tender, non-distended, Bs+   - MSK: 2+ swelling in LLE up to knee. Has regressed from previous marking line. Still warm, erythematous and painful to touch. Scabbed over coin size ulcer on dorsal surface of left great toe. Vitals:   Patient Vitals for the past 24 hrs:   Temp Pulse Resp BP SpO2   03/03/20 0622 99.4 °F (37.4 °C) 96 16 154/87 95 %   03/03/20 0400 -- 95 -- -- --   03/03/20 0226 97.7 °F (36.5 °C) 100 16 160/80 98 %   03/03/20 0000 -- 99 -- -- --   03/02/20 2155 97.7 °F (36.5 °C) 96 18 157/85 95 %   03/02/20 2000 -- 100 -- -- --   03/02/20 1830 99.8 °F (37.7 °C) (!) 107 16 149/85 94 %   03/02/20 1615 99 °F (37.2 °C) 97 16 139/86 90 %   03/02/20 0845 97.1 °F (36.2 °C) 99 18 134/79 94 %         Intake/Output Summary (Last 24 hours) at 3/3/2020 0841  Last data filed at 3/3/2020 0615  Gross per 24 hour   Intake 3400 ml   Output 2100 ml   Net 1300 ml       Labs reviewed.    Pertinent labs: WBC 24.8 with 8 bands, K 3.3, urine cx NG2D, blood cx NG3D  Pertinent studies:     Arterial dopplers: No evidence of hemodynamically significant arterial disease is identified within bilateral lower extremities at rest    MRI back   1.  Subacute appearing burst-type L3 superior endplate fracture with mild 4 mm  retropulsion associated mild L2/L3 spinal canal stenosis. 2.  No high-grade spinal canal or foraminal narrowing. 3.  Annular fissure at L5/S1, a potential pain generator. 4.  Bilateral paraspinal muscle fatty atrophy with mild to moderate  intramuscular edema, which could reflect grade 1 strains or denervation change. 5.  Mild mildly atrophic lower thoracic spinal cord. CXR pending     Assessment/Plan:     61 y. o. male with PMH alcohol abuse, HTN, T2DM, now admitted with sepsis 2/2 cellulitis.     Sepsis 2/2 cellulitis- patient presents with ~6 days of worsening pain, redness and swelling in his LLE associated with some nausea, vomiting, fevers and chills. Red leg erythematous extending all the way up to groin. Tachycardic on presentation that has since resolved. Leukocytosis of 27.1 with left shift which has been slowly downtrending, but bands have been increasing. No lactic acidosis. Urine and blood cultures pending, so far no growth. ESR WNLs, CRP elevatied. Initial XR of left foot without signs of osteomyelitis. MRI foot and MRI tib/fib without sings of osteomyelitis. Erythema is regressing and patient is feeling overall better. - Continue vancomycin and ceftriaxone  - trend leukocytosis and fevers  - consider ID consult   - VS per nursing unit      Possible CAP- CXR on admission with Mild diffuse reticulonodular prominence of the interstitial markings and focal irregular increased opacity projects at the left lung base partially overlapping the anterior sixth rib shadow, concerning for focal airspace disease versus atelectasis. Patient has productive cough, but lungs overall sound clear on exam- do not think this is the cause of his sepsis. Is also long term smoker and has persistent daily cough  - continue Levaquin for now  - repeat CXR pending today     GAIL- resolved.  Elevated to 1.72 on admission, baseline today after fluid recussitation  - Daily BMP  - I&Os     Hypokalemia  - replete per protocol  - Will check Mg level     Alcohol use disorder- hx of drinking 4 beers/day.  LFTs slightly elevated on admission, resolved today  - CIWA protocol  - Trend transaminases- have downtrended     Acute L3 compression fracture- Seen in ED, 2/23 told to FU with ortho OP  - Pain control as needed  - Ortho consulted, recommended progressive mobility, can FU outpaitent     Hx of HTN- SBP in 150's-160's overnight.   - Restart home lisinopril      DM- HbA1c 5.8 On 1500 metformin daily  - Hold metformin  - diabetic diet     Tinea Cruris- groin  - Continue Clomitrazole cream     Diet Regular   DVT Prophylaxis SQH   GI Prophylaxis None   Code status Full   [facility]      [address]  [phone]         Signed By: Asha Almanzar MD     March 3, 2020

## 2020-03-03 NOTE — ROUTINE PROCESS
Bedside and Verbal shift change report given to [address] rnrn (oncoming nurse) by Jassi Guevara RN (offgoing nurse). Report included the following information Kardex, Intake/Output, MAR and Recent Results.

## 2020-03-03 NOTE — DISCHARGE SUMMARY
[address] Medicine  Discharge Summary    Patient: Janeth Meng MRN: [id number]  CSN: [id number]    YOB: 1960  Age: 61 y.o. Sex: male      Admission Date: 2/29/2020 Discharge Date: 3/12/20   Attending: Lou Castro MD PCP: Dash Paris MD     ===================================================================    Reason for Admission: Cellulitis of left lower extremity [L03.116]  Leukocytosis [D72.829]  Hypotension [I95.9]  GAIL (acute kidney injury) ([address] [N17.9]  Sepsis ([facility] [address] [A41.9]  Acute leg pain, left [M79.605]  Cellulitis [L03.90]    Discharge Diagnoses:   Sepsis 2/2 cellulitis  LLE cellulitis  GAIL  Electrolyte disturbances- hypokalemia, hypomagnesemia  Alcohol use disorder  Acute L3 compression fracture  HTN  DM  Tinea Cruris   Pleural Effusions on CXR     Important notes to PCP/ follow-up studies and evaluations   - patient had some small pleural effusions on CXR and on repeat. No clinical signs of PNA. Patient completely asymptomatic. Echo to evaluate for CHF resulted in normal EF. Recommend repeat imaging as an outpatient given that he is completely asymptomatic. For now recommend cardiac diet and fluid restriction to 1800. Consider cardiology consult as an outpatient. - Will need ortho follow up after discharge from [facility] for evaluation of L3 compression fracture within 3 weeks of discharge  - Will need podiatry follow up for left lower extremity cellulits after discharge from [facility] within 1 week of discharge. - Will need 10 days total of cipro and linezolid as an outpatient.   - Given percocet 5-325 for L3 compression fracture PRN for pain control, PCP to monitor for abuse potential as an outpatient    Pending labs and studies:  none    Operative Procedures:   Left lower extremity abscess I&D 3/7/20 by podiatry Dr. Blessing Esqueda    Discharge Medications:     Current Discharge Medication List      START taking these medications    Details atorvastatin (LIPITOR) 10 mg tablet Take 1 Tab by mouth daily. Qty: 30 Tab, Refills: 0      acetaminophen (TYLENOL) 325 mg tablet Take 2 Tabs by mouth every six (6) hours. Qty: 30 Tab, Refills: 0      ciprofloxacin HCl (CIPRO) 500 mg tablet Take 1 Tab by mouth two (2) times a day for 10 days. Qty: 20 Tab, Refills: 0      clotrimazole (LOTRIMIN) 1 % topical cream Apply  to affected area two (2) times a day. Qty: 15 g, Refills: 0      lidocaine 4 % patch Back pain  Qty: 30 Package, Refills: 0      oxyCODONE-acetaminophen (PERCOCET) 5-325 mg per tablet Take 1 Tab by mouth every six (6) hours as needed for Pain for up to 30 days. Max Daily Amount: 4 Tabs. Qty: 30 Tab, Refills: 0    Associated Diagnoses: Closed compression fracture of L3 lumbar vertebra, initial encounter ([facility])      polyethylene glycol (MIRALAX) 17 gram packet Take 1 Packet by mouth daily as needed for Constipation. Qty: 30 Packet, Refills: 0      linezolid (ZYVOX) 600 mg tablet Take 1 Tab by mouth two (2) times a day for 10 days. Qty: 20 Tab, Refills: 0         CONTINUE these medications which have CHANGED    Details   lisinopriL (PRINIVIL, ZESTRIL) 10 mg tablet Take 1 Tab by mouth daily. Qty: 30 Tab, Refills: 0      metFORMIN ER (GLUCOPHAGE XR) 750 mg tablet Take 2 Tabs by mouth daily. Qty: 30 Tab, Refills: 0         STOP taking these medications       simvastatin (ZOCOR) 20 mg tablet Comments:   Reason for Stopping:         ibuprofen (MOTRIN) 800 mg tablet Comments:   Reason for Stopping:             Disposition: SNF    Consultants:    Orthopedic spine  Infectious disease    Brief Hospital Course (including pertinent history and physical findings)  Patient presented to the ED for swelling and redness in his left lower extremity for several days prior, he had also been having some fevers/chills and nausea. Tachycardic and soft Bps in the ED. Leukocyotis of 27 with left shift. LLE warm to touch, erythematous all the way up to the groin.  No open wounds. CXR showed some focal opacities. Patient was started on Vanc/Cefepime/Levofloxacin. Dopplers of the foot were negative for DVT, arterial PVLs were WNL and MRI of the foot and leg showed no signs of osteomyelitis or abscess. Infectious disease was consulted. Despite broad spectrum abx patient continued to have leukocytosis with bandemia that was persistent. Repeat imaging with ultrasound and CT was again negative for abscess or osteomyelitis. Ortho was consulted and did not think patient needed surgical intervention. Podiatry was consulted for concern that the ankle area did not seem to be healing (the leg and the foot both seemed to be improving clinically). Patient was taken to the OR for left lower extremity abscess I&D on 3/7/20 by podiatry, Dr. Aure Castro. Left lower leg extremity progressively improved, was still present up to mid calf at time of discharge. Leukocytosis improved and continued to downtrend to 17 prior to discharge. He was cleared by ID and is to continue cipro and linezolid for 10 days post discharge. He is to follow up with podiatry after discharge from SNF. CXR on initial admission showed retrocardiac opacity, repeat imaging concerning for vascular congestion and small pleural effusion. Initially concerned for pneumonia, which antibiotics for cellulitis would also cover. Repeat imaging more concerning for CHF. He received and echo that was normal with a LVEF 55-60%. Patient was asymptomatic in terms of no SOB, CP, or lower extremity edema (apart from infected lower extremity) throughout entire course. Recommend repeat CXR as an outpatient to follow up and possibly cardiology consult. In addition, recommend fluid restriction to 1800 ml and cardiac diet    Patient had presented to the ED 1 week prior to this admission for back pain after a fall. He was diagnosed with an L3 back fracture and instructed to follow up with ortho outpatient.  Patient never did get a chance to follow up. Ortho/spine was consulted who recommended conservative management with progressive mobility- patient was not eligible for cement due to his active infection. Will need follow up with ortho surgery after discharge from SNF    Patient had an GAIL on presentation with Cr. 1.72 on admission. This resolved quickly with fluids and remained normal throughout the rest of his hospital course. Patient was placed on CIWA protocol for his alcohol use, however did not require any Ativan throughout his course. For his hypertension, his Lisinopril was initially held for softer blood pressures. As his clinical condition improved, his blood pressure increased and he was started back on his home Lisinopril. His Metformin was held on admission. His HbA1c was 5.8. CURRENT ADMISSION IMAGING RESULTS   Xr Chest Pa Lat    Result Date: 3/9/2020  IMPRESSION: Worsened pulmonary venous congestion, right pleural effusion, and right basilar atelectasis. Xr Chest Pa Lat    Result Date: 3/5/2020  IMPRESSION: Similar small bilateral pleural effusions with likely mild atelectasis. Xr Chest Pa Lat    Result Date: 3/3/2020  IMPRESSION: Bilateral pleural effusions left greater than right. Stringy densities left lower lobe-infiltrate versus atelectasis. Xr Chest Pa Lat    Result Date: 3/3/2020  IMPRESSION: Since the prior study significant bibasilar opacities have developed with probable left effusion. Primary differential considerations include aspiration, pneumonia, pulmonary edema, less likely pulmonary hemorrhage or diffuse lung injury. Xr Hip Lt W Or Wo Pelv 2-3 Vws    Result Date: 3/9/2020  IMPRESSION: Normal left hip. Xr Foot Lt Min 3 V    Result Date: 3/1/2020  Impression: Persistent tiny punctate density very superficially near the skin seen on lateral view which appears to be just proximal to ulcer site concerning for tiny radiopaque foreign body as discussed above. Soft tissue swelling.  No other significant interval change. Xr Foot Lt Min 3 V    Result Date: 2/29/2020  Impression: Soft tissue lucency along the plantar great toe suspicious for reported ulcer/wound. Punctate opacity suspicious for radiopaque foreign body projects close to the site of this lucency along the plantar aspect of the left great toe best demonstrated on lateral view, clinical correlation is needed, please see above. Soft tissue swelling as described. No evidence of acute fracture or dislocation. The patient has been admitted. Mri Lumb Spine Wo Cont    Result Date: 3/2/2020  IMPRESSION: 1. Subacute appearing burst-type L3 superior endplate fracture with mild 4 mm retropulsion associated mild L2/L3 spinal canal stenosis. 2.  No high-grade spinal canal or foraminal narrowing. 3.  Annular fissure at L5/S1, a potential pain generator. 4.  Bilateral paraspinal muscle fatty atrophy with mild to moderate intramuscular edema, which could reflect grade 1 strains or denervation change. 5.  Mild mildly atrophic lower thoracic spinal cord. Mri Tib/fib Alfonso Birch Wo Cont    Result Date: 3/1/2020  IMPRESSION: 1. Severe circumferential subcutaneous swelling/cellulitis in the calf with no abscess. Musculature is unremarkable. 2. No osteomyelitis in the tibia or fibula. Mri Foot Alfonso Birch Wo Cont    Result Date: 3/1/2020  IMPRESSION: 1. Nonspecific severe dorsal subcutaneous inflammation/edema with no abscess. No osteomyelitis or fracture. Minimal plantar soft tissue edema and flexor tenosynovitis. 2. Focal ulcer along the medial aspect of the first toe with tiny superficial foreign body. Ct Low Ext Lt W Cont    Result Date: 3/5/2020  IMPRESSION: 1.. Extensive edema of left the foreleg in subcutaneous and muscular compartments but no focal fluid collection or abscess seen. 2. Mild DJD of right knee.  Thank you for your referral.     Xr Chest Port    Result Date: 2/29/2020  IMPRESSION: Focal irregular increased opacity projects at the left lung base partially overlapping the anterior sixth rib shadow, concerning for focal airspace disease versus atelectasis. Some of this may also relate to the underlying rib as described. Mild diffuse reticulonodular prominence of the interstitial markings. [address]    Result Date: 3/6/2020  IMPRESSION: 1. Negative for abscess at the 2 separately evaluated sites within the left lower extremity. -Profound subcutaneous tissue edema at each location, as can be seen with severe cellulitis     [id number] [facility]    Result Date: 3/6/2020  IMPRESSION: 1.   Negative for abscess at the 2 separately evaluated sites within the left lower extremity. -Profound subcutaneous tissue edema at each location, as can be seen with severe cellulitis         Cardiology Procedures/Testing:  MODALITY RESULTS   EKG Results for orders placed or performed during the hospital encounter of 02/29/20   EKG, 12 LEAD, INITIAL   Result Value Ref Range    Ventricular Rate 98 BPM    Atrial Rate 98 BPM    P-R Interval 126 ms    QRS Duration 74 ms    Q-T Interval 350 ms    QTC Calculation (Bezet) 446 ms    Calculated P Axis 32 degrees    Calculated R Axis 82 degrees    Calculated T Axis 79 degrees    Diagnosis       Normal sinus rhythm  Normal ECG  When compared with ECG of 23-FEB-2020 20:08,  No significant change was found  Confirmed by Daly Us MD, Arsalan Philippe ([id number]) on 3/1/2020 9:38:26 AM           Special Testing/Procedures:  MODALITY RESULTS   MICRO All Micro Results     Procedure Component Value Units Date/Time    CULTURE, Yesenia Marchip STAIN [[id number]] Collected:  03/07/20 1009    Order Status:  Completed Specimen:  Surgical Specimen Updated:  03/11/20 1038     Special Requests: LF ANKLE WOUND NO 2     GRAM STAIN RARE WBCS SEEN         NO ORGANISMS SEEN        Culture result: NO GROWTH 4 DAYS       CULTURE, Yesenia Marchip STAIN [[id number]] Collected:  03/07/20 1009    Order Status:  Completed Specimen:  Surgical Specimen Updated:  03/11/20 1036 Special Requests: LF ANKLE NO 1     GRAM STAIN RARE WBCS SEEN         NO ORGANISMS SEEN        Culture result: NO GROWTH 4 DAYS       CULTURE, BLOOD [[id number]] Collected:  03/03/20 1757    Order Status:  Completed Specimen:  Blood Updated:  03/09/20 0741     Special Requests: --        RIGHT  FOREARM       Culture result: NO GROWTH 6 DAYS       CULTURE, BLOOD [[id number]] Collected:  03/03/20 1757    Order Status:  Completed Specimen:  Blood Updated:  03/09/20 0741     Special Requests: --        LEFT  HAND       Culture result: NO GROWTH 6 DAYS       CULTURE, BLOOD [[id number]] Collected:  02/29/20 1111    Order Status:  Completed Specimen:  Blood Updated:  03/06/20 0710     Special Requests: NO SPECIAL REQUESTS        Culture result: NO GROWTH 6 DAYS       CULTURE, BLOOD [[id number]] Collected:  02/29/20 1742    Order Status:  Completed Specimen:  Blood Updated:  03/06/20 0710     Special Requests: NO SPECIAL REQUESTS        Culture result: NO GROWTH 6 DAYS       CULTURE, URINE [[id number]] Collected:  02/29/20 1900    Order Status:  Completed Specimen:  Urine from Clean catch Updated:  03/02/20 0846     Special Requests: NO SPECIAL REQUESTS        Culture result: NO GROWTH 2 DAYS              Laboratory Results:  LABORATORY RESULTS   HEMATOLOGY Lab Results   Component Value Date/Time    WBC 17.2 (H) 03/11/2020 04:42 AM    HGB 11.5 (L) 03/11/2020 04:42 AM    HCT 34.9 (L) 03/11/2020 04:42 AM    PLATELET 494 (H) 06/12/7106 04:42 AM    MCV 96.1 03/11/2020 04:42 AM       CHEMISTRIES Lab Results   Component Value Date/Time    Sodium 134 (L) 03/12/2020 04:08 AM    Potassium 4.6 03/12/2020 04:08 AM    Chloride 101 03/12/2020 04:08 AM    CO2 28 03/12/2020 04:08 AM    Anion gap 5 03/12/2020 04:08 AM    Glucose 105 (H) 03/12/2020 04:08 AM    BUN 9 03/12/2020 04:08 AM    Creatinine 0.81 03/12/2020 04:08 AM    BUN/Creatinine ratio 11 (L) 03/12/2020 04:08 AM    GFR est AA >60 03/12/2020 04:08 AM    GFR est non-AA >60 03/12/2020 04:08 AM    Calcium 8.7 03/12/2020 04:08 AM      HEPATIC FUNCTION Lab Results   Component Value Date/Time    Albumin 2.2 (L) 03/12/2020 04:08 AM    Bilirubin, direct 0.6 (H) 02/29/2020 11:11 AM    Bilirubin, total 1.3 (H) 03/12/2020 04:08 AM    Protein, total 6.8 03/12/2020 04:08 AM    Globulin 4.6 (H) 03/12/2020 04:08 AM    A-G Ratio 0.5 (L) 03/12/2020 04:08 AM    AST (SGOT) 16 03/12/2020 04:08 AM    ALT (SGPT) 16 03/12/2020 04:08 AM    Alk.  phosphatase 150 (H) 03/12/2020 04:08 AM       LACTIC ACID Lab Results   Component Value Date/Time    Lactic acid 1.7 02/29/2020 11:11 AM            Functional status and cognitive function:    Ambulates with with assistance  Status: alert, cooperative, no distress, appears stated age  Condition: STABLE  Disposition: SNF    Diet: Cardiac with 1800 fluid restriction    Code status and advanced care plan: Full    Point of Contact Julius   Relationship: Brother  [phone]     Patient Education:  Patient was educated on the following topics prior to discharge: cellulitis compression fracture    Follow-up:   Follow-up Information     Follow up With Specialties Details Why Contact Info    Jcarlos Romo DPM Podiatry Schedule an appointment as soon as possible for a visit in 1 week hospital follow up for cellulitis and I&D [address] [place],  Hand Surgery Schedule an appointment as soon as possible for a visit in 1 month hospital follow up for L3 compression fracture [address] [facility]  [address] [facility] [place]             Vinay Abad MD, PGY-1   [address]   Intern Pager: [phone]   March 12, 2020, 5:52 PM

## 2020-03-03 NOTE — CONSULTS
Infectious Disease Consultation Note        Reason: sepsis, cellulitis LLE    Current abx Prior abx   Ceftriaxone, levofloxacin, vancomycin since 2/29/20      Lines:       Assessment :     61 y.o. male with PMH HTN, T2DM (last hgbA1C 5.8 on 3/2/20) admitted to [facility] on 2/2/9/20 with cellulitis. Clinical presentation c/w sepsis (POA) due to acute left leg cellulitis. Exact microbial etiology of infection is not clear. Rapid onset and presentation favors gram positive bacterial etiology such as streptococcus, staphylococcus. Gradual clinical improvement. Persistent leukocytosis - could be due to toxin producing staph or strep infection versus evolving left leg abscess. Will need to monitor clinically to determine this. Also, back pain - likely due to L3 fracture. Spine surgery f/u appreciated. MRI:Subacute appearing burst-type L3 superior endplate fracture with mild 4 mm retropulsion associated mild L2/L3 spinal canal stenosis. Recommendations:    1. Continue ceftriaxone. d/c levofloxacin. Continue vancomycin. Add clindamycin to inhibit toxin production  2. Monitor cbc, clinically  3. Elevate left leg  4. Repeat blood cultures  5. Will consider imaging studies to r/o deeper infection if not significant improvement in am    Thank you for consultation request. Above plan was discussed in details with patient, and primary team. Please call me if any further questions or concerns. Will continue to participate in the care of this patient. HPI:     61 y.o. male with PMH HTN, T2DM (last hgbA1C 5.8 on 3/2/20) admitted to [facility] on 2/2/9/20 with cellulitis.     Patient states that he was not feeling good and started having subjective fever, chills, increased left leg pain since 2/27. He went to [facility] on 2/29 & decided to come in when he noticed that his slipper was tight on that foot and loose on the other. He says it continued to get more swollen and painful since that time.  He says he has had cellulitis several years ago. He does not recall any injury or trauma to the area. In the ed, noted to have wbc:27k. Started on ceftriaxone, levofloxacin, vancomycin. Patient had persistent leukocytosis. MRI left leg 3/1 revealed no deeper infection. Patient continues to have persistent leukocytosis. Also, noted to have blisters left leg. I have been consulted for further recommendations.      Patient denies headaches, visual disturbances, sore throat, runny nose, earaches, cp, sob, chills, cough, abdominal pain, diarrhea, burning micturition,  or weakness in extremities. C/o  back pain/no flank pain. He denies recent sick contacts. No h/o recent travel. No known h/o MRSA colonization or infection in the past. No pets. [place] [employer] [place]. No past medical history on file. No past surgical history on file.    home Medication List    Details   metFORMIN ER (GLUCOPHAGE XR) 750 mg tablet Take 1,500 mg by mouth daily. simvastatin (ZOCOR) 20 mg tablet Take 20 mg by mouth nightly. lisinopril (PRINIVIL, ZESTRIL) 10 mg tablet Take 10 mg by mouth daily.           ibuprofen (MOTRIN) 800 mg tablet Comments:   Reason for Stopping:               Current Facility-Administered Medications   Medication Dose Route Frequency    polyethylene glycol (MIRALAX) packet 17 g  17 g Oral DAILY PRN    lactobacillus-acidophilus (LACTINEX) 1 Packet  1 Packet Oral BID    VANCOMYCIN INFORMATION NOTE   Other Rx Dosing/Monitoring    Vancomycin trough level due 3/3/2020 at 1330 **prior to dose due at 1400**  1 Each Other ONCE    polyethylene glycol (MIRALAX) packet 17 g  17 g Oral DAILY    [START ON 3/4/2020] polyethylene glycol (MIRALAX) packet 17 g  17 g Oral DAILY PRN    insulin lispro (HUMALOG) injection   SubCUTAneous AC&HS    glucose chewable tablet 16 g  4 Tab Oral PRN    glucagon (GLUCAGEN) injection 1 mg  1 mg IntraMUSCular PRN    dextrose 10% infusion 125-250 mL  125-250 mL IntraVENous PRN    thiamine HCL (B-1) tablet 100 mg  100 mg Oral DAILY    morphine injection 1 mg  1 mg IntraVENous Q4H PRN    sodium chloride (NS) flush 5-10 mL  5-10 mL IntraVENous PRN    cefTRIAXone (ROCEPHIN) 1 g in sterile water (preservative free) 10 mL IV syringe  1 g IntraVENous Q24H    lisinopril (PRINIVIL, ZESTRIL) tablet 10 mg  10 mg Oral DAILY    atorvastatin (LIPITOR) tablet 10 mg  10 mg Oral DAILY    heparin (porcine) injection 5,000 Units  5,000 Units SubCUTAneous Q8H    sodium chloride (NS) flush 5-40 mL  5-40 mL IntraVENous Q8H    sodium chloride (NS) flush 5-40 mL  5-40 mL IntraVENous PRN    LORazepam (ATIVAN) tablet 1 mg  1 mg Oral Q1H PRN    Or    LORazepam (ATIVAN) injection 1 mg  1 mg IntraVENous Q1H PRN    LORazepam (ATIVAN) tablet 2 mg  2 mg Oral Q1H PRN    Or    LORazepam (ATIVAN) injection 2 mg  2 mg IntraVENous Q1H PRN    LORazepam (ATIVAN) injection 3 mg  3 mg IntraVENous Q15MIN PRN    acetaminophen (TYLENOL) tablet 650 mg  650 mg Oral Q6H PRN    levoFLOXacin (LEVAQUIN) 750 mg in D5W IVPB  750 mg IntraVENous Q24H    clotrimazole (LOTRIMIN) 1 % cream   Topical BID       Allergies: Patient has no known allergies. No family history on file.   Social History     Socioeconomic History    Marital status:      Spouse name: Not on file    Number of children: Not on file    Years of education: Not on file    Highest education level: Not on file   Occupational History    Not on file   Social Needs    Financial resource strain: Not on file    Food insecurity:     Worry: Not on file     Inability: Not on file    Transportation needs:     Medical: Not on file     Non-medical: Not on file   Tobacco Use    Smoking status: Not on file   Substance and Sexual Activity    Alcohol use: Not on file    Drug use: Not on file    Sexual activity: Not on file   Lifestyle    Physical activity:     Days per week: Not on file     Minutes per session: Not on file    Stress: Not on file   Relationships    Social connections:     Talks on phone: Not on file     Gets together: Not on file     Attends Baptism service: Not on file     Active member of club or organization: Not on file     Attends meetings of clubs or organizations: Not on file     Relationship status: Not on file    Intimate partner violence:     Fear of current or ex partner: Not on file     Emotionally abused: Not on file     Physically abused: Not on file     Forced sexual activity: Not on file   Other Topics Concern    Not on file   Social History Narrative    Not on file     Social History     Tobacco Use   Smoking Status Not on file        Temp (24hrs), Av °F (37.2 °C), Min:97.7 °F (36.5 °C), Max:100.4 °F (38 °C)    Visit Vitals  /79   Pulse 99   Temp 100.4 °F (38 °C)   Resp (!) 32 Comment: RN notified   Ht 6' 2\" (1.88 m)   Wt 91 kg (200 lb 9.9 oz)   SpO2 94%   BMI 25.76 kg/m²       ROS: 12 point ROS obtained in details. Pertinent positives as mentioned in HPI,   otherwise negative    Physical Exam:    General:  AAOx3, NAD   HEENT: Conjunctiva pink, sclera anicteric. PERRL. EOMI. Pharynx moist, nonerythematous. Dry mucous membranes. Thyroid not enlarged, no nodules. No cervical, supraclavicular, occipital or submandibular lymphadenopathy. No other gross abnormalities present. CV:  RRR, no murmurs. No visible pulsations or thrills. RESP:  Unlabored breathing. Lungs clear to auscultation without adventitious breath sounds. Equal expansion bilaterally. ABD:  Soft, nontender, nondistended. BS (+). No hepatosplenomegaly. No suprapubic tenderness. MS:  No joint deformity or instability. No atrophy. Neuro:  CN II-XII grossly intact. 5/5 strength bilateral upper extremities and lower extremities. LLE pain limited  Ext:  2+ pitting edema LLE with bright erythema, warmth, blisters LLE. 2+ radial and dp pulses bilaterally.   Skin:  Significant erythema and swelling LLE to mid calf, picture in chart      Labs: Results: Chemistry Recent Labs     03/03/20 0321 03/02/20  0520 03/01/20  0238   * 109* 104*   * 134* 134*   K 3.3* 3.6 3.8    103 103   CO2 23 24 24   BUN 10 14 28*   CREA 0.61 0.65 1.03   CA 8.8 8.5 8.3*   AGAP 10 7 7   BUCR 16 22* 27*   * 146* 196*   TP 6.1* 6.0* 6.1*   ALB 1.7* 1.7* 1.9*   GLOB 4.4* 4.3* 4.2*   AGRAT 0.4* 0.4* 0.5*      CBC w/Diff Recent Labs     03/03/20 0321 03/02/20 0520 03/01/20  0238   WBC 24.8* 26.1* 27.8*   RBC 3.77* 3.74* 3.83*   HGB 11.9* 12.1* 12.4*   HCT 36.2 35.8* 36.7    213 194   GRANS 69 80* 79*   LYMPH 12* 9* 6*   EOS 0 0 0      Microbiology Recent Labs     02/29/20  1900 02/29/20  1742   CULT NO GROWTH 2 DAYS NO GROWTH 3 DAYS          RADIOLOGY:    All available imaging studies/reports in [facility] care for this admission were reviewed    Dr. Sherin Reardon, Infectious Disease Specialist  [phone]  March 3, 2020  4:00 PM

## 2020-03-03 NOTE — PROGRESS NOTES
conducted an initial consultation and Spiritual Assessment for Luis Stevens, who is a 61 y.o.,male. Patient's Primary Language is: [place]. According to the patient's EMR Yazidi Affiliation is: Buddhist. The reason the Patient came to the hospital is:   Patient Active Problem List    Diagnosis Date Noted    Leukocytosis 02/29/2020    Hypotension 02/29/2020    Sepsis ([facility] [address] 02/29/2020    GAIL (acute kidney injury) ([facility] [address] 02/29/2020    Cellulitis of left lower extremity 02/29/2020    Acute leg pain, left 02/29/2020    Cellulitis 02/29/2020        The  provided the following Interventions:  Initiated a relationship of care and support. Explored issues of deondre, belief, spirituality and Synagogue/ritual needs while hospitalized. Listened empathically. Offered prayer and assurance of continued prayers on patient's behalf. The following outcomes where achieved:  Patient shared limited information about both their medical narrative and spiritual journey/beliefs.  confirmed Patient's Yazidi Affiliation. Patient processed feeling about current hospitalization. Patient expressed gratitude for 's visit. Assessment:  Patient does not have any Synagogue/cultural needs that will affect patient's preferences in health care. There are no spiritual or Synagogue issues which require intervention at this time. Plan:  Chaplains will continue to follow and will provide pastoral care on an as needed/requested basis.  recommends bedside caregivers page  on duty if patient shows signs of acute spiritual or emotional distress.  Fr.  [address]   [phone]

## 2020-03-03 NOTE — ROUTINE PROCESS
Patient is alert and oriented times three with no signs or symptoms fo distress.  Leg is still hot and red

## 2020-03-03 NOTE — ROUTINE PROCESS
Bedside and Verbal shift change report given by Isra Clemente RN (offgoing nurse) to Candelaria Curry RN (oncomingnurse). Report included the following information SBAR, Kardex and MAR.

## 2020-03-03 NOTE — ROUTINE PROCESS
Bedside and Verbal shift change report given to Keon Love RN (oncoming nurse) by Santa Rao (offgoing nurse). Report included the following information SBAR, Kardex and MAR.

## 2020-03-04 LAB
ALBUMIN SERPL-MCNC: 1.7 G/DL (ref 3.4–5)
ALBUMIN/GLOB SERPL: 0.4 {RATIO} (ref 0.8–1.7)
ALP SERPL-CCNC: 180 U/L (ref 45–117)
ALT SERPL-CCNC: 42 U/L (ref 16–61)
ANION GAP SERPL CALC-SCNC: 8 MMOL/L (ref 3–18)
AST SERPL-CCNC: 47 U/L (ref 10–38)
BASOPHILS # BLD: 0 K/UL (ref 0–0.06)
BASOPHILS NFR BLD: 0 % (ref 0–3)
BILIRUB SERPL-MCNC: 0.8 MG/DL (ref 0.2–1)
BUN SERPL-MCNC: 10 MG/DL (ref 7–18)
BUN/CREAT SERPL: 18 (ref 12–20)
CALCIUM SERPL-MCNC: 8.7 MG/DL (ref 8.5–10.1)
CHLORIDE SERPL-SCNC: 100 MMOL/L (ref 100–111)
CO2 SERPL-SCNC: 24 MMOL/L (ref 21–32)
CREAT SERPL-MCNC: 0.56 MG/DL (ref 0.6–1.3)
DATE LAST DOSE: ABNORMAL
DIFFERENTIAL METHOD BLD: ABNORMAL
EOSINOPHIL # BLD: 0.2 K/UL (ref 0–0.4)
EOSINOPHIL NFR BLD: 1 % (ref 0–5)
ERYTHROCYTE [DISTWIDTH] IN BLOOD BY AUTOMATED COUNT: 14.5 % (ref 11.6–14.5)
GLOBULIN SER CALC-MCNC: 4.5 G/DL (ref 2–4)
GLUCOSE BLD STRIP.AUTO-MCNC: 112 MG/DL (ref 70–110)
GLUCOSE SERPL-MCNC: 104 MG/DL (ref 74–99)
HCT VFR BLD AUTO: 35.7 % (ref 36–48)
HGB BLD-MCNC: 12.1 G/DL (ref 13–16)
LYMPHOCYTES # BLD: 1.9 K/UL (ref 0.8–3.5)
LYMPHOCYTES NFR BLD: 8 % (ref 20–51)
MAGNESIUM SERPL-MCNC: 1.7 MG/DL (ref 1.6–2.6)
MCH RBC QN AUTO: 32.2 PG (ref 24–34)
MCHC RBC AUTO-ENTMCNC: 33.9 G/DL (ref 31–37)
MCV RBC AUTO: 94.9 FL (ref 74–97)
METAMYELOCYTES NFR BLD MANUAL: 3 %
MONOCYTES # BLD: 2.6 K/UL (ref 0–1)
MONOCYTES NFR BLD: 11 % (ref 2–9)
NEUTS BAND NFR BLD MANUAL: 4 % (ref 0–5)
NEUTS SEG # BLD: 18.2 K/UL (ref 1.8–8)
NEUTS SEG NFR BLD: 73 % (ref 42–75)
PLATELET # BLD AUTO: 221 K/UL (ref 135–420)
PLATELET COMMENTS,PCOM: ABNORMAL
PMV BLD AUTO: 9.7 FL (ref 9.2–11.8)
POTASSIUM SERPL-SCNC: 3.9 MMOL/L (ref 3.5–5.5)
PROT SERPL-MCNC: 6.2 G/DL (ref 6.4–8.2)
RBC # BLD AUTO: 3.76 M/UL (ref 4.7–5.5)
RBC MORPH BLD: ABNORMAL
REPORTED DOSE,DOSE: ABNORMAL UNITS
REPORTED DOSE/TIME,TMG: 1000
SODIUM SERPL-SCNC: 132 MMOL/L (ref 136–145)
VANCOMYCIN TROUGH SERPL-MCNC: 26.5 UG/ML (ref 10–20)
WBC # BLD AUTO: 23.6 K/UL (ref 4.6–13.2)

## 2020-03-04 PROCEDURE — 74011250637 HC RX REV CODE- 250/637: Performed by: STUDENT IN AN ORGANIZED HEALTH CARE EDUCATION/TRAINING PROGRAM

## 2020-03-04 PROCEDURE — 74011000250 HC RX REV CODE- 250: Performed by: INTERNAL MEDICINE

## 2020-03-04 PROCEDURE — 65270000029 HC RM PRIVATE

## 2020-03-04 PROCEDURE — 74011000258 HC RX REV CODE- 258: Performed by: INTERNAL MEDICINE

## 2020-03-04 PROCEDURE — 74011250636 HC RX REV CODE- 250/636: Performed by: INTERNAL MEDICINE

## 2020-03-04 PROCEDURE — 74011250636 HC RX REV CODE- 250/636: Performed by: ORTHOPAEDIC SURGERY

## 2020-03-04 PROCEDURE — 85025 COMPLETE CBC W/AUTO DIFF WBC: CPT

## 2020-03-04 PROCEDURE — 80053 COMPREHEN METABOLIC PANEL: CPT

## 2020-03-04 PROCEDURE — 80202 ASSAY OF VANCOMYCIN: CPT

## 2020-03-04 PROCEDURE — 74011000250 HC RX REV CODE- 250: Performed by: STUDENT IN AN ORGANIZED HEALTH CARE EDUCATION/TRAINING PROGRAM

## 2020-03-04 PROCEDURE — 36415 COLL VENOUS BLD VENIPUNCTURE: CPT

## 2020-03-04 PROCEDURE — 74011250636 HC RX REV CODE- 250/636: Performed by: STUDENT IN AN ORGANIZED HEALTH CARE EDUCATION/TRAINING PROGRAM

## 2020-03-04 PROCEDURE — 83735 ASSAY OF MAGNESIUM: CPT

## 2020-03-04 PROCEDURE — 82962 GLUCOSE BLOOD TEST: CPT

## 2020-03-04 PROCEDURE — 74011250637 HC RX REV CODE- 250/637: Performed by: INTERNAL MEDICINE

## 2020-03-04 RX ORDER — LIDOCAINE 4 G/100G
1 PATCH TOPICAL EVERY 24 HOURS
Status: DISCONTINUED | OUTPATIENT
Start: 2020-03-04 | End: 2020-03-12 | Stop reason: HOSPADM

## 2020-03-04 RX ADMIN — LISINOPRIL 10 MG: 10 TABLET ORAL at 08:07

## 2020-03-04 RX ADMIN — Medication 1 CAPSULE: at 08:07

## 2020-03-04 RX ADMIN — Medication 10 ML: at 05:25

## 2020-03-04 RX ADMIN — ACETAMINOPHEN 650 MG: 325 TABLET ORAL at 21:26

## 2020-03-04 RX ADMIN — HEPARIN SODIUM 5000 UNITS: 5000 INJECTION INTRAVENOUS; SUBCUTANEOUS at 16:47

## 2020-03-04 RX ADMIN — CLOTRIMAZOLE: 10 CREAM TOPICAL at 18:00

## 2020-03-04 RX ADMIN — Medication 10 ML: at 14:00

## 2020-03-04 RX ADMIN — SODIUM CHLORIDE 1000 MG: 900 INJECTION, SOLUTION INTRAVENOUS at 19:53

## 2020-03-04 RX ADMIN — CLINDAMYCIN PHOSPHATE 900 MG: 150 INJECTION, SOLUTION, CONCENTRATE INTRAVENOUS at 16:47

## 2020-03-04 RX ADMIN — MORPHINE SULFATE 1 MG: 2 INJECTION, SOLUTION INTRAMUSCULAR; INTRAVENOUS at 08:08

## 2020-03-04 RX ADMIN — MORPHINE SULFATE 1 MG: 2 INJECTION, SOLUTION INTRAMUSCULAR; INTRAVENOUS at 21:26

## 2020-03-04 RX ADMIN — Medication 100 MG: at 08:07

## 2020-03-04 RX ADMIN — CLINDAMYCIN PHOSPHATE 900 MG: 150 INJECTION, SOLUTION, CONCENTRATE INTRAVENOUS at 00:16

## 2020-03-04 RX ADMIN — VANCOMYCIN HYDROCHLORIDE 1250 MG: 10 INJECTION, POWDER, LYOPHILIZED, FOR SOLUTION INTRAVENOUS at 02:18

## 2020-03-04 RX ADMIN — ATORVASTATIN CALCIUM 10 MG: 10 TABLET, FILM COATED ORAL at 08:07

## 2020-03-04 RX ADMIN — CLINDAMYCIN PHOSPHATE 900 MG: 150 INJECTION, SOLUTION, CONCENTRATE INTRAVENOUS at 08:18

## 2020-03-04 RX ADMIN — HEPARIN SODIUM 5000 UNITS: 5000 INJECTION INTRAVENOUS; SUBCUTANEOUS at 08:08

## 2020-03-04 RX ADMIN — Medication 10 ML: at 21:20

## 2020-03-04 RX ADMIN — CEFTRIAXONE SODIUM 2 G: 2 INJECTION, POWDER, FOR SOLUTION INTRAMUSCULAR; INTRAVENOUS at 10:09

## 2020-03-04 RX ADMIN — CLOTRIMAZOLE: 10 CREAM TOPICAL at 08:09

## 2020-03-04 RX ADMIN — VANCOMYCIN HYDROCHLORIDE 1250 MG: 10 INJECTION, POWDER, LYOPHILIZED, FOR SOLUTION INTRAVENOUS at 10:09

## 2020-03-04 NOTE — PROGRESS NOTES
Infectious Disease progress Note        Reason: sepsis, cellulitis LLE    Current abx Prior abx   Ceftriaxone, vancomycin since 2/29/20  Clindamycin since 3/3/20  levofloxacin 2/29-3/3/20     Lines:       Assessment :     61 y.o. male with PMH HTN, T2DM (last hgbA1C 5.8 on 3/2/20) admitted to [facility] on 2/2/9/20 with cellulitis. Clinical presentation c/w sepsis (POA) due to acute left leg cellulitis. Exact microbial etiology of infection is not clear. Rapid onset and presentation favors gram positive bacterial etiology such as streptococcus, staphylococcus. Gradual clinical improvement. Decreased erythema  Noted on today's exam.   Persistent leukocytosis - could be due to toxin producing staph or strep infection versus evolving left leg abscess. Will need to monitor clinically to determine this. Also, back pain - likely due to L3 fracture. Spine surgery f/u appreciated. MRI:Subacute appearing burst-type L3 superior endplate fracture with mild 4 mm retropulsion associated mild L2/L3 spinal canal stenosis. Recommendations:    1. Continue ceftriaxone,  Vancomycin, clindamycin to inhibit toxin production  2. Monitor cbc, clinically  3. Elevate left leg  4. F/u Repeat blood cultures  5. F/u anti DNAse B  6. Will consider imaging studies to r/o deeper infection if not significant improvement in am     Above plan was discussed in details with patient, and primary team. Please call me if any further questions or concerns. Will continue to participate in the care of this patient. HPI:    Feels better. Decreased left leg pain. Patient denies headaches, visual disturbances, sore throat, runny nose, earaches, cp, sob, chills, cough, abdominal pain, diarrhea, burning micturition,  or weakness in extremities. C/o  back pain/no flank pain. No past medical history on file.     No past surgical history on file.    home Medication List    Details   metFORMIN ER (GLUCOPHAGE XR) 750 mg tablet Take 1,500 mg by mouth daily.      simvastatin (ZOCOR) 20 mg tablet Take 20 mg by mouth nightly. lisinopril (PRINIVIL, ZESTRIL) 10 mg tablet Take 10 mg by mouth daily. ibuprofen (MOTRIN) 800 mg tablet Comments:   Reason for Stopping:               Current Facility-Administered Medications   Medication Dose Route Frequency    lidocaine 4 % patch 1 Patch  1 Patch TransDERmal Q24H    Vancomycin TROUGH level to be drawn 3/4 at 17:30 -- BEFORE giving the next dose due at 18:00. Thanks! Other ONCE    polyethylene glycol (MIRALAX) packet 17 g  17 g Oral DAILY PRN    polyethylene glycol (MIRALAX) packet 17 g  17 g Oral DAILY    clindamycin phosphate (CLEOCIN) 900 mg in 0.9% sodium chloride 100 mL IVPB  900 mg IntraVENous Q8H    lactobacillus sp. 50 billion cpu (BIO-K PLUS) capsule 1 Cap  1 Cap Oral DAILY    cefTRIAXone (ROCEPHIN) 2 g in sterile water (preservative free) 20 mL IV syringe  2 g IntraVENous Q24H    vancomycin (VANCOCIN) 1250 mg in  ml infusion  1,250 mg IntraVENous Q8H    morphine injection 1 mg  1 mg IntraVENous TID PRN    glucose chewable tablet 16 g  4 Tab Oral PRN    glucagon (GLUCAGEN) injection 1 mg  1 mg IntraMUSCular PRN    dextrose 10% infusion 125-250 mL  125-250 mL IntraVENous PRN    thiamine HCL (B-1) tablet 100 mg  100 mg Oral DAILY    sodium chloride (NS) flush 5-10 mL  5-10 mL IntraVENous PRN    lisinopril (PRINIVIL, ZESTRIL) tablet 10 mg  10 mg Oral DAILY    atorvastatin (LIPITOR) tablet 10 mg  10 mg Oral DAILY    heparin (porcine) injection 5,000 Units  5,000 Units SubCUTAneous Q8H    sodium chloride (NS) flush 5-40 mL  5-40 mL IntraVENous Q8H    sodium chloride (NS) flush 5-40 mL  5-40 mL IntraVENous PRN    acetaminophen (TYLENOL) tablet 650 mg  650 mg Oral Q6H PRN    clotrimazole (LOTRIMIN) 1 % cream   Topical BID       Allergies: Patient has no known allergies. No family history on file.   Social History     Socioeconomic History    Marital status:      Spouse name: Not on file    Number of children: Not on file    Years of education: Not on file    Highest education level: Not on file   Occupational History    Not on file   Social Needs    Financial resource strain: Not on file    Food insecurity:     Worry: Not on file     Inability: Not on file    Transportation needs:     Medical: Not on file     Non-medical: Not on file   Tobacco Use    Smoking status: Not on file   Substance and Sexual Activity    Alcohol use: Not on file    Drug use: Not on file    Sexual activity: Not on file   Lifestyle    Physical activity:     Days per week: Not on file     Minutes per session: Not on file    Stress: Not on file   Relationships    Social connections:     Talks on phone: Not on file     Gets together: Not on file     Attends Yarsanism service: Not on file     Active member of club or organization: Not on file     Attends meetings of clubs or organizations: Not on file     Relationship status: Not on file    Intimate partner violence:     Fear of current or ex partner: Not on file     Emotionally abused: Not on file     Physically abused: Not on file     Forced sexual activity: Not on file   Other Topics Concern    Not on file   Social History Narrative    Not on file     Social History     Tobacco Use   Smoking Status Not on file        Temp (24hrs), Av.3 °F (37.4 °C), Min:98.5 °F (36.9 °C), Max:100.4 °F (38 °C)    Visit Vitals  /87 (BP 1 Location: Right arm, BP Patient Position: At rest)   Pulse (!) 101   Temp 99.2 °F (37.3 °C)   Resp 18   Ht 6' 2\" (1.88 m)   Wt 91 kg (200 lb 9.9 oz)   SpO2 96%   BMI 25.76 kg/m²       ROS: 12 point ROS obtained in details. Pertinent positives as mentioned in HPI,   otherwise negative    Physical Exam:    General:  AAOx3, NAD   HEENT: Conjunctiva pink, sclera anicteric. PERRL. EOMI. Pharynx moist, nonerythematous. Dry mucous membranes. Thyroid not enlarged, no nodules.   No cervical, supraclavicular, occipital or submandibular lymphadenopathy. No other gross abnormalities present. CV:  RRR, no murmurs. No visible pulsations or thrills. RESP:  Unlabored breathing. Lungs clear to auscultation without adventitious breath sounds. Equal expansion bilaterally. ABD:  Soft, nontender, nondistended. BS (+). No hepatosplenomegaly. No suprapubic tenderness. MS:  No joint deformity or instability. No atrophy. Neuro:  CN II-XII grossly intact. 5/5 strength bilateral upper extremities and lower extremities. LLE pain limited  Ext:  2+ pitting edema LLE with bright erythema, warmth, blisters LLE. 2+ radial and dp pulses bilaterally.   Skin:  Significant erythema and swelling LLE to mid calf, picture in chart      Labs: Results:   Chemistry Recent Labs     03/04/20  0521 03/03/20 1657 03/03/20 0321 03/02/20  0520   *  --  111* 109*   *  --  135* 134*   K 3.9 4.0 3.3* 3.6     --  102 103   CO2 24  --  23 24   BUN 10  --  10 14   CREA 0.56*  --  0.61 0.65   CA 8.7  --  8.8 8.5   AGAP 8  --  10 7   BUCR 18  --  16 22*   *  --  167* 146*   TP 6.2*  --  6.1* 6.0*   ALB 1.7*  --  1.7* 1.7*   GLOB 4.5*  --  4.4* 4.3*   AGRAT 0.4*  --  0.4* 0.4*      CBC w/Diff Recent Labs     03/04/20  0521 03/03/20 0321 03/02/20  0520   WBC 23.6* 24.8* 26.1*   RBC 3.76* 3.77* 3.74*   HGB 12.1* 11.9* 12.1*   HCT 35.7* 36.2 35.8*    223 213   GRANS 73 69 80*   LYMPH 8* 12* 9*   EOS 1 0 0      Microbiology Recent Labs     03/03/20  1657   CULT NO GROWTH AFTER 16 HOURS  NO GROWTH AFTER 16 HOURS          RADIOLOGY:    All available imaging studies/reports in [facility] care for this admission were reviewed    Dr. Katrin Humphrey, Infectious Disease Specialist  [phone]  March 4, 2020  4:00 PM

## 2020-03-04 NOTE — ROUTINE PROCESS
Patient is alert and oriented times three with no signs or symptoms of distress. Leg is hot and red.

## 2020-03-04 NOTE — ROUTINE PROCESS
Bedside and Verbal shift change report given to Jerson Merrill RN (oncoming nurse) by Enrique Ferrera RN (offgoing nurse). Report included the following information SBAR, Kardex and MAR.

## 2020-03-04 NOTE — ROUTINE PROCESS
Patient is alert and oriented times three with no signs or symptoms of distress.  Leg remains red and swollen

## 2020-03-04 NOTE — PROGRESS NOTES
Problem: General Medical Care Plan  Goal: *Vital signs within specified parameters  Outcome: Progressing Towards Goal  Goal: *Labs within defined limits  Outcome: Progressing Towards Goal  Goal: *Absence of infection signs and symptoms  Outcome: Progressing Towards Goal  Goal: *Optimal pain control at patient's stated goal  Outcome: Progressing Towards Goal  Goal: *Skin integrity maintained  Outcome: Progressing Towards Goal  Goal: *Fluid volume balance  Outcome: Progressing Towards Goal  Goal: *Optimize nutritional status  Outcome: Progressing Towards Goal  Goal: *Anxiety reduced or absent  Outcome: Progressing Towards Goal  Goal: *Progressive mobility and function (eg: ADL's)  Outcome: Progressing Towards Goal     Problem: Patient Education: Go to Patient Education Activity  Goal: Patient/Family Education  Outcome: Progressing Towards Goal     Problem: Pain  Goal: *Control of Pain  Outcome: Progressing Towards Goal     Problem: Patient Education: Go to Patient Education Activity  Goal: Patient/Family Education  Outcome: Progressing Towards Goal     Problem: Falls - Risk of  Goal: *Absence of Falls  Description  Document Bishop Rea Fall Risk and appropriate interventions in the flowsheet.   Outcome: Progressing Towards Goal  Note: Fall Risk Interventions:  Mobility Interventions: Assess mobility with egress test, Communicate number of staff needed for ambulation/transfer, OT consult for ADLs, Patient to call before getting OOB, PT Consult for mobility concerns         Medication Interventions: Assess postural VS orthostatic hypotension, Evaluate medications/consider consulting pharmacy, Patient to call before getting OOB, Teach patient to arise slowly    Elimination Interventions: Call light in reach, Patient to call for help with toileting needs, Toilet paper/wipes in reach, Toileting schedule/hourly rounds              Problem: Patient Education: Go to Patient Education Activity  Goal: Patient/Family Education  Outcome: Progressing Towards Goal     Problem: Cellulitis Care Plan (Adult)  Goal: *Control of acute pain  Outcome: Progressing Towards Goal  Goal: *Skin integrity maintained  Outcome: Progressing Towards Goal  Goal: *Absence of infection signs and symptoms  Outcome: Progressing Towards Goal     Problem: Patient Education: Go to Patient Education Activity  Goal: Patient/Family Education  Outcome: Progressing Towards Goal     Problem: Pressure Injury - Risk of  Goal: *Prevention of pressure injury  Description  Document Kahlil Scale and appropriate interventions in the flowsheet.   Outcome: Progressing Towards Goal  Note: Pressure Injury Interventions:  Sensory Interventions: Assess changes in LOC, Avoid rigorous massage over bony prominences, Check visual cues for pain, Keep linens dry and wrinkle-free    Moisture Interventions: Absorbent underpads, Assess need for specialty bed, Check for incontinence Q2 hours and as needed    Activity Interventions: Assess need for specialty bed, Increase time out of bed, Pressure redistribution bed/mattress(bed type), PT/OT evaluation    Mobility Interventions: Assess need for specialty bed, Float heels, HOB 30 degrees or less    Nutrition Interventions: Document food/fluid/supplement intake, Offer support with meals,snacks and hydration                     Problem: Patient Education: Go to Patient Education Activity  Goal: Patient/Family Education  Outcome: Progressing Towards Goal

## 2020-03-04 NOTE — PROGRESS NOTES
[address] [facility]  Intern Progress Note    Patient: Cameron Guadarrama MRN: [id number]   SSN: [id number]  YOB: 1960   Age: 61 y.o. Sex: male      Admit Date: 2/29/2020    LOS: 4 days   Chief Complaint   Patient presents with    Leg Pain     Left LLE pain and swelling        Subjective:     Patient seen at bedside this morning, complaining of back pain. Pain in leg is controlled. No fever/chills overnight. No SOB, cough is intermittent. No BM since admission, but patient states he feels fine- thinks it's because he has not been eating. ROS:   Denies:   - fevers/chill  - CP  - SOB  - N/V/diarrhea or abdominal pain     Objective:     PE:  - General: patient is in no acute distress  - Cv: RRR, no murmurs/gallops/rubs, peripheral pulses intact  - Resp: Lungs CTA, scattered rales that clear with cough  - Abdominal: Soft, non-tender, non-distended, Bs+   - MSK: 2+ swelling in LLE up to knee. Has regressed from previous marking line. Still warm, erythematous and painful to touch. Some bulla looking lesions around the ankle without any drainage this morning. Scabbed over coin size ulcer on dorsal surface of left great toe.     Vitals:   Patient Vitals for the past 24 hrs:   Temp Pulse Resp BP SpO2   03/04/20 0921 98.8 °F (37.1 °C) 97 16 140/81 --   03/04/20 0624 98.6 °F (37 °C) 93 18 149/87 96 %   03/04/20 0400 -- 90 -- -- --   03/04/20 0000 -- 92 -- -- --   03/03/20 2202 98.5 °F (36.9 °C) 85 20 141/82 95 %   03/03/20 2000 -- 92 -- -- --   03/03/20 1745 100.4 °F (38 °C) (!) 101 19 135/77 93 %   03/03/20 1417 100.4 °F (38 °C) 99 (!) 32 145/79 94 %   03/03/20 1000 98.7 °F (37.1 °C) 97 18 159/80 95 %         Intake/Output Summary (Last 24 hours) at 3/4/2020 0930  Last data filed at 3/4/2020 0810  Gross per 24 hour   Intake 2870 ml   Output 1850 ml   Net 1020 ml       Labs reviewed.    Pertinent labs: WBC 23.6 with 4 bands, Na 132, K 3.9, Mg 1.7  Pertinent studies    CXR 3/3/2020  Bilateral pleural effusions left greater than right. Stringy densities left lower lobe-infiltrate versus atelectasis. Assessment/Plan:     61 y. o. male with PMH alcohol abuse, HTN, T2DM, now admitted with sepsis 2/2 cellulitis.     Sepsis 2/2 cellulitis- patient presents with ~6 days of worsening pain, redness and swelling in his LLE associated with some nausea, vomiting, fevers and chills. Red leg erythematous extending all the way up to groin. Tachycardic on presentation that has since resolved. Leukocytosis of 27.1 with left shift which has been slowly downtrending. No lactic acidosis. Urine and blood cultures pending, so far no growth. ESR WNLs, CRP elevatied. Initial XR of left foot without signs of osteomyelitis. MRI foot and MRI tib/fib without sings of osteomyelitis. Erythema is regressing and patient is feeling overall better. - Continue vancomycin, ceftriaxone, and clindamycin   - trend leukocytosis and fevers  - FU ID recs      Possible CAP- CXR on admission with Mild diffuse reticulonodular prominence of the interstitial markings and focal irregular increased opacity projects at the left lung base partially overlapping the anterior sixth rib shadow, concerning for focal airspace disease versus atelectasis. Patient has productive cough, but lungs overall sound clear on exam- do not think this is the cause of his sepsis. Is also long term smoker and has persistent daily cough. Repeat CXR today without much change from prior. Lungs overall clear with some scattered rales that clear with cough on exam   - likely not PNA given clinical picture, however antibiotics for cellulitis should also cover for anything pulmonary      GAIL- resolved. Elevated to 1.72 on admission, baseline today after fluid recussitation  - Daily BMP  - I&Os      Alcohol use disorder- hx of drinking 4 beers/day.  LFTs slightly elevated on admission, resolved today  - CIWA protocol, patient has not needed any ativan during hospitalization  - Trend transaminases- have downtrended     Acute L3 compression fracture- Seen in ED, 2/23 told to FU with ortho OP  - Pain control as needed, will add Lidocaine patch   - Ortho consulted, recommended progressive mobility, can FU outpaitent     Hx of HTN- SBP in 140's overnight.   - Continue home Lisinopril     DM- HbA1c 5.8 On 1500 metformin daily  - Hold metformin  - diabetic diet  - monitor glucose on daily BMP     Tinea Cruris- groin  - Continue Clomitrazole cream     Diet Regular   DVT Prophylaxis SQH   GI Prophylaxis None   Code status Full   Disposition 2-3 days then home with [facility]      [address]  [phone]         Signed By: Claria Dancer, MD     March 4, 2020

## 2020-03-04 NOTE — PROGRESS NOTES
Discharge planning    Reviewed chart. Will need taxi for transportation at discharge. CM will continue to monitor for transitional needs.     CHANO FloresN, RN  Pager # [phone]  Care Manager

## 2020-03-04 NOTE — ROUTINE PROCESS
Patient is alert and oriented times three with no signs or symptoms of distress. Patient urinated on himself and finally agreed to a bath. Foot and leg remains hot and swollen

## 2020-03-05 ENCOUNTER — APPOINTMENT (OUTPATIENT)
Dept: ULTRASOUND IMAGING | Age: 60
DRG: 854 | End: 2020-03-05
Attending: INTERNAL MEDICINE
Payer: MEDICARE

## 2020-03-05 ENCOUNTER — APPOINTMENT (OUTPATIENT)
Dept: CT IMAGING | Age: 60
DRG: 854 | End: 2020-03-05
Attending: FAMILY MEDICINE
Payer: MEDICARE

## 2020-03-05 ENCOUNTER — APPOINTMENT (OUTPATIENT)
Dept: GENERAL RADIOLOGY | Age: 60
DRG: 854 | End: 2020-03-05
Attending: STUDENT IN AN ORGANIZED HEALTH CARE EDUCATION/TRAINING PROGRAM
Payer: MEDICARE

## 2020-03-05 LAB
ALBUMIN SERPL-MCNC: 1.6 G/DL (ref 3.4–5)
ALBUMIN/GLOB SERPL: 0.4 {RATIO} (ref 0.8–1.7)
ALP SERPL-CCNC: 190 U/L (ref 45–117)
ALT SERPL-CCNC: 42 U/L (ref 16–61)
ANION GAP SERPL CALC-SCNC: 7 MMOL/L (ref 3–18)
AST SERPL-CCNC: 48 U/L (ref 10–38)
BASOPHILS # BLD: 0 K/UL (ref 0–0.06)
BASOPHILS NFR BLD: 0 % (ref 0–3)
BILIRUB SERPL-MCNC: 0.8 MG/DL (ref 0.2–1)
BUN SERPL-MCNC: 10 MG/DL (ref 7–18)
BUN/CREAT SERPL: 14 (ref 12–20)
CALCIUM SERPL-MCNC: 8.5 MG/DL (ref 8.5–10.1)
CHLORIDE SERPL-SCNC: 102 MMOL/L (ref 100–111)
CO2 SERPL-SCNC: 27 MMOL/L (ref 21–32)
CREAT SERPL-MCNC: 0.73 MG/DL (ref 0.6–1.3)
DATE LAST DOSE: NORMAL
DIFFERENTIAL METHOD BLD: ABNORMAL
EOSINOPHIL # BLD: 0.3 K/UL (ref 0–0.4)
EOSINOPHIL NFR BLD: 1 % (ref 0–5)
ERYTHROCYTE [DISTWIDTH] IN BLOOD BY AUTOMATED COUNT: 14.4 % (ref 11.6–14.5)
GLOBULIN SER CALC-MCNC: 4.4 G/DL (ref 2–4)
GLUCOSE SERPL-MCNC: 118 MG/DL (ref 74–99)
HCT VFR BLD AUTO: 34 % (ref 36–48)
HGB BLD-MCNC: 11.3 G/DL (ref 13–16)
LYMPHOCYTES # BLD: 3.5 K/UL (ref 0.8–3.5)
LYMPHOCYTES NFR BLD: 14 % (ref 20–51)
MAGNESIUM SERPL-MCNC: 1.6 MG/DL (ref 1.6–2.6)
MCH RBC QN AUTO: 31.6 PG (ref 24–34)
MCHC RBC AUTO-ENTMCNC: 33.2 G/DL (ref 31–37)
MCV RBC AUTO: 95 FL (ref 74–97)
MONOCYTES # BLD: 1.8 K/UL (ref 0–1)
MONOCYTES NFR BLD: 7 % (ref 2–9)
MYELOCYTES NFR BLD MANUAL: 1 %
NEUTS BAND NFR BLD MANUAL: 9 % (ref 0–5)
NEUTS SEG # BLD: 19.5 K/UL (ref 1.8–8)
NEUTS SEG NFR BLD: 68 % (ref 42–75)
PLATELET # BLD AUTO: 239 K/UL (ref 135–420)
PLATELET COMMENTS,PCOM: ABNORMAL
PMV BLD AUTO: 10.2 FL (ref 9.2–11.8)
POTASSIUM SERPL-SCNC: 3.5 MMOL/L (ref 3.5–5.5)
PROT SERPL-MCNC: 6 G/DL (ref 6.4–8.2)
RBC # BLD AUTO: 3.58 M/UL (ref 4.7–5.5)
RBC MORPH BLD: ABNORMAL
REPORTED DOSE,DOSE: NORMAL UNITS
REPORTED DOSE/TIME,TMG: 1229
SODIUM SERPL-SCNC: 136 MMOL/L (ref 136–145)
STREP DNASE B SER-ACNC: 91 U/ML (ref 0–120)
VANCOMYCIN TROUGH SERPL-MCNC: 19.9 UG/ML (ref 10–20)
WBC # BLD AUTO: 25.3 K/UL (ref 4.6–13.2)

## 2020-03-05 PROCEDURE — 85025 COMPLETE CBC W/AUTO DIFF WBC: CPT

## 2020-03-05 PROCEDURE — 65270000029 HC RM PRIVATE

## 2020-03-05 PROCEDURE — 80053 COMPREHEN METABOLIC PANEL: CPT

## 2020-03-05 PROCEDURE — 74011250637 HC RX REV CODE- 250/637: Performed by: STUDENT IN AN ORGANIZED HEALTH CARE EDUCATION/TRAINING PROGRAM

## 2020-03-05 PROCEDURE — 74011250637 HC RX REV CODE- 250/637: Performed by: INTERNAL MEDICINE

## 2020-03-05 PROCEDURE — 74011000250 HC RX REV CODE- 250: Performed by: INTERNAL MEDICINE

## 2020-03-05 PROCEDURE — 83735 ASSAY OF MAGNESIUM: CPT

## 2020-03-05 PROCEDURE — 36415 COLL VENOUS BLD VENIPUNCTURE: CPT

## 2020-03-05 PROCEDURE — 73701 CT LOWER EXTREMITY W/DYE: CPT

## 2020-03-05 PROCEDURE — 80202 ASSAY OF VANCOMYCIN: CPT

## 2020-03-05 PROCEDURE — 76882 US LMTD JT/FCL EVL NVASC XTR: CPT

## 2020-03-05 PROCEDURE — 74011636320 HC RX REV CODE- 636/320: Performed by: FAMILY MEDICINE

## 2020-03-05 PROCEDURE — 74011000258 HC RX REV CODE- 258: Performed by: INTERNAL MEDICINE

## 2020-03-05 PROCEDURE — 74011000250 HC RX REV CODE- 250: Performed by: STUDENT IN AN ORGANIZED HEALTH CARE EDUCATION/TRAINING PROGRAM

## 2020-03-05 PROCEDURE — 74011250636 HC RX REV CODE- 250/636: Performed by: STUDENT IN AN ORGANIZED HEALTH CARE EDUCATION/TRAINING PROGRAM

## 2020-03-05 PROCEDURE — 71046 X-RAY EXAM CHEST 2 VIEWS: CPT

## 2020-03-05 PROCEDURE — 74011250636 HC RX REV CODE- 250/636: Performed by: INTERNAL MEDICINE

## 2020-03-05 RX ADMIN — HEPARIN SODIUM 5000 UNITS: 5000 INJECTION INTRAVENOUS; SUBCUTANEOUS at 12:41

## 2020-03-05 RX ADMIN — ATORVASTATIN CALCIUM 10 MG: 10 TABLET, FILM COATED ORAL at 12:26

## 2020-03-05 RX ADMIN — CLINDAMYCIN PHOSPHATE 900 MG: 150 INJECTION, SOLUTION, CONCENTRATE INTRAVENOUS at 00:01

## 2020-03-05 RX ADMIN — CLOTRIMAZOLE: 10 CREAM TOPICAL at 09:00

## 2020-03-05 RX ADMIN — SODIUM CHLORIDE 1000 MG: 900 INJECTION, SOLUTION INTRAVENOUS at 03:58

## 2020-03-05 RX ADMIN — SODIUM CHLORIDE 1000 MG: 900 INJECTION, SOLUTION INTRAVENOUS at 12:28

## 2020-03-05 RX ADMIN — CEFTRIAXONE SODIUM 2 G: 2 INJECTION, POWDER, FOR SOLUTION INTRAMUSCULAR; INTRAVENOUS at 12:27

## 2020-03-05 RX ADMIN — HEPARIN SODIUM 5000 UNITS: 5000 INJECTION INTRAVENOUS; SUBCUTANEOUS at 00:01

## 2020-03-05 RX ADMIN — SODIUM CHLORIDE 1000 MG: 900 INJECTION, SOLUTION INTRAVENOUS at 21:40

## 2020-03-05 RX ADMIN — CLINDAMYCIN PHOSPHATE 900 MG: 150 INJECTION, SOLUTION, CONCENTRATE INTRAVENOUS at 17:12

## 2020-03-05 RX ADMIN — Medication 10 ML: at 06:10

## 2020-03-05 RX ADMIN — ACETAMINOPHEN 650 MG: 325 TABLET ORAL at 17:10

## 2020-03-05 RX ADMIN — Medication 10 ML: at 21:43

## 2020-03-05 RX ADMIN — Medication 100 MG: at 12:26

## 2020-03-05 RX ADMIN — Medication 1 CAPSULE: at 12:26

## 2020-03-05 RX ADMIN — ACETAMINOPHEN 650 MG: 325 TABLET ORAL at 06:09

## 2020-03-05 RX ADMIN — IOPAMIDOL 100 ML: 612 INJECTION, SOLUTION INTRAVENOUS at 14:00

## 2020-03-05 RX ADMIN — CLOTRIMAZOLE: 10 CREAM TOPICAL at 17:03

## 2020-03-05 RX ADMIN — LISINOPRIL 10 MG: 10 TABLET ORAL at 12:26

## 2020-03-05 NOTE — ROUTINE PROCESS
Bedside and Verbal shift change report given to Isabel Garcia RN (oncoming nurse) by Victoria Ray RN (offgoing nurse). Report included the following information SBAR, Kardex and MAR.

## 2020-03-05 NOTE — CONSULTS
Orthopedic Surgery Consult    Subjective:     Xiao Joe is a 61 y.o.  male who is being seen for . Onset of symptoms was gradual with gradually improving course since that time. The pain is located in the left lower extremity specifically the lower leg and ankle. Patient describes the pain as intermittent,  and rated as moderate. Pain has been associated with palpation and pressure. Patient denies thick injuries cuts or wounds. Symptoms have improved gradually since starting antibiotics. Previous studies include MRI and CT scan of the left lower extremity. To date none of the studies have showed a fluid collection and there is an ultrasound pending. The patient denies any sick feeling at this moment and reports overall improvement in his upper leg symptoms. No past medical history on file. No past surgical history on file. No family history on file.    Social History     Tobacco Use    Smoking status: Not on file   Substance Use Topics    Alcohol use: Not on file       Current Facility-Administered Medications   Medication Dose Route Frequency Provider Last Rate Last Dose    Vancomycin TROUGH DUE @ 2030 03/05/2020  1 Each Other Once per day on Thu Salima Farooq MD        lidocaine 4 % patch 1 Patch  1 Patch TransDERmal Q24H Addy Stearns MD   1 Patch at 03/05/20 1225    vancomycin (VANCOCIN) 1,000 mg in 0.9% sodium chloride (MBP/ADV) 250 mL adv  1,000 mg IntraVENous Q8H Asha WING  mL/hr at 03/05/20 1228 1,000 mg at 03/05/20 1228    polyethylene glycol (MIRALAX) packet 17 g  17 g Oral DAILY PRN Addy Stearns MD   17 g at 03/03/20 1232    polyethylene glycol (MIRALAX) packet 17 g  17 g Oral DAILY Wendi Hemp, DO   Stopped at 03/03/20 1300    clindamycin phosphate (CLEOCIN) 900 mg in 0.9% sodium chloride 100 mL IVPB  900 mg IntraVENous Q8H Dee Farooq  mL/hr at 03/05/20 1241 900 mg at 03/05/20 1241    lactobacillus sp. 50 billion cpu (BIO-K PLUS) capsule 1 Cap  1 Cap Oral DAILY Ingrid Cleaning MD   1 Cap at 03/05/20 1226    cefTRIAXone (ROCEPHIN) 2 g in sterile water (preservative free) 20 mL IV syringe  2 g IntraVENous Q24H Asha WING MD   2 g at 03/05/20 1227    morphine injection 1 mg  1 mg IntraVENous TID PRN Addy Stearns MD   1 mg at 03/04/20 2126    glucose chewable tablet 16 g  4 Tab Oral PRN Addy Stearns MD        glucagon (GLUCAGEN) injection 1 mg  1 mg IntraMUSCular PRN Addy Stearns MD        dextrose 10% infusion 125-250 mL  125-250 mL IntraVENous PRN Adella Olszewski, MD        thiamine HCL (B-1) tablet 100 mg  100 mg Oral DAILY Addy Stearns MD   100 mg at 03/05/20 1226    sodium chloride (NS) flush 5-10 mL  5-10 mL IntraVENous PRN Sharon Ledbetter MD   10 mL at 03/03/20 1841    lisinopril (PRINIVIL, ZESTRIL) tablet 10 mg  10 mg Oral DAILY Sharon Ledbetter MD   10 mg at 03/05/20 1226    atorvastatin (LIPITOR) tablet 10 mg  10 mg Oral DAILY Sharon Ledbetter MD   10 mg at 03/05/20 1226    heparin (porcine) injection 5,000 Units  5,000 Units SubCUTAneous Q8H Sharon Ledbetter MD   5,000 Units at 03/05/20 1241    sodium chloride (NS) flush 5-40 mL  5-40 mL IntraVENous Q8H Sharon Ledbetter MD   10 mL at 03/05/20 0610    sodium chloride (NS) flush 5-40 mL  5-40 mL IntraVENous PRN Sharon Ledbetter MD        acetaminophen (TYLENOL) tablet 650 mg  650 mg Oral Q6H PRN Sharon Ledbetter MD   650 mg at 03/05/20 0609    clotrimazole (LOTRIMIN) 1 % cream   Topical BID Basia Molina MD            No Known Allergies     Review of Systems:  A comprehensive review of systems was negative except for that written in the History of Present Illness. Objective: Intake and Output:    03/05 0701 - 03/05 1900  In: 440 [P.O.:440]  Out: 500 [Urine:500]  03/03 1901 - 03/05 0700  In: 2880 [P.O.:2170;  I.V.:710]  Out: 2701 [Urine:2700]    Physical Exam:   General appearance: alert, cooperative, no distress, appears stated age  Extremities: edema substantially located at the left lower extremity. This is pitting in nature however there is no fluctuance or induration noted about the left lower leg and ankle. No draining wounds however there is formation of serosanguineous blisters distally. Pulses: 2+ and symmetric refill is brisk. Skin: There is substantial erythema about the lower leg and ankle, however the erythema appears to be regressing in the proximal aspect of the leg and thigh in relation to the previous marking. Neurologic: Alert and oriented X 3, normal strength and tone. Normal symmetric reflexes. Normal coordination and gait        Data Review:   Recent Results (from the past 24 hour(s))   METABOLIC PANEL, COMPREHENSIVE    Collection Time: 03/05/20  1:08 AM   Result Value Ref Range    Sodium 136 136 - 145 mmol/L    Potassium 3.5 3.5 - 5.5 mmol/L    Chloride 102 100 - 111 mmol/L    CO2 27 21 - 32 mmol/L    Anion gap 7 3.0 - 18 mmol/L    Glucose 118 (H) 74 - 99 mg/dL    BUN 10 7.0 - 18 MG/DL    Creatinine 0.73 0.6 - 1.3 MG/DL    BUN/Creatinine ratio 14 12 - 20      GFR est AA >60 >60 ml/min/1.73m2    GFR est non-AA >60 >60 ml/min/1.73m2    Calcium 8.5 8.5 - 10.1 MG/DL    Bilirubin, total 0.8 0.2 - 1.0 MG/DL    ALT (SGPT) 42 16 - 61 U/L    AST (SGOT) 48 (H) 10 - 38 U/L    Alk.  phosphatase 190 (H) 45 - 117 U/L    Protein, total 6.0 (L) 6.4 - 8.2 g/dL    Albumin 1.6 (L) 3.4 - 5.0 g/dL    Globulin 4.4 (H) 2.0 - 4.0 g/dL    A-G Ratio 0.4 (L) 0.8 - 1.7     CBC WITH AUTOMATED DIFF    Collection Time: 03/05/20  1:08 AM   Result Value Ref Range    WBC 25.3 (H) 4.6 - 13.2 K/uL    RBC 3.58 (L) 4.70 - 5.50 M/uL    HGB 11.3 (L) 13.0 - 16.0 g/dL    HCT 34.0 (L) 36.0 - 48.0 %    MCV 95.0 74.0 - 97.0 FL    MCH 31.6 24.0 - 34.0 PG    MCHC 33.2 31.0 - 37.0 g/dL    RDW 14.4 11.6 - 14.5 %    PLATELET 406 207 - 631 K/uL    MPV 10.2 9.2 - 11.8 FL    NEUTROPHILS 68 42 - 75 %    BAND NEUTROPHILS 9 (H) 0 - 5 %    LYMPHOCYTES 14 (L) 20 - 51 % MONOCYTES 7 2 - 9 %    EOSINOPHILS 1 0 - 5 %    BASOPHILS 0 0 - 3 %    MYELOCYTES 1 (H) 0 %    ABS. NEUTROPHILS 19.5 (H) 1.8 - 8.0 K/UL    ABS. LYMPHOCYTES 3.5 0.8 - 3.5 K/UL    ABS. MONOCYTES 1.8 (H) 0 - 1.0 K/UL    ABS. EOSINOPHILS 0.3 0.0 - 0.4 K/UL    ABS. BASOPHILS 0.0 0.0 - 0.06 K/UL    DF MANUAL      PLATELET COMMENTS ADEQUATE PLATELETS      RBC COMMENTS NORMOCYTIC, NORMOCHROMIC     MAGNESIUM    Collection Time: 03/05/20  1:08 AM   Result Value Ref Range    Magnesium 1.6 1.6 - 2.6 mg/dL       CT left lower extremity with contrast 3/5/2020  FINDINGS:      There is diffuse and significant edema identified in imaged left foreleg  including subcutaneous compartment and also mildly involving the muscular  compartments with mild skin thickening, more pronounced toward to the ankle. Although, no focal fluid collection or abscess identified. No definite open  wound or radiopaque foreign body seen.     There are moderate atherosclerotic calcified plaques noted in the popliteal and  pretibial arteries. The tibia and fibula appear intact with no definite osseous  lesion seen. No fracture or dislocation. Mild knee joint space narrowing noted  but no significant osteophytic changes. No joint effusion. .     IMPRESSION  IMPRESSION:     1. . Extensive edema of left the foreleg in subcutaneous and muscular  compartments but no focal fluid collection or abscess seen.     2. Mild DJD of right knee. MRI left tib-fib with and without contrast 3/1/2020  Significant artifacts at the ankle and knee levels.     Likely benign enchondroma in the proximal tibia. No marrow edema. No cortical  destruction or erosion. No findings of osteomyelitis in the tibia or fibula.     Severe circumferential subcutaneous edema throughout the calf. No abscess.     Musculature is otherwise unremarkable.     Soft tissue defect, suggesting ulcer along the lateral aspect of the distal  calf.  Major medial and lateral tendons at the ankle level are intact.     IMPRESSION  IMPRESSION:     1. Severe circumferential subcutaneous swelling/cellulitis in the calf with no  abscess. Musculature is unremarkable.     2. No osteomyelitis in the tibia or fibula. MRI of the left foot with and without contrast 3/1/2020  No fracture or bony erosion. No marrow edema. No significant degenerative joint  disease. No joint effusion. Lisfranc ligament is intact. Mild hallux valgus.     Severe dorsal soft tissue swelling: edema and inflammation with no discrete  abscess. Less severe, minimal plantar soft tissue edema with mild flexor  tendinosis/tenosynovitis throughout. No rupture. Sesamoids are intact.     More focal edema along the medial aspect of the first toe at the level of the IP  joint, with a small superficial focus of high signal, suggesting tiny  nonspecific foreign body.     IMPRESSION  IMPRESSION:      1. Nonspecific severe dorsal subcutaneous inflammation/edema with no abscess. No osteomyelitis or fracture. Minimal plantar soft tissue edema and flexor  tenosynovitis. 2. Focal ulcer along the medial aspect of the first toe with tiny superficial  foreign body. Foot plain films 3/1/2020  Impression:      Persistent tiny punctate density very superficially near the skin seen on  lateral view which appears to be just proximal to ulcer site concerning for tiny  radiopaque foreign body as discussed above    Assessment:     Principal Problem:    Cellulitis (2/29/2020)    Active Problems:    Leukocytosis (2/29/2020)      Hypotension (2/29/2020)      Sepsis ([address] (2/29/2020)      GAIL (acute kidney injury) ([address] (2/29/2020)      Cellulitis of left lower extremity (2/29/2020)      Acute leg pain, left (2/29/2020)        Plan: At this point as there is no evidence for a fluid collection seen on MRI or CT scan as well as no evidence for osteomyelitis I do not believe surgical intervention is indicated at this time.     Recommend continuing broad-spectrum IV antibiotics and possibly repeat blood cultures. It may be beneficial to take a wound culture from 1 of the blisters distally if not already done so.

## 2020-03-05 NOTE — PROGRESS NOTES
conducted a Follow up consultation and Spiritual Assessment for Gordon Lassiter, who is a 61 y.o.,male. The  provided the following Interventions:  Continued the relationship of care and support. Listened empathically. Offered prayer and assurance of continued prayer on patients behalf. The following outcomes were achieved:  Patient expressed gratitude for 's visit. Assessment:  There are no further spiritual or Catholic issues which require Spiritual Care Services interventions at this time. Plan:  Chaplains will continue to follow and will provide pastoral care on an as needed/requested basis.  recommends bedside caregivers page  on duty if patient shows signs of acute spiritual or emotional distress.        [id number]   [phone]

## 2020-03-05 NOTE — PROGRESS NOTES
[address] [facility]  Intern Progress Note    Patient: Warden Murillo MRN: [id number]   SSN: [id number]  YOB: 1960   Age: 61 y.o. Sex: male      Admit Date: 2/29/2020    LOS: 5 days   Chief Complaint   Patient presents with    Leg Pain     Left LLE pain and swelling        Subjective:     Patient feels well this morning. Back pain is controlled with Lidocaine patch. Foot pain is intermittent, overall controlled. Denies any fever/chills overnight. Had 2x BMs yesterday, feels relieved. Cough is improving. ROS:   Denies:   - fevers/chill  - headache  - CP  - SOB  - N/V/diarrhea or abdominal pain     Objective:     PE:  - General: patient is in no acute distress  - Cv: RRR, no murmurs/gallops/rubs, peripheral pulses intact  - Resp: Lungs CTA   - Abdominal: Soft, non-tender, non-distended, Bs+   - MSK:  2+ swelling in LLE up to just below knee. Has regressed from previous marking line. Still warm, erythematous and painful to touch. Some bulla looking lesions around the ankle without any drainage this morning. Scabbed over coin size ulcer on dorsal surface of left great toe.     Vitals:   Patient Vitals for the past 24 hrs:   Temp Pulse Resp BP SpO2   03/05/20 0624 98.3 °F (36.8 °C) 77 15 133/81 98 %   03/04/20 2110 99.3 °F (37.4 °C) 100 17 155/80 95 %   03/04/20 1403 99.2 °F (37.3 °C) (!) 101 18 155/87 --   03/04/20 0921 98.8 °F (37.1 °C) 97 16 140/81 --         Intake/Output Summary (Last 24 hours) at 3/5/2020 0839  Last data filed at 3/5/2020 0815  Gross per 24 hour   Intake 930 ml   Output 1751 ml   Net -821 ml       Labs reviewed. Pertinent labs: WBC 25.3 with 9 bands, K 3.5, Cr 0.73, Blood cx NG2D, urine cx NG2D, DNASE B AB WNL 91  Pertinent studies: CXR pending this AM        Assessment/Plan:     61 y. o. male with PMH alcohol abuse, HTN, T2DM, now admitted with sepsis 2/2 cellulitis.     Sepsis 2/2 cellulitis- patient presents with ~6 days of worsening pain, redness and swelling in his LLE associated with some nausea, vomiting, fevers and chills. Red leg erythematous extending all the way up to groin. Tachycardic on presentation that has since resolved. Leukocytosis of 27.1 with left shift which has been slowly down trending, but started to uptrend again this morning. Urine and blood cultures pending, so far no growth. ESR WNLs, CRP elevatied. Initial XR of left foot without signs of osteomyelitis. MRI foot and MRI tib/fib from 3/1 without sings of osteomyelitis. Erythema is making slow regression. Patient feels overall well. - Continue vancomycin, ceftriaxone, and clindamycin   - trend leukocytosis and fevers  - FU ID recs   - Will repeat MRI today given poor clinical response to antibiotics. Need to r/o osteo vs abscess     Possible CAP- CXR on admission with Mild diffuse reticulonodular prominence of the interstitial markings and focal irregular increased opacity projects at the left lung base partially overlapping the anterior sixth rib shadow, concerning for focal airspace disease versus atelectasis. Patient has productive cough, but lungs overall sound clear on exam- do not think this is the cause of his sepsis. Is also long term smoker and has persistent daily cough. Repeat CXR pending today  - likely not PNA given clinical picture, however antibiotics for cellulitis should also cover for anything pulmonary   - FU repeat CXR     GAIL- resolved. Elevated to 1.72 on admission, baseline today after fluid recussitation  - Daily BMP  - I&Os      Alcohol use disorder- hx of drinking 4 beers/day.  LFTs slightly elevated on admission, resolved today  - WA protocol, patient has not needed any ativan during hospitalization  - Trend transaminases- have downtrended     Acute L3 compression fracture- Seen in ED, 2/23 told to FU with ortho OP  - Pain control as needed, will add Lidocaine patch   - Ortho consulted, recommended progressive mobility, can FU outpaitent     Hx of HTN- SBP in 130's-150's overnight.   - Continue home Lisinopril     DM- HbA1c 5.8 On 1500 metformin daily  - Hold metformin  - diabetic diet  - monitor glucose on daily BMP     Tinea Cruris- groin  - Continue Clomitrazole cream     Diet Regular   DVT Prophylaxis SQH   GI Prophylaxis None   Code status Full   Disposition 2-3 days then home with [facility]      [address]  [phone]         Signed By: Claria Dancer, MD     March 5, 2020

## 2020-03-05 NOTE — ROUTINE PROCESS
End of Shift Note     Bedside and verbal shift change report given to Tamera Larsen RN (On coming nurse) by Phoenix Ruffin RN (Off going nurse). Report included the following information:      --Procedure Summary     --MAR,     --Recent Results     --Med Rec Status    SBAR Recommendations: ambulate.  PT/OT    Issues for Provider to address PT/OT          Activity This Shift     [] Bed Rest Order   [] Refused   [] Dangled    [] TDWB         Ambulating:     [] Bathroom     [] BSC     [] Room/Hallway      Up in Chair for meals    []Yes [x] No   Voiding       [x] Yes  [] No  Veliz          [] Yes  [] No  Incontinent [] Yes  [] No    DUE TO VOID POUR        [] Yes [] No  Purewick    [] Yes [] No  New Onset [] Yes [] No Straight Cath   []Yes  [] No  Condom Cath  [] Yes [] No  MD Called      [] Yes  [] No   Blood Sugars Managed []Yes [x] No    Bowels Moved [x] Yes [] No    Incontinent     [] Yes [] No Passed Gas [x]Yes [] No    New Onset  []Yes [] No        MD Called []Yes  [] No     CHG Bath Done     Before Surgery     After Surgery      [] Yes  [x] No  [] Yes  [x] No       Drain Removed [] Yes  [] No [x] N/A    Dressing Changed [] Yes   [] No [x] N/A      Nausea/Vomiting [] Yes   [x] No     Ice Packs Changed [] Yes   [] No  [x] N/A    Incentive Spirometer  [] Yes  [x] No      SCD Pumps On     Ankle Pumping  [] Yes   [x] No      [] Yes   [x] No        Telemetry Monitoring [x] Yes   [] No   Rhythm  ST

## 2020-03-05 NOTE — PROGRESS NOTES
Infectious Disease progress Note        Reason: sepsis, cellulitis LLE    Current abx Prior abx   Ceftriaxone, vancomycin since 2/29/20  Clindamycin since 3/3/20  levofloxacin 2/29-3/3/20     Lines:       Assessment :     61 y.o. male with PMH HTN, T2DM (last hgbA1C 5.8 on 3/2/20) admitted to [facility] on 2/2/9/20 with cellulitis. Clinical presentation c/w sepsis (POA) due to acute left leg cellulitis. Exact microbial etiology of infection is not clear. Rapid onset and presentation favors gram positive bacterial etiology such as streptococcus, staphylococcus. Negative anti DNAse B would argue against group A strep infection. Gradual clinical improvement. Decreased erythema/swelling  noted on today's exam. No crepitus. No areas of necrosis. Preserved LLE sensation. However, persistent leukocytosis and area of fluctuance left lateral leg, left medial ankle concerning for undrained left leg abscesses as the source of persistent leukocytosis. D/w radiologist about these concerns. He recommends to get US LLE to evaluate this and obtain MRI if US non diagnostic and clinical condition doesn't improve. Also, back pain - likely due to L3 fracture. Spine surgery f/u appreciated. MRI:Subacute appearing burst-type L3 superior endplate fracture with mild 4 mm retropulsion associated mild L2/L3 spinal canal stenosis. Recommendations:    1. Continue ceftriaxone,  Vancomycin, clindamycin to inhibit toxin production  2. Obtain stat US left leg  3. Elevate left leg  4. Recommend ortho evaluation to determine need for I&D left leg  5. Obtain MRI if US non diagnostic. Discussed with radiologist. He agrees with this. Above plan was discussed in details with patient, and primary team. All questions answered to their full satisfaction. Spent additional 35 minutes in management and evaluation of this patient. >50% time spent in counselling and coordination of care. Please call me if any further questions or concerns. Will continue to participate in the care of this patient. HPI:    Has intermittent pain left leg. No subjective fever/chills. Patient denies headaches, visual disturbances, sore throat, runny nose, earaches, cp, sob, chills, cough, abdominal pain, diarrhea, burning micturition,  or weakness in extremities. C/o  back pain/no flank pain. No past medical history on file. No past surgical history on file.    home Medication List    Details   metFORMIN ER (GLUCOPHAGE XR) 750 mg tablet Take 1,500 mg by mouth daily. simvastatin (ZOCOR) 20 mg tablet Take 20 mg by mouth nightly. lisinopril (PRINIVIL, ZESTRIL) 10 mg tablet Take 10 mg by mouth daily.           ibuprofen (MOTRIN) 800 mg tablet Comments:   Reason for Stopping:               Current Facility-Administered Medications   Medication Dose Route Frequency    lidocaine 4 % patch 1 Patch  1 Patch TransDERmal Q24H    vancomycin (VANCOCIN) 1,000 mg in 0.9% sodium chloride (MBP/ADV) 250 mL adv  1,000 mg IntraVENous Q8H    polyethylene glycol (MIRALAX) packet 17 g  17 g Oral DAILY PRN    polyethylene glycol (MIRALAX) packet 17 g  17 g Oral DAILY    clindamycin phosphate (CLEOCIN) 900 mg in 0.9% sodium chloride 100 mL IVPB  900 mg IntraVENous Q8H    lactobacillus sp. 50 billion cpu (BIO-K PLUS) capsule 1 Cap  1 Cap Oral DAILY    cefTRIAXone (ROCEPHIN) 2 g in sterile water (preservative free) 20 mL IV syringe  2 g IntraVENous Q24H    morphine injection 1 mg  1 mg IntraVENous TID PRN    glucose chewable tablet 16 g  4 Tab Oral PRN    glucagon (GLUCAGEN) injection 1 mg  1 mg IntraMUSCular PRN    dextrose 10% infusion 125-250 mL  125-250 mL IntraVENous PRN    thiamine HCL (B-1) tablet 100 mg  100 mg Oral DAILY    sodium chloride (NS) flush 5-10 mL  5-10 mL IntraVENous PRN    lisinopril (PRINIVIL, ZESTRIL) tablet 10 mg  10 mg Oral DAILY    atorvastatin (LIPITOR) tablet 10 mg  10 mg Oral DAILY    heparin (porcine) injection 5,000 Units  5,000 Units SubCUTAneous Q8H    sodium chloride (NS) flush 5-40 mL  5-40 mL IntraVENous Q8H    sodium chloride (NS) flush 5-40 mL  5-40 mL IntraVENous PRN    acetaminophen (TYLENOL) tablet 650 mg  650 mg Oral Q6H PRN    clotrimazole (LOTRIMIN) 1 % cream   Topical BID       Allergies: Patient has no known allergies. Temp (24hrs), Av.9 °F (37.2 °C), Min:98.3 °F (36.8 °C), Max:99.3 °F (37.4 °C)    Visit Vitals  /81 (BP 1 Location: Right arm, BP Patient Position: At rest)   Pulse 77   Temp 98.3 °F (36.8 °C)   Resp 15   Ht 6' 2\" (1.88 m)   Wt 91.6 kg (202 lb)   SpO2 98%   BMI 25.94 kg/m²       ROS: 12 point ROS obtained in details. Pertinent positives as mentioned in HPI,   otherwise negative    Physical Exam:    General:  AAOx3, NAD   HEENT: Conjunctiva pink, sclera anicteric. PERRL. EOMI. Pharynx moist, nonerythematous. Dry mucous membranes. Thyroid not enlarged, no nodules. No cervical, supraclavicular, occipital or submandibular lymphadenopathy. No other gross abnormalities present. CV:  RRR, no murmurs. No visible pulsations or thrills. RESP:  Unlabored breathing. Lungs clear to auscultation without adventitious breath sounds. Equal expansion bilaterally. ABD:  Soft, nontender, nondistended. BS (+). No hepatosplenomegaly. No suprapubic tenderness. MS:  No joint deformity or instability. No atrophy. Neuro:  CN II-XII grossly intact. 5/5 strength bilateral upper extremities and lower extremities. LLE pain limited  Ext:  decreased edema/erythema LLE. 2 areas of fluctuance - one on lateral aspect of left leg, other on medial aspect left ankle. 2+ radial and dp pulses bilaterally.   Skin:   erythema and swelling LLE extending from foot to left thigh    Labs: Results:   Chemistry Recent Labs     20  0108 20  0521 20  1657 20  0321   * 104*  --  111*    132*  --  135*   K 3.5 3.9 4.0 3.3*    100  --  102   CO2 27 24  --  23   BUN 10 10  --  10   CREA 0. 73 0.56*  --  0.61   CA 8.5 8.7  --  8.8   AGAP 7 8  --  10   BUCR 14 18  --  16   * 180*  --  167*   TP 6.0* 6.2*  --  6.1*   ALB 1.6* 1.7*  --  1.7*   GLOB 4.4* 4.5*  --  4.4*   AGRAT 0.4* 0.4*  --  0.4*      CBC w/Diff Recent Labs     03/05/20  0108 03/04/20  0521 03/03/20  0321   WBC 25.3* 23.6* 24.8*   RBC 3.58* 3.76* 3.77*   HGB 11.3* 12.1* 11.9*   HCT 34.0* 35.7* 36.2    221 223   GRANS 68 73 69   LYMPH 14* 8* 12*   EOS 1 1 0      Microbiology Recent Labs     03/03/20  1657   CULT NO GROWTH 2 DAYS  NO GROWTH 2 DAYS          RADIOLOGY:    All available imaging studies/reports in [facility] for this admission were reviewed    Dr. Norma Fabian, Infectious Disease Specialist  [phone]  March 5, 2020  4:00 PM

## 2020-03-06 ENCOUNTER — ANESTHESIA EVENT (OUTPATIENT)
Dept: SURGERY | Age: 60
DRG: 854 | End: 2020-03-06
Payer: MEDICARE

## 2020-03-06 LAB
ALBUMIN SERPL-MCNC: 1.7 G/DL (ref 3.4–5)
ALBUMIN/GLOB SERPL: 0.4 {RATIO} (ref 0.8–1.7)
ALP SERPL-CCNC: 167 U/L (ref 45–117)
ALT SERPL-CCNC: 35 U/L (ref 16–61)
ANION GAP SERPL CALC-SCNC: 6 MMOL/L (ref 3–18)
AST SERPL-CCNC: 38 U/L (ref 10–38)
BACTERIA SPEC CULT: NORMAL
BACTERIA SPEC CULT: NORMAL
BASOPHILS # BLD: 0 K/UL (ref 0–0.06)
BASOPHILS NFR BLD: 0 % (ref 0–3)
BILIRUB SERPL-MCNC: 0.6 MG/DL (ref 0.2–1)
BUN SERPL-MCNC: 7 MG/DL (ref 7–18)
BUN/CREAT SERPL: 12 (ref 12–20)
CALCIUM SERPL-MCNC: 8.5 MG/DL (ref 8.5–10.1)
CHLORIDE SERPL-SCNC: 102 MMOL/L (ref 100–111)
CO2 SERPL-SCNC: 27 MMOL/L (ref 21–32)
CREAT SERPL-MCNC: 0.59 MG/DL (ref 0.6–1.3)
DIFFERENTIAL METHOD BLD: ABNORMAL
EOSINOPHIL # BLD: 0 K/UL (ref 0–0.4)
EOSINOPHIL NFR BLD: 0 % (ref 0–5)
ERYTHROCYTE [DISTWIDTH] IN BLOOD BY AUTOMATED COUNT: 14.4 % (ref 11.6–14.5)
GLOBULIN SER CALC-MCNC: 4.3 G/DL (ref 2–4)
GLUCOSE SERPL-MCNC: 109 MG/DL (ref 74–99)
HCT VFR BLD AUTO: 34.9 % (ref 36–48)
HGB BLD-MCNC: 11.8 G/DL (ref 13–16)
LYMPHOCYTES # BLD: 2 K/UL (ref 0.8–3.5)
LYMPHOCYTES NFR BLD: 8 % (ref 20–51)
MAGNESIUM SERPL-MCNC: 1.5 MG/DL (ref 1.6–2.6)
MCH RBC QN AUTO: 32.2 PG (ref 24–34)
MCHC RBC AUTO-ENTMCNC: 33.8 G/DL (ref 31–37)
MCV RBC AUTO: 95.1 FL (ref 74–97)
METAMYELOCYTES NFR BLD MANUAL: 4 %
MONOCYTES # BLD: 1 K/UL (ref 0–1)
MONOCYTES NFR BLD: 4 % (ref 2–9)
MYELOCYTES NFR BLD MANUAL: 2 %
NEUTS BAND NFR BLD MANUAL: 3 % (ref 0–5)
NEUTS SEG # BLD: 20.9 K/UL (ref 1.8–8)
NEUTS SEG NFR BLD: 79 % (ref 42–75)
PLATELET # BLD AUTO: 262 K/UL (ref 135–420)
PLATELET COMMENTS,PCOM: ABNORMAL
PMV BLD AUTO: 10.1 FL (ref 9.2–11.8)
POTASSIUM SERPL-SCNC: 3.4 MMOL/L (ref 3.5–5.5)
POTASSIUM SERPL-SCNC: 4.1 MMOL/L (ref 3.5–5.5)
PROT SERPL-MCNC: 6 G/DL (ref 6.4–8.2)
RBC # BLD AUTO: 3.67 M/UL (ref 4.7–5.5)
RBC MORPH BLD: ABNORMAL
SERVICE CMNT-IMP: NORMAL
SERVICE CMNT-IMP: NORMAL
SODIUM SERPL-SCNC: 135 MMOL/L (ref 136–145)
WBC # BLD AUTO: 25.5 K/UL (ref 4.6–13.2)

## 2020-03-06 PROCEDURE — 74011250637 HC RX REV CODE- 250/637: Performed by: STUDENT IN AN ORGANIZED HEALTH CARE EDUCATION/TRAINING PROGRAM

## 2020-03-06 PROCEDURE — 36415 COLL VENOUS BLD VENIPUNCTURE: CPT

## 2020-03-06 PROCEDURE — 65270000029 HC RM PRIVATE

## 2020-03-06 PROCEDURE — 83735 ASSAY OF MAGNESIUM: CPT

## 2020-03-06 PROCEDURE — 74011250636 HC RX REV CODE- 250/636: Performed by: INTERNAL MEDICINE

## 2020-03-06 PROCEDURE — 84132 ASSAY OF SERUM POTASSIUM: CPT

## 2020-03-06 PROCEDURE — 74011250636 HC RX REV CODE- 250/636: Performed by: STUDENT IN AN ORGANIZED HEALTH CARE EDUCATION/TRAINING PROGRAM

## 2020-03-06 PROCEDURE — 85025 COMPLETE CBC W/AUTO DIFF WBC: CPT

## 2020-03-06 PROCEDURE — 74011000250 HC RX REV CODE- 250: Performed by: INTERNAL MEDICINE

## 2020-03-06 PROCEDURE — 74011250637 HC RX REV CODE- 250/637: Performed by: INTERNAL MEDICINE

## 2020-03-06 PROCEDURE — 74011000250 HC RX REV CODE- 250: Performed by: STUDENT IN AN ORGANIZED HEALTH CARE EDUCATION/TRAINING PROGRAM

## 2020-03-06 PROCEDURE — 74011000258 HC RX REV CODE- 258: Performed by: INTERNAL MEDICINE

## 2020-03-06 RX ORDER — POTASSIUM CHLORIDE 20 MEQ/1
20 TABLET, EXTENDED RELEASE ORAL
Status: COMPLETED | OUTPATIENT
Start: 2020-03-06 | End: 2020-03-06

## 2020-03-06 RX ORDER — VANCOMYCIN/0.9 % SOD CHLORIDE 750 MG/250
750 PLASTIC BAG, INJECTION (ML) INTRAVENOUS EVERY 8 HOURS
Status: DISCONTINUED | OUTPATIENT
Start: 2020-03-06 | End: 2020-03-06 | Stop reason: ALTCHOICE

## 2020-03-06 RX ORDER — CIPROFLOXACIN 2 MG/ML
400 INJECTION, SOLUTION INTRAVENOUS EVERY 12 HOURS
Status: DISCONTINUED | OUTPATIENT
Start: 2020-03-06 | End: 2020-03-12

## 2020-03-06 RX ORDER — MAGNESIUM SULFATE HEPTAHYDRATE 40 MG/ML
2 INJECTION, SOLUTION INTRAVENOUS ONCE
Status: COMPLETED | OUTPATIENT
Start: 2020-03-06 | End: 2020-03-06

## 2020-03-06 RX ORDER — LINEZOLID 2 MG/ML
600 INJECTION, SOLUTION INTRAVENOUS EVERY 12 HOURS
Status: DISCONTINUED | OUTPATIENT
Start: 2020-03-06 | End: 2020-03-12

## 2020-03-06 RX ADMIN — CEFTRIAXONE SODIUM 2 G: 2 INJECTION, POWDER, FOR SOLUTION INTRAMUSCULAR; INTRAVENOUS at 10:55

## 2020-03-06 RX ADMIN — ACETAMINOPHEN 650 MG: 325 TABLET ORAL at 20:41

## 2020-03-06 RX ADMIN — Medication 10 ML: at 17:25

## 2020-03-06 RX ADMIN — POTASSIUM CHLORIDE 20 MEQ: 1500 TABLET, EXTENDED RELEASE ORAL at 06:55

## 2020-03-06 RX ADMIN — HEPARIN SODIUM 5000 UNITS: 5000 INJECTION INTRAVENOUS; SUBCUTANEOUS at 00:46

## 2020-03-06 RX ADMIN — SODIUM CHLORIDE 1000 MG: 900 INJECTION, SOLUTION INTRAVENOUS at 05:01

## 2020-03-06 RX ADMIN — CLINDAMYCIN PHOSPHATE 900 MG: 150 INJECTION, SOLUTION, CONCENTRATE INTRAVENOUS at 10:55

## 2020-03-06 RX ADMIN — Medication 10 ML: at 05:01

## 2020-03-06 RX ADMIN — LINEZOLID 600 MG: 600 INJECTION, SOLUTION INTRAVENOUS at 17:25

## 2020-03-06 RX ADMIN — Medication 100 MG: at 08:57

## 2020-03-06 RX ADMIN — POTASSIUM CHLORIDE 20 MEQ: 1500 TABLET, EXTENDED RELEASE ORAL at 08:57

## 2020-03-06 RX ADMIN — POTASSIUM CHLORIDE 20 MEQ: 1500 TABLET, EXTENDED RELEASE ORAL at 10:57

## 2020-03-06 RX ADMIN — HEPARIN SODIUM 5000 UNITS: 5000 INJECTION INTRAVENOUS; SUBCUTANEOUS at 07:59

## 2020-03-06 RX ADMIN — CLOTRIMAZOLE: 10 CREAM TOPICAL at 17:25

## 2020-03-06 RX ADMIN — LISINOPRIL 10 MG: 10 TABLET ORAL at 08:57

## 2020-03-06 RX ADMIN — ACETAMINOPHEN 650 MG: 325 TABLET ORAL at 10:57

## 2020-03-06 RX ADMIN — MAGNESIUM SULFATE HEPTAHYDRATE 2 G: 40 INJECTION, SOLUTION INTRAVENOUS at 08:00

## 2020-03-06 RX ADMIN — HEPARIN SODIUM 5000 UNITS: 5000 INJECTION INTRAVENOUS; SUBCUTANEOUS at 17:24

## 2020-03-06 RX ADMIN — CIPROFLOXACIN 400 MG: 2 INJECTION, SOLUTION INTRAVENOUS at 18:59

## 2020-03-06 RX ADMIN — Medication 1 CAPSULE: at 08:57

## 2020-03-06 RX ADMIN — Medication 10 ML: at 20:43

## 2020-03-06 RX ADMIN — CLINDAMYCIN PHOSPHATE 900 MG: 150 INJECTION, SOLUTION, CONCENTRATE INTRAVENOUS at 00:46

## 2020-03-06 RX ADMIN — ATORVASTATIN CALCIUM 10 MG: 10 TABLET, FILM COATED ORAL at 08:57

## 2020-03-06 RX ADMIN — ACETAMINOPHEN 650 MG: 325 TABLET ORAL at 01:37

## 2020-03-06 RX ADMIN — CLOTRIMAZOLE: 10 CREAM TOPICAL at 08:59

## 2020-03-06 NOTE — PROGRESS NOTES
Discharge planning    Reviewed chart. CM will continue to monitor for transitional needs. Will need taxi at discharge. Patient is able to pay for taxi.     CHANO ConnerN, RN  Pager # [phone]  Care Manager

## 2020-03-06 NOTE — PROGRESS NOTES
[facility]  Intern Progress Note    Patient: Chandler Siemens MRN: [id number]   SSN: [id number]  YOB: 1960   Age: 61 y.o. Sex: male      Admit Date: 2/29/2020    LOS: 6 days   Chief Complaint   Patient presents with    Leg Pain     Left LLE pain and swelling        Subjective:     Patient feels well this morning. Foot pain is improving. Foot swelling is also decreasing. No other complaints. Denies any fevers/chills overnight. ROS:   Denies:   - fevers/chill  - CP  - SOB  - N/V/diarrhea or abdominal pain     Objective:     PE:  - General: patient is in no acute distress  - Cv: RRR, no murmurs/gallops/rubs, peripheral pulses intact  - Resp: Lungs CTA, no wheezing or ronchi  - Abdominal: Soft, non-tender, non-distended, Bs+   - MSK:  1+ swelling in LLE up to mid-calf. Has regressed from previous marking line. Still warm and erythematous around the ankle. Swelling has decreased remarkable this morning. Some bulla looking lesions around the ankle without any drainage this morning. Scabbed over coin size ulcer on dorsal surface of left great toe.     Vitals:   Patient Vitals for the past 24 hrs:   Temp Pulse Resp BP SpO2   03/06/20 0939 98.7 °F (37.1 °C) 94 20 158/83 93 %   03/06/20 0518 98 °F (36.7 °C) 87 20 150/83 94 %   03/05/20 2135 98 °F (36.7 °C) 95 21 153/83 95 %   03/05/20 1746 100 °F (37.8 °C) (!) 105 24 152/81 93 %         Intake/Output Summary (Last 24 hours) at 3/6/2020 1148  Last data filed at 3/6/2020 1106  Gross per 24 hour   Intake 1040 ml   Output 2450 ml   Net -1410 ml       Labs reviewed. Pertinent labs: WBC 25.5 with 3 band neutrophils, K 3.4, Cr 0.59  Pertinent studies:     CT Lt lower extremity  IMPRESSION:  1. Extensive edema of left the foreleg in subcutaneous and muscular  compartments but no focal fluid collection or abscess seen. 2. Mild DJD of right knee. Ultrasound LLE  IMPRESSION:  1.   Negative for abscess at the 2 separately evaluated sites within the left  lower extremity.  -Profound subcutaneous tissue edema at each location, as can be seen with severe  cellulitis     Assessment/Plan:     61 y. o. male with PMH alcohol abuse, HTN, T2DM, now admitted with sepsis 2/2 cellulitis.     Sepsis 2/2 cellulitis- patient presents with ~6 days of worsening pain, redness and swelling in his LLE associated with some nausea, vomiting, fevers and chills. Red leg erythematous extending all the way up to groin. Tachycardic on presentation that has since resolved. Leukocytosis of 27.1 with left shift which has been slowly down trending/stable. Band neutrophils are decreasing today. Urine and blood cultures pending, so far no growth. ESR WNLs, CRP elevatied. Initial XR of left foot without signs of osteomyelitis. MRI foot and MRI tib/fib from 3/1 without sings of osteomyelitis. Erythema an edema are regressing and improving. Patient feels overall well. CT and Ultrasound of leg yesterday showing no signs of abscess or osteomyelitis. - Continue vancomycin, ceftriaxone, and clindamycin   - trend leukocytosis and fevers  - FU ID recs   - Ortho saw yesterday, no surgical indication at this time  - Will repeat MRI today given poor clinical response to antibiotics. Need to r/o osteo vs abscess     Possible CAP- CXR on admission with Mild diffuse reticulonodular prominence of the interstitial markings and focal irregular increased opacity projects at the left lung base partially overlapping the anterior sixth rib shadow, concerning for focal airspace disease versus atelectasis. Patient has productive cough, but lungs overall sound clear on exam- do not think this is the cause of his sepsis. Is also long term smoker and has persistent daily cough. - not likely PNA given clinical picture, however antibiotics for cellulitis should also cover for anything pulmonary   - FU CXR unchanged from piror     GAIL- resolved.  Elevated to 1.72 on admission, baseline today after fluid recussitation  - Daily BMP  - I&Os      Alcohol use disorder- hx of drinking 4 beers/day.  LFTs slightly elevated on admission, resolved today  - CIWA protocol, patient has not needed any ativan during hospitalization  - Trend transaminases- have downtrended     Acute L3 compression fracture- Seen in ED, 2/23 told to FU with ortho OP  - Pain control as needed, will add Lidocaine patch   - Ortho consulted, recommended progressive mobility, can FU outpaitent     Hx of HTN- SBP in 150's overnight.   - Continue home Lisinopril     DM- HbA1c 5.8 On 1500 metformin daily  - Hold metformin  - diabetic diet  - monitor glucose on daily BMP     Tinea Cruris- groin  - Continue Clomitrazole cream     Diet Regular   DVT Prophylaxis SQH   GI Prophylaxis None   Code status Full   Disposition 2-3 days then home with [facility]      [address]  [phone]         Signed By: Asha Almanzar MD     March 6, 2020

## 2020-03-06 NOTE — PROGRESS NOTES
Infectious Disease progress Note        Reason: sepsis, cellulitis LLE    Current abx Prior abx   Ceftriaxone, vancomycin since 2/29/20  Clindamycin since 3/3/20  levofloxacin 2/29-3/3/20     Lines:       Assessment :     61 y.o. male with PMH HTN, T2DM (last hgbA1C 5.8 on 3/2/20) admitted to [facility] on 2/2/9/20 with cellulitis. Clinical presentation c/w sepsis (POA) due to acute left leg cellulitis. Exact microbial etiology of infection is not clear. Negative anti DNAse B would argue against group A strep infection. Gradual clinical improvement. Decreased erythema/swelling  noted on today's exam. No crepitus. No areas of necrosis. Preserved LLE sensation. However, persistent leukocytosis and area of fluctuance left medial ankle is concerning for undrained left ankle soft tissue abscess. Ortho f/u appreciated. No evidence of abscess per ct scan 3/5. Hence, no surgical intervention planned. Persistent pain/tenderness left medial ankle with swelling. Will obtain MRI to further evaluate this. This was discussed with radiologist yesterday and he agreed with MRI if other imaging study non diagnostic. In the meantime, I will alter current abx to treat for gram positives with reduced susceptibilities to vancomycin/clindamycin; gram negatives with reduced susceptibility to ceftriaxone. Also, back pain - likely due to L3 fracture. Spine surgery f/u appreciated. MRI:Subacute appearing burst-type L3 superior endplate fracture with mild 4 mm retropulsion associated mild L2/L3 spinal canal stenosis. Recommendations:    1. discontinue ceftriaxone,  Vancomycin, clindamycin. Start ciprofloxacin, linezolid  2. Obtain stat MRI left ankle  3. Elevate left leg  4. F/u cbc, MRI findings and clinically to determine further plan of care  5. Will obtain podiatry evaluation to evaluate left ankle     Above plan was discussed in details with patient, and primary team. All questions answered to their full satisfaction. Please call me if any further questions or concerns. Will continue to participate in the care of this patient. HPI:    Has intermittent pain left ankle. No subjective fever/chills. Patient denies headaches, visual disturbances, sore throat, runny nose, earaches, cp, sob, chills, cough, abdominal pain, diarrhea, burning micturition,  or weakness in extremities. C/o  back pain/no flank pain. No past medical history on file. No past surgical history on file.    home Medication List    Details   metFORMIN ER (GLUCOPHAGE XR) 750 mg tablet Take 1,500 mg by mouth daily. simvastatin (ZOCOR) 20 mg tablet Take 20 mg by mouth nightly. lisinopril (PRINIVIL, ZESTRIL) 10 mg tablet Take 10 mg by mouth daily.           ibuprofen (MOTRIN) 800 mg tablet Comments:   Reason for Stopping:               Current Facility-Administered Medications   Medication Dose Route Frequency    vancomycin (VANCOCIN) 750 mg in  mL infusion  750 mg IntraVENous Q8H    lidocaine 4 % patch 1 Patch  1 Patch TransDERmal Q24H    polyethylene glycol (MIRALAX) packet 17 g  17 g Oral DAILY PRN    polyethylene glycol (MIRALAX) packet 17 g  17 g Oral DAILY    clindamycin phosphate (CLEOCIN) 900 mg in 0.9% sodium chloride 100 mL IVPB  900 mg IntraVENous Q8H    lactobacillus sp. 50 billion cpu (BIO-K PLUS) capsule 1 Cap  1 Cap Oral DAILY    cefTRIAXone (ROCEPHIN) 2 g in sterile water (preservative free) 20 mL IV syringe  2 g IntraVENous Q24H    morphine injection 1 mg  1 mg IntraVENous TID PRN    glucose chewable tablet 16 g  4 Tab Oral PRN    glucagon (GLUCAGEN) injection 1 mg  1 mg IntraMUSCular PRN    dextrose 10% infusion 125-250 mL  125-250 mL IntraVENous PRN    thiamine HCL (B-1) tablet 100 mg  100 mg Oral DAILY    sodium chloride (NS) flush 5-10 mL  5-10 mL IntraVENous PRN    lisinopril (PRINIVIL, ZESTRIL) tablet 10 mg  10 mg Oral DAILY    atorvastatin (LIPITOR) tablet 10 mg  10 mg Oral DAILY    heparin (porcine) injection 5,000 Units  5,000 Units SubCUTAneous Q8H    sodium chloride (NS) flush 5-40 mL  5-40 mL IntraVENous Q8H    sodium chloride (NS) flush 5-40 mL  5-40 mL IntraVENous PRN    acetaminophen (TYLENOL) tablet 650 mg  650 mg Oral Q6H PRN    clotrimazole (LOTRIMIN) 1 % cream   Topical BID       Allergies: Patient has no known allergies. Temp (24hrs), Av.7 °F (37.1 °C), Min:98 °F (36.7 °C), Max:100 °F (37.8 °C)    Visit Vitals  /83   Pulse 94   Temp 98.7 °F (37.1 °C)   Resp 20   Ht 6' 2\" (1.88 m)   Wt 92.5 kg (204 lb)   SpO2 93%   BMI 26.19 kg/m²       ROS: 12 point ROS obtained in details. Pertinent positives as mentioned in HPI,   otherwise negative    Physical Exam:    General:  AAOx3, NAD   HEENT: Conjunctiva pink, sclera anicteric. PERRL. EOMI. Pharynx moist, nonerythematous. Dry mucous membranes. Thyroid not enlarged, no nodules. No cervical, supraclavicular, occipital or submandibular lymphadenopathy. No other gross abnormalities present. CV:  RRR, no murmurs. No visible pulsations or thrills. RESP:  Unlabored breathing. Lungs clear to auscultation without adventitious breath sounds. Equal expansion bilaterally. ABD:  Soft, nontender, nondistended. BS (+). No hepatosplenomegaly. No suprapubic tenderness. MS:  No joint deformity or instability. No atrophy. Neuro:  CN II-XII grossly intact. 5/5 strength bilateral upper extremities and lower extremities. LLE pain limited  Ext:  decreased edema/erythema LLE. area of fluctuance  on medial aspect left ankle. 2+ radial and dp pulses bilaterally.   Skin: darkish  erythema and swelling LLE extending from foot to left thigh     Labs: Results:   Chemistry Recent Labs     20  0412 20  0108 20  0521   * 118* 104*   * 136 132*   K 3.4* 3.5 3.9    102 100   CO2 27 27 24   BUN 7 10 10   CREA 0.59* 0.73 0.56*   CA 8.5 8.5 8.7   AGAP 6 7 8   BUCR 12 14 18   * 190* 180*   TP 6.0* 6.0* 6.2*   ALB 1. 7* 1.6* 1.7*   GLOB 4.3* 4.4* 4.5*   AGRAT 0.4* 0.4* 0.4*      CBC w/Diff Recent Labs     03/06/20  0412 03/05/20  0108 03/04/20  0521   WBC 25.5* 25.3* 23.6*   RBC 3.67* 3.58* 3.76*   HGB 11.8* 11.3* 12.1*   HCT 34.9* 34.0* 35.7*    239 221   GRANS 79* 68 73   LYMPH 8* 14* 8*   EOS 0 1 1      Microbiology Recent Labs     03/03/20  1657   CULT NO GROWTH 3 DAYS  NO GROWTH 3 DAYS          RADIOLOGY:    All available imaging studies/reports in [facility] for this admission were reviewed    Dr. Marlene Ponce, Infectious Disease Specialist  [phone]  March 6, 2020  4:00 PM

## 2020-03-06 NOTE — CONSULTS
Consult    Patient: Xiao Joe MRN: [id number]  SSN: [id number]    YOB: 1960  Age: 61 y.o. Sex: male      Subjective:      Xiao Joe is a 61 y.o. male who is being seen for asked to evaluate infection left ankle, persistent white blood count. .    No past medical history on file. No past surgical history on file. No family history on file.   Social History     Tobacco Use    Smoking status: Not on file   Substance Use Topics    Alcohol use: Not on file      Current Facility-Administered Medications   Medication Dose Route Frequency Provider Last Rate Last Dose    linezolid in dextrose 5% (ZYVOX) IVPB premix in D5W 600 mg  600 mg IntraVENous Q12H Ingrid Cleaning MD        ciprofloxacin (CIPRO) 400 mg in D5W IVPB (premix)  400 mg IntraVENous Q12H Ingrid Cleaning MD        lidocaine 4 % patch 1 Patch  1 Patch TransDERmal Q24H Addy Stearns MD   1 Patch at 03/06/20 0905    polyethylene glycol (MIRALAX) packet 17 g  17 g Oral DAILY PRN Addy Stearns MD   17 g at 03/03/20 1232    polyethylene glycol (MIRALAX) packet 17 g  17 g Oral DAILY Wendi Hemp, DO   Stopped at 03/03/20 1300    lactobacillus sp. 50 billion cpu (BIO-K PLUS) capsule 1 Cap  1 Cap Oral DAILY Ingrid Cleaning MD   1 Cap at 03/06/20 0857    morphine injection 1 mg  1 mg IntraVENous TID PRN Addy Stearns MD   1 mg at 03/04/20 2126    glucose chewable tablet 16 g  4 Tab Oral PRN Addy Stearns MD        glucagon (GLUCAGEN) injection 1 mg  1 mg IntraMUSCular PRN Addy Stearns MD        dextrose 10% infusion 125-250 mL  125-250 mL IntraVENous PRN Adella Olszewski, MD        thiamine HCL (B-1) tablet 100 mg  100 mg Oral DAILY Addy Stearns MD   100 mg at 03/06/20 0857    sodium chloride (NS) flush 5-10 mL  5-10 mL IntraVENous PRN Sharon Ledbetter MD   10 mL at 03/03/20 0477    lisinopril (PRINIVIL, ZESTRIL) tablet 10 mg  10 mg Oral DAILY Sharon Ledbetter MD   10 mg at 03/06/20 0626  atorvastatin (LIPITOR) tablet 10 mg  10 mg Oral DAILY Anthony Farias MD   10 mg at 03/06/20 0857    heparin (porcine) injection 5,000 Units  5,000 Units SubCUTAneous Q8H Anthony Farias MD   5,000 Units at 03/06/20 0759    sodium chloride (NS) flush 5-40 mL  5-40 mL IntraVENous Q8H Anthony Farias MD   10 mL at 03/06/20 0501    sodium chloride (NS) flush 5-40 mL  5-40 mL IntraVENous PRN Anthony Farias MD        acetaminophen (TYLENOL) tablet 650 mg  650 mg Oral Q6H PRN Anthony Farias MD   650 mg at 03/06/20 1057    clotrimazole (LOTRIMIN) 1 % cream   Topical BID Freda Jarrett MD            No Known Allergies    Review of Systems:  A comprehensive review of systems was negative except for that written in the History of Present Illness. Objective:     Vitals:    03/06/20 0512 03/06/20 0518 03/06/20 0939 03/06/20 1446   BP:  150/83 158/83 159/86   Pulse:  87 94 88   Resp:  20 20 16   Temp:  98 °F (36.7 °C) 98.7 °F (37.1 °C) 98.5 °F (36.9 °C)   SpO2:  94% 93% 95%   Weight: 92.5 kg (204 lb)      Height:            Physical Exam:  Seen at bedside awake and alert. Inspected left ankle. Medial ankle is very swollen and warm to touch. Fluctuance over tendons. Discolored area possible early blister or sinus tract. MRI shows swelling and inflammation.    Assessment:     Hospital Problems  Never Reviewed          Codes Class Noted POA    Leukocytosis ICD-10-CM: C09.598  ICD-9-CM: 288.60  2/29/2020 Unknown        Hypotension ICD-10-CM: I95.9  ICD-9-CM: 458.9  2/29/2020 Unknown        Sepsis ([facility] ICD-10-CM: A41.9  ICD-9-CM: 038.9, 995.91  2/29/2020 Unknown        GAIL (acute kidney injury) ([facility] ICD-10-CM: N17.9  ICD-9-CM: 584.9  2/29/2020 Unknown        Cellulitis of left lower extremity ICD-10-CM: L03.116  ICD-9-CM: 682.6  2/29/2020 Unknown        Acute leg pain, left ICD-10-CM: M79.605  ICD-9-CM: 729.5  2/29/2020 Unknown        * (Principal) Cellulitis ICD-10-CM: L03.90  ICD-9-CM: 682.9 2/29/2020 Unknown              Plan:     Possible medial ankle abscess. Plan to take to OR in AM to puncture this area.      Signed By: Kourtney Poon DPM     March 6, 2020

## 2020-03-06 NOTE — PROGRESS NOTES
NUTRITION    Nutrition Screen      RECOMMENDATIONS / PLAN:     - Continue current nutrition interventions. - Continue RD inpatient monitoring and evaluation. NUTRITION INTERVENTIONS & DIAGNOSIS:     - Meals/snacks: modified composition    Nutrition Diagnosis: Inadequate energy intake related to food preferences as evidenced by energy provided by average meal intake is inadequate to meet his energy needs. ASSESSMENT:     Admitted with sepsis 2/2 cellulitis. Ortho consulted, noted no plan for surgical intervention. Pt with fair appetite, meal intake is dependent on whether he likes the food, per pt. Encouraged pt to choose options he likes and to notify dietary if he doesn't like the food he gets. Declined oral supplements. Noted Na, K, and Mg are low, received K and Mg replacement. Nutritional intake adequate to meet patients estimated nutritional needs:  No    Diet: DIET DIABETIC CONSISTENT CARB Regular      Food Allergies: NKFA  Current Appetite: Fair  Appetite/meal intake prior to admission: Good  Feeding Limitations:  [] Swallowing difficulty    [] Chewing difficulty    [] Other:  Current Meal Intake: Patient Vitals for the past 100 hrs:   % Diet Eaten   03/06/20 0843 75 %   03/05/20 1750 75 %   03/05/20 1228 75 %   03/05/20 0815 50 %   03/04/20 1229 57 %   03/03/20 1805 50 %   03/03/20 1206 75 %   03/03/20 1021 50 %   03/02/20 1836 100 %   03/02/20 0842 50 %       BM: 3/4  Skin Integrity: abscess to left foot.   Edema:   [] No     [x] Yes   Pertinent Medications: Reviewed: thiamine    Recent Labs     03/06/20  0412 03/05/20  0108 03/04/20  0521   * 136 132*   K 3.4* 3.5 3.9    102 100   CO2 27 27 24   * 118* 104*   BUN 7 10 10   CREA 0.59* 0.73 0.56*   CA 8.5 8.5 8.7   MG 1.5* 1.6 1.7   ALB 1.7* 1.6* 1.7*   SGOT 38 48* 47*   ALT 35 42 42       Intake/Output Summary (Last 24 hours) at 3/6/2020 1235  Last data filed at 3/6/2020 1106  Gross per 24 hour   Intake 800 ml   Output 2250 ml   Net -1450 ml       Anthropometrics:  Ht Readings from Last 1 Encounters:   03/01/20 6' 2\" (1.88 m)     Last 3 Recorded Weights in this Encounter    03/03/20 0321 03/05/20 0100 03/06/20 0512   Weight: 91 kg (200 lb 9.9 oz) 91.6 kg (202 lb) 92.5 kg (204 lb)     Body mass index is 26.19 kg/m². Weight History: Reports wt hx of 250# is inaccurate, UBW is around 210#    Weight Metrics 3/6/2020 2/23/2020 9/4/2019 6/19/2019 12/22/2013   Weight 204 lb 250 lb 250 lb 250 lb 270 lb   BMI 26.19 kg/m2 32.1 kg/m2 32.1 kg/m2 32.1 kg/m2 34.65 kg/m2        Admitting Diagnosis: Cellulitis of left lower extremity [L03.116]  Leukocytosis [D72.829]  Hypotension [I95.9]  GAIL (acute kidney injury) ([facility] [address] [N17.9]  Sepsis ([facility] [address] [A41.9]  Acute leg pain, left [M79.605]  Cellulitis [L03.90]  Pertinent PMHx: HTN, DM    Education Needs:        [x] None identified  [] Identified - Not appropriate at this time  []  Identified and addressed - refer to education log  Learning Limitations:   [x] None identified  [] Identified    Cultural, Jew & ethnic food preferences:  [x] None identified    [] Identified and addressed     ESTIMATED NUTRITION NEEDS:     Calories: 6109-3240 kcal (MSJx1.2-1.3) based on  [x] Actual BW 93 kg     [] IBW   Protein: 74-93 gm (0.8-1 gm/kg) based on  [x] Actual BW      [] IBW   Fluid: 1 mL/kcal     MONITORING & EVALUATION:     Nutrition Goal(s):   - PO nutrition intake will meet >75% of patient estimated nutritional needs within the next 7 days. Outcome: New/Initial goal     Monitoring:   [x] Food and nutrient intake   [x] Food and nutrient administration  [x] Comparative standards   [x] Nutrition-focused physical findings   [x] Anthropometric Measurements   [x] Treatment/therapy   [x] Biochemical data, medical tests, and procedures        Previous Recommendations (for follow-up assessments only):  Not Applicable     Discharge Planning: No nutritional discharge needs at this time.    Participated in care planning, discharge planning, & interdisciplinary rounds as appropriate.       Jenni Salazar RD, [address]   Pager: [phone]

## 2020-03-06 NOTE — ROUTINE PROCESS
Bedside and Verbal shift change report given to MECCA Last (oncoming nurse) by Estella Crews (offgoing nurse). Report included the following information SBAR, Kardex, Intake/Output, MAR and Recent Results.

## 2020-03-06 NOTE — ROUTINE PROCESS
End of Shift Note     Bedside and verbal shift change report given to Angelica Peacock RN (On coming nurse) by Isabel Garcia RN (Off going nurse). Report included the following information:      --Procedure Summary     --MAR,     --Recent Results     --Med Rec Status    SBAR Recommendations: ambulate.  PT/OT    Issues for Provider to address PT/OT          Activity This Shift     [] Bed Rest Order   [] Refused   [] Dangled    [] TDWB         Ambulating:     [] Bathroom     [] BSC     [] Room/Hallway      Up in Chair for meals    []Yes [x] No   Voiding       [x] Yes  [] No  Veliz          [] Yes  [] No  Incontinent [] Yes  [] No    DUE TO VOID POUR        [] Yes [] No  Purewick    [] Yes [] No  New Onset [] Yes [] No Straight Cath   []Yes  [] No  Condom Cath  [] Yes [] No  MD Called      [] Yes  [] No   Blood Sugars Managed []Yes [x] No    Bowels Moved [x] Yes [] No    Incontinent     [] Yes [] No Passed Gas [x]Yes [] No    New Onset  []Yes [] No        MD Called []Yes  [] No     CHG Bath Done     Before Surgery     After Surgery      [] Yes  [x] No  [] Yes  [x] No       Drain Removed [] Yes  [] No [x] N/A    Dressing Changed [] Yes   [] No [x] N/A      Nausea/Vomiting [] Yes   [x] No     Ice Packs Changed [] Yes   [] No  [x] N/A    Incentive Spirometer  [] Yes  [x] No      SCD Pumps On     Ankle Pumping  [] Yes   [x] No      [] Yes   [x] No        Telemetry Monitoring [x] Yes   [] No   Rhythm  ST

## 2020-03-06 NOTE — PROGRESS NOTES
MRI Screening form needs to be filled out and faxed to [phone] MRI can be scheduled. If unable to obtain information from pt, [facility] needs to be contacted.  If pt is claustro or will need pain meds, please have ordered in advance in order to facilitate exam.

## 2020-03-07 ENCOUNTER — ANESTHESIA (OUTPATIENT)
Dept: SURGERY | Age: 60
DRG: 854 | End: 2020-03-07
Payer: MEDICARE

## 2020-03-07 LAB
ALBUMIN SERPL-MCNC: 1.7 G/DL (ref 3.4–5)
ALBUMIN/GLOB SERPL: 0.4 {RATIO} (ref 0.8–1.7)
ALP SERPL-CCNC: 158 U/L (ref 45–117)
ALT SERPL-CCNC: 30 U/L (ref 16–61)
ANION GAP SERPL CALC-SCNC: 4 MMOL/L (ref 3–18)
AST SERPL-CCNC: 27 U/L (ref 10–38)
BASOPHILS # BLD: 0 K/UL (ref 0–0.06)
BASOPHILS NFR BLD: 0 % (ref 0–3)
BILIRUB SERPL-MCNC: 0.5 MG/DL (ref 0.2–1)
BUN SERPL-MCNC: 7 MG/DL (ref 7–18)
BUN/CREAT SERPL: 10 (ref 12–20)
CALCIUM SERPL-MCNC: 8.6 MG/DL (ref 8.5–10.1)
CHLORIDE SERPL-SCNC: 103 MMOL/L (ref 100–111)
CO2 SERPL-SCNC: 29 MMOL/L (ref 21–32)
CREAT SERPL-MCNC: 0.72 MG/DL (ref 0.6–1.3)
DIFFERENTIAL METHOD BLD: ABNORMAL
EOSINOPHIL # BLD: 0 K/UL (ref 0–0.4)
EOSINOPHIL NFR BLD: 0 % (ref 0–5)
ERYTHROCYTE [DISTWIDTH] IN BLOOD BY AUTOMATED COUNT: 14.3 % (ref 11.6–14.5)
GLOBULIN SER CALC-MCNC: 4.6 G/DL (ref 2–4)
GLUCOSE BLD STRIP.AUTO-MCNC: 98 MG/DL (ref 70–110)
GLUCOSE SERPL-MCNC: 127 MG/DL (ref 74–99)
HCT VFR BLD AUTO: 35.1 % (ref 36–48)
HGB BLD-MCNC: 11.6 G/DL (ref 13–16)
LYMPHOCYTES # BLD: 3.1 K/UL (ref 0.8–3.5)
LYMPHOCYTES NFR BLD: 13 % (ref 20–51)
MAGNESIUM SERPL-MCNC: 1.9 MG/DL (ref 1.6–2.6)
MCH RBC QN AUTO: 31.3 PG (ref 24–34)
MCHC RBC AUTO-ENTMCNC: 33 G/DL (ref 31–37)
MCV RBC AUTO: 94.6 FL (ref 74–97)
METAMYELOCYTES NFR BLD MANUAL: 1 %
MONOCYTES # BLD: 1.2 K/UL (ref 0–1)
MONOCYTES NFR BLD: 5 % (ref 2–9)
MYELOCYTES NFR BLD MANUAL: 2 %
NEUTS BAND NFR BLD MANUAL: 5 % (ref 0–5)
NEUTS SEG # BLD: 18.7 K/UL (ref 1.8–8)
NEUTS SEG NFR BLD: 74 % (ref 42–75)
PLATELET # BLD AUTO: 276 K/UL (ref 135–420)
PLATELET COMMENTS,PCOM: ABNORMAL
PMV BLD AUTO: 9.3 FL (ref 9.2–11.8)
POTASSIUM SERPL-SCNC: 3.7 MMOL/L (ref 3.5–5.5)
PROT SERPL-MCNC: 6.3 G/DL (ref 6.4–8.2)
RBC # BLD AUTO: 3.71 M/UL (ref 4.7–5.5)
RBC MORPH BLD: ABNORMAL
SODIUM SERPL-SCNC: 136 MMOL/L (ref 136–145)
WBC # BLD AUTO: 23.7 K/UL (ref 4.6–13.2)

## 2020-03-07 PROCEDURE — 74011250636 HC RX REV CODE- 250/636: Performed by: NURSE ANESTHETIST, CERTIFIED REGISTERED

## 2020-03-07 PROCEDURE — 76060000031 HC ANESTHESIA FIRST 0.5 HR: Performed by: PODIATRIST

## 2020-03-07 PROCEDURE — 0J9R0ZZ DRAINAGE OF LEFT FOOT SUBCUTANEOUS TISSUE AND FASCIA, OPEN APPROACH: ICD-10-PCS | Performed by: PODIATRIST

## 2020-03-07 PROCEDURE — 74011250636 HC RX REV CODE- 250/636: Performed by: STUDENT IN AN ORGANIZED HEALTH CARE EDUCATION/TRAINING PROGRAM

## 2020-03-07 PROCEDURE — 74011000250 HC RX REV CODE- 250: Performed by: NURSE ANESTHETIST, CERTIFIED REGISTERED

## 2020-03-07 PROCEDURE — 74011250637 HC RX REV CODE- 250/637: Performed by: PODIATRIST

## 2020-03-07 PROCEDURE — 36415 COLL VENOUS BLD VENIPUNCTURE: CPT

## 2020-03-07 PROCEDURE — 85025 COMPLETE CBC W/AUTO DIFF WBC: CPT

## 2020-03-07 PROCEDURE — 77010033678 HC OXYGEN DAILY

## 2020-03-07 PROCEDURE — 87205 SMEAR GRAM STAIN: CPT

## 2020-03-07 PROCEDURE — 83735 ASSAY OF MAGNESIUM: CPT

## 2020-03-07 PROCEDURE — 82962 GLUCOSE BLOOD TEST: CPT

## 2020-03-07 PROCEDURE — 87075 CULTR BACTERIA EXCEPT BLOOD: CPT

## 2020-03-07 PROCEDURE — 74011000250 HC RX REV CODE- 250: Performed by: PODIATRIST

## 2020-03-07 PROCEDURE — 65270000029 HC RM PRIVATE

## 2020-03-07 PROCEDURE — 76210000006 HC OR PH I REC 0.5 TO 1 HR: Performed by: PODIATRIST

## 2020-03-07 PROCEDURE — 74011250636 HC RX REV CODE- 250/636: Performed by: PODIATRIST

## 2020-03-07 PROCEDURE — 76010000154 HC OR TIME FIRST 0.5 HR: Performed by: PODIATRIST

## 2020-03-07 PROCEDURE — 80053 COMPREHEN METABOLIC PANEL: CPT

## 2020-03-07 PROCEDURE — 74011000272 HC RX REV CODE- 272: Performed by: PODIATRIST

## 2020-03-07 PROCEDURE — 77030018836 HC SOL IRR NACL ICUM -A: Performed by: PODIATRIST

## 2020-03-07 PROCEDURE — 77030040361 HC SLV COMPR DVT MDII -B: Performed by: PODIATRIST

## 2020-03-07 PROCEDURE — 74011250636 HC RX REV CODE- 250/636: Performed by: INTERNAL MEDICINE

## 2020-03-07 PROCEDURE — 74011250637 HC RX REV CODE- 250/637: Performed by: STUDENT IN AN ORGANIZED HEALTH CARE EDUCATION/TRAINING PROGRAM

## 2020-03-07 RX ORDER — ONDANSETRON 2 MG/ML
4 INJECTION INTRAMUSCULAR; INTRAVENOUS ONCE
Status: COMPLETED | OUTPATIENT
Start: 2020-03-07 | End: 2020-03-07

## 2020-03-07 RX ORDER — LIDOCAINE HYDROCHLORIDE 20 MG/ML
INJECTION, SOLUTION EPIDURAL; INFILTRATION; INTRACAUDAL; PERINEURAL AS NEEDED
Status: DISCONTINUED | OUTPATIENT
Start: 2020-03-07 | End: 2020-03-07 | Stop reason: HOSPADM

## 2020-03-07 RX ORDER — NALOXONE HYDROCHLORIDE 0.4 MG/ML
0.1 INJECTION, SOLUTION INTRAMUSCULAR; INTRAVENOUS; SUBCUTANEOUS AS NEEDED
Status: DISCONTINUED | OUTPATIENT
Start: 2020-03-07 | End: 2020-03-07 | Stop reason: HOSPADM

## 2020-03-07 RX ORDER — SODIUM CHLORIDE 0.9 % (FLUSH) 0.9 %
5-40 SYRINGE (ML) INJECTION AS NEEDED
Status: DISCONTINUED | OUTPATIENT
Start: 2020-03-07 | End: 2020-03-07 | Stop reason: HOSPADM

## 2020-03-07 RX ORDER — INSULIN LISPRO 100 [IU]/ML
INJECTION, SOLUTION INTRAVENOUS; SUBCUTANEOUS ONCE
Status: DISCONTINUED | OUTPATIENT
Start: 2020-03-07 | End: 2020-03-07 | Stop reason: HOSPADM

## 2020-03-07 RX ORDER — MIDAZOLAM HYDROCHLORIDE 1 MG/ML
INJECTION, SOLUTION INTRAMUSCULAR; INTRAVENOUS AS NEEDED
Status: DISCONTINUED | OUTPATIENT
Start: 2020-03-07 | End: 2020-03-07 | Stop reason: HOSPADM

## 2020-03-07 RX ORDER — SODIUM CHLORIDE, SODIUM LACTATE, POTASSIUM CHLORIDE, CALCIUM CHLORIDE 600; 310; 30; 20 MG/100ML; MG/100ML; MG/100ML; MG/100ML
50 INJECTION, SOLUTION INTRAVENOUS CONTINUOUS
Status: DISCONTINUED | OUTPATIENT
Start: 2020-03-07 | End: 2020-03-07 | Stop reason: HOSPADM

## 2020-03-07 RX ORDER — DIPHENHYDRAMINE HYDROCHLORIDE 50 MG/ML
12.5 INJECTION, SOLUTION INTRAMUSCULAR; INTRAVENOUS
Status: DISCONTINUED | OUTPATIENT
Start: 2020-03-07 | End: 2020-03-07 | Stop reason: HOSPADM

## 2020-03-07 RX ORDER — KETAMINE HCL 50MG/ML(1)
SYRINGE (ML) INTRAVENOUS AS NEEDED
Status: DISCONTINUED | OUTPATIENT
Start: 2020-03-07 | End: 2020-03-07 | Stop reason: HOSPADM

## 2020-03-07 RX ORDER — HYDROMORPHONE HYDROCHLORIDE 2 MG/ML
0.5 INJECTION, SOLUTION INTRAMUSCULAR; INTRAVENOUS; SUBCUTANEOUS
Status: DISCONTINUED | OUTPATIENT
Start: 2020-03-07 | End: 2020-03-07 | Stop reason: HOSPADM

## 2020-03-07 RX ORDER — PROPOFOL 10 MG/ML
INJECTION, EMULSION INTRAVENOUS AS NEEDED
Status: DISCONTINUED | OUTPATIENT
Start: 2020-03-07 | End: 2020-03-07 | Stop reason: HOSPADM

## 2020-03-07 RX ORDER — SODIUM CHLORIDE 0.9 % (FLUSH) 0.9 %
5-40 SYRINGE (ML) INJECTION EVERY 8 HOURS
Status: DISCONTINUED | OUTPATIENT
Start: 2020-03-07 | End: 2020-03-07 | Stop reason: HOSPADM

## 2020-03-07 RX ORDER — ACETAMINOPHEN 325 MG/1
650 TABLET ORAL EVERY 6 HOURS
Status: DISCONTINUED | OUTPATIENT
Start: 2020-03-07 | End: 2020-03-12 | Stop reason: HOSPADM

## 2020-03-07 RX ORDER — FENTANYL CITRATE 50 UG/ML
INJECTION, SOLUTION INTRAMUSCULAR; INTRAVENOUS AS NEEDED
Status: DISCONTINUED | OUTPATIENT
Start: 2020-03-07 | End: 2020-03-07 | Stop reason: HOSPADM

## 2020-03-07 RX ORDER — FENTANYL CITRATE 50 UG/ML
50 INJECTION, SOLUTION INTRAMUSCULAR; INTRAVENOUS AS NEEDED
Status: DISCONTINUED | OUTPATIENT
Start: 2020-03-07 | End: 2020-03-07 | Stop reason: HOSPADM

## 2020-03-07 RX ORDER — OXYCODONE AND ACETAMINOPHEN 5; 325 MG/1; MG/1
1 TABLET ORAL
Status: DISCONTINUED | OUTPATIENT
Start: 2020-03-07 | End: 2020-03-12 | Stop reason: HOSPADM

## 2020-03-07 RX ORDER — ONDANSETRON 2 MG/ML
INJECTION INTRAMUSCULAR; INTRAVENOUS AS NEEDED
Status: DISCONTINUED | OUTPATIENT
Start: 2020-03-07 | End: 2020-03-07 | Stop reason: HOSPADM

## 2020-03-07 RX ADMIN — ONDANSETRON 4 MG: 2 INJECTION INTRAMUSCULAR; INTRAVENOUS at 10:05

## 2020-03-07 RX ADMIN — ONDANSETRON 4 MG: 2 INJECTION INTRAMUSCULAR; INTRAVENOUS at 09:18

## 2020-03-07 RX ADMIN — LIDOCAINE HYDROCHLORIDE 40 MG: 20 INJECTION, SOLUTION EPIDURAL; INFILTRATION; INTRACAUDAL; PERINEURAL at 08:57

## 2020-03-07 RX ADMIN — LISINOPRIL 10 MG: 10 TABLET ORAL at 10:39

## 2020-03-07 RX ADMIN — LINEZOLID 600 MG: 600 INJECTION, SOLUTION INTRAVENOUS at 05:40

## 2020-03-07 RX ADMIN — CIPROFLOXACIN 400 MG: 2 INJECTION, SOLUTION INTRAVENOUS at 06:43

## 2020-03-07 RX ADMIN — FENTANYL CITRATE 100 MCG: 50 INJECTION INTRAMUSCULAR; INTRAVENOUS at 08:49

## 2020-03-07 RX ADMIN — MORPHINE SULFATE 1 MG: 2 INJECTION, SOLUTION INTRAMUSCULAR; INTRAVENOUS at 05:47

## 2020-03-07 RX ADMIN — ACETAMINOPHEN 650 MG: 325 TABLET ORAL at 17:26

## 2020-03-07 RX ADMIN — HYDROMORPHONE HYDROCHLORIDE 0.5 MG: 2 INJECTION, SOLUTION INTRAMUSCULAR; INTRAVENOUS; SUBCUTANEOUS at 10:00

## 2020-03-07 RX ADMIN — Medication 100 MG: at 10:40

## 2020-03-07 RX ADMIN — CLOTRIMAZOLE: 10 CREAM TOPICAL at 09:00

## 2020-03-07 RX ADMIN — HEPARIN SODIUM 5000 UNITS: 5000 INJECTION INTRAVENOUS; SUBCUTANEOUS at 17:26

## 2020-03-07 RX ADMIN — MIDAZOLAM HYDROCHLORIDE 2 MG: 2 INJECTION, SOLUTION INTRAMUSCULAR; INTRAVENOUS at 08:49

## 2020-03-07 RX ADMIN — HEPARIN SODIUM 5000 UNITS: 5000 INJECTION INTRAVENOUS; SUBCUTANEOUS at 10:41

## 2020-03-07 RX ADMIN — Medication 10 ML: at 22:37

## 2020-03-07 RX ADMIN — Medication 10 ML: at 05:40

## 2020-03-07 RX ADMIN — LINEZOLID 600 MG: 600 INJECTION, SOLUTION INTRAVENOUS at 17:27

## 2020-03-07 RX ADMIN — PROPOFOL 150 MG: 10 INJECTION, EMULSION INTRAVENOUS at 08:57

## 2020-03-07 RX ADMIN — Medication 50 MG: at 08:57

## 2020-03-07 RX ADMIN — FENTANYL CITRATE 100 MCG: 50 INJECTION INTRAMUSCULAR; INTRAVENOUS at 09:02

## 2020-03-07 RX ADMIN — FAMOTIDINE 20 MG: 10 INJECTION, SOLUTION INTRAVENOUS at 08:40

## 2020-03-07 RX ADMIN — Medication 10 ML: at 17:27

## 2020-03-07 RX ADMIN — CIPROFLOXACIN 400 MG: 2 INJECTION, SOLUTION INTRAVENOUS at 20:21

## 2020-03-07 RX ADMIN — ATORVASTATIN CALCIUM 10 MG: 10 TABLET, FILM COATED ORAL at 10:40

## 2020-03-07 NOTE — OP NOTES
[address] [place] [address]   OPERATIVE REPORT    Name:  Joseluis Garcia  MR#:   [id number]  :  1960  ACCOUNT #:  [de-identified]  DATE OF SERVICE:  2020    PREOPERATIVE DIAGNOSIS:  Abscess, medial and lateral left ankle. POSTOPERATIVE DIAGNOSIS:  Abscess, medial and lateral left ankle. PROCEDURE PERFORMED:  Incision and drainage of soft tissue abscess, medial and lateral ankle, left. SURGEON:  Heather Mcneil DPM    ASSISTANT:  Duglas Ford. ANESTHESIA:  General.    COMPLICATIONS:  No complications. SPECIMENS REMOVED:  No specimens. IMPLANTS:  No implants. ESTIMATED BLOOD LOSS:  No blood loss. DESCRIPTION OF PROCEDURE:  On 2020, the patient was placed on the operating room table in supine position. After adequate induction of general anesthesia, left lower extremity was prepped and draped in usual sterile manner. Attention was directed to medial aspect of the ankle just above the tarsal tunnel and also to the lateral portion of the peroneal tendons. Both these areas were fluctuant and hot, bulging. Linear incisions were accomplished in these areas, puncture and a bloody discharge was expressed. It was cultured. It was thoroughly irrigated and packed with iodoform and a compressive dressing. The patient tolerated the procedure and anesthesia well with vital signs stable throughout. The patient was transported to the recovery room in stable condition.       Everett Buckner DPM      [id number]  D:  2020 9:26  T:  2020 10:47  JOB #:  [id number]  CC:  Heather Mcneil DPM

## 2020-03-07 NOTE — PERIOP NOTES
TRANSFER - IN REPORT:    Verbal report received from [facility] RN(name) on Jacob Orlando  being received from [facility](unit) for routine progression of care      Report consisted of patients Situation, Background, Assessment and   Recommendations(SBAR). Information from the following report(s) Procedure Verification was reviewed with the receiving nurse. Opportunity for questions and clarification was provided. Assessment completed upon patients arrival to unit and care assumed.

## 2020-03-07 NOTE — H&P
H&P Update:  Elis Medrano was seen and examined. History and physical has been reviewed. The patient has been examined.  There have been no significant clinical changes since the completion of the originally dated History and Physical.

## 2020-03-07 NOTE — PROGRESS NOTES
Bedside and Verbal shift change report given to GUNNER Petersen (oncoming nurse) by Krystal Rider RN (offgoing nurse). Report included the following information SBAR, Kardex, Procedure Summary, Intake/Output and MAR.

## 2020-03-07 NOTE — BRIEF OP NOTE
BRIEF OPERATIVE NOTE    Date of Procedure: 3/7/2020   Preoperative Diagnosis: DX  Postoperative Diagnosis: medial ankle abscess    Procedure(s):  INCISION AND DRAINAGE LEFT ANKLE  Surgeon(s) and Role:     Jethro Madison DPM - Primary         Surgical Assistant: Christina Chiu    Surgical Staff:  Circ-1: Irais Mccollum  Scrub Tech-1: Tashi Harris  Surg Asst-1: Skyler Cuff  Event Time In Time Out   Incision Start 03/07/2020 0904    Incision Close       Anesthesia: MAC   Estimated Blood Loss: 0  Specimens: * No specimens in log *   Findings: abscess   Complications: none  Implants: * No implants in log *

## 2020-03-07 NOTE — ANESTHESIA PREPROCEDURE EVALUATION
Relevant Problems   No relevant active problems       Anesthetic History   No history of anesthetic complications            Review of Systems / Medical History  Patient summary reviewed, nursing notes reviewed and pertinent labs reviewed    Pulmonary    COPD: moderate      Pneumonia and smoker      Comments: Chronic smoker's cough and abnormal CXR being Rx'd with antibiotics. Neuro/Psych   Within defined limits           Cardiovascular    Hypertension: well controlled          PAD    Exercise tolerance: >4 METS  Comments: 2019 ECHO: EF = 56-60%. 2020 ECG: WNL. Leukocytosis - r/o sepsis 2/2 cellulitis of left ankle. GI/Hepatic/Renal         Renal disease      Comments: Quiana on admission now resolved. Endo/Other    Diabetes: well controlled, type 2         Other Findings   Comments: Alcohol abuse disorder - on CIWA protocol. FBS = 127. Physical Exam    Airway  Mallampati: II  TM Distance: 4 - 6 cm  Neck ROM: normal range of motion   Mouth opening: Normal    Comments: Full beard. Cardiovascular  Regular rate and rhythm,  S1 and S2 normal,  no murmur, click, rub, or gallop  Rhythm: regular  Rate: normal         Dental    Dentition: Poor dentition  Comments: Missing numerous teeth and remaining dentition in poor repair. Pulmonary  Breath sounds clear to auscultation               Abdominal  Abdominal exam normal      Comments: Acute L3 compression fracture. Left ankle cellulitis.  Other Findings            Anesthetic Plan    ASA: 3  Anesthesia type: MAC          Induction: Intravenous  Anesthetic plan and risks discussed with: Patient

## 2020-03-07 NOTE — ROUTINE PROCESS
Bedside shift change report given to Coty (oncoming nurse) by Anu Zamora (offgoing nurse). Report included the following information SBAR, Kardex, Intake/Output, MAR and Recent Results.

## 2020-03-07 NOTE — PROGRESS NOTES
Problem: General Medical Care Plan  Goal: *Vital signs within specified parameters  Outcome: Progressing Towards Goal  Goal: *Labs within defined limits  Outcome: Progressing Towards Goal  Goal: *Absence of infection signs and symptoms  Outcome: Progressing Towards Goal  Goal: *Optimal pain control at patient's stated goal  Outcome: Progressing Towards Goal  Goal: *Skin integrity maintained  Outcome: Progressing Towards Goal  Goal: *Fluid volume balance  Outcome: Progressing Towards Goal  Goal: *Optimize nutritional status  Outcome: Progressing Towards Goal  Goal: *Anxiety reduced or absent  Outcome: Progressing Towards Goal  Goal: *Progressive mobility and function (eg: ADL's)  Outcome: Progressing Towards Goal

## 2020-03-07 NOTE — PROGRESS NOTES
Intern Progress Note  [facility]       Patient: Mahogany Quach MRN: [id number]  CSN: [id number]    YOB: 1960  Age: 61 y.o. Sex: male    DOA: 2/29/2020 LOS:  LOS: 7 days                    Subjective:     Acute events: Pt is post op from Incision and drainage of medial ankle abscess. Pt still somewhat out of it. Says he has no pain or complaints at this time. BPs elevated some post op, will monitor after lisinopril dose    Review of Systems   Constitutional: Negative for chills and fever. Respiratory: Negative for shortness of breath. Cardiovascular: Negative for chest pain and palpitations. Gastrointestinal: Negative for nausea and vomiting. Objective:      Patient Vitals for the past 24 hrs:   Temp Pulse Resp BP SpO2   03/07/20 1048 98.9 °F (37.2 °C) 90 17 (!) 172/92 98 %   03/07/20 1022 -- 95 17 -- 98 %   03/07/20 1019 98.5 °F (36.9 °C) 96 19 -- 98 %   03/07/20 1017 -- 95 19 -- 98 %   03/07/20 1016 -- 95 19 156/85 98 %   03/07/20 1006 -- 98 19 160/88 98 %   03/07/20 0956 -- 100 21 (!) 167/93 98 %   03/07/20 0936 -- 99 19 143/79 100 %   03/07/20 0928 98.8 °F (37.1 °C) 99 22 125/68 100 %   03/07/20 0926 98.8 °F (37.1 °C) 99 22 125/68 94 %   03/07/20 0836 98.2 °F (36.8 °C) 87 17 161/83 95 %   03/07/20 0539 98.7 °F (37.1 °C) 90 18 152/81 91 %   03/06/20 2132 98.1 °F (36.7 °C) 93 18 152/81 92 %   03/06/20 1854 99 °F (37.2 °C) 94 18 156/80 95 %   03/06/20 1446 98.5 °F (36.9 °C) 88 16 159/86 95 %         Intake/Output Summary (Last 24 hours) at 3/7/2020 1058  Last data filed at 3/7/2020 1052  Gross per 24 hour   Intake 740 ml   Output 3150 ml   Net -2410 ml       Physical Exam:   - General: patient is in no acute distress  - Cv: RRR, no murmurs/gallops/rubs, peripheral pulses intact  - Resp: Lungs CTA, no wheezing or ronchi  - Abdominal: Soft, non-tender, non-distended, Bs+   - MSK:  1+ swelling in LLE up to mid-calf. Has regressed from previous marking line.  Still warm and erythematous around the ankle. Foot wrapped up to almost mid calf, swelling and erythema present but improved. LAB  Recent Results (from the past 12 hour(s))   METABOLIC PANEL, COMPREHENSIVE    Collection Time: 03/07/20  1:24 AM   Result Value Ref Range    Sodium 136 136 - 145 mmol/L    Potassium 3.7 3.5 - 5.5 mmol/L    Chloride 103 100 - 111 mmol/L    CO2 29 21 - 32 mmol/L    Anion gap 4 3.0 - 18 mmol/L    Glucose 127 (H) 74 - 99 mg/dL    BUN 7 7.0 - 18 MG/DL    Creatinine 0.72 0.6 - 1.3 MG/DL    BUN/Creatinine ratio 10 (L) 12 - 20      GFR est AA >60 >60 ml/min/1.73m2    GFR est non-AA >60 >60 ml/min/1.73m2    Calcium 8.6 8.5 - 10.1 MG/DL    Bilirubin, total 0.5 0.2 - 1.0 MG/DL    ALT (SGPT) 30 16 - 61 U/L    AST (SGOT) 27 10 - 38 U/L    Alk. phosphatase 158 (H) 45 - 117 U/L    Protein, total 6.3 (L) 6.4 - 8.2 g/dL    Albumin 1.7 (L) 3.4 - 5.0 g/dL    Globulin 4.6 (H) 2.0 - 4.0 g/dL    A-G Ratio 0.4 (L) 0.8 - 1.7     CBC WITH AUTOMATED DIFF    Collection Time: 03/07/20  1:24 AM   Result Value Ref Range    WBC 23.7 (H) 4.6 - 13.2 K/uL    RBC 3.71 (L) 4.70 - 5.50 M/uL    HGB 11.6 (L) 13.0 - 16.0 g/dL    HCT 35.1 (L) 36.0 - 48.0 %    MCV 94.6 74.0 - 97.0 FL    MCH 31.3 24.0 - 34.0 PG    MCHC 33.0 31.0 - 37.0 g/dL    RDW 14.3 11.6 - 14.5 %    PLATELET 662 932 - 793 K/uL    MPV 9.3 9.2 - 11.8 FL    NEUTROPHILS 74 42 - 75 %    BAND NEUTROPHILS 5 0 - 5 %    LYMPHOCYTES 13 (L) 20 - 51 %    MONOCYTES 5 2 - 9 %    EOSINOPHILS 0 0 - 5 %    BASOPHILS 0 0 - 3 %    METAMYELOCYTES 1 (H) 0 %    MYELOCYTES 2 (H) 0 %    ABS. NEUTROPHILS 18.7 (H) 1.8 - 8.0 K/UL    ABS. LYMPHOCYTES 3.1 0.8 - 3.5 K/UL    ABS. MONOCYTES 1.2 (H) 0 - 1.0 K/UL    ABS. EOSINOPHILS 0.0 0.0 - 0.4 K/UL    ABS.  BASOPHILS 0.0 0.0 - 0.06 K/UL    DF MANUAL      PLATELET COMMENTS ADEQUATE PLATELETS      RBC COMMENTS NORMOCYTIC, NORMOCHROMIC     MAGNESIUM    Collection Time: 03/07/20  1:24 AM   Result Value Ref Range    Magnesium 1.9 1.6 - 2.6 mg/dL GLUCOSE, POC    Collection Time: 03/07/20  9:47 AM   Result Value Ref Range    Glucose (POC) 98 70 - 110 mg/dL         Scheduled Medications Reviewed:  Current Facility-Administered Medications   Medication Dose Route Frequency    linezolid in dextrose 5% (ZYVOX) IVPB premix in D5W 600 mg  600 mg IntraVENous Q12H    ciprofloxacin (CIPRO) 400 mg in D5W IVPB (premix)  400 mg IntraVENous Q12H    lidocaine 4 % patch 1 Patch  1 Patch TransDERmal Q24H    polyethylene glycol (MIRALAX) packet 17 g  17 g Oral DAILY    lactobacillus sp. 50 billion cpu (BIO-K PLUS) capsule 1 Cap  1 Cap Oral DAILY    thiamine HCL (B-1) tablet 100 mg  100 mg Oral DAILY    lisinopril (PRINIVIL, ZESTRIL) tablet 10 mg  10 mg Oral DAILY    atorvastatin (LIPITOR) tablet 10 mg  10 mg Oral DAILY    heparin (porcine) injection 5,000 Units  5,000 Units SubCUTAneous Q8H    sodium chloride (NS) flush 5-40 mL  5-40 mL IntraVENous Q8H    clotrimazole (LOTRIMIN) 1 % cream   Topical BID     Assessment/Plan   60 y. o. male with PMH alcohol abuse, HTN, T2DM, now admitted with sepsis 2/2 cellulitis.     Sepsis 2/2 cellulitis- patient presents with ~6 days of worsening pain, redness and swelling in his LLE associated with some nausea, vomiting, fevers and chills. Red leg erythematous extending all the way up to groin. Tachycardic on presentation that has since resolved. Leukocytosis of 27.1 with left shift which has been slowly down trending/stable. Band neutrophils are decreasing today. Urine and blood cultures pending, so far no growth. ESR WNLs, CRP elevatied. Initial XR of left foot without signs of osteomyelitis. MRI foot and MRI tib/fib from 3/1 without sings of osteomyelitis. Erythema an edema are regressing and improving. Patient feels overall well. CT and Ultrasound of leg earlier in course showing no signs of abscess or osteomyelitis.  Concern for abscess grew as pt still with very high white count despite improving erythema and swelling on the surface, I&D 3/7 with evacuation of abscess  - Continue linezolid and clotrimazole  - trend leukocytosis and fevers  - FU ID recs      Possible CAP- CXR on admission with Mild diffuse reticulonodular prominence of the interstitial markings and focal irregular increased opacity projects at the left lung base partially overlapping the anterior sixth rib shadow, concerning for focal airspace disease versus atelectasis. Patient has productive cough, but lungs overall sound clear on exam- do not think this is the cause of his sepsis. Is also long term smoker and has persistent daily cough. - not likely PNA given clinical picture, however antibiotics for cellulitis should also cover for anything pulmonary   - FU CXR unchanged from piror     GAIL- resolved. Elevated to 1.72 on admission, baseline after fluid recussitation  - Daily BMP  - I&Os      Alcohol use disorder- hx of drinking 4 drinks/day down from more previously, starts drinking in AM. No hx withdrawal or seizures in past per pt. LFTs slightly elevated on admission, resolved early in course with supportive care.   - CIWA protocol, patient has not needed any ativan during hospitalization  - Trend transaminases- have downtrended     Acute L3 compression fracture- Seen in ED, 2/23 told to FU with ortho OP  - Pain control as needed, will add Lidocaine patch   - Ortho consulted, recommended progressive mobility, can FU outpaitent     Hx of HTN- elevated to 170s/90's post op, was given his AM lisinopril at that time, will continue to monitor  - Continue home Lisinopril     DM- HbA1c 5.8 On 1500 metformin daily  - Hold metformin  - diabetic diet  - monitor glucose on daily BMP     Tinea Cruris- groin  - Continue Clomitrazole cream     Diet Regular   DVT Prophylaxis SQH   GI Prophylaxis None   Code status Full   Disposition 2-3 days then home with [facility]      Point of Contact Julius   Relationship: Brother  [phone] [address] [employer], [address] Family Medicine Intern  03/07/20 10:58 AM

## 2020-03-07 NOTE — PERIOP NOTES
TRANSFER - OUT REPORT:    Verbal report given to Kelsey(name) on Lorelei Florence  being transferred to [facility](unit) for routine post - op       Report consisted of patients Situation, Background, Assessment and   Recommendations(SBAR). Information from the following report(s) OR Summary was reviewed with the receiving nurse. Lines:   Peripheral IV 03/05/20 Right Arm (Active)   Site Assessment Clean, dry, & intact 3/7/2020 11:51 AM   Phlebitis Assessment 0 3/7/2020 11:51 AM   Infiltration Assessment 0 3/7/2020 11:51 AM   Dressing Status Clean, dry, & intact 3/7/2020 11:51 AM   Dressing Type Transparent 3/7/2020 11:51 AM   Hub Color/Line Status Pink 3/7/2020 11:51 AM   Alcohol Cap Used Yes 3/5/2020 10:53 PM        Opportunity for questions and clarification was provided.       Patient transported with:   [place]

## 2020-03-07 NOTE — ANESTHESIA POSTPROCEDURE EVALUATION
Procedure(s):  INCISION AND DRAINAGE LEFT ANKLE. MAC    Anesthesia Post Evaluation      Multimodal analgesia: multimodal analgesia used between 6 hours prior to anesthesia start to PACU discharge  Patient location during evaluation: PACU  Patient participation: complete - patient participated  Level of consciousness: awake  Pain score: 5  Pain management: adequate  Airway patency: patent  Anesthetic complications: no  Cardiovascular status: acceptable  Respiratory status: acceptable  Hydration status: acceptable  Post anesthesia nausea and vomiting:  controlled      Vitals Value Taken Time   /93 3/7/2020  9:56 AM   Temp     Pulse 98 3/7/2020 10:05 AM   Resp 19 3/7/2020 10:05 AM   SpO2 98 % 3/7/2020 10:05 AM   Vitals shown include unvalidated device data.

## 2020-03-08 LAB
ALBUMIN SERPL-MCNC: 1.9 G/DL (ref 3.4–5)
ALBUMIN/GLOB SERPL: 0.4 {RATIO} (ref 0.8–1.7)
ALP SERPL-CCNC: 152 U/L (ref 45–117)
ALT SERPL-CCNC: 22 U/L (ref 16–61)
ANION GAP SERPL CALC-SCNC: 5 MMOL/L (ref 3–18)
AST SERPL-CCNC: 20 U/L (ref 10–38)
BASOPHILS # BLD: 0 K/UL (ref 0–0.06)
BASOPHILS NFR BLD: 0 % (ref 0–3)
BILIRUB SERPL-MCNC: 0.8 MG/DL (ref 0.2–1)
BUN SERPL-MCNC: 6 MG/DL (ref 7–18)
BUN/CREAT SERPL: 9 (ref 12–20)
CALCIUM SERPL-MCNC: 8.6 MG/DL (ref 8.5–10.1)
CHLORIDE SERPL-SCNC: 100 MMOL/L (ref 100–111)
CO2 SERPL-SCNC: 29 MMOL/L (ref 21–32)
CREAT SERPL-MCNC: 0.67 MG/DL (ref 0.6–1.3)
DIFFERENTIAL METHOD BLD: ABNORMAL
EOSINOPHIL # BLD: 0.5 K/UL (ref 0–0.4)
EOSINOPHIL NFR BLD: 2 % (ref 0–5)
ERYTHROCYTE [DISTWIDTH] IN BLOOD BY AUTOMATED COUNT: 14.4 % (ref 11.6–14.5)
GLOBULIN SER CALC-MCNC: 4.7 G/DL (ref 2–4)
GLUCOSE SERPL-MCNC: 93 MG/DL (ref 74–99)
HCT VFR BLD AUTO: 36.9 % (ref 36–48)
HGB BLD-MCNC: 11.9 G/DL (ref 13–16)
LYMPHOCYTES # BLD: 2.4 K/UL (ref 0.8–3.5)
LYMPHOCYTES NFR BLD: 10 % (ref 20–51)
MAGNESIUM SERPL-MCNC: 1.6 MG/DL (ref 1.6–2.6)
MCH RBC QN AUTO: 31.2 PG (ref 24–34)
MCHC RBC AUTO-ENTMCNC: 32.2 G/DL (ref 31–37)
MCV RBC AUTO: 96.9 FL (ref 74–97)
MONOCYTES # BLD: 1.2 K/UL (ref 0–1)
MONOCYTES NFR BLD: 5 % (ref 2–9)
NEUTS BAND NFR BLD MANUAL: 5 % (ref 0–5)
NEUTS SEG # BLD: 19.4 K/UL (ref 1.8–8)
NEUTS SEG NFR BLD: 78 % (ref 42–75)
PLATELET # BLD AUTO: 315 K/UL (ref 135–420)
PLATELET COMMENTS,PCOM: ABNORMAL
PMV BLD AUTO: 10.2 FL (ref 9.2–11.8)
POTASSIUM SERPL-SCNC: 4 MMOL/L (ref 3.5–5.5)
PROT SERPL-MCNC: 6.6 G/DL (ref 6.4–8.2)
RBC # BLD AUTO: 3.81 M/UL (ref 4.7–5.5)
RBC MORPH BLD: ABNORMAL
SODIUM SERPL-SCNC: 134 MMOL/L (ref 136–145)
WBC # BLD AUTO: 23.5 K/UL (ref 4.6–13.2)

## 2020-03-08 PROCEDURE — 74011250636 HC RX REV CODE- 250/636: Performed by: PODIATRIST

## 2020-03-08 PROCEDURE — 74011250637 HC RX REV CODE- 250/637: Performed by: PODIATRIST

## 2020-03-08 PROCEDURE — 85025 COMPLETE CBC W/AUTO DIFF WBC: CPT

## 2020-03-08 PROCEDURE — 65270000029 HC RM PRIVATE

## 2020-03-08 PROCEDURE — 83735 ASSAY OF MAGNESIUM: CPT

## 2020-03-08 PROCEDURE — 80053 COMPREHEN METABOLIC PANEL: CPT

## 2020-03-08 PROCEDURE — 74011000250 HC RX REV CODE- 250: Performed by: PODIATRIST

## 2020-03-08 PROCEDURE — 36415 COLL VENOUS BLD VENIPUNCTURE: CPT

## 2020-03-08 PROCEDURE — 74011250637 HC RX REV CODE- 250/637: Performed by: STUDENT IN AN ORGANIZED HEALTH CARE EDUCATION/TRAINING PROGRAM

## 2020-03-08 RX ADMIN — Medication 10 ML: at 05:43

## 2020-03-08 RX ADMIN — POLYETHYLENE GLYCOL 3350 17 G: 17 POWDER, FOR SOLUTION ORAL at 08:10

## 2020-03-08 RX ADMIN — LISINOPRIL 10 MG: 10 TABLET ORAL at 08:10

## 2020-03-08 RX ADMIN — Medication 1 CAPSULE: at 08:10

## 2020-03-08 RX ADMIN — Medication 100 MG: at 08:10

## 2020-03-08 RX ADMIN — HEPARIN SODIUM 5000 UNITS: 5000 INJECTION INTRAVENOUS; SUBCUTANEOUS at 16:32

## 2020-03-08 RX ADMIN — HEPARIN SODIUM 5000 UNITS: 5000 INJECTION INTRAVENOUS; SUBCUTANEOUS at 00:28

## 2020-03-08 RX ADMIN — ACETAMINOPHEN 650 MG: 325 TABLET ORAL at 06:58

## 2020-03-08 RX ADMIN — CIPROFLOXACIN 400 MG: 2 INJECTION, SOLUTION INTRAVENOUS at 08:10

## 2020-03-08 RX ADMIN — OXYCODONE AND ACETAMINOPHEN 1 TABLET: 5; 325 TABLET ORAL at 08:13

## 2020-03-08 RX ADMIN — OXYCODONE AND ACETAMINOPHEN 1 TABLET: 5; 325 TABLET ORAL at 16:31

## 2020-03-08 RX ADMIN — ACETAMINOPHEN 650 MG: 325 TABLET ORAL at 00:28

## 2020-03-08 RX ADMIN — ACETAMINOPHEN 650 MG: 325 TABLET ORAL at 18:08

## 2020-03-08 RX ADMIN — HEPARIN SODIUM 5000 UNITS: 5000 INJECTION INTRAVENOUS; SUBCUTANEOUS at 08:09

## 2020-03-08 RX ADMIN — ATORVASTATIN CALCIUM 10 MG: 10 TABLET, FILM COATED ORAL at 08:10

## 2020-03-08 RX ADMIN — LINEZOLID 600 MG: 600 INJECTION, SOLUTION INTRAVENOUS at 05:37

## 2020-03-08 RX ADMIN — CIPROFLOXACIN 400 MG: 2 INJECTION, SOLUTION INTRAVENOUS at 18:09

## 2020-03-08 RX ADMIN — Medication 10 ML: at 21:51

## 2020-03-08 RX ADMIN — LINEZOLID 600 MG: 600 INJECTION, SOLUTION INTRAVENOUS at 16:32

## 2020-03-08 NOTE — ROUTINE PROCESS
Assumed care of patient. Bedside verbal report received from [facility], RN including SBAR, MAR, and Kardex. Vitals within normal limits. Assessment completed, patient in no apparent distress.

## 2020-03-08 NOTE — PROGRESS NOTES
MRI Safety Screening form needs to be filled out and FAXED to [phone] MRI can be scheduled. If unable to acquire information from patient  [facility] must be contacted, or screening xrays will need to be ordered.     IF pt is Claustrophobic or will need Pain Meds please have these ordered in advance to help facilitate MRI exam.

## 2020-03-08 NOTE — PROGRESS NOTES
I have seen and examined the patient. Please see separate resident note for assessment & plan which we have discussed. In addition, pt doing well, WBC unchanged, no fevers. Plan for CXR in AM to follow retrocardiac opacity, effusions.

## 2020-03-08 NOTE — PROGRESS NOTES
Intern Progress Note  [facility]       Patient: Janeth Meng MRN: [id number]  CSN: [id number]    YOB: 1960  Age: 61 y.o. Sex: male    DOA: 2/29/2020 LOS:  LOS: 8 days                    Subjective:     No events overnight. Pt is post op from Incision and drainage of medial ankle abscess on 3/7/20. Pt stated that the pain is more tolerable in the ankle after I&D. Review of Systems   Constitutional: Negative for chills and fever. Respiratory: Negative for shortness of breath. Cardiovascular: Negative for chest pain and palpitations. Gastrointestinal: Negative for nausea and vomiting. Objective:      Patient Vitals for the past 24 hrs:   Temp Pulse Resp BP SpO2   03/08/20 1540 98.8 °F (37.1 °C) 91 16 145/78 91 %   03/08/20 0954 98.6 °F (37 °C) 85 16 130/75 94 %   03/08/20 0543 98.3 °F (36.8 °C) 86 16 147/81 94 %   03/07/20 2201 98.5 °F (36.9 °C) 97 16 131/74 99 %   03/07/20 1854 98.8 °F (37.1 °C) 99 18 129/70 96 %         Intake/Output Summary (Last 24 hours) at 3/8/2020 1630  Last data filed at 3/8/2020 1427  Gross per 24 hour   Intake 640 ml   Output 2650 ml   Net -2010 ml       Physical Exam:   General: NAD  CV: RRR, no murmurs/gallops/rubs, peripheral pulses intact  Resp: Lungs CTA, no wheezing or ronchi  Abdominal: Soft, non-tender, non-distended, BS+   MSK:  1+ swelling in LLE up to mid-calf. Still warm and erythematous around the ankle. Foot wrapped up to almost mid calf, swelling and erythema present but improved.     LAB  Recent Results (from the past 24 hour(s))   METABOLIC PANEL, COMPREHENSIVE    Collection Time: 03/08/20  3:49 AM   Result Value Ref Range    Sodium 134 (L) 136 - 145 mmol/L    Potassium 4.0 3.5 - 5.5 mmol/L    Chloride 100 100 - 111 mmol/L    CO2 29 21 - 32 mmol/L    Anion gap 5 3.0 - 18 mmol/L    Glucose 93 74 - 99 mg/dL    BUN 6 (L) 7.0 - 18 MG/DL    Creatinine 0.67 0.6 - 1.3 MG/DL    BUN/Creatinine ratio 9 (L) 12 - 20      GFR est AA >60 >60 ml/min/1.73m2    GFR est non-AA >60 >60 ml/min/1.73m2    Calcium 8.6 8.5 - 10.1 MG/DL    Bilirubin, total 0.8 0.2 - 1.0 MG/DL    ALT (SGPT) 22 16 - 61 U/L    AST (SGOT) 20 10 - 38 U/L    Alk. phosphatase 152 (H) 45 - 117 U/L    Protein, total 6.6 6.4 - 8.2 g/dL    Albumin 1.9 (L) 3.4 - 5.0 g/dL    Globulin 4.7 (H) 2.0 - 4.0 g/dL    A-G Ratio 0.4 (L) 0.8 - 1.7     CBC WITH AUTOMATED DIFF    Collection Time: 03/08/20  3:49 AM   Result Value Ref Range    WBC 23.5 (H) 4.6 - 13.2 K/uL    RBC 3.81 (L) 4.70 - 5.50 M/uL    HGB 11.9 (L) 13.0 - 16.0 g/dL    HCT 36.9 36.0 - 48.0 %    MCV 96.9 74.0 - 97.0 FL    MCH 31.2 24.0 - 34.0 PG    MCHC 32.2 31.0 - 37.0 g/dL    RDW 14.4 11.6 - 14.5 %    PLATELET 589 177 - 938 K/uL    MPV 10.2 9.2 - 11.8 FL    NEUTROPHILS 78 (H) 42 - 75 %    BAND NEUTROPHILS 5 0 - 5 %    LYMPHOCYTES 10 (L) 20 - 51 %    MONOCYTES 5 2 - 9 %    EOSINOPHILS 2 0 - 5 %    BASOPHILS 0 0 - 3 %    ABS. NEUTROPHILS 19.4 (H) 1.8 - 8.0 K/UL    ABS. LYMPHOCYTES 2.4 0.8 - 3.5 K/UL    ABS. MONOCYTES 1.2 (H) 0 - 1.0 K/UL    ABS. EOSINOPHILS 0.5 (H) 0.0 - 0.4 K/UL    ABS. BASOPHILS 0.0 0.0 - 0.06 K/UL    DF MANUAL      PLATELET COMMENTS ADEQUATE PLATELETS      RBC COMMENTS NORMOCYTIC, NORMOCHROMIC     MAGNESIUM    Collection Time: 03/08/20  3:49 AM   Result Value Ref Range    Magnesium 1.6 1.6 - 2.6 mg/dL     Xr Chest Pa Lat    Result Date: 3/5/2020  IMPRESSION: Similar small bilateral pleural effusions with likely mild atelectasis. Xr Chest Pa Lat    Result Date: 3/3/2020  IMPRESSION: Bilateral pleural effusions left greater than right. Stringy densities left lower lobe-infiltrate versus atelectasis. Xr Chest Pa Lat    Result Date: 3/3/2020  IMPRESSION: Since the prior study significant bibasilar opacities have developed with probable left effusion. Primary differential considerations include aspiration, pneumonia, pulmonary edema, less likely pulmonary hemorrhage or diffuse lung injury.      Xr Foot Lt Min 3 V    Result Date: 3/1/2020  Impression: Persistent tiny punctate density very superficially near the skin seen on lateral view which appears to be just proximal to ulcer site concerning for tiny radiopaque foreign body as discussed above. Soft tissue swelling. No other significant interval change. Xr Foot Lt Min 3 V    Result Date: 2/29/2020  Impression: Soft tissue lucency along the plantar great toe suspicious for reported ulcer/wound. Punctate opacity suspicious for radiopaque foreign body projects close to the site of this lucency along the plantar aspect of the left great toe best demonstrated on lateral view, clinical correlation is needed, please see above. Soft tissue swelling as described. No evidence of acute fracture or dislocation. The patient has been admitted. Mri Lumb Spine Wo Cont    Result Date: 3/2/2020  IMPRESSION: 1. Subacute appearing burst-type L3 superior endplate fracture with mild 4 mm retropulsion associated mild L2/L3 spinal canal stenosis. 2.  No high-grade spinal canal or foraminal narrowing. 3.  Annular fissure at L5/S1, a potential pain generator. 4.  Bilateral paraspinal muscle fatty atrophy with mild to moderate intramuscular edema, which could reflect grade 1 strains or denervation change. 5.  Mild mildly atrophic lower thoracic spinal cord. Mri Tib/fib [facility] Wo Cont    Result Date: 3/1/2020  IMPRESSION: 1. Severe circumferential subcutaneous swelling/cellulitis in the calf with no abscess. Musculature is unremarkable. 2. No osteomyelitis in the tibia or fibula. Mri Foot [facility] Wo Cont    Result Date: 3/1/2020  IMPRESSION: 1. Nonspecific severe dorsal subcutaneous inflammation/edema with no abscess. No osteomyelitis or fracture. Minimal plantar soft tissue edema and flexor tenosynovitis. 2. Focal ulcer along the medial aspect of the first toe with tiny superficial foreign body.     Ct Low Ext Lt W Cont    Result Date: 3/5/2020  IMPRESSION: 1.. Extensive edema of left the foreleg in subcutaneous and muscular compartments but no focal fluid collection or abscess seen. 2. Mild DJD of right knee. Thank you for your referral.     Xr Chest Port    Result Date: 2/29/2020  IMPRESSION: Focal irregular increased opacity projects at the left lung base partially overlapping the anterior sixth rib shadow, concerning for focal airspace disease versus atelectasis. Some of this may also relate to the underlying rib as described. Mild diffuse reticulonodular prominence of the interstitial markings. [address] Ext [address]    Result Date: 3/6/2020  IMPRESSION: 1. Negative for abscess at the 2 separately evaluated sites within the left lower extremity. -Profound subcutaneous tissue edema at each location, as can be seen with severe cellulitis     [id number] [facility]    Result Date: 3/6/2020  IMPRESSION: 1.   Negative for abscess at the 2 separately evaluated sites within the left lower extremity. -Profound subcutaneous tissue edema at each location, as can be seen with severe cellulitis         Scheduled Medications Reviewed:  Current Facility-Administered Medications   Medication Dose Route Frequency    acetaminophen (TYLENOL) tablet 650 mg  650 mg Oral Q6H    linezolid in dextrose 5% (ZYVOX) IVPB premix in D5W 600 mg  600 mg IntraVENous Q12H    ciprofloxacin (CIPRO) 400 mg in D5W IVPB (premix)  400 mg IntraVENous Q12H    lidocaine 4 % patch 1 Patch  1 Patch TransDERmal Q24H    polyethylene glycol (MIRALAX) packet 17 g  17 g Oral DAILY    lactobacillus sp. 50 billion cpu (BIO-K PLUS) capsule 1 Cap  1 Cap Oral DAILY    thiamine HCL (B-1) tablet 100 mg  100 mg Oral DAILY    lisinopril (PRINIVIL, ZESTRIL) tablet 10 mg  10 mg Oral DAILY    atorvastatin (LIPITOR) tablet 10 mg  10 mg Oral DAILY    heparin (porcine) injection 5,000 Units  5,000 Units SubCUTAneous Q8H    sodium chloride (NS) flush 5-40 mL  5-40 mL IntraVENous Q8H    clotrimazole (LOTRIMIN) 1 % cream   Topical BID Assessment/Plan   61 y. o. male with PMH alcohol abuse, HTN, T2DM, now admitted with sepsis 2/2 cellulitis.     Sepsis 2/2 cellulitis- patient presents with ~6 days of worsening pain, redness and swelling in his LLE associated with some nausea, vomiting, fevers and chills. Red leg erythematous extending all the way up to groin. Tachycardic on presentation that has since resolved. Leukocytosis of 27.1 with left shift which has been slowly down trending/stable. Band neutrophils are decreasing today. Urine and blood cultures pending, so far no growth. ESR WNLs, CRP elevatied. Initial XR of left foot without signs of osteomyelitis. MRI foot and MRI tib/fib from 3/1 without sings of osteomyelitis. Erythema an edema are regressing and improving. Patient feels overall well. CT and Ultrasound of leg earlier in course showing no signs of abscess or osteomyelitis. Concern for abscess grew as pt still with very high white count despite improving erythema and swelling on the surface, I&D 3/7 with evacuation of abscess. - Continue linezolid and clotrimazole  - trend leukocytosis and fevers  - ID consulted, appreciate Recs     Possible CAP- CXR on admission with Mild diffuse reticulonodular prominence of the interstitial markings and focal irregular increased opacity projects at the left lung base partially overlapping the anterior sixth rib shadow, concerning for focal airspace disease versus atelectasis. Patient has productive cough, but lungs overall sound clear on exam- do not think this is the cause of his sepsis. Is also long term smoker and has persistent daily cough. - not likely PNA given clinical picture, however antibiotics for cellulitis should also cover for anything pulmonary   - FU CXR in the AM on 3/9/20     GAIL- resolved.  Elevated to 1.72 on admission, baseline(0.6-0.7) after fluid recussitation  - Daily BMP  - I&Os      Alcohol use disorder- hx of drinking 4 drinks/day down from more previously, starts drinking in AM. No hx withdrawal or seizures in past per pt. LFTs slightly elevated on admission, resolved early in course with supportive care.   - CIWA protocol, patient has not needed any ativan during hospitalization  - Trend transaminases- have downtrended     Acute L3 compression fracture- Seen in ED, 2/23 told to FU with ortho OP  - Pain control as needed, will add Lidocaine patch   - Ortho consulted, recommended progressive mobility, can FU outpaitent     Hx of HTN- elevated to 170s/90's post op, was given his AM lisinopril at that time, will continue to monitor  - Continue home Lisinopril     DM- HbA1c 5.8(3/2/20) On 1500 metformin daily  - Hold metformin  - diabetic diet  - monitor glucose on daily BMP     Tinea Cruris- groin  - Continue Clomitrazole cream     Diet Regular   DVT Prophylaxis SQH   GI Prophylaxis None   Code status Full   Disposition 2-3 days then home with [facility] [address]  [phone] Geo Guadarrama MD   [address] Medicine Intern  03/08/20 10:58 AM

## 2020-03-08 NOTE — ROUTINE PROCESS
Report given to [place], RN, including SBAR, [facility], and Kardex. Patient alert and oriented, vitals within normal limits, no apparent distress.

## 2020-03-08 NOTE — ROUTINE PROCESS
Bedside and Verbal shift change report given to [address] [facility] [address] (oncoming nurse) by Syliva Kocher RN (offgoing nurse). Report included the following information SBAR, Kardex, ED Summary, OR Summary, Procedure Summary, Intake/Output, MAR and Recent Results.

## 2020-03-09 ENCOUNTER — APPOINTMENT (OUTPATIENT)
Dept: GENERAL RADIOLOGY | Age: 60
DRG: 854 | End: 2020-03-09
Attending: INTERNAL MEDICINE
Payer: MEDICARE

## 2020-03-09 ENCOUNTER — APPOINTMENT (OUTPATIENT)
Dept: MRI IMAGING | Age: 60
DRG: 854 | End: 2020-03-09
Attending: PODIATRIST
Payer: MEDICARE

## 2020-03-09 ENCOUNTER — APPOINTMENT (OUTPATIENT)
Dept: GENERAL RADIOLOGY | Age: 60
DRG: 854 | End: 2020-03-09
Attending: STUDENT IN AN ORGANIZED HEALTH CARE EDUCATION/TRAINING PROGRAM
Payer: MEDICARE

## 2020-03-09 LAB
ALBUMIN SERPL-MCNC: 1.9 G/DL (ref 3.4–5)
ALBUMIN/GLOB SERPL: 0.4 {RATIO} (ref 0.8–1.7)
ALP SERPL-CCNC: 146 U/L (ref 45–117)
ALT SERPL-CCNC: 19 U/L (ref 16–61)
ANION GAP SERPL CALC-SCNC: 5 MMOL/L (ref 3–18)
AST SERPL-CCNC: 18 U/L (ref 10–38)
BACTERIA SPEC CULT: NORMAL
BACTERIA SPEC CULT: NORMAL
BASOPHILS # BLD: 0 K/UL (ref 0–0.06)
BASOPHILS NFR BLD: 0 % (ref 0–3)
BILIRUB SERPL-MCNC: 0.5 MG/DL (ref 0.2–1)
BUN SERPL-MCNC: 7 MG/DL (ref 7–18)
BUN/CREAT SERPL: 9 (ref 12–20)
CALCIUM SERPL-MCNC: 8.7 MG/DL (ref 8.5–10.1)
CHLORIDE SERPL-SCNC: 100 MMOL/L (ref 100–111)
CO2 SERPL-SCNC: 30 MMOL/L (ref 21–32)
CREAT SERPL-MCNC: 0.76 MG/DL (ref 0.6–1.3)
DIFFERENTIAL METHOD BLD: ABNORMAL
EOSINOPHIL # BLD: 0 K/UL (ref 0–0.4)
EOSINOPHIL NFR BLD: 0 % (ref 0–5)
ERYTHROCYTE [DISTWIDTH] IN BLOOD BY AUTOMATED COUNT: 14.3 % (ref 11.6–14.5)
GLOBULIN SER CALC-MCNC: 4.8 G/DL (ref 2–4)
GLUCOSE SERPL-MCNC: 116 MG/DL (ref 74–99)
HCT VFR BLD AUTO: 34.3 % (ref 36–48)
HGB BLD-MCNC: 11.3 G/DL (ref 13–16)
LYMPHOCYTES # BLD: 3.3 K/UL (ref 0.8–3.5)
LYMPHOCYTES NFR BLD: 16 % (ref 20–51)
MAGNESIUM SERPL-MCNC: 1.7 MG/DL (ref 1.6–2.6)
MCH RBC QN AUTO: 31.8 PG (ref 24–34)
MCHC RBC AUTO-ENTMCNC: 32.9 G/DL (ref 31–37)
MCV RBC AUTO: 96.6 FL (ref 74–97)
MONOCYTES # BLD: 1.3 K/UL (ref 0–1)
MONOCYTES NFR BLD: 6 % (ref 2–9)
MYELOCYTES NFR BLD MANUAL: 1 %
NEUTS BAND NFR BLD MANUAL: 3 % (ref 0–5)
NEUTS SEG # BLD: 16.1 K/UL (ref 1.8–8)
NEUTS SEG NFR BLD: 74 % (ref 42–75)
PLATELET # BLD AUTO: 358 K/UL (ref 135–420)
PLATELET COMMENTS,PCOM: ABNORMAL
PMV BLD AUTO: 9.9 FL (ref 9.2–11.8)
POTASSIUM SERPL-SCNC: 3.9 MMOL/L (ref 3.5–5.5)
PROT SERPL-MCNC: 6.7 G/DL (ref 6.4–8.2)
RBC # BLD AUTO: 3.55 M/UL (ref 4.7–5.5)
RBC MORPH BLD: ABNORMAL
SERVICE CMNT-IMP: NORMAL
SERVICE CMNT-IMP: NORMAL
SODIUM SERPL-SCNC: 135 MMOL/L (ref 136–145)
WBC # BLD AUTO: 20.9 K/UL (ref 4.6–13.2)

## 2020-03-09 PROCEDURE — 83735 ASSAY OF MAGNESIUM: CPT

## 2020-03-09 PROCEDURE — 74011250637 HC RX REV CODE- 250/637: Performed by: STUDENT IN AN ORGANIZED HEALTH CARE EDUCATION/TRAINING PROGRAM

## 2020-03-09 PROCEDURE — 80053 COMPREHEN METABOLIC PANEL: CPT

## 2020-03-09 PROCEDURE — 74011000250 HC RX REV CODE- 250: Performed by: PODIATRIST

## 2020-03-09 PROCEDURE — 97162 PT EVAL MOD COMPLEX 30 MIN: CPT

## 2020-03-09 PROCEDURE — 36415 COLL VENOUS BLD VENIPUNCTURE: CPT

## 2020-03-09 PROCEDURE — 73502 X-RAY EXAM HIP UNI 2-3 VIEWS: CPT

## 2020-03-09 PROCEDURE — 74011250637 HC RX REV CODE- 250/637: Performed by: PODIATRIST

## 2020-03-09 PROCEDURE — 74011250636 HC RX REV CODE- 250/636: Performed by: PODIATRIST

## 2020-03-09 PROCEDURE — 97165 OT EVAL LOW COMPLEX 30 MIN: CPT

## 2020-03-09 PROCEDURE — 71046 X-RAY EXAM CHEST 2 VIEWS: CPT

## 2020-03-09 PROCEDURE — 97535 SELF CARE MNGMENT TRAINING: CPT

## 2020-03-09 PROCEDURE — 65270000029 HC RM PRIVATE

## 2020-03-09 PROCEDURE — 97116 GAIT TRAINING THERAPY: CPT

## 2020-03-09 PROCEDURE — 85025 COMPLETE CBC W/AUTO DIFF WBC: CPT

## 2020-03-09 RX ADMIN — HEPARIN SODIUM 5000 UNITS: 5000 INJECTION INTRAVENOUS; SUBCUTANEOUS at 00:16

## 2020-03-09 RX ADMIN — CIPROFLOXACIN 400 MG: 2 INJECTION, SOLUTION INTRAVENOUS at 18:19

## 2020-03-09 RX ADMIN — HEPARIN SODIUM 5000 UNITS: 5000 INJECTION INTRAVENOUS; SUBCUTANEOUS at 17:03

## 2020-03-09 RX ADMIN — Medication 1 CAPSULE: at 09:08

## 2020-03-09 RX ADMIN — ATORVASTATIN CALCIUM 10 MG: 10 TABLET, FILM COATED ORAL at 09:08

## 2020-03-09 RX ADMIN — CIPROFLOXACIN 400 MG: 2 INJECTION, SOLUTION INTRAVENOUS at 05:46

## 2020-03-09 RX ADMIN — HEPARIN SODIUM 5000 UNITS: 5000 INJECTION INTRAVENOUS; SUBCUTANEOUS at 09:08

## 2020-03-09 RX ADMIN — OXYCODONE AND ACETAMINOPHEN 1 TABLET: 5; 325 TABLET ORAL at 18:19

## 2020-03-09 RX ADMIN — LINEZOLID 600 MG: 600 INJECTION, SOLUTION INTRAVENOUS at 17:03

## 2020-03-09 RX ADMIN — Medication 100 MG: at 09:08

## 2020-03-09 RX ADMIN — CLOTRIMAZOLE: 10 CREAM TOPICAL at 09:09

## 2020-03-09 RX ADMIN — ACETAMINOPHEN 650 MG: 325 TABLET ORAL at 12:00

## 2020-03-09 RX ADMIN — DAKIN'S SOLUTION 0.125% (QUARTER STRENGTH): 0.12 SOLUTION at 22:07

## 2020-03-09 RX ADMIN — ACETAMINOPHEN 650 MG: 325 TABLET ORAL at 05:52

## 2020-03-09 RX ADMIN — CLOTRIMAZOLE: 10 CREAM TOPICAL at 17:03

## 2020-03-09 RX ADMIN — Medication 10 ML: at 05:53

## 2020-03-09 RX ADMIN — OXYCODONE AND ACETAMINOPHEN 1 TABLET: 5; 325 TABLET ORAL at 11:00

## 2020-03-09 RX ADMIN — ACETAMINOPHEN 650 MG: 325 TABLET ORAL at 00:16

## 2020-03-09 RX ADMIN — LISINOPRIL 10 MG: 10 TABLET ORAL at 09:08

## 2020-03-09 RX ADMIN — ACETAMINOPHEN 650 MG: 325 TABLET ORAL at 17:03

## 2020-03-09 RX ADMIN — LINEZOLID 600 MG: 600 INJECTION, SOLUTION INTRAVENOUS at 03:48

## 2020-03-09 RX ADMIN — OXYCODONE AND ACETAMINOPHEN 1 TABLET: 5; 325 TABLET ORAL at 03:48

## 2020-03-09 RX ADMIN — Medication 10 ML: at 14:00

## 2020-03-09 NOTE — PROGRESS NOTES
Problem: General Medical Care Plan  Goal: *Vital signs within specified parameters  Outcome: Progressing Towards Goal  Goal: *Labs within defined limits  Outcome: Progressing Towards Goal  Goal: *Absence of infection signs and symptoms  Outcome: Progressing Towards Goal  Goal: *Optimal pain control at patient's stated goal  Outcome: Progressing Towards Goal  Goal: *Skin integrity maintained  Outcome: Progressing Towards Goal  Goal: *Fluid volume balance  Outcome: Progressing Towards Goal  Goal: *Optimize nutritional status  Outcome: Progressing Towards Goal  Goal: *Anxiety reduced or absent  Outcome: Progressing Towards Goal  Goal: *Progressive mobility and function (eg: ADL's)  Outcome: Progressing Towards Goal     Problem: Patient Education: Go to Patient Education Activity  Goal: Patient/Family Education  Outcome: Progressing Towards Goal     Problem: Pain  Goal: *Control of Pain  Outcome: Progressing Towards Goal     Problem: Patient Education: Go to Patient Education Activity  Goal: Patient/Family Education  Outcome: Progressing Towards Goal     Problem: Falls - Risk of  Goal: *Absence of Falls  Description  Document Anthony Martines Fall Risk and appropriate interventions in the flowsheet.   Outcome: Progressing Towards Goal  Note: Fall Risk Interventions:  Mobility Interventions: Assess mobility with egress test, Communicate number of staff needed for ambulation/transfer, OT consult for ADLs, Patient to call before getting OOB, PT Consult for mobility concerns         Medication Interventions: Assess postural VS orthostatic hypotension, Evaluate medications/consider consulting pharmacy, Patient to call before getting OOB, Teach patient to arise slowly    Elimination Interventions: Call light in reach, Patient to call for help with toileting needs, Stay With Me (per policy), Toilet paper/wipes in reach, Toileting schedule/hourly rounds              Problem: Patient Education: Go to Patient Education Activity  Goal: Patient/Family Education  Outcome: Progressing Towards Goal     Problem: Cellulitis Care Plan (Adult)  Goal: *Control of acute pain  Outcome: Progressing Towards Goal  Goal: *Skin integrity maintained  Outcome: Progressing Towards Goal  Goal: *Absence of infection signs and symptoms  Outcome: Progressing Towards Goal     Problem: Patient Education: Go to Patient Education Activity  Goal: Patient/Family Education  Outcome: Progressing Towards Goal     Problem: Pressure Injury - Risk of  Goal: *Prevention of pressure injury  Description  Document Kahlil Scale and appropriate interventions in the flowsheet.   Outcome: Progressing Towards Goal  Note: Pressure Injury Interventions:  Sensory Interventions: Assess changes in LOC    Moisture Interventions: Absorbent underpads, Assess need for specialty bed, Check for incontinence Q2 hours and as needed, Internal/External urinary devices    Activity Interventions: Assess need for specialty bed, Increase time out of bed, Pressure redistribution bed/mattress(bed type), PT/OT evaluation    Mobility Interventions: Assess need for specialty bed, HOB 30 degrees or less, Pressure redistribution bed/mattress (bed type)    Nutrition Interventions: Document food/fluid/supplement intake, Offer support with meals,snacks and hydration    Friction and Shear Interventions: Apply protective barrier, creams and emollients, Foam dressings/transparent film/skin sealants, HOB 30 degrees or less                Problem: Patient Education: Go to Patient Education Activity  Goal: Patient/Family Education  Outcome: Progressing Towards Goal     Problem: Nutrition Deficit  Goal: *Optimize nutritional status  Outcome: Progressing Towards Goal

## 2020-03-09 NOTE — ROUTINE PROCESS
Patient is alert and oriented times three with no signs or symptoms of distress.  Dressing remains intact and pulses palpable

## 2020-03-09 NOTE — ROUTINE PROCESS
Bedside and Verbal shift change report given to GUNNER Gotti (oncoming nurse) by Katherin Meigs, RN (offgoing nurse). Report included the following information SBAR, Kardex and MAR.

## 2020-03-09 NOTE — ROUTINE PROCESS
Patient is alert and oriented times three with no signs or symptoms of distress.  Dressing remains clean dry and intact and pulses palpable

## 2020-03-09 NOTE — PROGRESS NOTES
Seen at bedside awake and alert. Feels better, less pain. Still with cellulitis, diminished. White count improved. Wound care.

## 2020-03-09 NOTE — ROUTINE PROCESS
Patient is alert and oriented times three with no signs or symptoms of distress. Dressing is clean dry and intact and pulses palpable.  Leg is less red and not as hot

## 2020-03-09 NOTE — ROUTINE PROCESS
Bedside and Verbal shift change report given by Jj Randhawa RN (offgoing nurse) to Fernando Francois RN (ongoing nurse). Report included the following information SBAR, Kardex and MAR.

## 2020-03-09 NOTE — ROUTINE PROCESS
End of Shift Note     Bedside and verbal shift change report given to Dakota Cid RN (On coming nurse) by Keshav Jimenez RN (Off going nurse). Report included the following information:      --Procedure Summary     --MAR,     --Recent Results     --Med Rec Status    SBAR Recommendations: ambulate.  PT/OT    Issues for Provider to address PT/OT          Activity This Shift     [] Bed Rest Order   [] Refused   [] Dangled    [] TDWB         Ambulating:     [] Bathroom     [] BSC     [] Room/Hallway      Up in Chair for meals    []Yes [x] No   Voiding       [x] Yes  [] No  Veliz          [] Yes  [] No  Incontinent [] Yes  [] No    DUE TO VOID POUR        [] Yes [] No  Purewick    [] Yes [] No  New Onset [] Yes [] No Straight Cath   []Yes  [] No  Condom Cath  [] Yes [] No  MD Called      [] Yes  [] No   Blood Sugars Managed []Yes [x] No    Bowels Moved [x] Yes [] No    Incontinent     [] Yes [] No Passed Gas [x]Yes [] No    New Onset  []Yes [] No        MD Called []Yes  [] No     CHG Bath Done     Before Surgery     After Surgery      [] Yes  [x] No  [] Yes  [x] No       Drain Removed [] Yes  [] No [x] N/A    Dressing Changed [] Yes   [] No [x] N/A      Nausea/Vomiting [] Yes   [x] No     Ice Packs Changed [] Yes   [] No  [x] N/A    Incentive Spirometer  [] Yes  [x] No      SCD Pumps On     Ankle Pumping  [] Yes   [x] No      [] Yes   [x] No        Telemetry Monitoring [x] Yes   [] No   Rhythm  ST Medical Necessity Clause: This procedure was medically necessary because the lesions that were treated were:

## 2020-03-09 NOTE — PROGRESS NOTES
Problem: Self Care Deficits Care Plan (Adult)  Goal: *Acute Goals and Plan of Care (Insert Text)  Description  Occupational Therapy Goals  Initiated 3/9/2020 within 7 day(s). 1.  Patient will perform bed mobility with supervision in preparation for further self-care. 2.  Patient will perform lower body dressing with supervision/set-up. 3.  Patient will perform toileting with supervision/set-up. 4.  Patient will perform toilet transfers with supervision/set-up. 5.  Patient will perform a functional activity in standing with supervision/setup for 3-5 minutes, presenting with F+ standing balance. 6.  Patient will participate in upper extremity therapeutic exercise/activities with supervision/set-up for 8 minutes. Prior Level of Function: Pt reports he was (I) with basic self-care/ADLs and functional mobility without AD PTA. Pt lives in a boarding home with 13-14 SHIRLEY. Outcome: Progressing Towards Goal   OCCUPATIONAL THERAPY EVALUATION    Patient: Jess Hillman (57 y.o. male)  Date: 3/9/2020  Primary Diagnosis: Cellulitis of left lower extremity [L03.116]  Leukocytosis [D72.829]  Hypotension [I95.9]  GAIL (acute kidney injury) ([address] [N17.9]  Sepsis ([address] [A41.9]  Acute leg pain, left [M79.605]  Cellulitis [L03.90]  Procedure(s) (LRB):  INCISION AND DRAINAGE LEFT ANKLE (Left) 2 Days Post-Op   Precautions: Falls; WBAT LLE       ASSESSMENT :  Pt cleared to participate in OT evaluation by RN. Upon entering room, pt received semi-reclined in bed, alert, and agreeable to OT eval. Pt seen in conjunction with PT to maximize safety of patient and staff members. Based on the objective data described below, the patient presents with increased pain, decreased strength, decreased activity tolerance decreased standing balance, and decreased ability to safely perform functional transfers and mobility affecting the patients safety and ability to perform basic ADLs.  Pt educated on w/b status; pt acknowledged understanding. Pt required min assist x2 to participate in further self-care; vc's for hand placement. Pt required max assist to don L sock over foot. Pt stood with mod assist x2 and transferred from bed>chair using RW, simulating transfers to [facility]. Two pillows placed on recliner for easier transition on/off recliner. Noted pt c/o L hip during transfers, rating pain 9/10; RN notified after session. Educated pt on the role of Ot, evaluation process, goals for therapy, benefits of sitting in chair, and to call for assistance of nursing staff to return to bed with pt demo good understanding. The patient requires skilled OT services to assess safety and increase independence with basic self-care/ADLs, enhancing the patients quality of life by improving their ability to return to [facility]. At the end of the session, pt left resting in recliner, call bell in reach, with all needs met. Patient will benefit from skilled intervention to address the above impairments.   Patient's rehabilitation potential is considered to be Good  Factors which may influence rehabilitation potential include:   []             None noted  []             Mental ability/status  [x]             Medical condition  [x]             Home/family situation and support systems  []             Safety awareness  [x]             Pain tolerance/management  []             Other:      PLAN :  Recommendations and Planned Interventions:   [x]               Self Care Training                  [x]      Therapeutic Activities  [x]               Functional Mobility Training   []      Cognitive Retraining  [x]               Therapeutic Exercises           [x]      Endurance Activities  [x]               Balance Training                    [x]      Neuromuscular Re-Education  []               Visual/Perceptual Training     [x]      Home Safety Training  [x]               Patient Education                   [x]      Family Training/Education  []               Other (comment):    Frequency/Duration: Patient will be followed by occupational therapy 1-2 times per day/4-7 days per week to address goals. Discharge Recommendations: [facility] Goss  Further Equipment Recommendations for Discharge: [facility] , Shower chair, and rolling walker     SUBJECTIVE:   When pt was asked if he wanted anything before conclusion on OT evaluation, patient stated \"Yeah, a new leg. OBJECTIVE DATA SUMMARY:   History reviewed. No pertinent past medical history. History reviewed. No pertinent surgical history. Barriers to Learning/Limitations: None  Compensate with: visual, verbal, tactile, kinesthetic cues/model    Home Situation:   Home Situation  Home Environment: Other (comment)(boarding house)  # Steps to Enter: 2  One/Two Story Residence: Two story, live on 1st floor  # of Interior Steps: 12  Living Alone: No  Support Systems: None  Patient Expects to be Discharged to[de-identified] Other (comment)  Current DME Used/Available at Home: None  Tub or Shower Type: Tub/Shower combination  []  Right hand dominant   []  Left hand dominant    Cognitive/Behavioral Status:  Neurologic State: Alert  Orientation Level: Appropriate for age;Oriented X4  Cognition: Follows commands  Safety/Judgement: Fall prevention    Skin: Visible skin appeared intact. Edema: None noted    Coordination: BUE  Coordination: Within functional limits  Fine Motor Skills-Upper: Left Intact; Right Intact    Gross Motor Skills-Upper: Left Intact; Right Intact    Balance:  Sitting: Intact  Standing: Impaired; With support  Standing - Static: Fair  Standing - Dynamic : Fair(-)    Strength: BUE  Strength: Generally decreased, functional    Tone & Sensation: BUE  Tone: Abnormal  Sensation: Intact      Range of Motion: BUE  AROM: Within functional limits      Functional Mobility and Transfers for ADLs:  Bed Mobility:  Supine to Sit: Minimum assistance;Assist x2    Transfers:  Sit to Stand:  Moderate assistance;Assist x2  Stand to Sit: Moderate assistance;Assist x2  Bed to Chair: Minimum assistance;Assist x2   Toilet Transfer : Minimum assistance;Assist x2(simulated)       ADL Assessment:   Feeding: Independent    Oral Facial Hygiene/Grooming: Modified Independent    Bathing: Minimum assistance    Upper Body Dressing: Modified independent    Lower Body Dressing: Moderate assistance    Toileting: Minimum assistance      ADL Intervention:  Upper Body Dressing Assistance  Dressing Assistance: [place]: Set-up    Lower Body Dressing Assistance  Dressing Assistance: Maximum assistance  Socks: Maximum assistance  Leg Crossed Method Used: No    Cognitive Retraining  Safety/Judgement: Fall prevention    Pain:  Pain level pre-treatment: 0/10   Pain level post-treatment: 9/10 (LLE)  Pain Intervention(s): Medication (see MAR); Rest, Ice, Repositioning   Response to intervention: Nurse notified, See doc flow    Activity Tolerance:   Fair    Please refer to the flowsheet for vital signs taken during this treatment. After treatment:   [x] Patient left in no apparent distress sitting up in chair  [] Patient left in no apparent distress in bed  [x] Call bell left within reach  [x] Nursing notified  [] Caregiver present  [] Bed alarm activated    COMMUNICATION/EDUCATION:   [x] Role of Occupational Therapy in the acute care setting  [x] Home safety education was provided and the patient/caregiver indicated understanding. [x] Patient/family have participated as able in goal setting and plan of care. [x] Patient/family agree to work toward stated goals and plan of care. [] Patient understands intent and goals of therapy, but is neutral about his/her participation. [] Patient is unable to participate in goal setting and plan of care.     Thank you for this referral.  Nasrin Torres MS, OTR/L  Time Calculation: 23 mins    Eval Complexity: History: MEDIUM Complexity : Expanded review of history including physical, cognitive and psychosocial  history ; Examination: MEDIUM Complexity : 3-5 performance deficits relating to physical, cognitive , or psychosocial skils that result in activity limitations and / or participation restrictions; Decision Making:MEDIUM Complexity : Patient may present with comorbidities that affect occupational performnce.  Miniml to moderate modification of tasks or assistance (eg, physical or verbal ) with assesment(s) is necessary to enable patient to complete evaluation

## 2020-03-09 NOTE — PROGRESS NOTES
[facility]  Progress Note    Patient: Jess Hillman MRN: [id number]   SSN: [id number]  YOB: 1960   Age: 61 y.o. Sex: male      Admit Date: 2/29/2020    LOS: 9 days   Chief Complaint   Patient presents with    Leg Pain     Left LLE pain and swelling        Subjective:     Patient had trouble sleeping last night due to being woken up frequently. Reporting back pain and foot pain since when he was admitted, but both have improved. He is now able to lean up off the bed without assistance and is able to move his foot more. States that the swelling has improved. Has not had a bowel movement in a few days, but does not want anything at this time. Does not find the food agreeable. Review of Systems   Constitutional: Negative for fever. Respiratory: Negative for cough and shortness of breath. Cardiovascular: Negative for chest pain. Gastrointestinal: Positive for constipation. Negative for abdominal pain, diarrhea, nausea and vomiting. Objective:     Visit Vitals  BP (!) 157/91   Pulse 89   Temp 98 °F (36.7 °C)   Resp 14   Ht 6' 2\" (1.88 m)   Wt 89.9 kg (198 lb 1.6 oz)   SpO2 95%   BMI 25.43 kg/m²       Physical Exam:   Physical Exam  Vitals signs and nursing note reviewed. Constitutional:       Appearance: He is not diaphoretic. HENT:      Head: Normocephalic and atraumatic. Eyes:      General: No scleral icterus. Conjunctiva/sclera: Conjunctivae normal.   Cardiovascular:      Rate and Rhythm: Normal rate and regular rhythm. Pulmonary:      Effort: Pulmonary effort is normal. No respiratory distress. Breath sounds: Normal breath sounds. Abdominal:      General: Abdomen is flat. Bowel sounds are normal. There is no distension. Palpations: Abdomen is soft. Tenderness: There is no abdominal tenderness. Musculoskeletal:         General: Swelling present. Comments: Swelling to the left ankle in an ACE wrap.  Erythema and warmth significantly improved compared to admission based on the purple skin marking. Skin:     General: Skin is warm and dry. Neurological:      Mental Status: He is alert. Mental status is at baseline. Lab/Data Review:  Recent Results (from the past 12 hour(s))   METABOLIC PANEL, COMPREHENSIVE    Collection Time: 03/09/20  3:12 AM   Result Value Ref Range    Sodium 135 (L) 136 - 145 mmol/L    Potassium 3.9 3.5 - 5.5 mmol/L    Chloride 100 100 - 111 mmol/L    CO2 30 21 - 32 mmol/L    Anion gap 5 3.0 - 18 mmol/L    Glucose 116 (H) 74 - 99 mg/dL    BUN 7 7.0 - 18 MG/DL    Creatinine 0.76 0.6 - 1.3 MG/DL    BUN/Creatinine ratio 9 (L) 12 - 20      GFR est AA >60 >60 ml/min/1.73m2    GFR est non-AA >60 >60 ml/min/1.73m2    Calcium 8.7 8.5 - 10.1 MG/DL    Bilirubin, total 0.5 0.2 - 1.0 MG/DL    ALT (SGPT) 19 16 - 61 U/L    AST (SGOT) 18 10 - 38 U/L    Alk. phosphatase 146 (H) 45 - 117 U/L    Protein, total 6.7 6.4 - 8.2 g/dL    Albumin 1.9 (L) 3.4 - 5.0 g/dL    Globulin 4.8 (H) 2.0 - 4.0 g/dL    A-G Ratio 0.4 (L) 0.8 - 1.7     CBC WITH AUTOMATED DIFF    Collection Time: 03/09/20  3:12 AM   Result Value Ref Range    WBC 20.9 (H) 4.6 - 13.2 K/uL    RBC 3.55 (L) 4.70 - 5.50 M/uL    HGB 11.3 (L) 13.0 - 16.0 g/dL    HCT 34.3 (L) 36.0 - 48.0 %    MCV 96.6 74.0 - 97.0 FL    MCH 31.8 24.0 - 34.0 PG    MCHC 32.9 31.0 - 37.0 g/dL    RDW 14.3 11.6 - 14.5 %    PLATELET 289 799 - 370 K/uL    MPV 9.9 9.2 - 11.8 FL    NEUTROPHILS 74 42 - 75 %    BAND NEUTROPHILS 3 0 - 5 %    LYMPHOCYTES 16 (L) 20 - 51 %    MONOCYTES 6 2 - 9 %    EOSINOPHILS 0 0 - 5 %    BASOPHILS 0 0 - 3 %    MYELOCYTES 1 (H) 0 %    ABS. NEUTROPHILS 16.1 (H) 1.8 - 8.0 K/UL    ABS. LYMPHOCYTES 3.3 0.8 - 3.5 K/UL    ABS. MONOCYTES 1.3 (H) 0 - 1.0 K/UL    ABS. EOSINOPHILS 0.0 0.0 - 0.4 K/UL    ABS.  BASOPHILS 0.0 0.0 - 0.06 K/UL    DF MANUAL      PLATELET COMMENTS ADEQUATE PLATELETS      RBC COMMENTS NORMOCYTIC, NORMOCHROMIC     MAGNESIUM    Collection Time: 03/09/20  3:12 AM Result Value Ref Range    Magnesium 1.7 1.6 - 2.6 mg/dL           Assessment and Plan:     61 y. o. male with PMH alcohol abuse, HTN, T2DM, now admitted with sepsis 2/2 cellulitis.     Sepsis 2/2 cellulitis- patient presents with ~6 days of worsening pain, redness and swelling in his LLE associated with some nausea, vomiting, fevers and chills. Red leg erythematous extending all the way up to groin. Tachycardic on presentation that has since resolved. Leukocytosis of 27.1 with left shift which has been slowly down trending/stable. Band neutrophils are decreasing today. Urine and blood cultures pending, so far no growth. ESR WNLs, CRP elevatied. Initial XR of left foot without signs of osteomyelitis. MRI foot and MRI tib/fib from 3/1 without sings of osteomyelitis. Erythema an edema are regressing and improving. Patient feels overall well. CT and Ultrasound of leg earlier in course showing no signs of abscess or osteomyelitis. Concern for abscess grew as pt still with very high white count despite improving erythema and swelling on the surface, I&D 3/7 with evacuation of abscess. - Continue linezolid and clotrimazole, per ID recommendations  - trend leukocytosis and fevers  - ID consulted, appreciate Recs     Abnormal lung opacities on CXR- CXR on admission with Mild diffuse reticulonodular prominence of the interstitial markings and focal irregular increased opacity projects at the left lung base partially overlapping the anterior sixth rib shadow, concerning for focal airspace disease versus atelectasis. Patient has productive cough, but lungs overall sound clear on exam- do not think this is the cause of his sepsis. Is also long term smoker and has persistent daily cough. Consider acute CHF given worsening vascular congestion on CXR today.   PLAN  - not likely PNA given clinical picture, however antibiotics for cellulitis should also cover for anything pulmonary   - Will order echo to evaluate for CHF     GAIL- resolved. Elevated to 1.72 on admission, baseline(0.6-0.7) after fluid recussitation  - Daily BMP  - I&Os      Alcohol use disorder- hx of drinking 4 drinks/day down from more previously, starts drinking in AM. No hx withdrawal or seizures in past per pt. LFTs slightly elevated on admission, resolved early in course with supportive care.   - CIWA protocol, patient has not needed any ativan during hospitalization  - Trend transaminases- have downtrended     Acute L3 compression fracture- Seen in ED, 2/23 told to FU with ortho OP  - Pain control as needed, will add Lidocaine patch   - Ortho consulted, recommended progressive mobility, can FU outpaitent     Hx of HTN- elevated to 170s/90's post op, was given his AM lisinopril at that time, will continue to monitor  - Continue home Lisinopril     DM- HbA1c 5.8(3/2/20) On 1500 metformin daily  - Hold metformin  - diabetic diet  - monitor glucose on daily BMP     Tinea Cruris- groin  - Continue Clomitrazole cream     Diet Regular   DVT Prophylaxis SQH   GI Prophylaxis None   Code status Full   Disposition 2-3 days then home with [facility]      [address]  [phone] Margarita Martino MD, PGY-1   [facility]   Senior Pager: [phone]   March 9, 2020, 7:07 AM

## 2020-03-09 NOTE — PROGRESS NOTES
Infectious Disease progress Note        Reason: sepsis, cellulitis LLE    Current abx Prior abx   Ciprofloxacin, linezolid since 3/6/20  levofloxacin 2/29-3/3/20  Ceftriaxone, vancomycin 2/29/20-3/8  Clindamycin  3/3/20-3/8/20     Lines:       Assessment :     61 y.o. male with PMH HTN, T2DM (last hgbA1C 5.8 on 3/2/20) admitted to [address] [place] [address] on 2/2/9/20 with cellulitis. Clinical presentation c/w sepsis (POA) due to acute left leg cellulitis. Exact microbial etiology of infection is not clear. Negative anti DNAse B would argue against group A strep infection. Gradual clinical improvement. Decreased erythema/swelling  noted on today's exam. No crepitus. No areas of necrosis. Preserved LLE sensation. However, persistent leukocytosis and area of fluctuance left medial ankle is concerning for undrained left ankle soft tissue abscess. Ortho f/u appreciated. No evidence of abscess per ct scan 3/5. S/p I&D 3/7- intra op findings d/w dr. Lesia Councilman. No purulence noted. Also, back pain - likely due to L3 fracture. Spine surgery f/u appreciated. MRI:Subacute appearing burst-type L3 superior endplate fracture with mild 4 mm retropulsion associated mild L2/L3 spinal canal stenosis. Now with increased pain left hip. No evidence of erythema or tenderness of the left hip on today's exam.  Will obtain imaging studies to rule out undiagnosed pathology. Decreasing WBC. Decreasing erythema left leg with darkish discoloration suggestive of resolving cellulitis. Recommendations:    1.cont ciprofloxacin, linezolid  2. Elevate left leg  3. Obtain x-ray of the left hip  4. Follow-up CBC and clinically to determine further plan of care     Above plan was discussed in details with patient, and primary team. All questions answered to their full satisfaction. Please call me if any further questions or concerns. Will continue to participate in the care of this patient. HPI:    Complains of pain in the left hip.   Decreased pain in the left leg. No subjective fever or chills  Patient denies headaches, visual disturbances, sore throat, runny nose, earaches, cp, sob, chills, cough, abdominal pain, diarrhea, burning micturition,  or weakness in extremities. C/o  back pain/no flank pain. History reviewed. No pertinent past medical history. History reviewed. No pertinent surgical history. home Medication List    Details   metFORMIN ER (GLUCOPHAGE XR) 750 mg tablet Take 1,500 mg by mouth daily. simvastatin (ZOCOR) 20 mg tablet Take 20 mg by mouth nightly. lisinopril (PRINIVIL, ZESTRIL) 10 mg tablet Take 10 mg by mouth daily.           ibuprofen (MOTRIN) 800 mg tablet Comments:   Reason for Stopping:               Current Facility-Administered Medications   Medication Dose Route Frequency    sodium hypochlorite (QUARTER STRENGTH DAKIN'S) 0.125% irrigation (bottle)   Topical BID    oxyCODONE-acetaminophen (PERCOCET) 5-325 mg per tablet 1 Tab  1 Tab Oral Q6H PRN    acetaminophen (TYLENOL) tablet 650 mg  650 mg Oral Q6H    linezolid in dextrose 5% (ZYVOX) IVPB premix in D5W 600 mg  600 mg IntraVENous Q12H    ciprofloxacin (CIPRO) 400 mg in D5W IVPB (premix)  400 mg IntraVENous Q12H    lidocaine 4 % patch 1 Patch  1 Patch TransDERmal Q24H    polyethylene glycol (MIRALAX) packet 17 g  17 g Oral DAILY PRN    polyethylene glycol (MIRALAX) packet 17 g  17 g Oral DAILY    lactobacillus sp. 50 billion cpu (BIO-K PLUS) capsule 1 Cap  1 Cap Oral DAILY    glucose chewable tablet 16 g  4 Tab Oral PRN    glucagon (GLUCAGEN) injection 1 mg  1 mg IntraMUSCular PRN    dextrose 10% infusion 125-250 mL  125-250 mL IntraVENous PRN    thiamine HCL (B-1) tablet 100 mg  100 mg Oral DAILY    sodium chloride (NS) flush 5-10 mL  5-10 mL IntraVENous PRN    lisinopril (PRINIVIL, ZESTRIL) tablet 10 mg  10 mg Oral DAILY    atorvastatin (LIPITOR) tablet 10 mg  10 mg Oral DAILY    heparin (porcine) injection 5,000 Units  5,000 Units SubCUTAneous Q8H    sodium chloride (NS) flush 5-40 mL  5-40 mL IntraVENous Q8H    sodium chloride (NS) flush 5-40 mL  5-40 mL IntraVENous PRN    clotrimazole (LOTRIMIN) 1 % cream   Topical BID       Allergies: Patient has no known allergies. Temp (24hrs), Av.2 °F (36.8 °C), Min:97.3 °F (36.3 °C), Max:99.1 °F (37.3 °C)    Visit Vitals  /80   Pulse 80   Temp 98 °F (36.7 °C)   Resp 15   Ht 6' 2\" (1.88 m)   Wt 89.9 kg (198 lb 1.6 oz)   SpO2 92%   BMI 25.43 kg/m²       ROS: 12 point ROS obtained in details. Pertinent positives as mentioned in HPI,   otherwise negative    Physical Exam:    General:  AAOx3, NAD   HEENT: Conjunctiva pink, sclera anicteric. PERRL. EOMI. Pharynx moist, nonerythematous. Dry mucous membranes. Thyroid not enlarged, no nodules. No cervical, supraclavicular, occipital or submandibular lymphadenopathy. No other gross abnormalities present. CV:  RRR, no murmurs. No visible pulsations or thrills. RESP:  Unlabored breathing. Lungs clear to auscultation without adventitious breath sounds. Equal expansion bilaterally. ABD:  Soft, nontender, nondistended. BS (+). No hepatosplenomegaly. No suprapubic tenderness. MS:  No joint deformity or instability. No atrophy. Neuro:  CN II-XII grossly intact. 5/5 strength bilateral upper extremities and lower extremities. LLE pain limited  Ext:  Darkish erythema of the left leg. Left foot surgical dressing not opened. Resolved erythema left medial thigh. No erythema or tenderness of the left hip. Pain on active and passive movements of the left hip joint. 2+ radial and dp pulses bilaterally. Skin:  Erythema left leg as mentioned above.     Labs: Results:   Chemistry Recent Labs     20  0312 20  0349 20  0124   * 93 127*   * 134* 136   K 3.9 4.0 3.7    100 103   CO2 30 29 29   BUN 7 6* 7   CREA 0.76 0.67 0.72   CA 8.7 8.6 8.6   AGAP 5 5 4   BUCR 9* 9* 10*   * 152* 158*   TP 6.7 6.6 6.3* ALB 1.9* 1.9* 1.7*   GLOB 4.8* 4.7* 4.6*   AGRAT 0.4* 0.4* 0.4*      CBC w/Diff Recent Labs     03/09/20  0312 03/08/20  0349 03/07/20  0124   WBC 20.9* 23.5* 23.7*   RBC 3.55* 3.81* 3.71*   HGB 11.3* 11.9* 11.6*   HCT 34.3* 36.9 35.1*    315 276   GRANS 74 78* 74   LYMPH 16* 10* 13*   EOS 0 2 0      Microbiology Recent Labs     03/07/20  1009   CULT NO GROWTH AFTER 16 HOURS  NO GROWTH AFTER 16 HOURS          RADIOLOGY:    All available imaging studies/reports in [facility] for this admission were reviewed    Dr. Britany Gray, Infectious Disease Specialist  [phone]  March 9, 2020  4:00 PM

## 2020-03-09 NOTE — PROGRESS NOTES
Problem: General Medical Care Plan  Goal: *Vital signs within specified parameters  Outcome: Progressing Towards Goal  Goal: *Labs within defined limits  Outcome: Progressing Towards Goal  Goal: *Absence of infection signs and symptoms  Outcome: Progressing Towards Goal  Goal: *Optimal pain control at patient's stated goal  Outcome: Progressing Towards Goal  Goal: *Skin integrity maintained  Outcome: Progressing Towards Goal  Goal: *Fluid volume balance  Outcome: Progressing Towards Goal  Goal: *Optimize nutritional status  Outcome: Progressing Towards Goal  Goal: *Anxiety reduced or absent  Outcome: Progressing Towards Goal  Goal: *Progressive mobility and function (eg: ADL's)  Outcome: Progressing Towards Goal     Problem: Patient Education: Go to Patient Education Activity  Goal: Patient/Family Education  Outcome: Progressing Towards Goal     Problem: Pain  Goal: *Control of Pain  Outcome: Progressing Towards Goal     Problem: Patient Education: Go to Patient Education Activity  Goal: Patient/Family Education  Outcome: Progressing Towards Goal     Problem: Falls - Risk of  Goal: *Absence of Falls  Description  Document Larry Zuniga Fall Risk and appropriate interventions in the flowsheet.   Outcome: Progressing Towards Goal  Note: Fall Risk Interventions:  Mobility Interventions: Bed/chair exit alarm, Mechanical lift, Patient to call before getting OOB, Utilize walker, cane, or other assistive device         Medication Interventions: Patient to call before getting OOB, Teach patient to arise slowly    Elimination Interventions: Call light in reach, Elevated toilet seat, Stay With Me (per policy), Toileting schedule/hourly rounds              Problem: Patient Education: Go to Patient Education Activity  Goal: Patient/Family Education  Outcome: Progressing Towards Goal     Problem: Cellulitis Care Plan (Adult)  Goal: *Control of acute pain  Outcome: Progressing Towards Goal  Goal: *Skin integrity maintained  Outcome: Progressing Towards Goal  Goal: *Absence of infection signs and symptoms  Outcome: Progressing Towards Goal     Problem: Patient Education: Go to Patient Education Activity  Goal: Patient/Family Education  Outcome: Progressing Towards Goal     Problem: Pressure Injury - Risk of  Goal: *Prevention of pressure injury  Description  Document Kahlil Scale and appropriate interventions in the flowsheet.   Outcome: Progressing Towards Goal  Note: Pressure Injury Interventions:  Sensory Interventions: Assess changes in LOC, Check visual cues for pain, Float heels, Keep linens dry and wrinkle-free, Maintain/enhance activity level, Minimize linen layers, Monitor skin under medical devices, Pressure redistribution bed/mattress (bed type), Pad between skin to skin    Moisture Interventions: Apply protective barrier, creams and emollients, Limit adult briefs, Maintain skin hydration (lotion/cream), Minimize layers, Moisture barrier    Activity Interventions: Increase time out of bed, Pressure redistribution bed/mattress(bed type)    Mobility Interventions: Chair cushion, Pressure redistribution bed/mattress (bed type)    Nutrition Interventions: Document food/fluid/supplement intake    Friction and Shear Interventions: Apply protective barrier, creams and emollients, Foam dressings/transparent film/skin sealants, HOB 30 degrees or less                Problem: Patient Education: Go to Patient Education Activity  Goal: Patient/Family Education  Outcome: Progressing Towards Goal     Problem: Nutrition Deficit  Goal: *Optimize nutritional status  Outcome: Progressing Towards Goal

## 2020-03-09 NOTE — ROUTINE PROCESS
Patient refused to get up since patient got here. Notified [facility]. New order for PT/occupation therapy.

## 2020-03-09 NOTE — PROGRESS NOTES
Problem: Mobility Impaired (Adult and Pediatric)  Goal: *Acute Goals and Plan of Care (Insert Text)  Description  Physical Therapy Goals  Initiated 3/9/2020 and to be accomplished within 7 day(s)  1. Patient will move from supine to sit and sit to supine  in bed with supervision/set-up. 2.  Patient will transfer from bed to chair and chair to bed with supervision/set-up using the least restrictive device. 3.  Patient will perform sit to stand with supervision/set-up. 4.  Patient will ambulate with supervision/set-up for 80 feet with the least restrictive device. 5.  Patient will ascend/descend 4 stairs with unilateral handrail(s) with minimal assistance/contact guard assist.    PLOF: Pt reports he is independent with ADLs and functional mobility. Outcome: Progressing Towards Goal     PHYSICAL THERAPY EVALUATION    Patient: Sofia Marquez (25 y.o. male)  Date: 3/9/2020  Primary Diagnosis: Cellulitis of left lower extremity [L03.116]  Leukocytosis [D72.829]  Hypotension [I95.9]  GAIL (acute kidney injury) ([facility] [address] [N17.9]  Sepsis ([facility] [address] [A41.9]  Acute leg pain, left [M79.605]  Cellulitis [L03.90]  Procedure(s) (LRB):  INCISION AND DRAINAGE LEFT ANKLE (Left) 2 Days Post-Op   Precautions:   Fall, WBAT      ASSESSMENT : Pt is a 61 y.o. male s/p left ankle I & D POD#2. Educated pt on weight bearing precautions and role of PT, he verbalizes understanding. Pt present with left ankle dressing clean, dry, and intact and no pain at rest. He performs functional mobility from min to mod A x2, needing additional time to complete secondary to back and hip pain. Pt provided with cues for safe hand placement during sit to stand tranfers, which he performs with mod A x2. Pt with decreased tolerance to weight bearing through left LE, antalgic gait moving from bed to chair using RW and min A.  Based on the objective data described below, the patient presents with decreased strength, impaired standing balance, left foot pain, decreased activity tolerance, and decreased safety with mobility. Patient left in recliner with legs elevated and all needs met. Patient will benefit from skilled intervention to address the above impairments. Patient's rehabilitation potential is considered to be Good  Factors which may influence rehabilitation potential include:   []         None noted  []         Mental ability/status  [x]         Medical condition  [x]         Home/family situation and support systems  [x]         Safety awareness  [x]         Pain tolerance/management  []         Other:      PLAN :  Recommendations and Planned Interventions:   []           Bed Mobility Training             [x]    Neuromuscular Re-Education  [x]           Transfer Training                   []    Orthotic/Prosthetic Training  [x]           Gait Training                          []    Modalities  [x]           Therapeutic Exercises           []    Edema Management/Control  [x]           Therapeutic Activities            []    Family Training/Education  [x]           Patient Education  []           Other (comment):    Frequency/Duration: Patient will be followed by physical therapy 1-2 times per day/4-7 days per week to address goals. Discharge Recommendations: Rehab  Further Equipment Recommendations for Discharge: shower chair and rolling walker     SUBJECTIVE:   Patient stated I coming from [facility].     OBJECTIVE DATA SUMMARY:   History reviewed. No pertinent past medical history. History reviewed. No pertinent surgical history.   Barriers to Learning/Limitations: None  Compensate with: N/A  Home Situation:  Home Situation  Home Environment: Other (comment)([facility])  # Steps to Enter: 2  One/Two Story Residence: Two story, live on 1st floor  # of Interior Steps: 12  Living Alone: No  Support Systems: None  Patient Expects to be Discharged to[de-identified] Other (comment)  Current DME Used/Available at Home: None  Tub or Shower Type: Tub/Shower combination  Critical Behavior:  Neurologic State: Alert  Orientation Level: Appropriate for age;Oriented X4  Cognition: Follows commands  Safety/Judgement: Fall prevention  Psychosocial  Patient Behaviors: Calm; Cooperative  Purposeful Interaction: Yes  Pt Identified Daily Priority: Clinical issues (comment)  Caritas Process: Nurture loving kindness;Establish trust;Nurture spiritual self;Teaching/learning; Attend basic human needs;Create healing environment  Caring Interventions: Therapeutic modalities  Reassure: Therapeutic listening; Informing; Instilling deondre and hope  Therapeutic Modalities: Deep breathing  Skin Condition/Temp: Warm;Dry     Skin Integrity: Other (comment)  Skin Integumentary  Skin Color: Appropriate for ethnicity  Skin Condition/Temp: Warm;Dry  Skin Integrity: Other (comment)  Turgor: Non-tenting  Hair Growth: Present  Varicosities: Absent     Strength:    Strength: Generally decreased, functional  Tone & Sensation:   Tone: Abnormal  Sensation: Intact    Range Of Motion:  AROM: Within functional limits      Functional Mobility:  Bed Mobility:  Supine to Sit: Minimum assistance;Assist x2    Transfers:  Sit to Stand: Moderate assistance;Assist x2  Stand to Sit: Moderate assistance;Assist x2  Bed to Chair: Minimum assistance;Assist x2    Balance:   Sitting: Intact  Standing: Impaired; With support  Standing - Static: Fair  Standing - Dynamic : Fair(-)    Ambulation/Gait Training:  Distance (ft): 6 Feet (ft)  Assistive Device: Walker, rolling  Ambulation - Level of Assistance: Minimal assistance  Gait Abnormalities: Antalgic  Speed/Sujata: Slow  Step Length: Right shortened  Interventions: Safety awareness training;Verbal cues       Therapeutic Exercises:   Educated pt on ankle pumps to improve circulation and reduce risk of DVT. Pain:  Pain level pre-treatment: 0/10   Pain level post-treatment: 9/10   Pain Intervention(s) : Medication (see MAR);  Rest, Ice, Repositioning  Response to intervention: Nurse notified, See doc flow    Activity Tolerance:   Fair  Please refer to the flowsheet for vital signs taken during this treatment. After treatment:   [x]         Patient left in no apparent distress sitting up in chair  []         Patient left in no apparent distress in bed  [x]         Call bell left within reach  [x]         Nursing notified  []         Caregiver present  []         Bed alarm activated  []         SCDs applied    COMMUNICATION/EDUCATION:   [x]         Role of Physical Therapy in the acute care setting. [x]         Fall prevention education was provided and the patient/caregiver indicated understanding. [x]         Patient/family have participated as able in goal setting and plan of care. [x]         Patient/family agree to work toward stated goals and plan of care. []         Patient understands intent and goals of therapy, but is neutral about his/her participation. []         Patient is unable to participate in goal setting/plan of care: ongoing with therapy staff.  []         Other:     Thank you for this referral.  Bard James, PT   Time Calculation: 24 mins      Eval Complexity: History: MEDIUM  Complexity : 1-2 comorbidities / personal factors will impact the outcome/ POC Exam:MEDIUM Complexity : 3 Standardized tests and measures addressing body structure, function, activity limitation and / or participation in recreation  Presentation: MEDIUM Complexity : Evolving with changing characteristics  Clinical Decision Making:Medium Complexity    Overall Complexity:MEDIUM

## 2020-03-10 ENCOUNTER — APPOINTMENT (OUTPATIENT)
Dept: NON INVASIVE DIAGNOSTICS | Age: 60
DRG: 854 | End: 2020-03-10
Attending: STUDENT IN AN ORGANIZED HEALTH CARE EDUCATION/TRAINING PROGRAM
Payer: MEDICARE

## 2020-03-10 LAB
ALBUMIN SERPL-MCNC: 2.1 G/DL (ref 3.4–5)
ALBUMIN/GLOB SERPL: 0.4 {RATIO} (ref 0.8–1.7)
ALP SERPL-CCNC: 152 U/L (ref 45–117)
ALT SERPL-CCNC: 16 U/L (ref 16–61)
ANION GAP SERPL CALC-SCNC: 4 MMOL/L (ref 3–18)
AST SERPL-CCNC: 16 U/L (ref 10–38)
BASOPHILS # BLD: 0.1 K/UL (ref 0–0.1)
BASOPHILS NFR BLD: 0 % (ref 0–2)
BILIRUB SERPL-MCNC: 0.6 MG/DL (ref 0.2–1)
BUN SERPL-MCNC: 8 MG/DL (ref 7–18)
BUN/CREAT SERPL: 10 (ref 12–20)
CALCIUM SERPL-MCNC: 9 MG/DL (ref 8.5–10.1)
CHLORIDE SERPL-SCNC: 98 MMOL/L (ref 100–111)
CO2 SERPL-SCNC: 30 MMOL/L (ref 21–32)
CREAT SERPL-MCNC: 0.79 MG/DL (ref 0.6–1.3)
DIFFERENTIAL METHOD BLD: ABNORMAL
ECHO LV EDV TEICHHOLZ: 0.4 ML
ECHO LV ESV TEICHHOLZ: 0.14 ML
ECHO LV INTERNAL DIMENSION DIASTOLIC: 4.11 CM (ref 4.2–5.9)
ECHO LV INTERNAL DIMENSION SYSTOLIC: 2.68 CM
ECHO LV IVSD: 0.96 CM (ref 0.6–1)
ECHO LV MASS 2D: 145 G (ref 88–224)
ECHO LV MASS INDEX 2D: 67 G/M2 (ref 49–115)
ECHO LV POSTERIOR WALL DIASTOLIC: 0.98 CM (ref 0.6–1)
EOSINOPHIL # BLD: 0.2 K/UL (ref 0–0.4)
EOSINOPHIL NFR BLD: 1 % (ref 0–5)
ERYTHROCYTE [DISTWIDTH] IN BLOOD BY AUTOMATED COUNT: 14.2 % (ref 11.6–14.5)
GLOBULIN SER CALC-MCNC: 5.1 G/DL (ref 2–4)
GLUCOSE SERPL-MCNC: 103 MG/DL (ref 74–99)
HCT VFR BLD AUTO: 35.3 % (ref 36–48)
HGB BLD-MCNC: 11.7 G/DL (ref 13–16)
LVFS 2D: 34.88 %
LVSV (TEICH): 22.28 ML
LYMPHOCYTES # BLD: 2.3 K/UL (ref 0.9–3.6)
LYMPHOCYTES NFR BLD: 13 % (ref 21–52)
MAGNESIUM SERPL-MCNC: 1.7 MG/DL (ref 1.6–2.6)
MCH RBC QN AUTO: 32.2 PG (ref 24–34)
MCHC RBC AUTO-ENTMCNC: 33.1 G/DL (ref 31–37)
MCV RBC AUTO: 97.2 FL (ref 74–97)
MONOCYTES # BLD: 1.1 K/UL (ref 0.05–1.2)
MONOCYTES NFR BLD: 6 % (ref 3–10)
NEUTS SEG # BLD: 14.9 K/UL (ref 1.8–8)
NEUTS SEG NFR BLD: 80 % (ref 40–73)
PLATELET # BLD AUTO: 416 K/UL (ref 135–420)
PMV BLD AUTO: 9.7 FL (ref 9.2–11.8)
POTASSIUM SERPL-SCNC: 4 MMOL/L (ref 3.5–5.5)
PROT SERPL-MCNC: 7.2 G/DL (ref 6.4–8.2)
RBC # BLD AUTO: 3.63 M/UL (ref 4.7–5.5)
SODIUM SERPL-SCNC: 132 MMOL/L (ref 136–145)
WBC # BLD AUTO: 18.6 K/UL (ref 4.6–13.2)

## 2020-03-10 PROCEDURE — 97116 GAIT TRAINING THERAPY: CPT

## 2020-03-10 PROCEDURE — 85025 COMPLETE CBC W/AUTO DIFF WBC: CPT

## 2020-03-10 PROCEDURE — 83735 ASSAY OF MAGNESIUM: CPT

## 2020-03-10 PROCEDURE — 74011250636 HC RX REV CODE- 250/636: Performed by: PODIATRIST

## 2020-03-10 PROCEDURE — 74011250637 HC RX REV CODE- 250/637: Performed by: PODIATRIST

## 2020-03-10 PROCEDURE — 36415 COLL VENOUS BLD VENIPUNCTURE: CPT

## 2020-03-10 PROCEDURE — 97530 THERAPEUTIC ACTIVITIES: CPT

## 2020-03-10 PROCEDURE — 80053 COMPREHEN METABOLIC PANEL: CPT

## 2020-03-10 PROCEDURE — 74011000250 HC RX REV CODE- 250: Performed by: PODIATRIST

## 2020-03-10 PROCEDURE — 74011250637 HC RX REV CODE- 250/637: Performed by: STUDENT IN AN ORGANIZED HEALTH CARE EDUCATION/TRAINING PROGRAM

## 2020-03-10 PROCEDURE — 65270000029 HC RM PRIVATE

## 2020-03-10 PROCEDURE — 93308 TTE F-UP OR LMTD: CPT

## 2020-03-10 RX ORDER — LINEZOLID 600 MG/1
600 TABLET, FILM COATED ORAL 2 TIMES DAILY
Qty: 20 TAB | Refills: 0 | Status: SHIPPED | OUTPATIENT
Start: 2020-03-10 | End: 2020-03-20

## 2020-03-10 RX ADMIN — LINEZOLID 600 MG: 600 INJECTION, SOLUTION INTRAVENOUS at 06:45

## 2020-03-10 RX ADMIN — ACETAMINOPHEN 650 MG: 325 TABLET ORAL at 06:44

## 2020-03-10 RX ADMIN — OXYCODONE AND ACETAMINOPHEN 1 TABLET: 5; 325 TABLET ORAL at 17:11

## 2020-03-10 RX ADMIN — HEPARIN SODIUM 5000 UNITS: 5000 INJECTION INTRAVENOUS; SUBCUTANEOUS at 23:13

## 2020-03-10 RX ADMIN — LINEZOLID 600 MG: 600 INJECTION, SOLUTION INTRAVENOUS at 21:24

## 2020-03-10 RX ADMIN — HEPARIN SODIUM 5000 UNITS: 5000 INJECTION INTRAVENOUS; SUBCUTANEOUS at 09:36

## 2020-03-10 RX ADMIN — ACETAMINOPHEN 650 MG: 325 TABLET ORAL at 14:16

## 2020-03-10 RX ADMIN — ATORVASTATIN CALCIUM 10 MG: 10 TABLET, FILM COATED ORAL at 09:36

## 2020-03-10 RX ADMIN — Medication 100 MG: at 09:36

## 2020-03-10 RX ADMIN — ACETAMINOPHEN 650 MG: 325 TABLET ORAL at 21:23

## 2020-03-10 RX ADMIN — HEPARIN SODIUM 5000 UNITS: 5000 INJECTION INTRAVENOUS; SUBCUTANEOUS at 17:11

## 2020-03-10 RX ADMIN — Medication 1 CAPSULE: at 09:36

## 2020-03-10 RX ADMIN — CIPROFLOXACIN 400 MG: 2 INJECTION, SOLUTION INTRAVENOUS at 17:15

## 2020-03-10 RX ADMIN — ACETAMINOPHEN 650 MG: 325 TABLET ORAL at 00:03

## 2020-03-10 RX ADMIN — DAKIN'S SOLUTION 0.125% (QUARTER STRENGTH): 0.12 SOLUTION at 22:50

## 2020-03-10 RX ADMIN — HEPARIN SODIUM 5000 UNITS: 5000 INJECTION INTRAVENOUS; SUBCUTANEOUS at 00:03

## 2020-03-10 RX ADMIN — LISINOPRIL 10 MG: 10 TABLET ORAL at 09:36

## 2020-03-10 NOTE — PROGRESS NOTES
Problem: Mobility Impaired (Adult and Pediatric)  Goal: *Acute Goals and Plan of Care (Insert Text)  Description  Physical Therapy Goals  Initiated 3/9/2020 and to be accomplished within 7 day(s)  1. Patient will move from supine to sit and sit to supine  in bed with supervision/set-up. 2.  Patient will transfer from bed to chair and chair to bed with supervision/set-up using the least restrictive device. 3.  Patient will perform sit to stand with supervision/set-up. 4.  Patient will ambulate with supervision/set-up for 80 feet with the least restrictive device. 5.  Patient will ascend/descend 4 stairs with unilateral handrail(s) with minimal assistance/contact guard assist.    PLOF: Pt reports he is independent with ADLs and functional mobility. Outcome: Progressing Towards Goal   PHYSICAL THERAPY TREATMENT    Patient: Britton Cary (54 y.o. male)  Date: 3/10/2020  Diagnosis: Cellulitis of left lower extremity [L03.116]  Leukocytosis [D72.829]  Hypotension [I95.9]  GAIL (acute kidney injury) ([address] [N17.9]  Sepsis ([address] [A41.9]  Acute leg pain, left [M79.605]  Cellulitis [L03.90]   Cellulitis  Procedure(s) (LRB):  INCISION AND DRAINAGE LEFT ANKLE (Left) 3 Days Post-Op  Precautions: Fall, WBAT    ASSESSMENT:  Patient is cleared by nursing for PT, and patient consents to therapy. Pt performed supine to sit contact guard assistance. Sit to stands minimal assistance. Gait minimal assistance with rolling walker 20 feet. Pt has slow noman and decreased step length. Pt requires increased time with activities and cued for safety. Pt ended therapy sitting in recliner with all needs met. Recommending pt for Move with Me to increase OOB.       Progression toward goals:    [x]      Improving appropriately and progressing toward goals  []      Improving slowly and progressing toward goals  []      Not making progress toward goals and plan of care will be adjusted     PLAN:  Patient continues to benefit from skilled intervention to address the above impairments. Continue treatment per established plan of care. Discharge Recommendations:  Rehab  Further Equipment Recommendations for Discharge:  rolling walker     SUBJECTIVE:   Patient stated I sat in it yesterday (asked if pt sat in chair today).     OBJECTIVE DATA SUMMARY:   Critical Behavior:  Neurologic State: Alert  Orientation Level: Oriented X4  Cognition: Follows commands  Safety/Judgement: Fall prevention  Functional Mobility Training:  Bed Mobility:  Supine to Sit: Contact guard assistance       Transfers:  Sit to Stand: Minimum assistance  Stand to Sit: Contact guard assistance  Bed to Chair: Minimum assistance    Balance:  Sitting: Intact  Standing: Impaired; With support  Standing - Static: Fair  Standing - Dynamic : Fair     Ambulation/Gait Training:  Distance (ft): 20 Feet (ft)  Assistive Device: Walker, rolling  Ambulation - Level of Assistance: Minimal assistance  Gait Abnormalities: Antalgic  Left Side Weight Bearing: As tolerated  Speed/Sujata: Slow  Step Length: Right shortened;Left shortened             Therapeutic Exercises:   Reviewed and performed ankle pumps to increase blood flow and circulation. Pain:  Mild pain back initially and mild pain in L ankle at end of session. Pt did not rate  Pain Intervention(s): Medication (see MAR); Rest, Repositioning   Response to intervention: Nurse notified, See doc flow    Activity Tolerance:   fair  Please refer to the flowsheet for vital signs taken during this treatment. After treatment:   [x] Patient left in no apparent distress sitting up in chair  [] Patient left in no apparent distress in bed  [x] Call bell left within reach  [x] Nursing notified Don Garcia  [x] Personal items in reach  [] Caregiver present  [] Bed/chair alarm activated  [] SCDs applied      COMMUNICATION/EDUCATION:   [x]         Role of Physical Therapy in the acute care setting.   [x]         Fall prevention education was provided and the patient/caregiver indicated understanding. [x]         Patient/family have participated as able in working toward goals and plan of care. [x]         Patient/family agree to work toward stated goals and plan of care. []         Patient understands intent and goals of therapy, but is neutral about his/her participation. []         Patient is unable to participate in stated goals/plan of care: ongoing with therapy staff. [x]         Out of bed at least 3-5 times a day with nursing assistance.    []         Other:        Chas Garnett, PT, DPT   Time Calculation: 24 mins

## 2020-03-10 NOTE — PROGRESS NOTES
Discharge planning    Met with patient to discuss discharge planning. PT recommending rehab. After a lengthy discussion, patient would like ARU as first choice. Writer spoke with Cassie Samaniego concerning patient's interest in ARU. The Plan for Transition of Care is related to the following treatment goals: ARU vs SNF    The Patient was provided with a choice of provider and agrees   with the discharge plan. [x] Yes [] No    Freedom of choice list was provided with basic dialogue that supports the patient's individualized plan of care/goals, treatment preferences and shares the quality data associated with the providers. [x] Yes [] No  Freedom of choice signed for ARU, [address] [facility], [facility], and [facility] and placed in cc/[employer] and [employer]. Called [place] with [employer] and left voice message. Patient requesting to apply for medicaid for personal care aide.     NAYA Joyner, RN  Pager # [phone]  Care Manager

## 2020-03-10 NOTE — PROGRESS NOTES
Seen at bedside earlier, awake and alert. Feels improved. Inspected wounds . No pus and resolving cellulitis and erythema. White count improving. Continue present care.

## 2020-03-10 NOTE — PROGRESS NOTES
[facility]  Progress Note    Patient: Lorelei Florence MRN: [id number]   SSN: [id number]  YOB: 1960   Age: 61 y.o. Sex: male      Admit Date: 2/29/2020    LOS: 10 days   Chief Complaint   Patient presents with    Leg Pain     Left LLE pain and swelling        Subjective:     Patient complaining of the food today. States that his foot and back are improving. Complaining that he is bored and wants to walk on his foot. Review of Systems   Constitutional: Negative for fever. Respiratory: Negative for cough and shortness of breath. Cardiovascular: Negative for chest pain. Gastrointestinal: Negative for abdominal pain, constipation, diarrhea, nausea and vomiting. Neurological: Negative for headaches. Objective:     Visit Vitals  /89 (BP 1 Location: Right arm, BP Patient Position: At rest)   Pulse 89   Temp 98 °F (36.7 °C)   Resp 16   Ht 6' 2\" (1.88 m)   Wt 89.9 kg (198 lb 1.6 oz)   SpO2 93%   BMI 25.43 kg/m²       Physical Exam:   Physical Exam  Vitals signs and nursing note reviewed. Constitutional:       Appearance: He is not toxic-appearing. HENT:      Head: Normocephalic and atraumatic. Eyes:      General: No scleral icterus. Conjunctiva/sclera: Conjunctivae normal.   Cardiovascular:      Rate and Rhythm: Normal rate and regular rhythm. Heart sounds: No murmur. Pulmonary:      Effort: Pulmonary effort is normal. No respiratory distress. Breath sounds: Normal breath sounds. Abdominal:      General: Abdomen is flat. Bowel sounds are normal. There is no distension. Palpations: Abdomen is soft. Tenderness: There is no abdominal tenderness. Musculoskeletal:      Comments: Left lower extremity with erythema diffusely to mid calf/shin, stable from yesterday. No swelling, but ankle wrapped in ace wrap. Skin:     General: Skin is warm and dry. Neurological:      Mental Status: He is alert. Mental status is at baseline. Lab/Data Review:  Recent Results (from the past 12 hour(s))   METABOLIC PANEL, COMPREHENSIVE    Collection Time: 03/10/20  4:54 AM   Result Value Ref Range    Sodium 132 (L) 136 - 145 mmol/L    Potassium 4.0 3.5 - 5.5 mmol/L    Chloride 98 (L) 100 - 111 mmol/L    CO2 30 21 - 32 mmol/L    Anion gap 4 3.0 - 18 mmol/L    Glucose 103 (H) 74 - 99 mg/dL    BUN 8 7.0 - 18 MG/DL    Creatinine 0.79 0.6 - 1.3 MG/DL    BUN/Creatinine ratio 10 (L) 12 - 20      GFR est AA >60 >60 ml/min/1.73m2    GFR est non-AA >60 >60 ml/min/1.73m2    Calcium 9.0 8.5 - 10.1 MG/DL    Bilirubin, total 0.6 0.2 - 1.0 MG/DL    ALT (SGPT) 16 16 - 61 U/L    AST (SGOT) 16 10 - 38 U/L    Alk. phosphatase 152 (H) 45 - 117 U/L    Protein, total 7.2 6.4 - 8.2 g/dL    Albumin 2.1 (L) 3.4 - 5.0 g/dL    Globulin 5.1 (H) 2.0 - 4.0 g/dL    A-G Ratio 0.4 (L) 0.8 - 1.7     CBC WITH AUTOMATED DIFF    Collection Time: 03/10/20  4:54 AM   Result Value Ref Range    WBC 18.6 (H) 4.6 - 13.2 K/uL    RBC 3.63 (L) 4.70 - 5.50 M/uL    HGB 11.7 (L) 13.0 - 16.0 g/dL    HCT 35.3 (L) 36.0 - 48.0 %    MCV 97.2 (H) 74.0 - 97.0 FL    MCH 32.2 24.0 - 34.0 PG    MCHC 33.1 31.0 - 37.0 g/dL    RDW 14.2 11.6 - 14.5 %    PLATELET 650 034 - 144 K/uL    MPV 9.7 9.2 - 11.8 FL    NEUTROPHILS 80 (H) 40 - 73 %    LYMPHOCYTES 13 (L) 21 - 52 %    MONOCYTES 6 3 - 10 %    EOSINOPHILS 1 0 - 5 %    BASOPHILS 0 0 - 2 %    ABS. NEUTROPHILS 14.9 (H) 1.8 - 8.0 K/UL    ABS. LYMPHOCYTES 2.3 0.9 - 3.6 K/UL    ABS. MONOCYTES 1.1 0.05 - 1.2 K/UL    ABS. EOSINOPHILS 0.2 0.0 - 0.4 K/UL    ABS. BASOPHILS 0.1 0.0 - 0.1 K/UL    DF AUTOMATED     MAGNESIUM    Collection Time: 03/10/20  4:54 AM   Result Value Ref Range    Magnesium 1.7 1.6 - 2.6 mg/dL           Assessment and Plan:     61 y. o. male with PMH alcohol abuse, HTN, T2DM, now admitted with sepsis 2/2 cellulitis.     Sepsis 2/2 cellulitis- patient presents with ~6 days of worsening pain, redness and swelling in his LLE associated with some nausea, vomiting, fevers and chills. Red leg erythematous extending all the way up to groin. Tachycardic on presentation that has since resolved. Leukocytosis of 27.1 with left shift which has been slowly down trending/stable. Band neutrophils are decreasing today. Urine and blood cultures pending, so far no growth. ESR WNLs, CRP elevatied. Initial XR of left foot without signs of osteomyelitis. MRI foot and MRI tib/fib from 3/1 without sings of osteomyelitis. Erythema an edema are regressing and improving. Patient feels overall well. CT and Ultrasound of leg earlier in course showing no signs of abscess or osteomyelitis. Concern for abscess grew as pt still with very high white count despite improving erythema and swelling on the surface, I&D 3/7 with evacuation of abscess. OT recommending SNF, PT recommending rehab  WBC downtrending  PLAN:  - Continue linezolid and clotrimazole, per ID recommendations- plan was to PO today   - trend leukocytosis and fevers  - ID consulted, appreciate Recs  - Will clarify PT/OT recs     Abnormal lung opacities on CXR- CXR on admission with Mild diffuse reticulonodular prominence of the interstitial markings and focal irregular increased opacity projects at the left lung base partially overlapping the anterior sixth rib shadow, concerning for focal airspace disease versus atelectasis. Patient has productive cough, but lungs overall sound clear on exam- do not think this is the cause of his sepsis. Is also long term smoker and has persistent daily cough. Consider acute CHF given worsening vascular congestion on CXR today. PLAN  - not likely PNA given clinical picture, however antibiotics for cellulitis should also cover for anything pulmonary   - FU echo     GAIL- resolved.  Elevated to 1.72 on admission, baseline(0.6-0.7) after fluid recussitation  - Daily BMP  - I&Os      Alcohol use disorder- hx of drinking 4 drinks/day down from more previously, starts drinking in AM. No hx withdrawal or seizures in past per pt. LFTs slightly elevated on admission, resolved early in course with supportive care.   - CIWA protocol, patient has not needed any ativan during hospitalization  - Trend transaminases- have downtrended     Acute L3 compression fracture- Seen in ED, 2/23 told to FU with ortho OP  - Pain control as needed, will add Lidocaine patch   - Ortho consulted, recommended progressive mobility, can FU outpaitent     Hx of HTN- elevated to 170s/90's post op, was given his AM lisinopril at that time, will continue to monitor  - Continue home Lisinopril     DM- HbA1c 5.8(3/2/20) On 1500 metformin daily  - Hold metformin  - diabetic diet  - monitor glucose on daily BMP     Tinea Cruris- groin  - Continue Clomitrazole cream     Diet Regular   DVT Prophylaxis SQH   GI Prophylaxis None   Code status Full   Disposition 2-3 days then home with [facility]      [address]  [phone] Nidia Jean-Baptiste MD, PGY-1   [facility]   Senior Pager: [phone]   March 10, 2020, 7:35 AM

## 2020-03-10 NOTE — PROGRESS NOTES
Shift Summary Note:  Assumed care of patient in bed awake & alert, no s/sof acute distress, uneventful night VSS, call bell within reach.   Patient Vitals for the past 12 hrs:   Temp Pulse Resp BP SpO2   03/10/20 0642 98 °F (36.7 °C) 89 16 144/89 93 %   03/10/20 0145 98.5 °F (36.9 °C) 94 18 162/89 92 %   03/09/20 2158 98.6 °F (37 °C) 88 16 (!) 160/94 94 %

## 2020-03-10 NOTE — PROGRESS NOTES
[facility].LTSS screening completed on this date, submitted for processing.        DEMI Roblero  Case Management  [phone]

## 2020-03-10 NOTE — PROGRESS NOTES
Infectious Disease progress Note        Reason: sepsis, cellulitis LLE    Current abx Prior abx   Ciprofloxacin, linezolid since 3/6/20  levofloxacin 2/29-3/3/20  Ceftriaxone, vancomycin 2/29/20-3/8  Clindamycin  3/3/20-3/8/20     Lines:       Assessment :     61 y.o. male with PMH HTN, T2DM (last hgbA1C 5.8 on 3/2/20) admitted to [facility] on 2/2/9/20 with cellulitis. Clinical presentation c/w sepsis (POA) due to acute left leg cellulitis. Exact microbial etiology of infection is not clear. Negative anti DNAse B would argue against group A strep infection. Gradual clinical improvement. Decreased erythema/swelling  noted on today's exam. No crepitus. No areas of necrosis. Preserved LLE sensation. However, persistent leukocytosis and area of fluctuance left medial ankle is concerning for undrained left ankle soft tissue abscess. Ortho f/u appreciated. No evidence of abscess per ct scan 3/5. S/p I&D 3/7- intra op findings d/w dr. Dano Roberts. No purulence noted. Also, back pain - likely due to L3 fracture. Spine surgery f/u appreciated. MRI:Subacute appearing burst-type L3 superior endplate fracture with mild 4 mm retropulsion associated mild L2/L3 spinal canal stenosis. Now with increased pain left hip. No evidence of erythema or tenderness of the left hip on today's exam.  Will obtain imaging studies to rule out undiagnosed pathology. Decreasing WBC. Decreasing erythema left leg with darkish discoloration suggestive of resolving cellulitis. Recommendations:    1.cont ciprofloxacin, linezolid - give script to  to start making arrangements for outpatient treatment. 2. Elevate left leg  3. Follow-up CBC in am.  Okay to discharge patient in a.m. if continued clinical and lab improvement noted. Above plan was discussed in details with patient, and primary team. All questions answered to their full satisfaction. Please call me if any further questions or concerns.  Will continue to participate in the care of this patient. HPI:    Feels better. Decreased pain in the left leg. No subjective fever or chills  Patient denies headaches, visual disturbances, sore throat, runny nose, earaches, cp, sob, chills, cough, abdominal pain, diarrhea, burning micturition,  or weakness in extremities. C/o  back pain/no flank pain. home Medication List    Details   metFORMIN ER (GLUCOPHAGE XR) 750 mg tablet Take 1,500 mg by mouth daily. simvastatin (ZOCOR) 20 mg tablet Take 20 mg by mouth nightly. lisinopril (PRINIVIL, ZESTRIL) 10 mg tablet Take 10 mg by mouth daily.           ibuprofen (MOTRIN) 800 mg tablet Comments:   Reason for Stopping:               Current Facility-Administered Medications   Medication Dose Route Frequency    sodium hypochlorite (QUARTER STRENGTH DAKIN'S) 0.125% irrigation (bottle)   Topical BID    oxyCODONE-acetaminophen (PERCOCET) 5-325 mg per tablet 1 Tab  1 Tab Oral Q6H PRN    acetaminophen (TYLENOL) tablet 650 mg  650 mg Oral Q6H    linezolid in dextrose 5% (ZYVOX) IVPB premix in D5W 600 mg  600 mg IntraVENous Q12H    ciprofloxacin (CIPRO) 400 mg in D5W IVPB (premix)  400 mg IntraVENous Q12H    lidocaine 4 % patch 1 Patch  1 Patch TransDERmal Q24H    polyethylene glycol (MIRALAX) packet 17 g  17 g Oral DAILY PRN    polyethylene glycol (MIRALAX) packet 17 g  17 g Oral DAILY    lactobacillus sp. 50 billion cpu (BIO-K PLUS) capsule 1 Cap  1 Cap Oral DAILY    glucose chewable tablet 16 g  4 Tab Oral PRN    glucagon (GLUCAGEN) injection 1 mg  1 mg IntraMUSCular PRN    dextrose 10% infusion 125-250 mL  125-250 mL IntraVENous PRN    thiamine HCL (B-1) tablet 100 mg  100 mg Oral DAILY    sodium chloride (NS) flush 5-10 mL  5-10 mL IntraVENous PRN    lisinopril (PRINIVIL, ZESTRIL) tablet 10 mg  10 mg Oral DAILY    atorvastatin (LIPITOR) tablet 10 mg  10 mg Oral DAILY    heparin (porcine) injection 5,000 Units  5,000 Units SubCUTAneous Q8H    sodium chloride (NS) flush 5-40 mL  5-40 mL IntraVENous Q8H    sodium chloride (NS) flush 5-40 mL  5-40 mL IntraVENous PRN    clotrimazole (LOTRIMIN) 1 % cream   Topical BID       Allergies: Patient has no known allergies. Temp (24hrs), Av.3 °F (36.8 °C), Min:98 °F (36.7 °C), Max:98.6 °F (37 °C)    Visit Vitals  /89 (BP 1 Location: Right arm, BP Patient Position: At rest)   Pulse 89   Temp 98 °F (36.7 °C)   Resp 16   Ht 6' 2\" (1.88 m)   Wt 89.9 kg (198 lb 1.6 oz)   SpO2 93%   BMI 25.43 kg/m²       ROS: 12 point ROS obtained in details. Pertinent positives as mentioned in HPI,   otherwise negative    Physical Exam:    General:  AAOx3, NAD   HEENT: Conjunctiva pink, sclera anicteric. PERRL. EOMI. Pharynx moist, nonerythematous. Dry mucous membranes. Thyroid not enlarged, no nodules. No cervical, supraclavicular, occipital or submandibular lymphadenopathy. No other gross abnormalities present. CV:  RRR, no murmurs. No visible pulsations or thrills. RESP:  Unlabored breathing. Lungs clear to auscultation without adventitious breath sounds. Equal expansion bilaterally. ABD:  Soft, nontender, nondistended. BS (+). No hepatosplenomegaly. No suprapubic tenderness. MS:  No joint deformity or instability. No atrophy. Neuro:  CN II-XII grossly intact. 5/5 strength bilateral upper extremities and lower extremities. LLE pain limited  Ext:  Darkish erythema of the left leg. Left foot surgical dressing not opened. Resolved erythema left medial thigh. No erythema or tenderness of the left hip. Pain on active and passive movements of the left hip joint. 2+ radial and dp pulses bilaterally. Skin:  Erythema left leg as mentioned above.     Labs: Results:   Chemistry Recent Labs     03/10/20  0454 20  0312 20  0349   * 116* 93   * 135* 134*   K 4.0 3.9 4.0   CL 98* 100 100   CO2 30 30 29   BUN 8 7 6*   CREA 0.79 0.76 0.67   CA 9.0 8.7 8.6   AGAP 4 5 5   BUCR 10* 9* 9*   * 146* 152*   TP 7.2 6.7 6.6   ALB 2.1* 1.9* 1.9*   GLOB 5.1* 4.8* 4.7*   AGRAT 0.4* 0.4* 0.4*      CBC w/Diff Recent Labs     03/10/20  0454 03/09/20  0312 03/08/20  0349   WBC 18.6* 20.9* 23.5*   RBC 3.63* 3.55* 3.81*   HGB 11.7* 11.3* 11.9*   HCT 35.3* 34.3* 36.9    358 315   GRANS 80* 74 78*   LYMPH 13* 16* 10*   EOS 1 0 2      Microbiology Recent Labs     03/07/20  1009   CULT NO GROWTH 3 DAYS  NO GROWTH 3 DAYS          RADIOLOGY:    All available imaging studies/reports in [facility] for this admission were reviewed    Dr. Britany Gray, Infectious Disease Specialist  [phone]  March 10, 2020  4:00 PM

## 2020-03-10 NOTE — PROGRESS NOTES
conducted a Follow up consultation and Spiritual Assessment for Chandler Siemens, who is a 61 y.o.,male. The  provided the following Interventions:  Continued the relationship of care and support. Listened empathically. Offered prayer and assurance of continued prayer on patients behalf. The following outcomes were achieved:  Patient expressed gratitude for 's visit. Assessment:  There are no further spiritual or Sikhism issues which require Spiritual Care Services interventions at this time. Plan:  Chaplains will continue to follow and will provide pastoral care on an as needed/requested basis.  recommends bedside caregivers page  on duty if patient shows signs of acute spiritual or emotional distress.        [phone] [employer]   [phone]

## 2020-03-10 NOTE — ROUTINE PROCESS
Bedside and Verbal shift change report given to Shila Garcia  (oncoming nurse) by Dorina Celestin RN (offgoing nurse). Report included the following information SBAR, Kardex, ED Summary, OR Summary, Procedure Summary, Intake/Output, MAR and Recent Results.

## 2020-03-11 LAB
ALBUMIN SERPL-MCNC: 2.1 G/DL (ref 3.4–5)
ALBUMIN/GLOB SERPL: 0.4 {RATIO} (ref 0.8–1.7)
ALP SERPL-CCNC: 153 U/L (ref 45–117)
ALT SERPL-CCNC: 15 U/L (ref 16–61)
ANION GAP SERPL CALC-SCNC: 3 MMOL/L (ref 3–18)
AST SERPL-CCNC: 18 U/L (ref 10–38)
BASOPHILS # BLD: 0.1 K/UL (ref 0–0.1)
BASOPHILS NFR BLD: 0 % (ref 0–2)
BILIRUB SERPL-MCNC: 0.6 MG/DL (ref 0.2–1)
BUN SERPL-MCNC: 9 MG/DL (ref 7–18)
BUN/CREAT SERPL: 11 (ref 12–20)
CALCIUM SERPL-MCNC: 8.8 MG/DL (ref 8.5–10.1)
CHLORIDE SERPL-SCNC: 99 MMOL/L (ref 100–111)
CO2 SERPL-SCNC: 31 MMOL/L (ref 21–32)
CREAT SERPL-MCNC: 0.82 MG/DL (ref 0.6–1.3)
DIFFERENTIAL METHOD BLD: ABNORMAL
EOSINOPHIL # BLD: 0.2 K/UL (ref 0–0.4)
EOSINOPHIL NFR BLD: 1 % (ref 0–5)
ERYTHROCYTE [DISTWIDTH] IN BLOOD BY AUTOMATED COUNT: 14 % (ref 11.6–14.5)
GLOBULIN SER CALC-MCNC: 5.1 G/DL (ref 2–4)
GLUCOSE SERPL-MCNC: 121 MG/DL (ref 74–99)
HCT VFR BLD AUTO: 34.9 % (ref 36–48)
HGB BLD-MCNC: 11.5 G/DL (ref 13–16)
LYMPHOCYTES # BLD: 2.1 K/UL (ref 0.9–3.6)
LYMPHOCYTES NFR BLD: 12 % (ref 21–52)
MAGNESIUM SERPL-MCNC: 1.7 MG/DL (ref 1.6–2.6)
MCH RBC QN AUTO: 31.7 PG (ref 24–34)
MCHC RBC AUTO-ENTMCNC: 33 G/DL (ref 31–37)
MCV RBC AUTO: 96.1 FL (ref 74–97)
MONOCYTES # BLD: 1.1 K/UL (ref 0.05–1.2)
MONOCYTES NFR BLD: 6 % (ref 3–10)
NEUTS SEG # BLD: 13.7 K/UL (ref 1.8–8)
NEUTS SEG NFR BLD: 81 % (ref 40–73)
PLATELET # BLD AUTO: 428 K/UL (ref 135–420)
PMV BLD AUTO: 9.5 FL (ref 9.2–11.8)
POTASSIUM SERPL-SCNC: 4.1 MMOL/L (ref 3.5–5.5)
PROT SERPL-MCNC: 7.2 G/DL (ref 6.4–8.2)
RBC # BLD AUTO: 3.63 M/UL (ref 4.7–5.5)
SODIUM SERPL-SCNC: 133 MMOL/L (ref 136–145)
WBC # BLD AUTO: 17.2 K/UL (ref 4.6–13.2)

## 2020-03-11 PROCEDURE — 80053 COMPREHEN METABOLIC PANEL: CPT

## 2020-03-11 PROCEDURE — 74011250637 HC RX REV CODE- 250/637: Performed by: PODIATRIST

## 2020-03-11 PROCEDURE — 74011250636 HC RX REV CODE- 250/636: Performed by: PODIATRIST

## 2020-03-11 PROCEDURE — 85025 COMPLETE CBC W/AUTO DIFF WBC: CPT

## 2020-03-11 PROCEDURE — 97116 GAIT TRAINING THERAPY: CPT

## 2020-03-11 PROCEDURE — 74011250637 HC RX REV CODE- 250/637: Performed by: STUDENT IN AN ORGANIZED HEALTH CARE EDUCATION/TRAINING PROGRAM

## 2020-03-11 PROCEDURE — 74011000250 HC RX REV CODE- 250: Performed by: PODIATRIST

## 2020-03-11 PROCEDURE — 65270000029 HC RM PRIVATE

## 2020-03-11 PROCEDURE — 97530 THERAPEUTIC ACTIVITIES: CPT

## 2020-03-11 PROCEDURE — 36415 COLL VENOUS BLD VENIPUNCTURE: CPT

## 2020-03-11 PROCEDURE — 83735 ASSAY OF MAGNESIUM: CPT

## 2020-03-11 PROCEDURE — 97535 SELF CARE MNGMENT TRAINING: CPT

## 2020-03-11 RX ORDER — ATORVASTATIN CALCIUM 10 MG/1
10 TABLET, FILM COATED ORAL DAILY
Qty: 30 TAB | Refills: 0 | Status: ON HOLD | OUTPATIENT
Start: 2020-03-12 | End: 2021-07-06 | Stop reason: SDUPTHER

## 2020-03-11 RX ORDER — LISINOPRIL 10 MG/1
10 TABLET ORAL DAILY
Qty: 30 TAB | Refills: 0 | Status: SHIPPED | OUTPATIENT
Start: 2020-03-12 | End: 2021-07-06

## 2020-03-11 RX ORDER — OXYCODONE AND ACETAMINOPHEN 5; 325 MG/1; MG/1
1 TABLET ORAL
Qty: 30 TAB | Refills: 0 | Status: SHIPPED | OUTPATIENT
Start: 2020-03-11 | End: 2020-04-10

## 2020-03-11 RX ORDER — POTASSIUM CHLORIDE 20 MEQ/1
20 TABLET, EXTENDED RELEASE ORAL
Status: DISCONTINUED | OUTPATIENT
Start: 2020-03-11 | End: 2020-03-11

## 2020-03-11 RX ORDER — POLYETHYLENE GLYCOL 3350 17 G/17G
17 POWDER, FOR SOLUTION ORAL
Qty: 30 PACKET | Refills: 0 | Status: ON HOLD | OUTPATIENT
Start: 2020-03-11 | End: 2021-07-06 | Stop reason: SDUPTHER

## 2020-03-11 RX ORDER — LIDOCAINE 4 G/100G
PATCH TOPICAL
Qty: 30 PACKAGE | Refills: 0 | Status: ON HOLD | OUTPATIENT
Start: 2020-03-12 | End: 2021-07-06 | Stop reason: SDUPTHER

## 2020-03-11 RX ORDER — CHLORPHENIRAMINE MALEATE 4 MG
TABLET ORAL 2 TIMES DAILY
Qty: 15 G | Refills: 0 | Status: SHIPPED | OUTPATIENT
Start: 2020-03-11 | End: 2021-07-06

## 2020-03-11 RX ORDER — ACETAMINOPHEN 325 MG/1
650 TABLET ORAL EVERY 6 HOURS
Qty: 30 TAB | Refills: 0 | Status: SHIPPED | OUTPATIENT
Start: 2020-03-11 | End: 2021-07-06

## 2020-03-11 RX ORDER — CIPROFLOXACIN 500 MG/1
500 TABLET ORAL 2 TIMES DAILY
Qty: 20 TAB | Refills: 0 | Status: SHIPPED | OUTPATIENT
Start: 2020-03-11 | End: 2020-03-21

## 2020-03-11 RX ORDER — METFORMIN HYDROCHLORIDE 750 MG/1
1500 TABLET, EXTENDED RELEASE ORAL DAILY
Qty: 30 TAB | Refills: 0 | Status: SHIPPED | OUTPATIENT
Start: 2020-03-11 | End: 2021-07-06

## 2020-03-11 RX ADMIN — LISINOPRIL 10 MG: 10 TABLET ORAL at 08:22

## 2020-03-11 RX ADMIN — LINEZOLID 600 MG: 600 INJECTION, SOLUTION INTRAVENOUS at 04:29

## 2020-03-11 RX ADMIN — ACETAMINOPHEN 650 MG: 325 TABLET ORAL at 17:40

## 2020-03-11 RX ADMIN — Medication 100 MG: at 08:22

## 2020-03-11 RX ADMIN — CIPROFLOXACIN 400 MG: 2 INJECTION, SOLUTION INTRAVENOUS at 06:34

## 2020-03-11 RX ADMIN — CLOTRIMAZOLE: 10 CREAM TOPICAL at 20:25

## 2020-03-11 RX ADMIN — POLYETHYLENE GLYCOL 3350 17 G: 17 POWDER, FOR SOLUTION ORAL at 08:22

## 2020-03-11 RX ADMIN — CIPROFLOXACIN 400 MG: 2 INJECTION, SOLUTION INTRAVENOUS at 17:40

## 2020-03-11 RX ADMIN — OXYCODONE AND ACETAMINOPHEN 1 TABLET: 5; 325 TABLET ORAL at 04:28

## 2020-03-11 RX ADMIN — OXYCODONE AND ACETAMINOPHEN 1 TABLET: 5; 325 TABLET ORAL at 11:55

## 2020-03-11 RX ADMIN — HEPARIN SODIUM 5000 UNITS: 5000 INJECTION INTRAVENOUS; SUBCUTANEOUS at 08:22

## 2020-03-11 RX ADMIN — Medication 1 CAPSULE: at 08:23

## 2020-03-11 RX ADMIN — ACETAMINOPHEN 650 MG: 325 TABLET ORAL at 05:41

## 2020-03-11 RX ADMIN — ATORVASTATIN CALCIUM 10 MG: 10 TABLET, FILM COATED ORAL at 08:22

## 2020-03-11 RX ADMIN — POTASSIUM CHLORIDE 20 MEQ: 1500 TABLET, EXTENDED RELEASE ORAL at 10:00

## 2020-03-11 RX ADMIN — ACETAMINOPHEN 650 MG: 325 TABLET ORAL at 23:22

## 2020-03-11 RX ADMIN — HEPARIN SODIUM 5000 UNITS: 5000 INJECTION INTRAVENOUS; SUBCUTANEOUS at 23:21

## 2020-03-11 RX ADMIN — LINEZOLID 600 MG: 600 INJECTION, SOLUTION INTRAVENOUS at 17:40

## 2020-03-11 RX ADMIN — HEPARIN SODIUM 5000 UNITS: 5000 INJECTION INTRAVENOUS; SUBCUTANEOUS at 17:45

## 2020-03-11 RX ADMIN — Medication 10 ML: at 23:24

## 2020-03-11 NOTE — PROGRESS NOTES
Discharge planning    [facility] declined patient. Patient agreed on SNF placement with [facility]. Writer spoke with Isaura Oscar with Admissions at [facility] and will accept patient. Writer [employer] authorization for placement. Clinicals faxed to [phone].      CHANO BlackmonN, RN  Pager # [phone]  Care Manager

## 2020-03-11 NOTE — PROGRESS NOTES
[facility]  Progress Note    Patient: Glo Page MRN: [id number]   SSN: [id number]  YOB: 1960   Age: 61 y.o. Sex: male      Admit Date: 2/29/2020    LOS: 11 days   Chief Complaint   Patient presents with    Leg Pain     Left LLE pain and swelling        Subjective:     Patient complaining of foot pain, improved and back pain that has improved since admission. No other complaints    Review of Systems   Constitutional: Negative for fever. Eyes: Negative for blurred vision. Respiratory: Negative for sputum production. Gastrointestinal: Negative for abdominal pain, constipation, diarrhea, nausea and vomiting. Musculoskeletal: Positive for back pain. Neurological: Negative for headaches. Objective:     Visit Vitals  /85   Pulse 84   Temp 98.4 °F (36.9 °C)   Resp 18   Ht 6' 2\" (1.88 m)   Wt 89.8 kg (198 lb)   SpO2 92%   BMI 25.42 kg/m²       Physical Exam:   61 y. o. male with PMH alcohol abuse, HTN, T2DM, now admitted with sepsis 2/2 cellulitis.                 Lab/Data Review:  Recent Results (from the past 12 hour(s))   METABOLIC PANEL, COMPREHENSIVE    Collection Time: 03/11/20  4:42 AM   Result Value Ref Range    Sodium 133 (L) 136 - 145 mmol/L    Potassium 4.1 3.5 - 5.5 mmol/L    Chloride 99 (L) 100 - 111 mmol/L    CO2 31 21 - 32 mmol/L    Anion gap 3 3.0 - 18 mmol/L    Glucose 121 (H) 74 - 99 mg/dL    BUN 9 7.0 - 18 MG/DL    Creatinine 0.82 0.6 - 1.3 MG/DL    BUN/Creatinine ratio 11 (L) 12 - 20      GFR est AA >60 >60 ml/min/1.73m2    GFR est non-AA >60 >60 ml/min/1.73m2    Calcium 8.8 8.5 - 10.1 MG/DL    Bilirubin, total 0.6 0.2 - 1.0 MG/DL    ALT (SGPT) 15 (L) 16 - 61 U/L    AST (SGOT) 18 10 - 38 U/L    Alk.  phosphatase 153 (H) 45 - 117 U/L    Protein, total 7.2 6.4 - 8.2 g/dL    Albumin 2.1 (L) 3.4 - 5.0 g/dL    Globulin 5.1 (H) 2.0 - 4.0 g/dL    A-G Ratio 0.4 (L) 0.8 - 1.7     CBC WITH AUTOMATED DIFF    Collection Time: 03/11/20  4:42 AM   Result Value Ref Range    WBC 17.2 (H) 4.6 - 13.2 K/uL    RBC 3.63 (L) 4.70 - 5.50 M/uL    HGB 11.5 (L) 13.0 - 16.0 g/dL    HCT 34.9 (L) 36.0 - 48.0 %    MCV 96.1 74.0 - 97.0 FL    MCH 31.7 24.0 - 34.0 PG    MCHC 33.0 31.0 - 37.0 g/dL    RDW 14.0 11.6 - 14.5 %    PLATELET 133 (H) 583 - 420 K/uL    MPV 9.5 9.2 - 11.8 FL    NEUTROPHILS 81 (H) 40 - 73 %    LYMPHOCYTES 12 (L) 21 - 52 %    MONOCYTES 6 3 - 10 %    EOSINOPHILS 1 0 - 5 %    BASOPHILS 0 0 - 2 %    ABS. NEUTROPHILS 13.7 (H) 1.8 - 8.0 K/UL    ABS. LYMPHOCYTES 2.1 0.9 - 3.6 K/UL    ABS. MONOCYTES 1.1 0.05 - 1.2 K/UL    ABS. EOSINOPHILS 0.2 0.0 - 0.4 K/UL    ABS. BASOPHILS 0.1 0.0 - 0.1 K/UL    DF AUTOMATED     MAGNESIUM    Collection Time: 03/11/20  4:42 AM   Result Value Ref Range    Magnesium 1.7 1.6 - 2.6 mg/dL           Assessment and Plan:     61 y. o. male with PMH alcohol abuse, HTN, T2DM, now admitted with sepsis 2/2 cellulitis.     Sepsis 2/2 cellulitis- patient presents with ~6 days of worsening pain, redness and swelling in his LLE associated with some nausea, vomiting, fevers and chills. Red leg erythematous extending all the way up to groin. Tachycardic on presentation that has since resolved. Leukocytosis of 27.1 with left shift which has been slowly down trending/stable. Band neutrophils are decreasing today. Urine and blood cultures pending, so far no growth. ESR WNLs, CRP elevatied. Initial XR of left foot without signs of osteomyelitis. MRI foot and MRI tib/fib from 3/1 without sings of osteomyelitis. Erythema an edema are regressing and improving. Patient feels overall well. CT and Ultrasound of leg earlier in course showing no signs of abscess or osteomyelitis. Concern for abscess grew as pt still with very high white count despite improving erythema and swelling on the surface, I&D 3/7 with evacuation of abscess.   OT recommending SNF, PT recommending rehab  WBC downtrending  PLAN:  - Continue linezolid and clotrimazole, per ID recommendations- plan to transition to PO linezolid at time of discharge. - trend leukocytosis and fevers  - ID consulted, appreciate Recs  - Will clarify PT/OT recs  - Medically cleared today, can likely discharge today pending placement     Abnormal lung opacities on CXR- CXR on admission with Mild diffuse reticulonodular prominence of the interstitial markings and focal irregular increased opacity projects at the left lung base partially overlapping the anterior sixth rib shadow, concerning for focal airspace disease versus atelectasis. Patient has productive cough, but lungs overall sound clear on exam- do not think this is the cause of his sepsis. Is also long term smoker and has persistent daily cough. Consider acute CHF given worsening vascular congestion on CXR today. Echo with normal EF  PLAN  - not likely PNA given clinical picture, however antibiotics for cellulitis should also cover for anything pulmonary        GAIL- resolved. Elevated to 1.72 on admission, baseline(0.6-0.7) after fluid recussitation  - Daily BMP  - I&Os      Alcohol use disorder- hx of drinking 4 drinks/day down from more previously, starts drinking in AM. No hx withdrawal or seizures in past per pt. LFTs slightly elevated on admission, resolved early in course with supportive care.   - CIWA protocol, patient has not needed any ativan during hospitalization  - Trend transaminases- have downtrended     Acute L3 compression fracture- Seen in ED, 2/23 told to FU with ortho OP  - Pain control as needed, will add Lidocaine patch   - Ortho consulted, recommended progressive mobility, can FU outpaitent     Hx of HTN- elevated to 170s/90's post op, was given his AM lisinopril at that time, will continue to monitor  - Continue home Lisinopril     DM- HbA1c 5.8(3/2/20) On 1500 metformin daily  - Hold metformin  - diabetic diet  - monitor glucose on daily BMP     Tinea Cruris- groin  - Continue Clomitrazole cream     Diet Regular   DVT Prophylaxis SQH   GI Prophylaxis None   Code status Full   Disposition 2-3 days then home with [facility]      Point of Contact Reuben Kim  [phone] Joyce Hansen MD, PGY-1   [address]   Senior Pager: [phone]   March 11, 2020, 7:18 AM

## 2020-03-11 NOTE — ROUTINE PROCESS
Bedside and Verbal shift change report given to Mayra Arreola (oncoming nurse) by Colleen Rebollar RN (offgoing nurse). Report included the following information SBAR, Kardex, ED Summary, OR Summary, Procedure Summary, Intake/Output, MAR and Recent Results.

## 2020-03-11 NOTE — PROGRESS NOTES
Shift Summary Note:  Assumed care of patient in bed awake & alert, VSS, medicated x 2 for pain with relief noted. No changes noted. Call bell within reach. Uneventful night.   Patient Vitals for the past 12 hrs:   Temp Pulse Resp BP SpO2   03/11/20 0502 98.4 °F (36.9 °C) 84 18 155/85 92 %   03/11/20 0219 98.7 °F (37.1 °C) 89 16 (!) 169/99 94 %   03/10/20 2244 98.5 °F (36.9 °C) 88 16 147/88 94 %

## 2020-03-11 NOTE — PROGRESS NOTES
Problem: Mobility Impaired (Adult and Pediatric)  Goal: *Acute Goals and Plan of Care (Insert Text)  Description: Physical Therapy Goals  Initiated 3/9/2020 and to be accomplished within 7 day(s)  1. Patient will move from supine to sit and sit to supine  in bed with supervision/set-up. 2.  Patient will transfer from bed to chair and chair to bed with supervision/set-up using the least restrictive device. 3.  Patient will perform sit to stand with supervision/set-up. 4.  Patient will ambulate with supervision/set-up for 80 feet with the least restrictive device. 5.  Patient will ascend/descend 4 stairs with unilateral handrail(s) with minimal assistance/contact guard assist.    PLOF: Pt reports he is independent with ADLs and functional mobility. Outcome: Progressing Towards Goal   PHYSICAL THERAPY TREATMENT    Patient: Jess Hillman (2 y.o. male)  Date: 3/11/2020  Diagnosis: Cellulitis of left lower extremity [L03.116]  Leukocytosis [D72.829]  Hypotension [I95.9]  GAIL (acute kidney injury) ([address] [N17.9]  Sepsis ([address] [A41.9]  Acute leg pain, left [M79.605]  Cellulitis [L03.90]   Cellulitis  Procedure(s) (LRB):  INCISION AND DRAINAGE LEFT ANKLE (Left) 4 Days Post-Op  Precautions: Fall, WBAT   Chart, physical therapy assessment, plan of care and goals were reviewed. OBJECTIVE/ ASSESSMENT:  Patient found sitting in b/s chair willing to work with PT. Pt stood to RW and was able to ambulate into hallway. Pt increased distance compared to last tx, presenting with antalgic (and slightly forward flexed) gait pattern. Pt ambulated back to EOB and returned to supine. Left LE elevated on pillows and needs left in reach. Pt progressing slowly towards goals, limited by increased ankle pain. Pt also reports increased back pain that comes and goes.     Progression toward goals:  []      Improving appropriately and progressing toward goals  [x]      Improving slowly and progressing toward goals  []      Not making progress toward goals and plan of care will be adjusted     PLAN:  Patient continues to benefit from skilled intervention to address the above impairments. Continue treatment per established plan of care. Discharge Recommendations:  Skilled Nursing Facility  Further Equipment Recommendations for Discharge:  rolling walker     SUBJECTIVE:   Patient stated I think I hit it on my room mates table when I fell.     OBJECTIVE DATA SUMMARY:   Critical Behavior:  Neurologic State: Alert  Orientation Level: Oriented X4  Cognition: Follows commands  Safety/Judgement: Fall prevention  Functional Mobility Training:  Bed Mobility:  Sit to Supine: Stand-by assistance  Transfers:  Sit to Stand: Stand-by assistance  Stand to Sit: Stand-by assistance  Balance:  Sitting: Intact  Standing: Impaired; With support  Standing - Static: Fair  Standing - Dynamic : Fair  Ambulation/Gait Training:  Distance (ft): 65 Feet (ft)  Assistive Device: Walker, rolling  Ambulation - Level of Assistance: Stand-by assistance;Contact guard assistance  Gait Abnormalities: Decreased step clearance  Left Side Weight Bearing: As tolerated  Speed/Sujata: Slow  Step Length: Left shortened;Right shortened      Pain:  Pain level pre-treatment: Not rated  Pain level post-treatment: Not rated    Activity Tolerance:   Fair    Please refer to the flowsheet for vital signs taken during this treatment.   After treatment:   [] Patient left in no apparent distress sitting up in chair  [x] Patient left in no apparent distress in bed  [x] Call bell left within reach  [] Nursing notified  [] Caregiver present  [] Bed alarm activated  [] SCDs applied    COMMUNICATION/EDUCATION:   [x]         Role of Physical Therapy  []         Fall prevention  []         Bed mobility  [x]         Transfers  [x]         Ambulation / gait  [x]         Assistive device management  []         Stairs  [x]         Body mechanics  [x]         Position change   []         Therapeutic exercise  [x]         Activity pacing / energy conservation  []         Other:      Lisy Fontanez PTA   Time Calculation: 23 mins

## 2020-03-11 NOTE — PROGRESS NOTES
[facility] REFERRAL CONTACT NOTE  [id number] [place] [address] for Physical Rehabilitation    RE: Manan Trevino     Thank you for the opportunity to review this patient's case for admission to [address] [place] [address] for Physical Rehabilitation. Based on our pre-admission screening:     [x ] This patient does not meet criteria for admission to [facility] for Physical        Rehabilitation due to:    [x ] Lack of dispo. He lives in a boarding home and has no one that can assist him upon DC. When questioned he also stated that he did not have anyone that can get him meals,meds or take him to the MD. Spoke with Juan C Olvera re: his lack of disposition and us feeling that this would not be a safe DC from [facility]. Will sign off at this time. Again, Thank you for this referral. Should you have any questions please do not hesitate to call. Sincerely,  Ap Whtie. Latisha Nicholson, [id number] [address]   Latisha Nicholson, RN  Admissions [facility] for Physical Rehabilitation  [phone]

## 2020-03-11 NOTE — PROGRESS NOTES
Infectious Disease progress Note        Reason: sepsis, cellulitis LLE    Current abx Prior abx   Ciprofloxacin, linezolid since 3/6/20  levofloxacin 2/29-3/3/20  Ceftriaxone, vancomycin 2/29/20-3/8  Clindamycin  3/3/20-3/8/20     Lines:       Assessment :     61 y.o. male with PMH HTN, T2DM (last hgbA1C 5.8 on 3/2/20) admitted to [facility] on 2/2/9/20 with cellulitis. Clinical presentation c/w sepsis (POA) due to acute left leg cellulitis. Exact microbial etiology of infection is not clear. Negative anti DNAse B would argue against group A strep infection. Gradual clinical improvement. Decreased erythema/swelling  noted on today's exam. No crepitus. No areas of necrosis. Preserved LLE sensation. However, persistent leukocytosis and area of fluctuance left medial ankle is concerning for undrained left ankle soft tissue abscess. Ortho f/u appreciated. No evidence of abscess per ct scan 3/5. S/p I&D 3/7- intra op findings d/w dr. Shahid Gonzalez. No purulence noted. Also, back pain - likely due to L3 fracture. Spine surgery f/u appreciated. MRI:Subacute appearing burst-type L3 superior endplate fracture with mild 4 mm retropulsion associated mild L2/L3 spinal canal stenosis. Now with increased pain left hip. No evidence of erythema or tenderness of the left hip on today's exam.  Will obtain imaging studies to rule out undiagnosed pathology. Decreasing WBC. Decreasing erythema left leg with darkish discoloration suggestive of resolving cellulitis. Recommendations:    1. cont ciprofloxacin, linezolid for 10 more days. - I have requested  to arrange for linezolid  2. Elevate left leg  3. F/u cbc, clinically     Above plan was discussed in details with patient, and primary team, . All questions answered to their full satisfaction. Please call me if any further questions or concerns. Will continue to participate in the care of this patient. HPI:    Feels better.   Decreased pain in the left leg. No subjective fever or chills  Patient denies headaches, visual disturbances, sore throat, runny nose, earaches, cp, sob, chills, cough, abdominal pain, diarrhea, burning micturition,  or weakness in extremities. C/o  back pain/no flank pain. home Medication List    Details   metFORMIN ER (GLUCOPHAGE XR) 750 mg tablet Take 1,500 mg by mouth daily. simvastatin (ZOCOR) 20 mg tablet Take 20 mg by mouth nightly. lisinopril (PRINIVIL, ZESTRIL) 10 mg tablet Take 10 mg by mouth daily.           ibuprofen (MOTRIN) 800 mg tablet Comments:   Reason for Stopping:               Current Facility-Administered Medications   Medication Dose Route Frequency    sodium hypochlorite (QUARTER STRENGTH DAKIN'S) 0.125% irrigation (bottle)   Topical BID    oxyCODONE-acetaminophen (PERCOCET) 5-325 mg per tablet 1 Tab  1 Tab Oral Q6H PRN    acetaminophen (TYLENOL) tablet 650 mg  650 mg Oral Q6H    linezolid in dextrose 5% (ZYVOX) IVPB premix in D5W 600 mg  600 mg IntraVENous Q12H    ciprofloxacin (CIPRO) 400 mg in D5W IVPB (premix)  400 mg IntraVENous Q12H    lidocaine 4 % patch 1 Patch  1 Patch TransDERmal Q24H    polyethylene glycol (MIRALAX) packet 17 g  17 g Oral DAILY PRN    polyethylene glycol (MIRALAX) packet 17 g  17 g Oral DAILY    lactobacillus sp. 50 billion cpu (BIO-K PLUS) capsule 1 Cap  1 Cap Oral DAILY    glucose chewable tablet 16 g  4 Tab Oral PRN    glucagon (GLUCAGEN) injection 1 mg  1 mg IntraMUSCular PRN    dextrose 10% infusion 125-250 mL  125-250 mL IntraVENous PRN    thiamine HCL (B-1) tablet 100 mg  100 mg Oral DAILY    sodium chloride (NS) flush 5-10 mL  5-10 mL IntraVENous PRN    lisinopril (PRINIVIL, ZESTRIL) tablet 10 mg  10 mg Oral DAILY    atorvastatin (LIPITOR) tablet 10 mg  10 mg Oral DAILY    heparin (porcine) injection 5,000 Units  5,000 Units SubCUTAneous Q8H    sodium chloride (NS) flush 5-40 mL  5-40 mL IntraVENous Q8H    sodium chloride (NS) flush 5-40 mL 5-40 mL IntraVENous PRN    clotrimazole (LOTRIMIN) 1 % cream   Topical BID       Allergies: Patient has no known allergies. Temp (24hrs), Av.5 °F (36.9 °C), Min:98.1 °F (36.7 °C), Max:98.8 °F (37.1 °C)    Visit Vitals  /83 (BP 1 Location: Right arm, BP Patient Position: At rest)   Pulse 84   Temp 98.2 °F (36.8 °C)   Resp 15   Ht 6' 2\" (1.88 m)   Wt 89.8 kg (198 lb)   SpO2 93%   BMI 25.42 kg/m²       ROS: 12 point ROS obtained in details. Pertinent positives as mentioned in HPI,   otherwise negative    Physical Exam:    General:  AAOx3, NAD   HEENT: Conjunctiva pink, sclera anicteric. PERRL. EOMI. Pharynx moist, nonerythematous. Dry mucous membranes. Thyroid not enlarged, no nodules. No cervical, supraclavicular, occipital or submandibular lymphadenopathy. No other gross abnormalities present. CV:  RRR, no murmurs. No visible pulsations or thrills. RESP:  Unlabored breathing. Lungs clear to auscultation without adventitious breath sounds. Equal expansion bilaterally. ABD:  Soft, nontender, nondistended. BS (+). No hepatosplenomegaly. No suprapubic tenderness. MS:  No joint deformity or instability. No atrophy. Neuro:  CN II-XII grossly intact. 5/5 strength bilateral upper extremities and lower extremities. LLE pain limited  Ext:  Darkish erythema of the left leg. Left foot surgical dressing not opened. Resolved erythema left medial thigh. No erythema or tenderness of the left hip. Pain on active and passive movements of the left hip joint. 2+ radial and dp pulses bilaterally. Skin:  Erythema left leg as mentioned above.     Labs: Results:   Chemistry Recent Labs     20  0442 03/10/20  0454 20  0312   * 103* 116*   * 132* 135*   K 4.1 4.0 3.9   CL 99* 98* 100   CO2 31 30 30   BUN 9 8 7   CREA 0.82 0.79 0.76   CA 8.8 9.0 8.7   AGAP 3 4 5   BUCR 11* 10* 9*   * 152* 146*   TP 7.2 7.2 6.7   ALB 2.1* 2.1* 1.9*   GLOB 5.1* 5.1* 4.8*   AGRAT 0.4* 0.4* 0.4* CBC w/Diff Recent Labs     03/11/20  0442 03/10/20  0454 03/09/20  0312   WBC 17.2* 18.6* 20.9*   RBC 3.63* 3.63* 3.55*   HGB 11.5* 11.7* 11.3*   HCT 34.9* 35.3* 34.3*   * 416 358   GRANS 81* 80* 74   LYMPH 12* 13* 16*   EOS 1 1 0      Microbiology No results for input(s): CULT in the last 72 hours.        RADIOLOGY:    All available imaging studies/reports in [facility] for this admission were reviewed    Dr. Abi Kunz, Infectious Disease Specialist  [phone]  March 11, 2020  4:00 PM

## 2020-03-11 NOTE — PROGRESS NOTES
NUTRITION    Nutrition Screen      RECOMMENDATIONS / PLAN:     - Add supplement: Magic Cup BID  - Update food preferences   - Continue RD inpatient monitoring and evaluation. NUTRITION INTERVENTIONS & DIAGNOSIS:     - Meals/snacks: modified composition  - Medical food supplement therapy: initiate    Nutrition Diagnosis: Inadequate energy intake related to food preferences as evidenced by energy provided by average meal intake is inadequate to meet his energy needs. ASSESSMENT:     3/11: Pt continues to have fair meal intake due to dislike of most meals. Preferences discussed. Encouraged pt to choose options he likes and to notify dietary if he doesn't like the food he gets. Does not have any family/friends near by to provide outside food per his report. Agreed this visit to try nutrition supplement. Noted K and Mg WNL; Na remain low    3/6: Admitted with sepsis 2/2 cellulitis. Ortho consulted, noted no plan for surgical intervention. Pt with fair appetite, meal intake is dependent on whether he likes the food, per pt. Encouraged pt to choose options he likes and to notify dietary if he doesn't like the food he gets. Declined oral supplements. Noted Na, K, and Mg are low, received K and Mg replacement. Nutritional intake adequate to meet patients estimated nutritional needs:  No    Diet: DIET DIABETIC CONSISTENT CARB Regular      Food Allergies: NKFA  Current Appetite: Fair/good  Appetite/meal intake prior to admission: Good  Feeding Limitations:  [] Swallowing difficulty    [] Chewing difficulty    [] Other:  Current Meal Intake:   Patient Vitals for the past 100 hrs:   % Diet Eaten   03/11/20 1729 50 %   03/11/20 1250 50 %   03/11/20 0822 75 %   03/10/20 1823 50 %   03/10/20 1209 75 %   03/10/20 0938 50 %   03/09/20 1207 75 %   03/08/20 1803 75 %   03/08/20 1427 75 %   03/08/20 0748 75 %       BM: 3/10;  3/9  Skin Integrity: abscess to left foot.   Edema:   [] No     [x] Yes   Pertinent Medications: Reviewed: thiamine    Recent Labs     03/11/20  0442 03/10/20  0454 03/09/20  0312   * 132* 135*   K 4.1 4.0 3.9   CL 99* 98* 100   CO2 31 30 30   * 103* 116*   BUN 9 8 7   CREA 0.82 0.79 0.76   CA 8.8 9.0 8.7   MG 1.7 1.7 1.7   ALB 2.1* 2.1* 1.9*   SGOT 18 16 18   ALT 15* 16 19       Intake/Output Summary (Last 24 hours) at 3/11/2020 1745  Last data filed at 3/11/2020 1729  Gross per 24 hour   Intake 880 ml   Output 2450 ml   Net -1570 ml       Anthropometrics:  Ht Readings from Last 1 Encounters:   03/10/20 6' 2\" (1.88 m)     Last 3 Recorded Weights in this Encounter    03/06/20 0512 03/08/20 0723 03/10/20 1103   Weight: 92.5 kg (204 lb) 89.9 kg (198 lb 1.6 oz) 89.8 kg (198 lb)     Body mass index is 25.42 kg/m².        Weight History: Reports wt hx of 250# is inaccurate, UBW is around 210#    Weight Metrics 3/10/2020 2/23/2020 9/4/2019 6/19/2019 12/22/2013   Weight 198 lb 250 lb 250 lb 250 lb 270 lb   BMI 25.42 kg/m2 32.1 kg/m2 32.1 kg/m2 32.1 kg/m2 34.65 kg/m2        Admitting Diagnosis: Cellulitis of left lower extremity [L03.116]  Leukocytosis [D72.829]  Hypotension [I95.9]  GAIL (acute kidney injury) ([facility] [address] [N17.9]  Sepsis ([facility] [address] [A41.9]  Acute leg pain, left [M79.605]  Cellulitis [L03.90]  Pertinent PMHx: HTN, DM; consumes 4 beers daily even sometimes in the morning    Education Needs:        [x] None identified  [] Identified - Not appropriate at this time  []  Identified and addressed - refer to education log  Learning Limitations:   [x] None identified  [] Identified    Cultural, Orthodox & ethnic food preferences:  [x] None identified    [] Identified and addressed     ESTIMATED NUTRITION NEEDS:     Calories: 7959-2135 kcal (MSJx1.2-1.3) based on  [x] Actual BW 93 kg     [] IBW   Protein: 74-93 gm (0.8-1 gm/kg) based on  [x] Actual BW      [] IBW   Fluid: 1 mL/kcal     MONITORING & EVALUATION:     Nutrition Goal(s):   - PO nutrition intake will meet >75% of patient estimated nutritional needs within the next 7 days. Outcome: Progressing towards goal     Monitoring:   [x] Food and nutrient intake   [x] Food and nutrient administration  [x] Comparative standards   [x] Nutrition-focused physical findings   [x] Anthropometric Measurements   [x] Treatment/therapy   [x] Biochemical data, medical tests, and procedures        Previous Recommendations (for follow-up assessments only): Implemented      Discharge Planning: No nutritional discharge needs at this time. Participated in care planning, discharge planning, & interdisciplinary rounds as appropriate.       Arpita Kramer RD  Pager: [phone]

## 2020-03-12 VITALS
HEART RATE: 82 BPM | RESPIRATION RATE: 13 BRPM | BODY MASS INDEX: 25.41 KG/M2 | WEIGHT: 198 LBS | TEMPERATURE: 98.7 F | DIASTOLIC BLOOD PRESSURE: 77 MMHG | SYSTOLIC BLOOD PRESSURE: 125 MMHG | OXYGEN SATURATION: 95 % | HEIGHT: 74 IN

## 2020-03-12 LAB
ALBUMIN SERPL-MCNC: 2.2 G/DL (ref 3.4–5)
ALBUMIN/GLOB SERPL: 0.5 {RATIO} (ref 0.8–1.7)
ALP SERPL-CCNC: 150 U/L (ref 45–117)
ALT SERPL-CCNC: 16 U/L (ref 16–61)
ANION GAP SERPL CALC-SCNC: 5 MMOL/L (ref 3–18)
AST SERPL-CCNC: 16 U/L (ref 10–38)
BACTERIA SPEC CULT: NORMAL
BACTERIA SPEC CULT: NORMAL
BASOPHILS # BLD: 0.1 K/UL (ref 0–0.1)
BASOPHILS NFR BLD: 0 % (ref 0–2)
BILIRUB SERPL-MCNC: 1.3 MG/DL (ref 0.2–1)
BUN SERPL-MCNC: 9 MG/DL (ref 7–18)
BUN/CREAT SERPL: 11 (ref 12–20)
CALCIUM SERPL-MCNC: 8.7 MG/DL (ref 8.5–10.1)
CHLORIDE SERPL-SCNC: 101 MMOL/L (ref 100–111)
CO2 SERPL-SCNC: 28 MMOL/L (ref 21–32)
CREAT SERPL-MCNC: 0.81 MG/DL (ref 0.6–1.3)
DIFFERENTIAL METHOD BLD: ABNORMAL
EOSINOPHIL # BLD: 0.2 K/UL (ref 0–0.4)
EOSINOPHIL NFR BLD: 2 % (ref 0–5)
ERYTHROCYTE [DISTWIDTH] IN BLOOD BY AUTOMATED COUNT: 14.3 % (ref 11.6–14.5)
GLOBULIN SER CALC-MCNC: 4.6 G/DL (ref 2–4)
GLUCOSE SERPL-MCNC: 105 MG/DL (ref 74–99)
GRAM STN SPEC: NORMAL
HCT VFR BLD AUTO: 34.8 % (ref 36–48)
HGB BLD-MCNC: 11.5 G/DL (ref 13–16)
LYMPHOCYTES # BLD: 2.6 K/UL (ref 0.9–3.6)
LYMPHOCYTES NFR BLD: 19 % (ref 21–52)
MAGNESIUM SERPL-MCNC: 1.8 MG/DL (ref 1.6–2.6)
MCH RBC QN AUTO: 32.4 PG (ref 24–34)
MCHC RBC AUTO-ENTMCNC: 33 G/DL (ref 31–37)
MCV RBC AUTO: 98 FL (ref 74–97)
MONOCYTES # BLD: 1.1 K/UL (ref 0.05–1.2)
MONOCYTES NFR BLD: 8 % (ref 3–10)
NEUTS SEG # BLD: 9.8 K/UL (ref 1.8–8)
NEUTS SEG NFR BLD: 71 % (ref 40–73)
PLATELET # BLD AUTO: 456 K/UL (ref 135–420)
PMV BLD AUTO: 9.4 FL (ref 9.2–11.8)
POTASSIUM SERPL-SCNC: 4.6 MMOL/L (ref 3.5–5.5)
PROT SERPL-MCNC: 6.8 G/DL (ref 6.4–8.2)
RBC # BLD AUTO: 3.55 M/UL (ref 4.7–5.5)
SERVICE CMNT-IMP: NORMAL
SERVICE CMNT-IMP: NORMAL
SODIUM SERPL-SCNC: 134 MMOL/L (ref 136–145)
WBC # BLD AUTO: 13.8 K/UL (ref 4.6–13.2)

## 2020-03-12 PROCEDURE — 85025 COMPLETE CBC W/AUTO DIFF WBC: CPT

## 2020-03-12 PROCEDURE — 97535 SELF CARE MNGMENT TRAINING: CPT

## 2020-03-12 PROCEDURE — 74011250636 HC RX REV CODE- 250/636: Performed by: PODIATRIST

## 2020-03-12 PROCEDURE — 36415 COLL VENOUS BLD VENIPUNCTURE: CPT

## 2020-03-12 PROCEDURE — 74011250637 HC RX REV CODE- 250/637: Performed by: PODIATRIST

## 2020-03-12 PROCEDURE — 80053 COMPREHEN METABOLIC PANEL: CPT

## 2020-03-12 PROCEDURE — 83735 ASSAY OF MAGNESIUM: CPT

## 2020-03-12 PROCEDURE — 97530 THERAPEUTIC ACTIVITIES: CPT

## 2020-03-12 PROCEDURE — 74011000250 HC RX REV CODE- 250: Performed by: PODIATRIST

## 2020-03-12 PROCEDURE — 74011250637 HC RX REV CODE- 250/637: Performed by: INTERNAL MEDICINE

## 2020-03-12 PROCEDURE — 74011250637 HC RX REV CODE- 250/637: Performed by: STUDENT IN AN ORGANIZED HEALTH CARE EDUCATION/TRAINING PROGRAM

## 2020-03-12 RX ORDER — LINEZOLID 600 MG/1
600 TABLET, FILM COATED ORAL EVERY 12 HOURS
Status: DISCONTINUED | OUTPATIENT
Start: 2020-03-12 | End: 2020-03-12 | Stop reason: HOSPADM

## 2020-03-12 RX ORDER — CIPROFLOXACIN 500 MG/1
500 TABLET ORAL EVERY 12 HOURS
Status: DISCONTINUED | OUTPATIENT
Start: 2020-03-12 | End: 2020-03-12 | Stop reason: HOSPADM

## 2020-03-12 RX ADMIN — LINEZOLID 600 MG: 600 TABLET, FILM COATED ORAL at 12:11

## 2020-03-12 RX ADMIN — ACETAMINOPHEN 650 MG: 325 TABLET ORAL at 06:04

## 2020-03-12 RX ADMIN — OXYCODONE AND ACETAMINOPHEN 1 TABLET: 5; 325 TABLET ORAL at 09:19

## 2020-03-12 RX ADMIN — DAKIN'S SOLUTION 0.125% (QUARTER STRENGTH): 0.12 SOLUTION at 09:23

## 2020-03-12 RX ADMIN — CIPROFLOXACIN 500 MG: 500 TABLET, FILM COATED ORAL at 12:11

## 2020-03-12 RX ADMIN — CLOTRIMAZOLE: 10 CREAM TOPICAL at 09:23

## 2020-03-12 RX ADMIN — HEPARIN SODIUM 5000 UNITS: 5000 INJECTION INTRAVENOUS; SUBCUTANEOUS at 09:19

## 2020-03-12 RX ADMIN — CIPROFLOXACIN 400 MG: 2 INJECTION, SOLUTION INTRAVENOUS at 07:00

## 2020-03-12 RX ADMIN — ATORVASTATIN CALCIUM 10 MG: 10 TABLET, FILM COATED ORAL at 09:19

## 2020-03-12 RX ADMIN — Medication 1 CAPSULE: at 09:19

## 2020-03-12 RX ADMIN — LISINOPRIL 10 MG: 10 TABLET ORAL at 09:19

## 2020-03-12 RX ADMIN — ACETAMINOPHEN 650 MG: 325 TABLET ORAL at 12:11

## 2020-03-12 RX ADMIN — OXYCODONE AND ACETAMINOPHEN 1 TABLET: 5; 325 TABLET ORAL at 02:17

## 2020-03-12 RX ADMIN — LINEZOLID 600 MG: 600 INJECTION, SOLUTION INTRAVENOUS at 04:31

## 2020-03-12 RX ADMIN — Medication 100 MG: at 09:19

## 2020-03-12 NOTE — PROGRESS NOTES
[facility]  Progress Note    Patient: Luis Stevens MRN: [id number]   SSN: [id number]  YOB: 1960   Age: 61 y.o. Sex: male      Admit Date: 2/29/2020    LOS: 12 days   Chief Complaint   Patient presents with    Leg Pain     Left LLE pain and swelling        Subjective:   Patient progressing well. Able to walk down the guidry with PT yesterday. States that his foot is feeling better but is now itchy. Back pain is doing better. Review of Systems   Constitutional: Negative for fever. Eyes: Negative for blurred vision. Respiratory: Negative for shortness of breath. Cardiovascular: Negative for chest pain. Gastrointestinal: Negative for abdominal pain, constipation, diarrhea, nausea and vomiting. Objective:     Visit Vitals  /82   Pulse 84   Temp 97.8 °F (36.6 °C)   Resp 16   Ht 6' 2\" (1.88 m)   Wt 89.8 kg (198 lb)   SpO2 93%   BMI 25.42 kg/m²       Physical Exam:   Physical Exam  Vitals signs and nursing note reviewed. Constitutional:       Appearance: He is not toxic-appearing. HENT:      Head: Normocephalic and atraumatic. Eyes:      General: No scleral icterus. Conjunctiva/sclera: Conjunctivae normal.   Cardiovascular:      Rate and Rhythm: Normal rate and regular rhythm. Heart sounds: No murmur. Pulmonary:      Effort: Pulmonary effort is normal. No respiratory distress. Breath sounds: Normal breath sounds. Abdominal:      General: Abdomen is flat. Bowel sounds are normal. There is no distension. Palpations: Abdomen is soft. Tenderness: There is no abdominal tenderness. Musculoskeletal:      Comments: Left lower extremity with erythema diffusely to mid calf/shin, stable from yesterday. No swelling, but ankle wrapped in ace wrap. Skin:     General: Skin is warm and dry. Neurological:      Mental Status: He is alert. Mental status is at baseline.          Lab/Data Review:  Recent Results (from the past 12 hour(s)) METABOLIC PANEL, COMPREHENSIVE    Collection Time: 03/12/20  4:08 AM   Result Value Ref Range    Sodium 134 (L) 136 - 145 mmol/L    Potassium 4.6 3.5 - 5.5 mmol/L    Chloride 101 100 - 111 mmol/L    CO2 28 21 - 32 mmol/L    Anion gap 5 3.0 - 18 mmol/L    Glucose 105 (H) 74 - 99 mg/dL    BUN 9 7.0 - 18 MG/DL    Creatinine 0.81 0.6 - 1.3 MG/DL    BUN/Creatinine ratio 11 (L) 12 - 20      GFR est AA >60 >60 ml/min/1.73m2    GFR est non-AA >60 >60 ml/min/1.73m2    Calcium 8.7 8.5 - 10.1 MG/DL    Bilirubin, total 1.3 (H) 0.2 - 1.0 MG/DL    ALT (SGPT) 16 16 - 61 U/L    AST (SGOT) 16 10 - 38 U/L    Alk. phosphatase 150 (H) 45 - 117 U/L    Protein, total 6.8 6.4 - 8.2 g/dL    Albumin 2.2 (L) 3.4 - 5.0 g/dL    Globulin 4.6 (H) 2.0 - 4.0 g/dL    A-G Ratio 0.5 (L) 0.8 - 1.7     MAGNESIUM    Collection Time: 03/12/20  4:08 AM   Result Value Ref Range    Magnesium 1.8 1.6 - 2.6 mg/dL           Assessment and Plan:     61 y. o. male with PMH alcohol abuse, HTN, T2DM, now admitted with sepsis 2/2 cellulitis.     Discharge planning:  Patient accepted to [facility] and insurance approved. Likely can be discharged today in the afternoon. Sepsis 2/2 cellulitis- patient presents with ~6 days of worsening pain, redness and swelling in his LLE associated with some nausea, vomiting, fevers and chills. Red leg erythematous extending all the way up to groin. Tachycardic on presentation that has since resolved. Leukocytosis of 27.1 with left shift which has been slowly down trending/stable. Band neutrophils are decreasing today. Urine and blood cultures pending, so far no growth. ESR WNLs, CRP elevatied. Initial XR of left foot without signs of osteomyelitis. MRI foot and MRI tib/fib from 3/1 without sings of osteomyelitis. Erythema an edema are regressing and improving.  Patient feels overall well. CT and Ultrasound of leg earlier in course showing no signs of abscess or osteomyelitis. Concern for abscess grew as pt still with very high white count despite improving erythema and swelling on the surface, I&D 3/7 with evacuation of abscess. WBC downtrending  PLAN:  - Continue linezolid and cipro, per ID recommendations- plan to transition to PO linezolid and at time of discharge.   - trend leukocytosis and fevers  - ID consulted, appreciate Recs  - Will clarify PT/OT recs       Abnormal lung opacities on CXR- CXR on admission with Mild diffuse reticulonodular prominence of the interstitial markings and focal irregular increased opacity projects at the left lung base partially overlapping the anterior sixth rib shadow, concerning for focal airspace disease versus atelectasis. Patient has productive cough, but lungs overall sound clear on exam- do not think this is the cause of his sepsis. Is also long term smoker and has persistent daily cough. Consider acute CHF given worsening vascular congestion on CXR today. Echo with normal EF  Consider lasix as an outpatient however since patient is completely asymptomatic, will hold off at this time. PLAN   -Consider repeat CXR as an outpatient as he has had no sx of SOB  - consider fluid restriction     GAIL- resolved. Cr 0.81 today. Elevated to 1.72 on admission, baseline(0.6-0.7)   - Daily BMP  - I&Os      Alcohol use disorder- hx of drinking 4 drinks/day down from more previously, starts drinking in AM. No hx withdrawal or seizures in past per pt. LFTs slightly elevated on admission, resolved early in course with supportive care.   - CIWA protocol, patient has not needed any ativan during hospitalization  - Trend transaminases- have downtrended     Acute L3 compression fracture- Seen in ED, 2/23 told to FU with ortho OP  - Pain control as needed, will add Lidocaine patch   - Ortho consulted, recommended progressive mobility, can FU outpaitent     Hx of HTN- elevated to 170s/90's post op,  blood presure 117-150/70-80s  - Continue home Lisinopril     DM- HbA1c 5.8(3/2/20) On 1500 metformin daily  - Hold metformin, continue SSI  - diabetic diet  - monitor glucose on daily BMP     Tinea Cruris- groin  - Continue Clomitrazole cream     Diet Cardiac diet, fluid restriction 1800   DVT Prophylaxis SQH   GI Prophylaxis None   Code status Full   Disposition 2-3 days then home with [facility]      [address]  [phone]        Quique Martinez MD, PGY-1   [address]   Senior Pager: [phone]   March 12, 2020, 8:18 AM

## 2020-03-12 NOTE — PROGRESS NOTES
Transition of Care Plan to SNF/Rehab    SNF/Rehab Transition:  Patient has been accepted to [facility] and meets criteria for admission. Patient will  be transported by [facility] and expected to leave at 2:30p.m. Communication to Patient/Family:  Met with patient and they are agreeable to the transition plan. Communication to SNF/Rehab:  Bedside RN,Nilay, , has been notified of the transition plan to the facility and to call report [phone]    Discharge information has been updated on the AVS. And communicated to facility via [facility], or CC link. SNF/Rehab Transition:    PCP/Specialist:   Ramon Cheng DPM Podiatry Schedule an appointment as soon as possible for a visit in 1 week hospital follow up for cellulitis and I&D [address], DO Hand Surgery Schedule an appointment as soon as possible for a visit in 1 month hospital follow up for L3 compression fracture         Nursing Please include all hard scripts for controlled substances, med rec and dc summary, and AVS in packet. Reviewed and confirmed with facility representative, Aspen Ace at [facility] they can manage the patient care needs for the following:     Sarah Us with (X) only those applicable:    Medication:  [x]  Medications will be available at the facility  [x]  IV Antibiotics   [x]  Controlled Substance - hard copy to be sent with patient   []  Weekly Labs    Document  [x] Hard RX  Number sent 9. [x] MAR  [] Kardex  [] AVS  [] Wound care note  [x]Transfer Summary/Discharge Summary Zyvox sent with patient to facility.     Equipment:  []  CPAP/BiPAP  []  Wound Vacuum  []  Veliz or Urinary Device  []  PICC/Central Line  []  Nebulizer  []  Ventilator    Treatment:  []Isolation (for MRSA, VRE, etc.)  []Surgical Drain Management  []Tracheostomy Care  []Dressing Changes  []Dialysis with transportation and chair time   Center Mode of tranpsort []PEG Care  []Oxygen  []Daily Weights for Heart Failure    Dietary:  []Any diet limitations  []Tube Feedings   []Total Parenteral Management (TPN)    Eligible for Medicaid Long Term Services and Supports  Yes:  [x] Eligible for medical assistance or will become eligible within 180 days and LTSS completed.            Financial Resources:  Medicaid    [] Initiated and application pending   [] Full coverage      Advanced Care Plan:  []Surrogate Decision Maker of Care  []POA  []Communicated Code Status/ sent     Other   LTSS faxed via NAYA Andrade, RN  Pager # [phone]  Care Manager

## 2020-03-12 NOTE — ROUTINE PROCESS
Report given to Aubree Logan RN at [facility]. I have reviewed discharge instructions with the patient. The patient verbalized understanding.     Discharge medications reviewed with patient and appropriate educational materials and side effects teaching were provided.     Armband was removed and shredded. Pt vitals was stable and pt is cleared for discharge. Zyvox antibiotics received from outpatient pharmacy. Medicine given to medics to give to nurse.

## 2020-03-12 NOTE — ROUTINE PROCESS
Bedside and Verbal shift change report given to Ericka Crenshaw RN (oncoming nurse) by Rodrigo Power RN (offgoing nurse). Report included the following information SBAR, Kardex, ED Summary, OR Summary, Intake/Output, MAR and Recent Results.

## 2020-03-12 NOTE — PROGRESS NOTES
Discharge planning    Spoke with Waldemar with [facility]. She received insurance authorization and can accept today. Notified Dr. Iris Coronado. Patient's Zyvox has been approved and has $3.60 copay. Patient stated \" I have $4.00.\"  Writer will continue to assist with transitional needs.     CHANO MoralesN, RN  Pager # [phone]  Care Manager

## 2020-03-12 NOTE — PROGRESS NOTES
conducted a Follow up consultation and Spiritual Assessment for Gracia Green, who is a 61 y.o.,male. The  provided the following Interventions:  Continued the relationship of care and support. Listened empathically. Offered prayer and assurance of continued prayer on patients behalf. The following outcomes were achieved:  Patient expressed gratitude for 's visit. Assessment:  There are no further spiritual or Sabianism issues which require Spiritual Care Services interventions at this time. Plan:  Chaplains will continue to follow and will provide pastoral care on an as needed/requested basis.  recommends bedside caregivers page  on duty if patient shows signs of acute spiritual or emotional distress.        [id number]   [phone]

## 2020-03-12 NOTE — PROGRESS NOTES
Late entry for 3/11/2020    Problem: Self Care Deficits Care Plan (Adult)  Goal: *Acute Goals and Plan of Care (Insert Text)  Description: Occupational Therapy Goals  Initiated 3/9/2020 within 7 day(s). 1.  Patient will perform bed mobility with supervision in preparation for further self-care. 2.  Patient will perform lower body dressing with supervision/set-up. 3.  Patient will perform toileting with supervision/set-up. 4.  Patient will perform toilet transfers with supervision/set-up. 5.  Patient will perform a functional activity in standing with supervision/setup for 3-5 minutes, presenting with F+ standing balance. 6.  Patient will participate in upper extremity therapeutic exercise/activities with supervision/set-up for 8 minutes. Prior Level of Function: Pt reports he was (I) with basic self-care/ADLs and functional mobility without AD PTA. Pt lives in a boarding home with 13-14 SHIRLEY. Outcome: Progressing Towards Goal   OCCUPATIONAL THERAPY TREATMENT    Patient: Graica Green (43 y.o. male)  Date: 3/12/2020  Diagnosis: Cellulitis of left lower extremity [L03.116]  Leukocytosis [D72.829]  Hypotension [I95.9]  GAIL (acute kidney injury) ([address] [N17.9]  Sepsis ([address] [A41.9]  Acute leg pain, left [M79.605]  Cellulitis [L03.90]   Cellulitis  Procedure(s) (LRB):  INCISION AND DRAINAGE LEFT ANKLE (Left) 5 Days Post-Op  Precautions: Fall, WBAT    Chart, occupational therapy assessment, plan of care, and goals were reviewed. ASSESSMENT:  Pt requires max encouragement for minimal participation 2/2 c/o LBP 8/10. Pt requires increase time w/bed mobility to maneuver to EOB. Pt educated on energy conservation techniques w/ADLs and benefits of shower chair. Pt tolerates sitting EOB ~10 minutes performing ADL grooming tasks and UB dressing task. Pt educated on safety w/RW and importance of hand placement w/functional transfers. Educated on importance OOB and encouraged OOB for all meals.  Reinforced importance of calling for assistance. Pt left w/all items within reach. Progression toward goals:  []          Improving appropriately and progressing toward goals  [x]          Improving slowly and progressing toward goals  []          Not making progress toward goals and plan of care will be adjusted     PLAN:  Patient continues to benefit from skilled intervention to address the above impairments. Continue treatment per established plan of care. Discharge Recommendations:  Home Health  Further Equipment Recommendations for Discharge:  shower chair and rolling walker     SUBJECTIVE:   Patient stated Giuseppe Carcamo have left my in this bed like this all day.  reference sliding down in bed    OBJECTIVE DATA SUMMARY:   Cognitive/Behavioral Status:  Neurologic State: Alert  Orientation Level: Oriented X4  Cognition: Recognition of people/places, Follows commands  Safety/Judgement: Fall prevention    Functional Mobility and Transfers for ADLs:   Bed Mobility:  Supine to Sit: Stand-by assistance, additional time   Transfers:   Sit to stand:CGA w/RW from elevated surface   Bed to chair:CGA w/RW  Balance:  Sitting: Intact  Standing:Impaired with support  Static:Fair  Dynamic:Fair    ADL Intervention:  Grooming  Wash face:Set-up  Wash hands:set-up    UB dressing  Hospital gown:Set-up    Pain:  Pain level pre-treatment: 8/10   Pain level post-treatment: 8/10  Pt c/o LBP    Activity Tolerance:    Fair    Please refer to the flowsheet for vital signs taken during this treatment. After treatment:   [x]  Patient left in no apparent distress sitting up in chair  []  Patient left in no apparent distress in bed  [x]  Call bell left within reach  [x]  Nursing notified  []  Caregiver present  []  Bed alarm activated    COMMUNICATION/EDUCATION:   [] Role of Occupational Therapy in the acute care setting  [] Home safety education was provided and the patient/caregiver indicated understanding.   [] Patient/family have participated as able in working towards goals and plan of care. [x] Patient/family agree to work toward stated goals and plan of care. [] Patient understands intent and goals of therapy, but is neutral about his/her participation. [] Patient is unable to participate in goal setting and plan of care.       Thank you for this referral.  KAYLYNN Lama  Time Calculation: 25 mins

## 2020-03-12 NOTE — PROGRESS NOTES
Shift Progress Note:  Assumed care of patient in bed awake & alert. Medicated x 2 for pain. No change in status. VSS, uneventful night Call bell within reach.   Patient Vitals for the past 12 hrs:   Temp Pulse Resp BP SpO2   03/12/20 0211 98.2 °F (36.8 °C) 91 18 133/77 93 %   03/11/20 2320 97.6 °F (36.4 °C) 90 18 153/87 93 %

## 2020-03-12 NOTE — PROGRESS NOTES
Problem: General Medical Care Plan  Goal: *Vital signs within specified parameters  Outcome: Progressing Towards Goal  Goal: *Labs within defined limits  Outcome: Progressing Towards Goal  Goal: *Absence of infection signs and symptoms  Outcome: Progressing Towards Goal  Goal: *Optimal pain control at patient's stated goal  Outcome: Progressing Towards Goal  Goal: *Skin integrity maintained  Outcome: Progressing Towards Goal  Goal: *Fluid volume balance  Outcome: Progressing Towards Goal  Goal: *Optimize nutritional status  Outcome: Progressing Towards Goal  Goal: *Anxiety reduced or absent  Outcome: Progressing Towards Goal  Goal: *Progressive mobility and function (eg: ADL's)  Outcome: Progressing Towards Goal     Problem: Patient Education: Go to Patient Education Activity  Goal: Patient/Family Education  Outcome: Progressing Towards Goal     Problem: Pain  Goal: *Control of Pain  Outcome: Progressing Towards Goal     Problem: Patient Education: Go to Patient Education Activity  Goal: Patient/Family Education  Outcome: Progressing Towards Goal     Problem: Falls - Risk of  Goal: *Absence of Falls  Description: Document Samantha Fall Risk and appropriate interventions in the flowsheet.   Outcome: Progressing Towards Goal  Note: Fall Risk Interventions:  Mobility Interventions: Communicate number of staff needed for ambulation/transfer, Patient to call before getting OOB, Utilize walker, cane, or other assistive device         Medication Interventions: Patient to call before getting OOB, Teach patient to arise slowly    Elimination Interventions: Call light in reach, Stay With Me (per policy), Toilet paper/wipes in reach, Toileting schedule/hourly rounds, Urinal in reach              Problem: Patient Education: Go to Patient Education Activity  Goal: Patient/Family Education  Outcome: Progressing Towards Goal     Problem: Cellulitis Care Plan (Adult)  Goal: *Control of acute pain  Outcome: Progressing Towards Goal  Goal: *Skin integrity maintained  Outcome: Progressing Towards Goal  Goal: *Absence of infection signs and symptoms  Outcome: Progressing Towards Goal     Problem: Patient Education: Go to Patient Education Activity  Goal: Patient/Family Education  Outcome: Progressing Towards Goal     Problem: Pressure Injury - Risk of  Goal: *Prevention of pressure injury  Description: Document Kahlil Scale and appropriate interventions in the flowsheet.   Outcome: Progressing Towards Goal  Note: Pressure Injury Interventions:  Sensory Interventions: Assess changes in LOC, Check visual cues for pain, Float heels, Keep linens dry and wrinkle-free, Maintain/enhance activity level, Minimize linen layers, Monitor skin under medical devices, Pad between skin to skin, Pressure redistribution bed/mattress (bed type)    Moisture Interventions: Absorbent underpads, Limit adult briefs, Minimize layers, Moisture barrier, Offer toileting Q_hr    Activity Interventions: Increase time out of bed, Pressure redistribution bed/mattress(bed type)    Mobility Interventions: Pressure redistribution bed/mattress (bed type)    Nutrition Interventions: Document food/fluid/supplement intake    Friction and Shear Interventions: Lift team/patient mobility team, Lift sheet, Minimize layers                Problem: Patient Education: Go to Patient Education Activity  Goal: Patient/Family Education  Outcome: Progressing Towards Goal     Problem: Nutrition Deficit  Goal: *Optimize nutritional status  Outcome: Progressing Towards Goal     Problem: Patient Education: Go to Patient Education Activity  Goal: Patient/Family Education  Outcome: Progressing Towards Goal     Problem: Patient Education: Go to Patient Education Activity  Goal: Patient/Family Education  Outcome: Progressing Towards Goal     Problem: Pain  Goal: *Control of Pain  Outcome: Progressing Towards Goal     Problem: Patient Education: Go to Patient Education Activity  Goal: Patient/Family Education  Outcome: Progressing Towards Goal

## 2020-03-12 NOTE — PROGRESS NOTES
Infectious Disease progress Note        Reason: sepsis, cellulitis LLE    Current abx Prior abx   Ciprofloxacin, linezolid since 3/6/20  levofloxacin 2/29-3/3/20  Ceftriaxone, vancomycin 2/29/20-3/8  Clindamycin  3/3/20-3/8/20     Lines:       Assessment :     61 y.o. male with PMH HTN, T2DM (last hgbA1C 5.8 on 3/2/20) admitted to [facility] on 2/2/9/20 with cellulitis. Clinical presentation c/w sepsis (POA) due to acute left leg cellulitis. Exact microbial etiology of infection is not clear. Negative anti DNAse B would argue against group A strep infection. Gradual clinical improvement. Decreased erythema/swelling  noted on today's exam. No crepitus. No areas of necrosis. Preserved LLE sensation. However, persistent leukocytosis and area of fluctuance left medial ankle is concerning for undrained left ankle soft tissue abscess. Ortho f/u appreciated. No evidence of abscess per ct scan 3/5. S/p I&D 3/7- intra op findings d/w dr. Gretchen Morgan. No purulence noted. Also, back pain - likely due to L3 fracture. Spine surgery f/u appreciated. MRI:Subacute appearing burst-type L3 superior endplate fracture with mild 4 mm retropulsion associated mild L2/L3 spinal canal stenosis. Now with increased pain left hip. No evidence of erythema or tenderness of the left hip on today's exam.  Will obtain imaging studies to rule out undiagnosed pathology. Decreasing WBC. Decreasing erythema left leg with darkish discoloration suggestive of resolving cellulitis. Recommendations:    1.cont ciprofloxacin, linezolid for 5 more days  2. Elevate left leg  3. Okay to discharge patient from ID standpoint     Above plan was discussed in details with patient, and primary team. All questions answered to their full satisfaction. Please call me if any further questions or concerns. Will continue to participate in the care of this patient. HPI:    Feels better. Decreased pain in the left leg.   No subjective fever or chills  Patient denies headaches, visual disturbances, sore throat, runny nose, earaches, cp, sob, chills, cough, abdominal pain, diarrhea, burning micturition,  or weakness in extremities. C/o  back pain/no flank pain. home Medication List    Details   metFORMIN ER (GLUCOPHAGE XR) 750 mg tablet Take 1,500 mg by mouth daily. simvastatin (ZOCOR) 20 mg tablet Take 20 mg by mouth nightly. lisinopril (PRINIVIL, ZESTRIL) 10 mg tablet Take 10 mg by mouth daily. ibuprofen (MOTRIN) 800 mg tablet Comments:   Reason for Stopping:               Current Facility-Administered Medications   Medication Dose Route Frequency    ciprofloxacin HCl (CIPRO) tablet 500 mg  500 mg Oral Q12H    linezolid (ZYVOX) tablet 600 mg  600 mg Oral Q12H    sodium hypochlorite (QUARTER STRENGTH DAKIN'S) 0.125% irrigation (bottle)   Topical BID    oxyCODONE-acetaminophen (PERCOCET) 5-325 mg per tablet 1 Tab  1 Tab Oral Q6H PRN    acetaminophen (TYLENOL) tablet 650 mg  650 mg Oral Q6H    lidocaine 4 % patch 1 Patch  1 Patch TransDERmal Q24H    polyethylene glycol (MIRALAX) packet 17 g  17 g Oral DAILY    lactobacillus sp. 50 billion cpu (BIO-K PLUS) capsule 1 Cap  1 Cap Oral DAILY    glucose chewable tablet 16 g  4 Tab Oral PRN    glucagon (GLUCAGEN) injection 1 mg  1 mg IntraMUSCular PRN    dextrose 10% infusion 125-250 mL  125-250 mL IntraVENous PRN    thiamine HCL (B-1) tablet 100 mg  100 mg Oral DAILY    sodium chloride (NS) flush 5-10 mL  5-10 mL IntraVENous PRN    lisinopril (PRINIVIL, ZESTRIL) tablet 10 mg  10 mg Oral DAILY    atorvastatin (LIPITOR) tablet 10 mg  10 mg Oral DAILY    heparin (porcine) injection 5,000 Units  5,000 Units SubCUTAneous Q8H    sodium chloride (NS) flush 5-40 mL  5-40 mL IntraVENous Q8H    sodium chloride (NS) flush 5-40 mL  5-40 mL IntraVENous PRN    clotrimazole (LOTRIMIN) 1 % cream   Topical BID       Allergies: Patient has no known allergies.     Temp (24hrs), Av.8 °F (36.6 °C), Min:96.2 °F (35.7 °C), Max:99.5 °F (37.5 °C)    Visit Vitals  /68   Pulse 88   Temp 97.7 °F (36.5 °C)   Resp 16   Ht 6' 2\" (1.88 m)   Wt 89.8 kg (198 lb)   SpO2 94%   BMI 25.42 kg/m²       ROS: 12 point ROS obtained in details. Pertinent positives as mentioned in HPI,   otherwise negative    Physical Exam:    General:  AAOx3, in good spirits  HEENT: Conjunctiva pink, sclera anicteric. Pharynx moist, nonerythematous. Dry mucous membranes. Thyroid not enlarged, no nodules. No cervical, supraclavicular, occipital or submandibular lymphadenopathy. No other gross abnormalities present. CV:  RRR, no murmurs. No visible pulsations or thrills. RESP:  Unlabored breathing. Lungs clear to auscultation without adventitious breath sounds. Equal expansion bilaterally. ABD:  Soft, nontender, nondistended. BS (+). No hepatosplenomegaly. No suprapubic tenderness. MS:  No joint deformity or instability. No atrophy. Neuro:  CN II-XII grossly intact. 5/5 strength bilateral upper extremities and lower extremities. LLE pain limited  Ext:  Darkish erythema of the left leg resolves when left leg elevated. Left foot surgical dressing not opened. Resolved erythema left medial thigh. No erythema or tenderness of the left hip. Pain on active and passive movements of the left hip joint. 2+ radial and dp pulses bilaterally. Skin:  Erythema left leg as mentioned above.     Labs: Results:   Chemistry Recent Labs     03/12/20  0408 03/11/20  0442 03/10/20  0454   * 121* 103*   * 133* 132*   K 4.6 4.1 4.0    99* 98*   CO2 28 31 30   BUN 9 9 8   CREA 0.81 0.82 0.79   CA 8.7 8.8 9.0   AGAP 5 3 4   BUCR 11* 11* 10*   * 153* 152*   TP 6.8 7.2 7.2   ALB 2.2* 2.1* 2.1*   GLOB 4.6* 5.1* 5.1*   AGRAT 0.5* 0.4* 0.4*      CBC w/Diff Recent Labs     03/12/20  1200 03/11/20  0442 03/10/20  0454   WBC 13.8* 17.2* 18.6*   RBC 3.55* 3.63* 3.63*   HGB 11.5* 11.5* 11.7*   HCT 34.8* 34.9* 35.3*   PLT 456* 428* 416   GRANS 71 81* 80*   LYMPH 19* 12* 13*   EOS 2 1 1      Microbiology No results for input(s): CULT in the last 72 hours.        RADIOLOGY:    All available imaging studies/reports in [facility] for this admission were reviewed    Dr. Tu Hancock, Infectious Disease Specialist  [phone]  March 12, 2020  4:00 PM

## 2020-03-12 NOTE — PROGRESS NOTES
Problem: Self Care Deficits Care Plan (Adult)  Goal: *Acute Goals and Plan of Care (Insert Text)  Description: Occupational Therapy Goals  Initiated 3/9/2020 within 7 day(s). 1.  Patient will perform bed mobility with supervision in preparation for further self-care. 2.  Patient will perform lower body dressing with supervision/set-up. 3.  Patient will perform toileting with supervision/set-up. 4.  Patient will perform toilet transfers with supervision/set-up. 5.  Patient will perform a functional activity in standing with supervision/setup for 3-5 minutes, presenting with F+ standing balance. 6.  Patient will participate in upper extremity therapeutic exercise/activities with supervision/set-up for 8 minutes. Prior Level of Function: Pt reports he was (I) with basic self-care/ADLs and functional mobility without AD PTA. Pt lives in a boarding home with 13-14 SHIRLEY. Outcome: Progressing Towards Goal     Problem: Patient Education: Go to Patient Education Activity  Goal: Patient/Family Education  Outcome: Progressing Towards Goal   OCCUPATIONAL THERAPY TREATMENT    Patient: Janeth Meng (54 y.o. male)  Date: 3/12/2020  Diagnosis: Cellulitis of left lower extremity [L03.116]  Leukocytosis [D72.829]  Hypotension [I95.9]  GAIL (acute kidney injury) ([address] [N17.9]  Sepsis ([address] [A41.9]  Acute leg pain, left [M79.605]  Cellulitis [L03.90]   Cellulitis  Procedure(s) (LRB):  INCISION AND DRAINAGE LEFT ANKLE (Left) 5 Days Post-Op  Precautions: Fall, WBAT  PLOF: (I) with ADLs    Chart, occupational therapy assessment, plan of care, and goals were reviewed. ASSESSMENT:  Pt presented supine in bed upon therapist arrival and agreeable for participation at this time. Pt performed functional bed mobility supine-sitting EOB SBA with min vc's for proper pacing technique. Pt performed LB dressing task Supervision donning socks with leg cross technique demonstrating F+ balance.  Attempted standing task however pt declining 2/2 L LE itching. Pt able to apply lotion to L LE above bandage for increased comfort MOD I. Pt educated on EC/WS techniques to improve functional activity tolerance during all ADLs. Pt returned self back to supine in bed SBA with L LE elevated on pillow and call bell placed within reach. Progression toward goals:  [x]          Improving appropriately and progressing toward goals  []          Improving slowly and progressing toward goals  []          Not making progress toward goals and plan of care will be adjusted     PLAN:  Patient continues to benefit from skilled intervention to address the above impairments. Continue treatment per established plan of care. Discharge Recommendations: SNF  Further Equipment Recommendations for Discharge: TBA     SUBJECTIVE:   Patient stated My leg is itching to much to get out of bed. \"    OBJECTIVE DATA SUMMARY:   Cognitive/Behavioral Status:  Neurologic State: Alert  Orientation Level: Oriented X4  Cognition: Recognition of people/places, Follows commands  Safety/Judgement: Fall prevention    Functional Mobility and Transfers for ADLs:   Bed Mobility:     Supine to Sit: Stand-by assistance  Sit to Supine: Stand-by assistance       Balance:  Sitting: Intact    ADL Intervention:       Lower Body Dressing Assistance  Dressing Assistance: Supervision  Socks: Supervision  Leg Crossed Method Used: Yes  Position Performed: Seated edge of bed    Pain:  Pain level pre-treatment: 3/10   Pain level post-treatment: 3/10  Pain Intervention(s): Medication (see MAR); Rest, Repositioning  Response to intervention: Nurse notified, See doc flow    Activity Tolerance:    Fair  Please refer to the flowsheet for vital signs taken during this treatment.   After treatment:   []  Patient left in no apparent distress sitting up in chair  [x]  Patient left in no apparent distress in bed  [x]  Call bell left within reach  []  Nursing notified  []  Caregiver present  []  Bed alarm activated    COMMUNICATION/EDUCATION:   [x] Role of Occupational Therapy in the acute care setting  [] Home safety education was provided and the patient/caregiver indicated understanding. [x] Patient/family have participated as able in working towards goals and plan of care. [] Patient/family agree to work toward stated goals and plan of care. [] Patient understands intent and goals of therapy, but is neutral about his/her participation. [] Patient is unable to participate in goal setting and plan of care.       Thank you for this referral.  KAYLYNN Noe  Time Calculation: 25 mins

## 2020-03-12 NOTE — PROGRESS NOTES
Discharge planning    Received call from Anabel Rivera and spoke with Dallas Quiroga. Per Dallas Quiroga, insurance authorization was approved (ID # [id number]). Spoke with Viviana at [facility]. She will let writer know if able to accept today once [employer] call her with authorization insurance information.     CHANO WillsonN, RN  Pager # [phone]  Care Manager

## 2020-03-12 NOTE — ROUTINE PROCESS
Bedside and Verbal shift change report given by Kd Calle RN (offgoing nurse) to Ethan Ledezma RN (oncoming nurse). Report included the following information SBAR, Kardex and MAR.

## 2020-03-12 NOTE — DISCHARGE INSTRUCTIONS

## 2020-03-13 LAB
BACTERIA SPEC ANAEROBE CULT: NORMAL
BACTERIA SPEC ANAEROBE CULT: NORMAL
SPECIMEN SOURCE: NORMAL
SPECIMEN SOURCE: NORMAL

## 2020-03-14 LAB
BACTERIA SPEC CULT: NORMAL
BACTERIA SPEC CULT: NORMAL
SOURCE, RSRC56: NORMAL
SOURCE, RSRC56: NORMAL

## 2020-03-18 ENCOUNTER — PATIENT OUTREACH (OUTPATIENT)
Dept: CASE MANAGEMENT | Age: 60
End: 2020-03-18

## 2020-03-18 NOTE — PROGRESS NOTES
Community Care Team Documentation for Patient in [facility]     Patient discharged from [facility]  to [place] [address], on 3/12/20. Hospital Discharge diagnosisper Dr. Harinder Parker:    Sepsis 2/2 cellulitis  LLE cellulitis  GAIL  Electrolyte disturbances- hypokalemia, hypomagnesemia  Alcohol use disorder  Acute L3 compression fracture  HTN  DM  Tinea Cruris   Pleural Effusions on CXR        SNF Attending Provider:  Dion Marcus    Anticipated discharge date from SNF:  3/26/20    PCP : Radha David MD    [facility] Team rounds completed, updates provided by facility. Brief Summary of Care:    Patient receiving PT/OT. Doing well with therapy . Dispo:  Patient was living in a boarding home PTA . Brother involved with care. He is scheduled to be d/c on 3/26 with [facility] . RRAT:  Low Risk            0       Total Score            Criteria that do not apply:    Has Seen PCP in Last 6 Months (Yes=3, No=0)    . Living with Significant Other. Assisted Living. LTAC. SNF. or   Rehab    Patient Length of Stay (>5 days = 3)    IP Visits Last 12 Months (1-3=4, 4=9, >4=11)    Pt.  Coverage (Medicare=5 , Medicaid, or Self-Pay=4)    Charlson Comorbidity Score (Age + Comorbid Conditions)        Active Ambulatory Problems     Diagnosis Date Noted    Leukocytosis 02/29/2020    Hypotension 02/29/2020    Sepsis ([address] 02/29/2020    GAIL (acute [address]) [address] [address] 02/29/2020    Cellulitis of left lower extremity 02/29/2020    Acute leg pain, left 02/29/2020    Cellulitis 02/29/2020     Resolved Ambulatory Problems     Diagnosis Date Noted    No Resolved Ambulatory Problems     No Additional Past Medical History

## 2020-03-20 ENCOUNTER — TELEPHONE (OUTPATIENT)
Dept: ORTHOPEDIC SURGERY | Age: 60
End: 2020-03-20

## 2020-03-25 ENCOUNTER — PATIENT OUTREACH (OUTPATIENT)
Dept: CASE MANAGEMENT | Age: 60
End: 2020-03-25

## 2020-03-25 NOTE — PROGRESS NOTES
Community Care Team Documentation for Patient in [facility]     Patient discharged from [facility]  to [facility], on 3/12/20. Hospital Discharge diagnosisper Dr. Gilma Goodwin:    Sepsis 2/2 cellulitis  LLE cellulitis  GAIL  Electrolyte disturbances- hypokalemia, hypomagnesemia  Alcohol use disorder  Acute L3 compression fracture  HTN  DM  Tinea Cruris   Pleural Effusions on CXR        SNF Attending Provider:  Paulette Stapleton    Anticipated discharge date from SNF:  3/26/20    PCP : Dianah Dubin, MD    [facility] Team rounds completed, updates provided by facility. Brief Summary of Care:    Patient receiving PT/OT. Doing well with therapy . Dispo:  Patient was living in a boarding home PTA . Brother involved with care. He is scheduled to be d/c on 3/26 with [place] . RRAT:  Low Risk            0       Total Score            Criteria that do not apply:    Has Seen PCP in Last 6 Months (Yes=3, No=0)    . Living with Significant Other. Assisted Living. LTAC. SNF. or   Rehab    Patient Length of Stay (>5 days = 3)    IP Visits Last 12 Months (1-3=4, 4=9, >4=11)    Pt.  Coverage (Medicare=5 , Medicaid, or Self-Pay=4)    Charlson Comorbidity Score (Age + Comorbid Conditions)        Active Ambulatory Problems     Diagnosis Date Noted    Leukocytosis 02/29/2020    Hypotension 02/29/2020    Sepsis ([address] 02/29/2020    GAIL (acute kidney [address]) [address] [address] 02/29/2020    Cellulitis of left lower extremity 02/29/2020    Acute leg pain, left 02/29/2020    Cellulitis 02/29/2020     Resolved Ambulatory Problems     Diagnosis Date Noted    No Resolved Ambulatory Problems     No Additional Past Medical History

## 2020-05-25 NOTE — PROGRESS NOTES
MS RN NOTES



1ST BAG OF CRYOPRECIPITATE GIVEN (129 ML) INFUSED DIANA WELL. Problem: General Medical Care Plan  Goal: *Vital signs within specified parameters  Outcome: Progressing Towards Goal  Goal: *Labs within defined limits  Outcome: Progressing Towards Goal  Goal: *Absence of infection signs and symptoms  Outcome: Progressing Towards Goal  Goal: *Optimal pain control at patient's stated goal  Outcome: Progressing Towards Goal  Goal: *Skin integrity maintained  Outcome: Progressing Towards Goal  Goal: *Fluid volume balance  Outcome: Progressing Towards Goal  Goal: *Optimize nutritional status  Outcome: Progressing Towards Goal  Goal: *Anxiety reduced or absent  Outcome: Progressing Towards Goal  Goal: *Progressive mobility and function (eg: ADL's)  Outcome: Progressing Towards Goal     Problem: Pain  Goal: *Control of Pain  Outcome: Progressing Towards Goal     Problem: Patient Education: Go to Patient Education Activity  Goal: Patient/Family Education  Outcome: Progressing Towards Goal     Problem: Falls - Risk of  Goal: *Absence of Falls  Description  Document Yolanda Bunting Fall Risk and appropriate interventions in the flowsheet.   Outcome: Progressing Towards Goal  Note: Fall Risk Interventions:  Mobility Interventions: Bed/chair exit alarm         Medication Interventions: Patient to call before getting OOB                   Problem: Patient Education: Go to Patient Education Activity  Goal: Patient/Family Education  Outcome: Progressing Towards Goal     Problem: Cellulitis Care Plan (Adult)  Goal: *Control of acute pain  Outcome: Progressing Towards Goal  Goal: *Skin integrity maintained  Outcome: Progressing Towards Goal  Goal: *Absence of infection signs and symptoms  Outcome: Progressing Towards Goal     Problem: Patient Education: Go to Patient Education Activity  Goal: Patient/Family Education  Outcome: Progressing Towards Goal

## 2021-06-25 ENCOUNTER — APPOINTMENT (OUTPATIENT)
Dept: GENERAL RADIOLOGY | Age: 61
DRG: 871 | End: 2021-06-25
Attending: EMERGENCY MEDICINE
Payer: MEDICARE

## 2021-06-25 ENCOUNTER — HOSPITAL ENCOUNTER (INPATIENT)
Age: 61
LOS: 11 days | Discharge: NURSING, REHAB OR LONG-TERM CARE | DRG: 871 | End: 2021-07-06
Attending: EMERGENCY MEDICINE | Admitting: FAMILY MEDICINE
Payer: MEDICARE

## 2021-06-25 DIAGNOSIS — E87.6 HYPOKALEMIA: ICD-10-CM

## 2021-06-25 DIAGNOSIS — N17.9 AKI (ACUTE KIDNEY INJURY) (HCC): ICD-10-CM

## 2021-06-25 DIAGNOSIS — F10.20 ALCOHOLISM (HCC): ICD-10-CM

## 2021-06-25 DIAGNOSIS — Z74.1 SELF-CARE DEFICIT FOR BATHING AND HYGIENE: ICD-10-CM

## 2021-06-25 DIAGNOSIS — D64.9 ANEMIA, UNSPECIFIED TYPE: ICD-10-CM

## 2021-06-25 DIAGNOSIS — E46 CHRONIC MALNUTRITION (HCC): ICD-10-CM

## 2021-06-25 DIAGNOSIS — A41.9 SEPSIS, DUE TO UNSPECIFIED ORGANISM, UNSPECIFIED WHETHER ACUTE ORGAN DYSFUNCTION PRESENT (HCC): Primary | ICD-10-CM

## 2021-06-25 PROBLEM — F10.10 ALCOHOL ABUSE: Status: ACTIVE | Noted: 2021-06-25

## 2021-06-25 LAB
ALBUMIN SERPL-MCNC: 2.6 G/DL (ref 3.4–5)
ALBUMIN/GLOB SERPL: 0.6 {RATIO} (ref 0.8–1.7)
ALP SERPL-CCNC: 169 U/L (ref 45–117)
ALT SERPL-CCNC: 35 U/L (ref 16–61)
ANION GAP SERPL CALC-SCNC: 13 MMOL/L (ref 3–18)
AST SERPL-CCNC: 103 U/L (ref 10–38)
ATRIAL RATE: 94 BPM
BASOPHILS # BLD: 0 K/UL (ref 0–0.1)
BASOPHILS NFR BLD: 0 % (ref 0–2)
BILIRUB SERPL-MCNC: 3.8 MG/DL (ref 0.2–1)
BUN SERPL-MCNC: 35 MG/DL (ref 7–18)
BUN/CREAT SERPL: 15 (ref 12–20)
CALCIUM SERPL-MCNC: 8.9 MG/DL (ref 8.5–10.1)
CALCULATED P AXIS, ECG09: 65 DEGREES
CALCULATED R AXIS, ECG10: 72 DEGREES
CALCULATED T AXIS, ECG11: 74 DEGREES
CHLORIDE SERPL-SCNC: 101 MMOL/L (ref 100–111)
CK MB CFR SERPL CALC: NORMAL % (ref 0–4)
CK MB SERPL-MCNC: <1 NG/ML (ref 5–25)
CK SERPL-CCNC: 70 U/L (ref 39–308)
CO2 SERPL-SCNC: 24 MMOL/L (ref 21–32)
CREAT SERPL-MCNC: 2.33 MG/DL (ref 0.6–1.3)
DIAGNOSIS, 93000: NORMAL
DIFFERENTIAL METHOD BLD: ABNORMAL
EOSINOPHIL # BLD: 0 K/UL (ref 0–0.4)
EOSINOPHIL NFR BLD: 0 % (ref 0–5)
ERYTHROCYTE [DISTWIDTH] IN BLOOD BY AUTOMATED COUNT: 15.9 % (ref 11.6–14.5)
GLOBULIN SER CALC-MCNC: 4.7 G/DL (ref 2–4)
GLUCOSE SERPL-MCNC: 160 MG/DL (ref 74–99)
HCT VFR BLD AUTO: 27 % (ref 36–48)
HGB BLD-MCNC: 8.9 G/DL (ref 13–16)
LACTATE BLD-SCNC: 1.75 MMOL/L (ref 0.4–2)
LACTATE BLD-SCNC: 5.89 MMOL/L (ref 0.4–2)
LYMPHOCYTES # BLD: 1.8 K/UL (ref 0.9–3.6)
LYMPHOCYTES NFR BLD: 14 % (ref 21–52)
MAGNESIUM SERPL-MCNC: 1.7 MG/DL (ref 1.6–2.6)
MCH RBC QN AUTO: 36 PG (ref 24–34)
MCHC RBC AUTO-ENTMCNC: 33 G/DL (ref 31–37)
MCV RBC AUTO: 109.3 FL (ref 74–97)
MONOCYTES # BLD: 1.6 K/UL (ref 0.05–1.2)
MONOCYTES NFR BLD: 13 % (ref 3–10)
NEUTS SEG # BLD: 9.3 K/UL (ref 1.8–8)
NEUTS SEG NFR BLD: 72 % (ref 40–73)
P-R INTERVAL, ECG05: 146 MS
PLATELET # BLD AUTO: 100 K/UL (ref 135–420)
PMV BLD AUTO: 10.1 FL (ref 9.2–11.8)
POTASSIUM SERPL-SCNC: 3.2 MMOL/L (ref 3.5–5.5)
PROT SERPL-MCNC: 7.3 G/DL (ref 6.4–8.2)
Q-T INTERVAL, ECG07: 388 MS
QRS DURATION, ECG06: 74 MS
QTC CALCULATION (BEZET), ECG08: 485 MS
RBC # BLD AUTO: 2.47 M/UL (ref 4.35–5.65)
SODIUM SERPL-SCNC: 138 MMOL/L (ref 136–145)
TROPONIN I SERPL-MCNC: 0.02 NG/ML (ref 0–0.04)
VENTRICULAR RATE, ECG03: 94 BPM
WBC # BLD AUTO: 12.9 K/UL (ref 4.6–13.2)

## 2021-06-25 PROCEDURE — 87040 BLOOD CULTURE FOR BACTERIA: CPT

## 2021-06-25 PROCEDURE — 74011250636 HC RX REV CODE- 250/636: Performed by: STUDENT IN AN ORGANIZED HEALTH CARE EDUCATION/TRAINING PROGRAM

## 2021-06-25 PROCEDURE — 65270000029 HC RM PRIVATE

## 2021-06-25 PROCEDURE — 74011250637 HC RX REV CODE- 250/637: Performed by: STUDENT IN AN ORGANIZED HEALTH CARE EDUCATION/TRAINING PROGRAM

## 2021-06-25 PROCEDURE — 82553 CREATINE MB FRACTION: CPT

## 2021-06-25 PROCEDURE — 83605 ASSAY OF LACTIC ACID: CPT

## 2021-06-25 PROCEDURE — 85025 COMPLETE CBC W/AUTO DIFF WBC: CPT

## 2021-06-25 PROCEDURE — 96367 TX/PROPH/DG ADDL SEQ IV INF: CPT

## 2021-06-25 PROCEDURE — 93005 ELECTROCARDIOGRAM TRACING: CPT

## 2021-06-25 PROCEDURE — 74011000258 HC RX REV CODE- 258: Performed by: STUDENT IN AN ORGANIZED HEALTH CARE EDUCATION/TRAINING PROGRAM

## 2021-06-25 PROCEDURE — 80053 COMPREHEN METABOLIC PANEL: CPT

## 2021-06-25 PROCEDURE — 71045 X-RAY EXAM CHEST 1 VIEW: CPT

## 2021-06-25 PROCEDURE — 74011250636 HC RX REV CODE- 250/636: Performed by: EMERGENCY MEDICINE

## 2021-06-25 PROCEDURE — 96365 THER/PROPH/DIAG IV INF INIT: CPT

## 2021-06-25 PROCEDURE — 99285 EMERGENCY DEPT VISIT HI MDM: CPT

## 2021-06-25 PROCEDURE — 83735 ASSAY OF MAGNESIUM: CPT

## 2021-06-25 RX ORDER — POLYETHYLENE GLYCOL 3350 17 G/17G
17 POWDER, FOR SOLUTION ORAL DAILY PRN
Status: DISCONTINUED | OUTPATIENT
Start: 2021-06-25 | End: 2021-07-02

## 2021-06-25 RX ORDER — SODIUM CHLORIDE 0.9 % (FLUSH) 0.9 %
5-40 SYRINGE (ML) INJECTION EVERY 8 HOURS
Status: DISCONTINUED | OUTPATIENT
Start: 2021-06-25 | End: 2021-07-07 | Stop reason: HOSPADM

## 2021-06-25 RX ORDER — FOLIC ACID 1 MG/1
1 TABLET ORAL DAILY
Status: DISCONTINUED | OUTPATIENT
Start: 2021-06-26 | End: 2021-07-07 | Stop reason: HOSPADM

## 2021-06-25 RX ORDER — AMOXICILLIN AND CLAVULANATE POTASSIUM 875; 125 MG/1; MG/1
1 TABLET, FILM COATED ORAL EVERY 12 HOURS
Status: DISCONTINUED | OUTPATIENT
Start: 2021-06-25 | End: 2021-06-25

## 2021-06-25 RX ORDER — LORAZEPAM 2 MG/ML
1 INJECTION INTRAMUSCULAR
Status: DISCONTINUED | OUTPATIENT
Start: 2021-06-25 | End: 2021-06-30

## 2021-06-25 RX ORDER — LEVOFLOXACIN 5 MG/ML
750 INJECTION, SOLUTION INTRAVENOUS
Status: DISCONTINUED | OUTPATIENT
Start: 2021-06-25 | End: 2021-06-26

## 2021-06-25 RX ORDER — VANCOMYCIN 2 GRAM/500 ML IN 0.9 % SODIUM CHLORIDE INTRAVENOUS
2000 ONCE
Status: COMPLETED | OUTPATIENT
Start: 2021-06-25 | End: 2021-06-26

## 2021-06-25 RX ORDER — THERA TABS 400 MCG
1 TAB ORAL DAILY
Status: DISCONTINUED | OUTPATIENT
Start: 2021-06-26 | End: 2021-07-07 | Stop reason: HOSPADM

## 2021-06-25 RX ORDER — LANOLIN ALCOHOL/MO/W.PET/CERES
100 CREAM (GRAM) TOPICAL DAILY
Status: DISCONTINUED | OUTPATIENT
Start: 2021-06-26 | End: 2021-07-07 | Stop reason: HOSPADM

## 2021-06-25 RX ORDER — ACETAMINOPHEN 325 MG/1
650 TABLET ORAL
Status: DISCONTINUED | OUTPATIENT
Start: 2021-06-25 | End: 2021-06-28

## 2021-06-25 RX ORDER — LORAZEPAM 1 MG/1
1 TABLET ORAL
Status: DISCONTINUED | OUTPATIENT
Start: 2021-06-25 | End: 2021-06-30

## 2021-06-25 RX ORDER — ONDANSETRON 2 MG/ML
4 INJECTION INTRAMUSCULAR; INTRAVENOUS
Status: DISCONTINUED | OUTPATIENT
Start: 2021-06-25 | End: 2021-06-30

## 2021-06-25 RX ORDER — POTASSIUM CHLORIDE 20 MEQ/1
40 TABLET, EXTENDED RELEASE ORAL
Status: COMPLETED | OUTPATIENT
Start: 2021-06-25 | End: 2021-06-25

## 2021-06-25 RX ORDER — ACETAMINOPHEN 650 MG/1
650 SUPPOSITORY RECTAL
Status: DISCONTINUED | OUTPATIENT
Start: 2021-06-25 | End: 2021-06-28

## 2021-06-25 RX ORDER — LEVOFLOXACIN 5 MG/ML
750 INJECTION, SOLUTION INTRAVENOUS EVERY 24 HOURS
Status: DISCONTINUED | OUTPATIENT
Start: 2021-06-25 | End: 2021-06-25

## 2021-06-25 RX ORDER — SODIUM CHLORIDE 0.9 % (FLUSH) 0.9 %
5-10 SYRINGE (ML) INJECTION AS NEEDED
Status: DISCONTINUED | OUTPATIENT
Start: 2021-06-25 | End: 2021-06-27 | Stop reason: SDUPTHER

## 2021-06-25 RX ORDER — POTASSIUM CHLORIDE 7.45 MG/ML
10 INJECTION INTRAVENOUS ONCE
Status: COMPLETED | OUTPATIENT
Start: 2021-06-25 | End: 2021-06-25

## 2021-06-25 RX ORDER — ONDANSETRON 4 MG/1
4 TABLET, ORALLY DISINTEGRATING ORAL
Status: DISCONTINUED | OUTPATIENT
Start: 2021-06-25 | End: 2021-06-30

## 2021-06-25 RX ORDER — SODIUM CHLORIDE 0.9 % (FLUSH) 0.9 %
5-40 SYRINGE (ML) INJECTION AS NEEDED
Status: DISCONTINUED | OUTPATIENT
Start: 2021-06-25 | End: 2021-07-07 | Stop reason: HOSPADM

## 2021-06-25 RX ADMIN — POTASSIUM CHLORIDE 10 MEQ: 7.46 INJECTION, SOLUTION INTRAVENOUS at 21:11

## 2021-06-25 RX ADMIN — POTASSIUM CHLORIDE 40 MEQ: 1500 TABLET, EXTENDED RELEASE ORAL at 21:15

## 2021-06-25 RX ADMIN — LEVOFLOXACIN 750 MG: 5 INJECTION, SOLUTION INTRAVENOUS at 21:54

## 2021-06-25 RX ADMIN — Medication 10 ML: at 23:44

## 2021-06-25 RX ADMIN — SODIUM CHLORIDE 1000 ML: 900 INJECTION, SOLUTION INTRAVENOUS at 21:07

## 2021-06-25 RX ADMIN — PIPERACILLIN AND TAZOBACTAM 2.25 G: 2; .25 INJECTION, POWDER, LYOPHILIZED, FOR SOLUTION INTRAVENOUS at 21:10

## 2021-06-25 RX ADMIN — VANCOMYCIN HYDROCHLORIDE 2000 MG: 10 INJECTION, POWDER, LYOPHILIZED, FOR SOLUTION INTRAVENOUS at 23:43

## 2021-06-25 NOTE — ED TRIAGE NOTES
Patient presents to the ED via EMS from home for evaluation of bilateral lower extremity weakness. Per medic, \"He called us because he said he was having trouble standing. When we arrived at his house he was sitting in a chair on his front porch. We tried to stand him up and he could not stand so we brought him in.\"    Patient states he has been feeling week for about two weeks now and that he is afraid to drive because he is afraid he will fall when he gets out of his car. He also states he for gets to eat and drink water because he \"gets too drunk. \"    Patient denies any chest pain, no difficulty breathing, no dizziness and no vision changes.

## 2021-06-25 NOTE — Clinical Note
Status[de-identified] INPATIENT [101]   Type of Bed: Medical [8]   Cardiac Monitoring Required?: Yes   Inpatient Hospitalization Certified Necessary for the Following Reasons: 3.  Patient receiving treatment that can only be provided in an inpatient setting (further clarification in H&P documentation)   Admitting Diagnosis: Sepsis [facility]) [[id number]]   Admitting Diagnosis: [facility] malnutrition ([facility] [4317507]   Admitting Diagnosis: Alcoholism ([facility] [[id number]]   Admitting Diagnosis: GAIL (acute kidney injury) [facility]) [[id number]]   Admitting Physician: Ca Recinos   Attending Physician: Ca Recinos   Estimated Length of Stay: 2 Midnights   Discharge Plan[de-identified] [facility]

## 2021-06-25 NOTE — ED PROVIDER NOTES
HPI   63 yo M pmhx T2DM, HTN, L3 compression fx (3/2020) non-operative presents with BLE weakness worsening for the past month. Patient was BIBEMS after he was found on his front porch unable to stand for fear this legs would give out. Patient reports not driving or walking around in the past week due to fear of falling. Has not fallen, LOC, hit his head. Denies headache, dizziness, lightheadedness, CP, SOB, palpitations, n/v/f/c, diarrhea/constipation, decreased appetite. Pt reports forgetting to eat or drink for days because he is drunk. Drinks 6-pack of beer and 4-5 mixed rum drinks per day, has not decreased or increased his intake recently. Lives at a boarding home. Usually ambulates without issue, no cane or walker. Denies numbness/tingling/pain in lower extremities, no difficulties with urinary or saddle anesthesia, no calf tenderness or swelling, erythema or drainage. Mri Lumb Spine Wo Cont     Result Date: 3/2/2020  IMPRESSION: 1. Subacute appearing burst-type L3 superior endplate fracture with mild 4 mm retropulsion associated mild L2/L3 spinal canal stenosis. 2.  No high-grade spinal canal or foraminal narrowing. 3.  Annular fissure at L5/S1, a potential pain generator. 4.  Bilateral paraspinal muscle fatty atrophy with mild to moderate intramuscular edema, which could reflect grade 1 strains or denervation change. 5.  Mild mildly atrophic lower thoracic spinal cord. No past medical history on file. No past surgical history on file. No family history on file.     Social History     Socioeconomic History    Marital status:      Spouse name: Not on file    Number of children: Not on file    Years of education: Not on file    Highest education level: Not on file   Occupational History    Not on file   Tobacco Use    Smoking status: Not on file   Substance and Sexual Activity    Alcohol use: Not on file    Drug use: Not on file    Sexual activity: Not on file   Other Topics Concern    Not on file   Social History Narrative    Not on file     Social Determinants of Health     Financial Resource Strain:     Difficulty of Paying Living Expenses:    Food Insecurity:     Worried About Running Out of Food in the Last Year:     [address] in the Last Year:    Transportation Needs:     Lack of Transportation (Medical):  Lack of Transportation (Non-Medical):    Physical Activity:     Days of Exercise per Week:     Minutes of Exercise per Session:    Stress:     Feeling of Stress :    Social Connections:     Frequency of Communication with Friends and Family:     Frequency of Social Gatherings with Friends and Family:     Attends [employer] Services:     Active Member of Clubs or Organizations:     Attends Club or Organization Meetings:     Marital Status:    Intimate Partner Violence:     Fear of Current or Ex-Partner:     Emotionally Abused:     Physically Abused:     Sexually Abused: ALLERGIES: Patient has no known allergies. Review of Systems   Constitutional: Positive for activity change. Negative for appetite change, chills, fatigue, fever and unexpected weight change. HENT: Negative for congestion, ear pain, rhinorrhea, sinus pressure and sore throat. Eyes: Positive for redness. Negative for photophobia, pain and visual disturbance. Respiratory: Negative for cough, choking, chest tightness, shortness of breath and wheezing. Cardiovascular: Negative for chest pain, palpitations and leg swelling. Gastrointestinal: Negative for abdominal pain, constipation, diarrhea, nausea and vomiting. Endocrine: Negative for polydipsia, polyphagia and polyuria. Genitourinary: Negative for decreased urine volume, difficulty urinating, dysuria, flank pain, frequency, hematuria and urgency. Musculoskeletal: Positive for gait problem. Negative for arthralgias, back pain, myalgias, neck pain and neck stiffness. Skin: Negative for rash and wound. Allergic/Immunologic: Negative for immunocompromised state. Neurological: Positive for tremors and weakness. Negative for dizziness, seizures, syncope, facial asymmetry, speech difficulty, light-headedness, numbness and headaches. Hematological: Does not bruise/bleed easily. Psychiatric/Behavioral: Negative for suicidal ideas. Vitals:    06/25/21 2030 06/25/21 2045 06/25/21 2100 06/25/21 2115   BP: 107/71 109/70 109/67 122/76   Pulse: 94 93 93 97   Resp: 29 27 28 28   Temp:       SpO2: 96% 97% 97% 98%   Weight:       Height:                Physical Exam  Vitals and nursing note reviewed. Constitutional:       General: He is not in acute distress. Appearance: He is not ill-appearing. Comments: Disshelved, covered in feces, urine, vomit, dirt from head to toe. AAOx3. Difficult to understand hx given dysarthria/ no teeth. HENT:      Head: Normocephalic and atraumatic. Nose: Nose normal. No congestion. Mouth/Throat:      Mouth: Mucous membranes are dry. Pharynx: Oropharynx is clear. Comments: Tongue, reddish color, unclear if pt just drank something (thinks he had a red bull)  Eyes:      General: No scleral icterus. Right eye: Discharge present. Left eye: Discharge present. Extraocular Movements: Extraocular movements intact. Conjunctiva/sclera: Conjunctivae normal.      Pupils: Pupils are equal, round, and reactive to light. Comments: 2+ pupils equal and reactive to light; conjunctival injection with yellow crusting and purulence drainage (pt keeps rubbing eyes, nails have feces and dirt in them)   Cardiovascular:      Rate and Rhythm: Normal rate and regular rhythm. Pulses: Normal pulses. Heart sounds: Normal heart sounds. Pulmonary:      Effort: Pulmonary effort is normal.      Breath sounds: Normal breath sounds. Abdominal:      General: There is no distension. Palpations: Abdomen is soft. There is no mass. Tenderness: There is no abdominal tenderness. There is no right CVA tenderness, left CVA tenderness, guarding or rebound. Musculoskeletal:         General: Swelling and deformity present. No tenderness. Normal range of motion. Cervical back: Normal range of motion. Right lower leg: Edema present. Left lower leg: Edema present. Comments: Chronic lymphedema 2+ BLE; non-tender, mildly erythematous; prior surgery for I&D for cellulitic LLE   Skin:     General: Skin is warm and dry. Capillary Refill: Capillary refill takes less than 2 seconds. Coloration: Skin is not jaundiced. Findings: No lesion or rash. Neurological:      General: No focal deficit present. Mental Status: He is alert and oriented to person, place, and time. Mental status is at baseline. Cranial Nerves: No cranial nerve deficit. Sensory: No sensory deficit. Motor: No weakness (strength 5/5 all 4 extremities, b/l hand  strength 5/5). Coordination: Coordination normal.   Psychiatric:         Mood and Affect: Mood normal.          MDM     65 yo M with HTN, T2DM, chronic L3 compression fx, alcohol use disorder presents with generalized weakness without syncopal episodes, CP, SOB, palpitations in the setting of chronic alcoholism and malnutrition. Concern for sepsis; aspiration PNA, unknown source, hepatic encephalopathy, chronic malnourishment, hypovolemia. Low suspicion for MSK - no evidence of cauda equina, clinical exam not consistent with herniated disc    Temp 99F, BP soft 86/53, HR 91, satting RA 97%, RR 22. On exam, pt appears disshelved, covered in feces, urine, vomit, dirt from head to toe. AAOx3. Difficult to understand hx given dysarthria/ no teeth.    No scleral icterus, pupils 2+ equal and reactive to light b/l; purulent drainage from b/l eyes  Lungs with coarse breath sounds in upper lung fields b/l, mild wheezing in lower lung fields  Heart RRR no murmurs  Abdominal S/NT, mildly distended; no HSM  No asterixis. BLE chronic lymphedema, mild erythematous, non-tender, nl sensation. 5/5  strength, strength all 4 extremities    CBC WBC 12.9, no leukocytosis or neutrophil predominance; Hgb 8.9 (11.5 on 3/12/2020)  CMP K 3.2, Gluc 160, Cr 2.33 GAIL, elevated , , Alb 2.6 chronic malnourishment  Trop x1 negative  Mg 1.7  Lactate 5.89  UA - pending  Ammonia - pending  Bcx x2 - pending    CXR on my read, no evidence of consolidation    XR CHEST PORT    (Results Pending)     EKG HR 94, prolonged QT, otherwise no evidence of acute ischemic changes    Labs and imaging interpreted by myself and Dr. Floresita Rock, attending. Sepsis protocol activated - given levofloxacin, vanc, zosyn, KCl 50 mEq, 1 L NS    9:31 PM Pt updated and amenable to admission tx plan. Paged [facility] for admission. 9:40 PM spoke to Dr. Jazmin Peterson, amenable to admit pt to attending Dr. Luis E Almanzar. Will come see patient for admission. 9:47 PM Tried to reach [employer], left a voicemail as pt needs help with living situation after discharge.     Jason Walden MD/MPH  Emergency Medicine PGY-1  9:02 PM 06/25/21

## 2021-06-26 ENCOUNTER — APPOINTMENT (OUTPATIENT)
Dept: GENERAL RADIOLOGY | Age: 61
DRG: 871 | End: 2021-06-26
Attending: STUDENT IN AN ORGANIZED HEALTH CARE EDUCATION/TRAINING PROGRAM
Payer: MEDICARE

## 2021-06-26 LAB
ALBUMIN SERPL-MCNC: 2.2 G/DL (ref 3.4–5)
ALBUMIN SERPL-MCNC: 2.4 G/DL (ref 3.4–5)
ALBUMIN/GLOB SERPL: 0.5 {RATIO} (ref 0.8–1.7)
ALBUMIN/GLOB SERPL: 0.6 {RATIO} (ref 0.8–1.7)
ALP SERPL-CCNC: 138 U/L (ref 45–117)
ALP SERPL-CCNC: 161 U/L (ref 45–117)
ALT SERPL-CCNC: 30 U/L (ref 16–61)
ALT SERPL-CCNC: 33 U/L (ref 16–61)
AMMONIA PLAS-SCNC: 40 UMOL/L (ref 11–32)
AMPHET UR QL SCN: NEGATIVE
ANION GAP SERPL CALC-SCNC: 5 MMOL/L (ref 3–18)
ANION GAP SERPL CALC-SCNC: 7 MMOL/L (ref 3–18)
APPEARANCE UR: CLEAR
AST SERPL-CCNC: 85 U/L (ref 10–38)
AST SERPL-CCNC: 86 U/L (ref 10–38)
BACTERIA URNS QL MICRO: ABNORMAL /HPF
BARBITURATES UR QL SCN: NEGATIVE
BASOPHILS # BLD: 0 K/UL (ref 0–0.1)
BASOPHILS # BLD: 0 K/UL (ref 0–0.1)
BASOPHILS NFR BLD: 0 % (ref 0–2)
BASOPHILS NFR BLD: 0 % (ref 0–2)
BENZODIAZ UR QL: NEGATIVE
BILIRUB SERPL-MCNC: 3.2 MG/DL (ref 0.2–1)
BILIRUB SERPL-MCNC: 3.7 MG/DL (ref 0.2–1)
BILIRUB UR QL: ABNORMAL
BUN SERPL-MCNC: 32 MG/DL (ref 7–18)
BUN SERPL-MCNC: 33 MG/DL (ref 7–18)
BUN/CREAT SERPL: 18 (ref 12–20)
BUN/CREAT SERPL: 19 (ref 12–20)
CALCIUM SERPL-MCNC: 8.1 MG/DL (ref 8.5–10.1)
CALCIUM SERPL-MCNC: 8.3 MG/DL (ref 8.5–10.1)
CANNABINOIDS UR QL SCN: NEGATIVE
CHLORIDE SERPL-SCNC: 106 MMOL/L (ref 100–111)
CHLORIDE SERPL-SCNC: 107 MMOL/L (ref 100–111)
CK MB CFR SERPL CALC: ABNORMAL % (ref 0–4)
CK MB SERPL-MCNC: <1 NG/ML (ref 5–25)
CK SERPL-CCNC: 24 U/L (ref 39–308)
CO2 SERPL-SCNC: 29 MMOL/L (ref 21–32)
CO2 SERPL-SCNC: 29 MMOL/L (ref 21–32)
COCAINE UR QL SCN: NEGATIVE
COLOR UR: ABNORMAL
CREAT SERPL-MCNC: 1.68 MG/DL (ref 0.6–1.3)
CREAT SERPL-MCNC: 1.85 MG/DL (ref 0.6–1.3)
DIFFERENTIAL METHOD BLD: ABNORMAL
DIFFERENTIAL METHOD BLD: ABNORMAL
EOSINOPHIL # BLD: 0.1 K/UL (ref 0–0.4)
EOSINOPHIL # BLD: 0.1 K/UL (ref 0–0.4)
EOSINOPHIL NFR BLD: 1 % (ref 0–5)
EOSINOPHIL NFR BLD: 1 % (ref 0–5)
EPITH CASTS URNS QL MICRO: NEGATIVE /LPF (ref 0–5)
ERYTHROCYTE [DISTWIDTH] IN BLOOD BY AUTOMATED COUNT: 16 % (ref 11.6–14.5)
ERYTHROCYTE [DISTWIDTH] IN BLOOD BY AUTOMATED COUNT: 16.4 % (ref 11.6–14.5)
EST. AVERAGE GLUCOSE BLD GHB EST-MCNC: 94 MG/DL
ETHANOL SERPL-MCNC: <3 MG/DL (ref 0–3)
FOLATE SERPL-MCNC: 4.2 NG/ML (ref 3.1–17.5)
GLOBULIN SER CALC-MCNC: 3.9 G/DL (ref 2–4)
GLOBULIN SER CALC-MCNC: 4.5 G/DL (ref 2–4)
GLUCOSE BLD STRIP.AUTO-MCNC: 131 MG/DL (ref 70–110)
GLUCOSE BLD STRIP.AUTO-MCNC: 143 MG/DL (ref 70–110)
GLUCOSE BLD STRIP.AUTO-MCNC: 149 MG/DL (ref 70–110)
GLUCOSE SERPL-MCNC: 129 MG/DL (ref 74–99)
GLUCOSE SERPL-MCNC: 133 MG/DL (ref 74–99)
GLUCOSE UR STRIP.AUTO-MCNC: NEGATIVE MG/DL
HBA1C MFR BLD: 4.9 % (ref 4.2–5.6)
HCT VFR BLD AUTO: 24.2 % (ref 36–48)
HCT VFR BLD AUTO: 29.6 % (ref 36–48)
HDSCOM,HDSCOM: NORMAL
HGB BLD-MCNC: 8 G/DL (ref 13–16)
HGB BLD-MCNC: 9.4 G/DL (ref 13–16)
HGB UR QL STRIP: NEGATIVE
HYALINE CASTS URNS QL MICRO: ABNORMAL /LPF (ref 0–2)
KETONES UR QL STRIP.AUTO: NEGATIVE MG/DL
LEUKOCYTE ESTERASE UR QL STRIP.AUTO: ABNORMAL
LYMPHOCYTES # BLD: 1.7 K/UL (ref 0.9–3.6)
LYMPHOCYTES # BLD: 2.2 K/UL (ref 0.9–3.6)
LYMPHOCYTES NFR BLD: 13 % (ref 21–52)
LYMPHOCYTES NFR BLD: 19 % (ref 21–52)
MAGNESIUM SERPL-MCNC: 1.7 MG/DL (ref 1.6–2.6)
MCH RBC QN AUTO: 35.7 PG (ref 24–34)
MCH RBC QN AUTO: 35.9 PG (ref 24–34)
MCHC RBC AUTO-ENTMCNC: 31.8 G/DL (ref 31–37)
MCHC RBC AUTO-ENTMCNC: 33.1 G/DL (ref 31–37)
MCV RBC AUTO: 108.5 FL (ref 74–97)
MCV RBC AUTO: 112.5 FL (ref 74–97)
METHADONE UR QL: NEGATIVE
MONOCYTES # BLD: 1.3 K/UL (ref 0.05–1.2)
MONOCYTES # BLD: 1.5 K/UL (ref 0.05–1.2)
MONOCYTES NFR BLD: 11 % (ref 3–10)
MONOCYTES NFR BLD: 12 % (ref 3–10)
NEUTS SEG # BLD: 7.7 K/UL (ref 1.8–8)
NEUTS SEG # BLD: 9.8 K/UL (ref 1.8–8)
NEUTS SEG NFR BLD: 68 % (ref 40–73)
NEUTS SEG NFR BLD: 74 % (ref 40–73)
NITRITE UR QL STRIP.AUTO: POSITIVE
OPIATES UR QL: NEGATIVE
PCP UR QL: NEGATIVE
PH UR STRIP: 5 [PH] (ref 5–8)
PHOSPHATE SERPL-MCNC: 0.9 MG/DL (ref 2.5–4.9)
PLATELET # BLD AUTO: 81 K/UL (ref 135–420)
PLATELET # BLD AUTO: 94 K/UL (ref 135–420)
PMV BLD AUTO: 9.8 FL (ref 9.2–11.8)
PMV BLD AUTO: 9.9 FL (ref 9.2–11.8)
POTASSIUM SERPL-SCNC: 3.1 MMOL/L (ref 3.5–5.5)
POTASSIUM SERPL-SCNC: 3.5 MMOL/L (ref 3.5–5.5)
PROT SERPL-MCNC: 6.1 G/DL (ref 6.4–8.2)
PROT SERPL-MCNC: 6.9 G/DL (ref 6.4–8.2)
PROT UR STRIP-MCNC: ABNORMAL MG/DL
RBC # BLD AUTO: 2.23 M/UL (ref 4.35–5.65)
RBC # BLD AUTO: 2.63 M/UL (ref 4.35–5.65)
RBC #/AREA URNS HPF: NEGATIVE /HPF (ref 0–5)
SODIUM SERPL-SCNC: 141 MMOL/L (ref 136–145)
SODIUM SERPL-SCNC: 142 MMOL/L (ref 136–145)
SP GR UR REFRACTOMETRY: 1.02 (ref 1–1.03)
UROBILINOGEN UR QL STRIP.AUTO: 2 EU/DL (ref 0.2–1)
VANCOMYCIN SERPL-MCNC: 18.6 UG/ML (ref 5–40)
VIT B12 SERPL-MCNC: 1073 PG/ML (ref 211–911)
WBC # BLD AUTO: 11.4 K/UL (ref 4.6–13.2)
WBC # BLD AUTO: 13.1 K/UL (ref 4.6–13.2)
WBC URNS QL MICRO: ABNORMAL /HPF (ref 0–5)

## 2021-06-26 PROCEDURE — 36415 COLL VENOUS BLD VENIPUNCTURE: CPT

## 2021-06-26 PROCEDURE — 80202 ASSAY OF VANCOMYCIN: CPT

## 2021-06-26 PROCEDURE — 74011000258 HC RX REV CODE- 258: Performed by: FAMILY MEDICINE

## 2021-06-26 PROCEDURE — 80307 DRUG TEST PRSMV CHEM ANLYZR: CPT

## 2021-06-26 PROCEDURE — 84425 ASSAY OF VITAMIN B-1: CPT

## 2021-06-26 PROCEDURE — 82550 ASSAY OF CK (CPK): CPT

## 2021-06-26 PROCEDURE — 74011250637 HC RX REV CODE- 250/637: Performed by: STUDENT IN AN ORGANIZED HEALTH CARE EDUCATION/TRAINING PROGRAM

## 2021-06-26 PROCEDURE — 2709999900 HC NON-CHARGEABLE SUPPLY

## 2021-06-26 PROCEDURE — 82077 ASSAY SPEC XCP UR&BREATH IA: CPT

## 2021-06-26 PROCEDURE — 74011250636 HC RX REV CODE- 250/636: Performed by: FAMILY MEDICINE

## 2021-06-26 PROCEDURE — 85025 COMPLETE CBC W/AUTO DIFF WBC: CPT

## 2021-06-26 PROCEDURE — 80053 COMPREHEN METABOLIC PANEL: CPT

## 2021-06-26 PROCEDURE — 74011000258 HC RX REV CODE- 258: Performed by: STUDENT IN AN ORGANIZED HEALTH CARE EDUCATION/TRAINING PROGRAM

## 2021-06-26 PROCEDURE — 82962 GLUCOSE BLOOD TEST: CPT

## 2021-06-26 PROCEDURE — 82140 ASSAY OF AMMONIA: CPT

## 2021-06-26 PROCEDURE — 74011250636 HC RX REV CODE- 250/636: Performed by: STUDENT IN AN ORGANIZED HEALTH CARE EDUCATION/TRAINING PROGRAM

## 2021-06-26 PROCEDURE — 83036 HEMOGLOBIN GLYCOSYLATED A1C: CPT

## 2021-06-26 PROCEDURE — 65660000000 HC RM CCU STEPDOWN

## 2021-06-26 PROCEDURE — 83735 ASSAY OF MAGNESIUM: CPT

## 2021-06-26 PROCEDURE — 97165 OT EVAL LOW COMPLEX 30 MIN: CPT

## 2021-06-26 PROCEDURE — 84100 ASSAY OF PHOSPHORUS: CPT

## 2021-06-26 PROCEDURE — 71046 X-RAY EXAM CHEST 2 VIEWS: CPT

## 2021-06-26 PROCEDURE — 87086 URINE CULTURE/COLONY COUNT: CPT

## 2021-06-26 PROCEDURE — 81001 URINALYSIS AUTO W/SCOPE: CPT

## 2021-06-26 PROCEDURE — 82746 ASSAY OF FOLIC ACID SERUM: CPT

## 2021-06-26 RX ORDER — SODIUM CHLORIDE 9 MG/ML
125 INJECTION, SOLUTION INTRAVENOUS CONTINUOUS
Status: DISCONTINUED | OUTPATIENT
Start: 2021-06-26 | End: 2021-06-26

## 2021-06-26 RX ORDER — POTASSIUM CHLORIDE 750 MG/1
40 TABLET, FILM COATED, EXTENDED RELEASE ORAL EVERY 4 HOURS
Status: COMPLETED | OUTPATIENT
Start: 2021-06-26 | End: 2021-06-26

## 2021-06-26 RX ORDER — INSULIN LISPRO 100 [IU]/ML
INJECTION, SOLUTION INTRAVENOUS; SUBCUTANEOUS
Status: DISCONTINUED | OUTPATIENT
Start: 2021-06-26 | End: 2021-07-07 | Stop reason: HOSPADM

## 2021-06-26 RX ORDER — DEXTROSE 50 % IN WATER (D50W) INTRAVENOUS SYRINGE
25-50 AS NEEDED
Status: DISCONTINUED | OUTPATIENT
Start: 2021-06-26 | End: 2021-07-07 | Stop reason: HOSPADM

## 2021-06-26 RX ORDER — VANCOMYCIN HYDROCHLORIDE
1250 ONCE
Status: DISCONTINUED | OUTPATIENT
Start: 2021-06-26 | End: 2021-06-26 | Stop reason: SDUPTHER

## 2021-06-26 RX ORDER — POTASSIUM CHLORIDE AND SODIUM CHLORIDE 450; 150 MG/100ML; MG/100ML
INJECTION, SOLUTION INTRAVENOUS CONTINUOUS
Status: DISCONTINUED | OUTPATIENT
Start: 2021-06-26 | End: 2021-06-28

## 2021-06-26 RX ORDER — LEVOFLOXACIN 5 MG/ML
750 INJECTION, SOLUTION INTRAVENOUS EVERY 24 HOURS
Status: DISCONTINUED | OUTPATIENT
Start: 2021-06-26 | End: 2021-07-02

## 2021-06-26 RX ORDER — VANCOMYCIN HYDROCHLORIDE
1250 ONCE
Status: COMPLETED | OUTPATIENT
Start: 2021-06-26 | End: 2021-06-27

## 2021-06-26 RX ORDER — MAGNESIUM SULFATE 100 %
16 CRYSTALS MISCELLANEOUS AS NEEDED
Status: DISCONTINUED | OUTPATIENT
Start: 2021-06-26 | End: 2021-07-07 | Stop reason: HOSPADM

## 2021-06-26 RX ADMIN — SODIUM CHLORIDE 125 ML/HR: 900 INJECTION, SOLUTION INTRAVENOUS at 02:08

## 2021-06-26 RX ADMIN — POTASSIUM CHLORIDE AND SODIUM CHLORIDE: 450; 150 INJECTION, SOLUTION INTRAVENOUS at 12:24

## 2021-06-26 RX ADMIN — PIPERACILLIN AND TAZOBACTAM 3.38 G: 3; .375 INJECTION, POWDER, LYOPHILIZED, FOR SOLUTION INTRAVENOUS at 16:26

## 2021-06-26 RX ADMIN — PIPERACILLIN AND TAZOBACTAM 3.38 G: 3; .375 INJECTION, POWDER, LYOPHILIZED, FOR SOLUTION INTRAVENOUS at 23:17

## 2021-06-26 RX ADMIN — Medication 6 ML: at 14:00

## 2021-06-26 RX ADMIN — PIPERACILLIN AND TAZOBACTAM 2.25 G: 2; .25 INJECTION, POWDER, LYOPHILIZED, FOR SOLUTION INTRAVENOUS at 04:50

## 2021-06-26 RX ADMIN — POTASSIUM CHLORIDE AND SODIUM CHLORIDE: 450; 150 INJECTION, SOLUTION INTRAVENOUS at 21:34

## 2021-06-26 RX ADMIN — POTASSIUM CHLORIDE 40 MEQ: 750 TABLET, FILM COATED, EXTENDED RELEASE ORAL at 12:26

## 2021-06-26 RX ADMIN — LEVOFLOXACIN 750 MG: 5 INJECTION, SOLUTION INTRAVENOUS at 21:47

## 2021-06-26 RX ADMIN — THERA TABS 1 TABLET: TAB at 08:28

## 2021-06-26 RX ADMIN — POTASSIUM CHLORIDE 40 MEQ: 750 TABLET, FILM COATED, EXTENDED RELEASE ORAL at 21:35

## 2021-06-26 RX ADMIN — FOLIC ACID 1 MG: 1 TABLET ORAL at 08:28

## 2021-06-26 RX ADMIN — POTASSIUM CHLORIDE 40 MEQ: 750 TABLET, FILM COATED, EXTENDED RELEASE ORAL at 16:14

## 2021-06-26 RX ADMIN — PIPERACILLIN AND TAZOBACTAM 3.38 G: 3; .375 INJECTION, POWDER, LYOPHILIZED, FOR SOLUTION INTRAVENOUS at 10:55

## 2021-06-26 RX ADMIN — Medication 10 ML: at 06:20

## 2021-06-26 RX ADMIN — Medication 100 MG: at 08:28

## 2021-06-26 NOTE — PROGRESS NOTES
Occupational Therapy Goals  Initiated 6/26/2021 within 7 day(s). 1.  Patient will perform perform self feeding with modified independence and efficiency  2. Patient will demonstrate 4/5 UB strength (including hand intrinsics) in preparation for his efficient completion of his self care routine  3. Patient will perform grooming with modified independence progressing to EOB and standing at the sink as able  4. Patient will perform LB dressing with modified independence  5. Patient will complete LB clothes management with min assist    Prior Level of Function: he reports he lives in a boarding house with others and was independent with his self care. He is reporting it was becoming more difficult to walk up and down the stairs (his room is on the second floor) and to step over the bathtub to get into the shower. OCCUPATIONAL THERAPY EVALUATION    Patient: Tosin Anderson (14 y.o. male)  Date: 6/26/2021  Primary Diagnosis: Sepsis ([facility] [A41.9]  Chronic malnutrition ([facility] [E46]  Alcoholism ([facility] [F10.20]  GAIL (acute kidney injury) ([facility] [N17.9]  Alcohol abuse [F10.10]        Precautions: fall    PLOF: he reports he lives in a boarding house with others and was independent with his self care. He is reporting it was becoming more difficult to walk up and down the stairs (his room is on the second floor) and to step over the bathtub to get into the shower. ASSESSMENT :  Based on the objective data described below, the patient presents with decreased UB strength and decreased functional mobility which limits his independence and his safety at this time. He is declining to sit on the edge of the bed secondary to c/o nausea. Patient will benefit from skilled intervention to address the above impairments.   Patient's rehabilitation potential is considered to be Fair  Factors which may influence rehabilitation potential include:   []             None noted  []             Mental ability/status  [x]             Medical condition  [x]             Home/family situation and support systems  [x]             Safety awareness  []             Pain tolerance/management  []             Other:      PLAN :  Recommendations and Planned Interventions:   [x]               Self Care Training                  [x]      Therapeutic Activities  [x]               Functional Mobility Training   []      Cognitive Retraining  [x]               Therapeutic Exercises           []      Endurance Activities  []               Balance Training                    []      Neuromuscular Re-Education  []               Visual/Perceptual Training     [x]      Home Safety Training  [x]               Patient Education                   []      Family Training/Education  []               Other (comment):    Frequency/Duration: Patient will be followed by occupational therapy 1-2 times per day/4-7 days per week to address goals. Discharge Recommendations: [address] depending on his progress  Further Equipment Recommendations for Discharge: N/A     SUBJECTIVE:   Patient stated I think I just did too much walking at the [facility] and that is why I couldn't stand.     OBJECTIVE DATA SUMMARY:   No past medical history on file. No past surgical history on file.   Barriers to Learning/Limitations: yes;  cognitive  Compensate with: limit setting  Home Situation:   Home Situation  Home Environment: Shelter  One/Two Story Residence: One story  Living Alone: No  Support Systems: None  Patient Expects to be Discharged to[de-identified] Unknown (Homeless)  Current DME Used/Available at Home: None  [x]  Right hand dominant   []  Left hand dominant    Cognitive/Behavioral Status:  Neurologic State: Alert  Orientation Level: Oriented X 3 (decreased insight)    Skin: no skin integrity issues noted during OT evaluation  Edema: no UE edema noted    Vision/Perceptual:      Tracking is [facility]       Coordination: BUE   Minimal tremors during AROM (likely linked to his overall weakness)    Balance:   He declined to sit on the EOB secondary to nausea    Strength: BUE  3+/5 throughout with the exception of bilateral hand intrinsics which are 3 to 3-/5     Tone & Sensation: BUE  WFL     Range of Motion: BUE  AROM is [facility]   Functional Mobility and Transfers for ADLs:  Bed Mobility:   Min assist rolling  ADL Assessment:    Self feeding: SBA with occasional min assist (secondary to UE weakness)  Grooming: SBA (simulated) with occasional min assist (secondary to UE weakness)  UB bathing/dressing: SBA (simulated) with occasional min assist (secondary to UE weakness)  LB bathing/dressing: unable to complete or simulate     Pain:  Pain level pre-treatment: 0/10   Pain level post-treatment: 0/10     Activity Tolerance:   No SOB noted  Please refer to the flowsheet for vital signs taken during this treatment. After treatment:   [] Patient left in no apparent distress sitting up in chair  [x] Patient left in no apparent distress in bed  [x] Call bell left within reach  [] Nursing notified  [] Caregiver present  [] Bed alarm activated    COMMUNICATION/EDUCATION:   [x] Role of Occupational Therapy in the acute care setting  [] Home safety education was provided and the patient/caregiver indicated understanding. [] Patient/family have participated as able in goal setting and plan of care. [x] Patient/family agree to work toward stated goals and plan of care. [] Patient understands intent and goals of therapy, but is neutral about his/her participation. [] Patient is unable to participate in goal setting and plan of care. Thank you for this referral.  Faith Coronado, OTR/L  Time Calculation: 15 mins    Eval Complexity: History: LOW Complexity : Brief history review ; Examination: LOW Complexity : 1-3 performance deficits relating to physical, cognitive , or psychosocial skils that result in activity limitations and / or participation restrictions ;    Decision Making:LOW Complexity : No comorbidities that affect functional and no verbal or physical assistance needed to complete eval tasks

## 2021-06-26 NOTE — ROUTINE PROCESS
Bedside shift change report given to Augustin Redmond (oncoming nurse) by Polo Costa (offgoing nurse). Report included the following information SBAR, Procedure Summary, MAR and Recent Results.

## 2021-06-26 NOTE — ED NOTES
Report included the following information SBAR, Kardex, MAR and Recent Results. SITUATION:    Code Status: DNR   Reason for Admission: Sepsis ([facility] [A41.9]   Chronic malnutrition ([facility] [E46]   Alcoholism ([facility] [F10.20]   GAIL (acute kidney injury) ([facility] [N17.9]   Alcohol abuse [F10.10]    [facility] day: 1   Problem List:       Hospital Problems  Never Reviewed        Codes Class Noted POA    Alcoholism ([facility] ICD-10-CM: F10.20  ICD-9-CM: 303.90  6/25/2021 Unknown        Chronic malnutrition ([facility] ICD-10-CM: E46  ICD-9-CM: 263.9  6/25/2021 Unknown        Self-care deficit for bathing and hygiene ICD-10-CM: Z74.1  ICD-9-CM: V40.39  6/25/2021 Yes        Anemia ICD-10-CM: D64.9  ICD-9-CM: 285.9  6/25/2021 Yes        Hypokalemia ICD-10-CM: E87.6  ICD-9-CM: 276.8  6/25/2021 Yes        Alcohol abuse ICD-10-CM: F10.10  ICD-9-CM: 305.00  6/25/2021 Unknown        [facility] ([facility] ICD-10-CM: A41.9  ICD-9-CM: 038.9, 995.91  2/29/2020 Unknown        GAIL (acute kidney injury) ([facility] ICD-10-CM: N17.9  ICD-9-CM: 584.9  2/29/2020 Unknown              BACKGROUND:    Past Medical History: No past medical history on file.       Patient taking anticoagulants no     ASSESSMENT:    Changes in Assessment Throughout Shift: none     Patient has Central Line: no Reasons if yes: n/a   Patient has Veliz Cath: no Reasons if yes: n/a      Last Vitals:     Vitals:    06/26/21 0400 06/26/21 0415 06/26/21 0430 06/26/21 0445   BP: 123/69 128/70 119/73 125/75   Pulse: 88 94 91 93   Resp: (!) 31 30 26 25   Temp:    97.8 °F (36.6 °C)   SpO2: 94% 96% 95% 96%   Weight:       Height:            IV and DRAINS (will only show if present)   Peripheral IV 06/25/21 Left Antecubital-Site Assessment: Clean, dry, & intact     WOUND (if present)   Wound Type:  none   Dressing present     Wound Concerns/Notes:  none     PAIN    Pain Assessment                      o Interventions for Pain:  none  o Intervention effective: n/a  o Time of last intervention: n/a   o Reassessment Completed: no      Last 3 Weights:  Last 3 Recorded Weights in this Encounter    06/25/21 1955   Weight: 86.2 kg (190 lb)     Weight change:      INTAKE/OUPUT    Current Shift: 06/25 1901 - 06/26 0700  In: 1800 [I.V.:1800]  Out: -     Last three shifts: No intake/output data recorded.  LAB RESULTS     Recent Labs     06/26/21 0033 06/25/21 2000   WBC 11.4 12.9   HGB 8.0* 8.9*   HCT 24.2* 27.0*   PLT 81* 100*        Recent Labs     06/26/21 0033 06/25/21 2000    138   K 3.5 3.2*   * 160*   BUN 33* 35*   CREA 1.85* 2.33*   CA 8.1* 8.9   MG  --  1.7       RECOMMENDATIONS AND DISCHARGE PLANNING     1. Pending tests/procedures/ Plan of Care or Other Needs: urine sample     2. Discharge plan for patient and Needs/Barriers: n/a    3. Estimated Discharge Date:  Posted on Whiteboard in Patients Room: no      4. The patient's care plan was reviewed with the oncoming nurse. \"HEALS\" SAFETY CHECK      Fall Risk    Total Score: 1    Safety Measures:      A safety check occurred in the patient's room between off going nurse and oncoming nurse listed above. The safety check included the below items  Area Items   H  High Alert Medications - Verify all high alert medication drips (heparin, PCA, etc.)   E  Equipment - Suction is set up for ALL patients (with yanker)  - Red plugs utilized for all equipment (IV pumps, etc.)  - WOWs wiped down at end of shift.  - Room stocked with oxygen, suction, and other unit-specific supplies   A  Alarms - Bed alarm is set for fall risk patients  - Ensure chair alarm is in place and activated if patient is up in a chair   L  Lines - Check IV for any infiltration  - Veliz bag is empty if patient has a Veliz   - Tubing and IV bags are labeled   S  Safety   - Room is clean, patient is clean, and equipment is clean. - Hallways are clear from equipment besides carts.    - Fall bracelet on for fall risk patients  - Ensure room is clear and free of clutter  - Suction is set up for ALL patients (with mishel)  - Hallways are clear from equipment besides carts.    - Isolation precautions followed, supplies available outside room, sign posted     La Crews

## 2021-06-26 NOTE — PROGRESS NOTES
Pharmacy Dosing Services: Vancomycin    Indication: Sepsis of Unknown Etiology    Day of therapy: 1    Other Antimicrobials (Include dose, start day & day of therapy):  Levofloxacin (Levaquin) and Piperacillin/Tazobactam (Zosyn)    Loading dose (date given): 2000 mg  Current Maintenance dose: 1250 mg  IV x 1 dose    Goal Vancomycin Level: Vancomycin Trough: 15 - 20 mcg/mL (most infections)    Vancomycin Level (if drawn):   Recent Labs     21  1700   VANCR 18.6       Significant Cultures:   Results       Procedure Component Value Units Date/Time    CULTURE, URINE [[id number]] Collected: 21 0622    Order Status: Completed Specimen: Urine from Clean catch Updated: 21 1253    CULTURE, BLOOD [[id number]] Collected: 21 2100    Order Status: Completed Specimen: Blood Updated: 21     Special Requests: NO SPECIAL REQUESTS        Culture result: NO GROWTH AFTER 8 HOURS       CULTURE, BLOOD [[id number]] Collected: 21 204    Order Status: Completed Specimen: Blood Updated: 21 1310     Special Requests: NO SPECIAL REQUESTS        GRAM STAIN       AEROBIC BOTTLE GRAM POSITIVE RODS                  CALLED TO AND CORRECTLY REPEATED BY: NANNETTE GREENWOOD. RN 4S AT 8820 TO BKS           Culture result:       CULTURE IN PROGRESS,FURTHER UPDATES TO FOLLOW                  Sent to [facility] for ID/Susceptibility if indicated. Weight 86.2 kg (190 lb 0.6 oz)  Recent Labs     21  0633 21  0033 21   CREA 1.68* 1.85* 2.33*   BUN 32* 33* 35*   WBC 13.1 11.4 12.9     Temp (72hrs), Av °F (36.7 °C), Min:97 °F (36.1 °C), Max:99 °F (37.2 °C)    Estimated Creatinine Clearance Estimated Creatinine Clearance: 53.7 mL/min (A) (based on SCr of 1.68 mg/dL (H)).   Estimated Creatinine Clearance (using IBW): 53.7 mL/min       CAPD, Hemodialysis or Renal Replacement Therapy: N/A    Renal function stable? (unstable defined as SCr increase of 0.5 mg/dL or > 50% increase from baseline, whichever is greater) (Y/N): N       Regimen assessment:   - new start vancomycin. Patient with GAIL which is rapidly improving. Will give 1 dose of 1250 mg tonight ~ 24 hours from LD. Random level 18.6 ~ 17 hours from last dose. Will get random tomorrow ~ 2100. If renal function improves will start to schedule doses instead of DPL. - Blood cultures with GPR x 1 bottle. Second bottle blood culture pending (no growth yet, but has only been 8 hours). Urine cultures pending. Maintenance dose: 1250 mg  IV x 1 dose  Next scheduled level: random 06/27/21 ~ 2100       Pharmacy will follow daily and adjust medications as appropriate for renal function and/or serum levels.     Thank you,  Shun Castillo, PHARMD

## 2021-06-26 NOTE — PROGRESS NOTES
[facility]  Intern Progress Note    Patient: Adrienne Garcia MRN: [id number]   SSN: [id number]  YOB: 1960   Age: 64 y.o. Sex: male      Admit Date: 6/25/2021    LOS: 1 day   Chief Complaint   Patient presents with    Extremity Weakness     Subjective:     Patient was seen this morning. No acute events since arriving to the floor. He reports he has increased range of motion in his lower extremities. He continues to have a robust cough that has yet to produce any sputum. I asked the patient about his social situation and he states that he lives in a boarding house. Otherwise, patient had no additional concerns.       General ROS: negative for  - chills, fever, night sweats, weight gain and weight loss  Psychological ROS: negative for - anxiety and depression  Ophthalmic ROS: negative for - blurry vision, decreased vision or loss of vision  ENT ROS: negative for - headaches, hearing change or visual changes  Hematological and Lymphatic ROS: negative for - bruising, jaundice  Respiratory ROS: negative for - cough, hemoptysis, shortness of breath, orthopnea, paroxysmal dyspnea, or wheezing  Cardiovascular ROS: negative for - chest pain, dyspnea on exertion, edema, loss of consciousness, or palpitations   Gastrointestinal ROS: negative for - abdominal pain, blood in stools, change in stools, constipation, diarrhea, hematemesis, melena, nausea/vomiting or swallowing difficulty/pain  Genito-Urinary ROS: negative for - dysuria, hematuria or urinary frequency/urgency  Musculoskeletal ROS: negative for - joint pain, joint swelling or muscle pain  Neurological ROS: negative for - dizziness, headaches, numbness/tingling, +weakness  Dermatological ROS: negative for - rash or skin lesion changes    Objective:     Visit Vitals  /60   Pulse 94   Temp 99 °F (37.2 °C)   Resp 23   Ht 6' 2\" (1.88 m)   Wt 86.2 kg (190 lb)   SpO2 96%   BMI 24.39 kg/m²     Patient Vitals for the past 24 hrs:   Temp Pulse Resp BP SpO2   06/26/21 0115 -- 94 23 107/60 96 %   06/26/21 0100 -- 90 27 111/72 95 %   06/26/21 0045 -- 91 26 (!) 105/58 95 %   06/26/21 0030 -- 93 (!) 33 106/69 95 %   06/26/21 0015 -- 92 -- 105/69 95 %   06/26/21 0000 -- 93 23 99/62 95 %   06/25/21 2345 -- 92 30 100/69 93 %   06/25/21 2330 -- 100 23 92/66 96 %   06/25/21 2315 -- 93 24 104/69 93 %   06/25/21 2300 -- 94 25 109/68 93 %   06/25/21 2245 -- 94 27 114/68 93 %   06/25/21 2230 -- 95 29 112/72 92 %   06/25/21 2215 -- 95 26 120/71 95 %   06/25/21 2200 -- 98 25 118/74 97 %   06/25/21 2145 -- 95 23 122/72 97 %   06/25/21 2130 -- 95 26 125/73 97 %   06/25/21 2115 -- 97 28 122/76 98 %   06/25/21 2100 -- 93 28 109/67 97 %   06/25/21 2045 -- 93 27 109/70 97 %   06/25/21 2030 -- 94 29 107/71 96 %   06/25/21 2015 -- 95 21 (!) 98/58 95 %   06/25/21 2000 -- 94 20 (!) 82/49 93 %   06/25/21 1955 99 °F (37.2 °C) 91 22 -- 97 %   06/25/21 1945 -- 90 28 (!) 85/53 93 %     Physical Exam:   General:  AAOx3, NAD, Appears disheveled now in a patient gown   HEENT: Conjunctiva pink, sclera anicteric. PERRL. EOMI. Pharynx moist, nonerythematous. Moist mucous membranes. Thyroid not enlarged, no nodules. No cervical, supraclavicular, occipital or submandibular lymphadenopathy. No other gross abnormalities present. CV:  RRR, no murmurs. No visible pulsations or thrills. RESP:  Unlabored breathing. Lungs clear to auscultation without adventitious breath sounds. Equal expansion bilaterally. ABD:  Soft, nontender, nondistended. BS (+). No hepatosplenomegaly. No suprapubic tenderness. MS:  No joint deformity or instability. No atrophy. Neuro:  CN II-XII grossly intact. 5/5 strength bilateral upper extremities and lower extremities. Ext:  No edema. 2+ radial and dp pulses bilaterally. 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 Skin:  No rashes, lesions, or ulcers. Good turgor.   Dirt and scab present on the patient's lower feet with +1 Edema Bilaterally      Intake and Output:  Current Shift: 06/25 1901 - 06/26 0700  In: 1800 [I.V.:1800]  Out: -   Last three shifts: No intake/output data recorded. Lab/Data Review:  Recent Results (from the past 12 hour(s))   CBC WITH AUTOMATED DIFF    Collection Time: 06/25/21  8:00 PM   Result Value Ref Range    WBC 12.9 4.6 - 13.2 K/uL    RBC 2.47 (L) 4.35 - 5.65 M/uL    HGB 8.9 (L) 13.0 - 16.0 g/dL    HCT 27.0 (L) 36.0 - 48.0 %    .3 (H) 74.0 - 97.0 FL    MCH 36.0 (H) 24.0 - 34.0 PG    MCHC 33.0 31.0 - 37.0 g/dL    RDW 15.9 (H) 11.6 - 14.5 %    PLATELET 289 (L) 229 - 420 K/uL    MPV 10.1 9.2 - 11.8 FL    NEUTROPHILS 72 40 - 73 %    LYMPHOCYTES 14 (L) 21 - 52 %    MONOCYTES 13 (H) 3 - 10 %    EOSINOPHILS 0 0 - 5 %    BASOPHILS 0 0 - 2 %    ABS. NEUTROPHILS 9.3 (H) 1.8 - 8.0 K/UL    ABS. LYMPHOCYTES 1.8 0.9 - 3.6 K/UL    ABS. MONOCYTES 1.6 (H) 0.05 - 1.2 K/UL    ABS. EOSINOPHILS 0.0 0.0 - 0.4 K/UL    ABS. BASOPHILS 0.0 0.0 - 0.1 K/UL    DF AUTOMATED     METABOLIC PANEL, COMPREHENSIVE    Collection Time: 06/25/21  8:00 PM   Result Value Ref Range    Sodium 138 136 - 145 mmol/L    Potassium 3.2 (L) 3.5 - 5.5 mmol/L    Chloride 101 100 - 111 mmol/L    CO2 24 21 - 32 mmol/L    Anion gap 13 3.0 - 18 mmol/L    Glucose 160 (H) 74 - 99 mg/dL    BUN 35 (H) 7.0 - 18 MG/DL    Creatinine 2.33 (H) 0.6 - 1.3 MG/DL    BUN/Creatinine ratio 15 12 - 20      GFR est AA 35 (L) >60 ml/min/1.73m2    GFR est non-AA 29 (L) >60 ml/min/1.73m2    Calcium 8.9 8.5 - 10.1 MG/DL    Bilirubin, total 3.8 (H) 0.2 - 1.0 MG/DL    ALT (SGPT) 35 16 - 61 U/L    AST (SGOT) 103 (H) 10 - 38 U/L    Alk.  phosphatase 169 (H) 45 - 117 U/L    Protein, total 7.3 6.4 - 8.2 g/dL    Albumin 2.6 (L) 3.4 - 5.0 g/dL    Globulin 4.7 (H) 2.0 - 4.0 g/dL    A-G Ratio 0.6 (L) 0.8 - 1.7     CARDIAC PANEL,(CK, CKMB & TROPONIN)    Collection Time: 06/25/21  8:00 PM   Result Value Ref Range    CK - MB <1.0 <3.6 ng/ml    CK-MB Index  0.0 - 4.0 %     CALCULATION NOT PERFORMED WHEN RESULT IS BELOW LINEAR LIMIT    CK 70 39 - 308 U/L    Troponin-I, QT 0.02 0.0 - 0.045 NG/ML   MAGNESIUM    Collection Time: 06/25/21  8:00 PM   Result Value Ref Range    Magnesium 1.7 1.6 - 2.6 mg/dL   EKG, 12 LEAD, INITIAL    Collection Time: 06/25/21  8:49 PM   Result Value Ref Range    Ventricular Rate 94 BPM    Atrial Rate 94 BPM    P-R Interval 146 ms    QRS Duration 74 ms    Q-T Interval 388 ms    QTC Calculation (Bezet) 485 ms    Calculated P Axis 65 degrees    Calculated R Axis 72 degrees    Calculated T Axis 74 degrees    Diagnosis       Normal sinus rhythm  Prolonged QT  Abnormal ECG  When compared with ECG of 29-FEB-2020 13:25,  No significant change was found  Confirmed by Monika Higgins ([id number]) on 6/25/2021 10:13:30 PM     POC LACTIC ACID    Collection Time: 06/25/21  8:55 PM   Result Value Ref Range    Lactic Acid (POC) 5.89 (HH) 0.40 - 2.00 mmol/L   POC LACTIC ACID    Collection Time: 06/25/21 11:51 PM   Result Value Ref Range    Lactic Acid (POC) 1.75 0.40 - 2.00 mmol/L   AMMONIA    Collection Time: 06/26/21 12:33 AM   Result Value Ref Range    Ammonia 40 (H) 11 - 32 UMOL/L   METABOLIC PANEL, COMPREHENSIVE    Collection Time: 06/26/21 12:33 AM   Result Value Ref Range    Sodium 141 136 - 145 mmol/L    Potassium 3.5 3.5 - 5.5 mmol/L    Chloride 107 100 - 111 mmol/L    CO2 29 21 - 32 mmol/L    Anion gap 5 3.0 - 18 mmol/L    Glucose 129 (H) 74 - 99 mg/dL    BUN 33 (H) 7.0 - 18 MG/DL    Creatinine 1.85 (H) 0.6 - 1.3 MG/DL    BUN/Creatinine ratio 18 12 - 20      GFR est AA 45 (L) >60 ml/min/1.73m2    GFR est non-AA 37 (L) >60 ml/min/1.73m2    Calcium 8.1 (L) 8.5 - 10.1 MG/DL    Bilirubin, total 3.2 (H) 0.2 - 1.0 MG/DL    ALT (SGPT) 30 16 - 61 U/L    AST (SGOT) 85 (H) 10 - 38 U/L    Alk.  phosphatase 138 (H) 45 - 117 U/L    Protein, total 6.1 (L) 6.4 - 8.2 g/dL    Albumin 2.2 (L) 3.4 - 5.0 g/dL    Globulin 3.9 2.0 - 4.0 g/dL    A-G Ratio 0.6 (L) 0.8 - 1.7     CBC WITH AUTOMATED DIFF Collection Time: 06/26/21 12:33 AM   Result Value Ref Range    WBC 11.4 4.6 - 13.2 K/uL    RBC 2.23 (L) 4.35 - 5.65 M/uL    HGB 8.0 (L) 13.0 - 16.0 g/dL    HCT 24.2 (L) 36.0 - 48.0 %    .5 (H) 74.0 - 97.0 FL    MCH 35.9 (H) 24.0 - 34.0 PG    MCHC 33.1 31.0 - 37.0 g/dL    RDW 16.0 (H) 11.6 - 14.5 %    PLATELET 81 (L) 053 - 420 K/uL    MPV 9.9 9.2 - 11.8 FL    NEUTROPHILS 68 40 - 73 %    LYMPHOCYTES 19 (L) 21 - 52 %    MONOCYTES 12 (H) 3 - 10 %    EOSINOPHILS 1 0 - 5 %    BASOPHILS 0 0 - 2 %    ABS. NEUTROPHILS 7.7 1.8 - 8.0 K/UL    ABS. LYMPHOCYTES 2.2 0.9 - 3.6 K/UL    ABS. MONOCYTES 1.3 (H) 0.05 - 1.2 K/UL    ABS. EOSINOPHILS 0.1 0.0 - 0.4 K/UL    ABS.  BASOPHILS 0.0 0.0 - 0.1 K/UL    DF AUTOMATED     PHOSPHORUS    Collection Time: 06/26/21 12:33 AM   Result Value Ref Range    Phosphorus 0.9 (L) 2.5 - 4.9 MG/DL   ETHYL ALCOHOL    Collection Time: 06/26/21 12:33 AM   Result Value Ref Range    ALCOHOL(ETHYL),SERUM <3 0 - 3 MG/DL   VITAMIN B12 & FOLATE    Collection Time: 06/26/21 12:33 AM   Result Value Ref Range    Vitamin B12 1,073 (H) 211 - 911 pg/mL    Folate 4.2 3.10 - 17.50 ng/mL       Key Findings or tests:   DATE TEST RESULT OR IMPRESSION      06/25/21      CXR  Read Pending      06/25/21      EKG  NSR with Prolonged QTC of 485                     Telemetry NONE   Oxygen NONE     Assessment and Plan:     Mr. Umer Quach is a 64 y.o. male with Past Medical History Notable for Type 2 Diabetes, Hypertension, Hyperlipidemia and a history of an L3 Compression Fracture in March 2020, now presenting with complaint of increased weakness and unsteadiness on his feet in the setting of meeting sepsis criteria and an GAIL.      Sepsis 2/2 in the setting of Acute Alcohol Abuse   -At admission, WBC 12.9, Heart Rates in the 90s, soft BPs ~80s/50s, lactate 5.89   -S/P 1 L Bolus of NS, Levofloxacin and Zosyn x 1 for coverage of Aspiration PNA    -Concern for Aspirational PNA given vomitus on the patient's shirt  -Reportedly drinks 6-pack of beer and 4-5 mixed rum drinks per day, has not decreased or increased his intake recently  -, ALT 35 on admission   -Mild Resting Tremor on exam  -Serum Ethanol <3, Ammonia 40   -Blood Cultures (6/25) - Pending     Plan  - CIWA protocol  - Continue Thiamine 359 mg and Folic Acid 1 mg   - Fall Precautions  - Follow up on Blood Cultures  - Follow up on Chest X-ray  - Follow up on UDS      Acute Kidney Injury   -Baseline Creatinine of 0.6 - 0.8   -Creatinine 2.33 on admission  -Likely Pre-renal due to dehydration in the setting of Alcohol Use Disorder  -S/P Sepsis bolus ordered in ED     Plan  - Continue Maintenance IVF (NS at 125 mL/hr)   - Avoid nephrotoxic meds  - Monitor Creatinine with Daily CMP     Macrocytic Anemia and Thrombocytopenia  -Likely due to malnourishment in the setting of acute alcohol disorder   -Hb 8.9, .3, and Platelets 053  -Folate 4.2, B12 Elevated at 1,073    Plan  -Monitor Hemoglobin and MCV with Daily CBC     Hypokalemia   -Potassium of 3.2, Magnesium of 1.7 on admission  -S/p KCl 40 mg PO x 1 and KCl 10 mg IV x 1   -AM Potassium of 3.5      Plan  -Continue Potassium Electrolyte Repletion Protocol   - Monitor Potassium with Daily CMP     Hypertension  -BP Hypotensive to the 80s/50s on Admission   -S/P 1 L Bolus of Normal Saline   -BP now 122/76 on evaluation  -Previously on Lisinopril 10 mg every day, but patient denies taking this medication      Plan  -Monitor Blood Pressures while on the Floor   -Can restart Lisinopril if the patient's Blood Pressure remains elevated once his GAIL Resolves      Type 2 Diabetes   - Last A1c (06/24/2020) - 5.8  - Previously on Metformin but not taking any medications for his Diabetes Currently  -Manages Diabetes with Diet Control   -Blood Glucose Trend ->  [id number]    Plan    -Monitor Blood Glucose  -Can consider SSI     History of L3 Compression Fracture   -MRI Lumbar Spine (3/2/2020) - subacute appearing superior endplate  fracture of the L3 vertebral body. There is approximately 30% height loss.  There  is extension of the fracture line to the posterior vertebral body wall, with 4 mm associated retropulsion  -Found on lumbar radiograph in [facility] ED S/p fall and presented to ED on 2/23. -Patient was to follow up with ortho outpatient per notes in 2020  -Patient denies any numbness, tingling, or pain is his back        Plan    -Pain Control with Tylenol PRN  -Follow up Outpatient with Ortho       Diet  Regular   DVT Prophylaxis  SCDs and SQH   GI Prophylaxis  Protonix    Code status  DNR / DNI   Disposition  Admit      Point of Contact [employer]  Relationship: Brother  [phone]     Jens Longoria MD, PGY-1   Hieu Mccall   Intern Pager: [phone]   June 26, 2021, 2:55 AM

## 2021-06-26 NOTE — H&P
Ad[facility] History and Physical  [facility]      Patient: Belem Loco MRN: [id number]  [facility]: [id number]    YOB: 1960  Age: 64 y.o. Sex: male      Admission Date: 6/25/2021       HPI:     Belem Loco is a 64 y.o. male with Past Medical History Notable for Type 2 Diabetes, Hypertension, Hyperlipidemia and a history of an L3 Compression Fracture in March 2020, now presenting with complaint of increased weakness and unsteadiness on his feet in the setting of meeting sepsis criteria and an GAIL. Patient reports that his weakness occurred this morning when he was found on his front porch and an was unable to stand up due to weakness in his lower extremities. Patient is a poor historian and reports that he did fall 1-2 weeks ago, but denies hitting his head or LOC. At baseline, patient is able to ambulate without assistance. He denies and numbness or tingling in his upper extremities, with no difficulty urinating, and no calf tenderness. He reports poor PO intake for the past week due to forgetting because he \"gets too drunk\". Patient is a poor historian and reported to me that he only drank a half a beer a day despite the smell of alcohol on his breath. He also reports that he vomited once about a week ago though has vomitus on his shirt. He reported to the ED physician that he drinks a 6-pack of beer a day in addition to 4-5 mixed rum drinks a day. PFM was consulted for admission for generalized LE weakness in the setting of acute alcohol abuse.      ED Course (See objective for values/interpretations):  Labs obtained: Lactic Acid 5.89, WBC 12.9, Hemoglobin 8.9, .3, Platelets 406, Troponin 0.02,    Medications administered: 1 L NS Bolus, KCl 40 mg PO x 1 and KCl 10 mg IV x 1, Zosyn x 1, Levofloxacin x 1   Imaging obtained:    -Chest X-ray (6/25) - Pending  -EKG (6/25) - NSR with Prolonged QTC of 485     Review of Systems:  General ROS: negative for  - chills, fever, night sweats, weight gain and weight loss  Psychological ROS: negative for - anxiety and depression  Ophthalmic ROS: negative for - blurry vision, decreased vision or loss of vision  ENT ROS: negative for - headaches, hearing change or visual changes  Hematological and Lymphatic ROS: negative for - bruising, jaundice  Respiratory ROS: negative for - cough, hemoptysis, shortness of breath, orthopnea, paroxysmal dyspnea, or wheezing  Cardiovascular ROS: negative for - chest pain, dyspnea on exertion, edema, loss of consciousness, or palpitations   Gastrointestinal ROS: negative for - abdominal pain, blood in stools, change in stools, constipation, diarrhea, hematemesis, melena, nausea/vomiting or swallowing difficulty/pain  Genito-Urinary ROS: negative for - dysuria, hematuria or urinary frequency/urgency  Musculoskeletal ROS: negative for - joint pain, joint swelling or muscle pain  Neurological ROS: negative for - dizziness, headaches, numbness/tingling or weakness  Dermatological ROS: negative for - rash or skin lesion changes    No past medical history on file. No past surgical history on file. No family history on file. Social History     Socioeconomic History    Marital status:      Spouse name: Not on file    Number of children: Not on file    Years of education: Not on file    Highest education level: Not on file     Social Determinants of Health     Financial Resource Strain:     Difficulty of Paying Living Expenses:    Food Insecurity:     Worried About Running Out of Food in the Last Year:     [address] in the Last Year:    Transportation Needs:     Lack of Transportation (Medical):      Lack of Transportation (Non-Medical):    Physical Activity:     Days of Exercise per Week:     Minutes of Exercise per Session:    Stress:     Feeling of Stress :    Social Connections:     Frequency of Communication with Friends and Family:     Frequency of Social Gatherings with Friends and Family:     Attends Shinto Services:     Active Member of Clubs or Organizations:     Attends Club or Organization Meetings:     Marital Status:        No Known Allergies    Prior to Admission Medications   Prescriptions Last Dose Informant Patient Reported? Taking?   acetaminophen (TYLENOL) 325 mg tablet   No No   Sig: Take 2 Tabs by mouth every six (6) hours. atorvastatin (LIPITOR) 10 mg tablet   No No   Sig: Take 1 Tab by mouth daily. clotrimazole (LOTRIMIN) 1 % topical cream   No No   Sig: Apply  to affected area two (2) times a day. lidocaine 4 % patch   No No   Sig: Back pain   lisinopriL (PRINIVIL, ZESTRIL) 10 mg tablet   No No   Sig: Take 1 Tab by mouth daily. metFORMIN ER (GLUCOPHAGE XR) 750 mg tablet   No No   Sig: Take 2 Tabs by mouth daily. polyethylene glycol (MIRALAX) 17 gram packet   No No   Sig: Take 1 Packet by mouth daily as needed for Constipation. Facility-Administered Medications: None       Physical Exam:     Patient Vitals for the past 24 hrs:   Temp Pulse Resp BP SpO2   06/25/21 2115 -- 97 28 122/76 98 %   06/25/21 2100 -- 93 28 109/67 97 %   06/25/21 2045 -- 93 27 109/70 97 %   06/25/21 2030 -- 94 29 107/71 96 %   06/25/21 2015 -- 95 21 (!) 98/58 95 %   06/25/21 2000 -- 94 20 (!) 82/49 93 %   06/25/21 1955 99 °F (37.2 °C) 91 22 -- 97 %   06/25/21 1945 -- 90 28 (!) 85/53 93 %       Physical Exam:   General:  AAOx3, NAD, Appears dissevered covered in feces, urine, vomit, dirt from head to toe  HEENT: Conjunctiva pink, sclera anicteric. PERRL. EOMI. Pharynx moist, nonerythematous. Moist mucous membranes. Thyroid not enlarged, no nodules. No cervical, supraclavicular, occipital or submandibular lymphadenopathy. No other gross abnormalities present. CV:  RRR, no murmurs. No visible pulsations or thrills. RESP:  Unlabored breathing. Lungs clear to auscultation without adventitious breath sounds. Equal expansion bilaterally.     ABD:  Soft, nontender, nondistended. BS (+). No hepatosplenomegaly. No suprapubic tenderness. MS:  No joint deformity or instability. No atrophy. Neuro:  CN II-XII grossly intact. 5/5 strength bilateral upper extremities and lower extremities. Ext:  No edema. 2+ radial and dp pulses bilaterally. Skin:  No rashes, lesions, or ulcers. Good turgor. Chemistry Recent Labs     06/25/21 2000   *      K 3.2*      CO2 24   BUN 35*   CREA 2.33*   CA 8.9   MG 1.7   AGAP 13   BUCR 15   *   TP 7.3   ALB 2.6*   GLOB 4.7*   AGRAT 0.6*        CBC w/Diff Recent Labs     06/25/21 2000   WBC 12.9   RBC 2.47*   HGB 8.9*   HCT 27.0*   *   GRANS 72   LYMPH 14*   EOS 0        Liver Enzymes Protein, total   Date Value Ref Range Status   06/25/2021 7.3 6.4 - 8.2 g/dL Final     Albumin   Date Value Ref Range Status   06/25/2021 2.6 (L) 3.4 - 5.0 g/dL Final     Globulin   Date Value Ref Range Status   06/25/2021 4.7 (H) 2.0 - 4.0 g/dL Final     A-G Ratio   Date Value Ref Range Status   06/25/2021 0.6 (L) 0.8 - 1.7   Final     Alk. phosphatase   Date Value Ref Range Status   06/25/2021 169 (H) 45 - 117 U/L Final     Recent Labs     06/25/21 2000   TP 7.3   ALB 2.6*   GLOB 4.7*   AGRAT 0.6*   *        Lactic Acid Lactic acid   Date Value Ref Range Status   02/29/2020 1.7 0.4 - 2.0 MMOL/L Final     No results for input(s): LAC in the last 72 hours. BNP No results found for: BNP, BNPP, XBNPT     Cardiac Enzymes Lab Results   Component Value Date/Time    CPK 70 06/25/2021 08:00 PM    CKMB <1.0 06/25/2021 08:00 PM    CKND1  06/25/2021 08:00 PM     CALCULATION NOT PERFORMED WHEN RESULT IS BELOW LINEAR LIMIT    TROIQ 0.02 06/25/2021 08:00 PM        Coagulation No results for input(s): PTP, INR, APTT, INREXT, INREXT in the last 72 hours.       Thyroid  Lab Results   Component Value Date/Time    TSH 2.35 06/19/2019 09:15 PM          Lipid Panel No results found for: CHOL, CHOLPOCT, [address] [facility], [address], [id number], HDL, HDLP, LDL, LDLC, DLDLP, [id number], VLDLC, VLDL, TGLX, TRIGL, TRIGP, TGLPOCT, CHHD, CHHDX     ABG No results for input(s): PHI, PHI, POC2, PCO2I, PO2, PO2I, HCO3, HCO3I, FIO2, FIO2I in the last 72 hours. Urinalysis Lab Results   Component Value Date/Time    Color DARK YELLOW 02/29/2020 07:00 PM    Appearance CLEAR 02/29/2020 07:00 PM    Specific gravity 1.016 02/29/2020 07:00 PM    pH (UA) 5.0 02/29/2020 07:00 PM    Protein TRACE (A) 02/29/2020 07:00 PM    Glucose NEGATIVE  02/29/2020 07:00 PM    Ketone TRACE (A) 02/29/2020 07:00 PM    Bilirubin NEGATIVE  02/29/2020 07:00 PM    Urobilinogen 2.0 (H) 02/29/2020 07:00 PM    Nitrites NEGATIVE  02/29/2020 07:00 PM    Leukocyte Esterase NEGATIVE  02/29/2020 07:00 PM    Epithelial cells NEGATIVE  02/29/2020 07:00 PM    Bacteria FEW (A) 02/29/2020 07:00 PM    WBC 1 to 3 02/29/2020 07:00 PM    RBC NEGATIVE  02/29/2020 07:00 PM        Micro No results for input(s): SDES, CULT in the last 72 hours. No results for input(s): CULT in the last 72 hours. Imaging:  XR (Most Recent). Results from [place] encounter on 02/29/20    XR HIP LT W OR WO PELV 2-3 VWS    Narrative  EXAM:  XR HIP LT W OR WO PELV 2-3 VWS    INDICATION:   evaluate for joint effusion/fracture. severe pain left hip. COMPARISON: None    FINDINGS:    No fracture, destruction, or dislocation. No arthropathic changes. Normal  mineralization. Impression  IMPRESSION:    Normal left hip. CT (Most Recent) Results from Hospital Encounter encounter on 02/29/20    CT LOW EXT LT W CONT    Narrative  CT of left lower extremity with contrast    HISTORY: Left foreleg edema. Clinical concern of abscess. COMPARISON: None. TECHNIQUE: Contiguous thin axial images to the left foreleg are performed from  the level of above the knee to the level of heel after IV contrast  administration.  Computer coronal and sagittal reconstruction images are  performed for better evaluation of the bony structures in relation to the  adjacent vasculature and soft tissue structures and reduce the radiation dose. 3D postprocessed images, including surface shaded display, also produce for this  exam, permanently archived, and interpreted. All CT scans at this facility performed using dose optimization techniques as  appreciated to a performed exam, to include automated exposure control,  adjustment of the mA and or KU according to patient size (including appropriate  matching for site specific examination), or use of iterative reconstruction  technique. Contrast:100 Isovue 300. FINDINGS:    There is diffuse and significant edema identified in imaged left foreleg  including subcutaneous compartment and also mildly involving the muscular  compartments with mild skin thickening, more pronounced toward to the ankle. Although, no focal fluid collection or abscess identified. No definite open  wound or radiopaque foreign body seen. There are moderate atherosclerotic calcified plaques noted in the popliteal and  pretibial arteries. The tibia and fibula appear intact with no definite osseous  lesion seen. No fracture or dislocation. Mild knee joint space narrowing noted  but no significant osteophytic changes. No joint effusion. .    Impression  IMPRESSION:    1.. Extensive edema of left the foreleg in subcutaneous and muscular  compartments but no focal fluid collection or abscess seen. 2. Mild DJD of right knee. Thank you for your referral.       ECHO No results found for this or any previous visit. EKG No results found for this or any previous visit.      Recent Results (from the past 12 hour(s))   CBC WITH AUTOMATED DIFF    Collection Time: 06/25/21  8:00 PM   Result Value Ref Range    WBC 12.9 4.6 - 13.2 K/uL    RBC 2.47 (L) 4.35 - 5.65 M/uL    HGB 8.9 (L) 13.0 - 16.0 g/dL    HCT 27.0 (L) 36.0 - 48.0 %    .3 (H) 74.0 - 97.0 FL    MCH 36.0 (H) 24.0 - 34.0 PG    MCHC 33.0 31.0 - 37.0 g/dL    RDW 15.9 (H) 11.6 - 14.5 %    PLATELET 814 (L) 874 - 420 K/uL    MPV 10.1 9.2 - 11.8 FL    NEUTROPHILS 72 40 - 73 %    LYMPHOCYTES 14 (L) 21 - 52 %    MONOCYTES 13 (H) 3 - 10 %    EOSINOPHILS 0 0 - 5 %    BASOPHILS 0 0 - 2 %    ABS. NEUTROPHILS 9.3 (H) 1.8 - 8.0 K/UL    ABS. LYMPHOCYTES 1.8 0.9 - 3.6 K/UL    ABS. MONOCYTES 1.6 (H) 0.05 - 1.2 K/UL    ABS. EOSINOPHILS 0.0 0.0 - 0.4 K/UL    ABS. BASOPHILS 0.0 0.0 - 0.1 K/UL    DF AUTOMATED     METABOLIC PANEL, COMPREHENSIVE    Collection Time: 06/25/21  8:00 PM   Result Value Ref Range    Sodium 138 136 - 145 mmol/L    Potassium 3.2 (L) 3.5 - 5.5 mmol/L    Chloride 101 100 - 111 mmol/L    CO2 24 21 - 32 mmol/L    Anion gap 13 3.0 - 18 mmol/L    Glucose 160 (H) 74 - 99 mg/dL    BUN 35 (H) 7.0 - 18 MG/DL    Creatinine 2.33 (H) 0.6 - 1.3 MG/DL    BUN/Creatinine ratio 15 12 - 20      GFR est AA 35 (L) >60 ml/min/1.73m2    GFR est non-AA 29 (L) >60 ml/min/1.73m2    Calcium 8.9 8.5 - 10.1 MG/DL    Bilirubin, total 3.8 (H) 0.2 - 1.0 MG/DL    ALT (SGPT) 35 16 - 61 U/L    AST (SGOT) 103 (H) 10 - 38 U/L    Alk.  phosphatase 169 (H) 45 - 117 U/L    Protein, total 7.3 6.4 - 8.2 g/dL    Albumin 2.6 (L) 3.4 - 5.0 g/dL    Globulin 4.7 (H) 2.0 - 4.0 g/dL    A-G Ratio 0.6 (L) 0.8 - 1.7     CARDIAC PANEL,(CK, CKMB & TROPONIN)    Collection Time: 06/25/21  8:00 PM   Result Value Ref Range    CK - MB <1.0 <3.6 ng/ml    CK-MB Index  0.0 - 4.0 %     CALCULATION NOT PERFORMED WHEN RESULT IS BELOW LINEAR LIMIT    CK 70 39 - 308 U/L    Troponin-I, QT 0.02 0.0 - 0.045 NG/ML   MAGNESIUM    Collection Time: 06/25/21  8:00 PM   Result Value Ref Range    Magnesium 1.7 1.6 - 2.6 mg/dL   EKG, 12 LEAD, INITIAL    Collection Time: 06/25/21  8:49 PM   Result Value Ref Range    Ventricular Rate 94 BPM    Atrial Rate 94 BPM    P-R Interval 146 ms    QRS Duration 74 ms    Q-T Interval 388 ms    QTC Calculation (Bezet) 485 ms    Calculated P Axis 65 degrees    Calculated R Axis 72 degrees    Calculated T Axis 74 degrees Diagnosis       Normal sinus rhythm  Prolonged QT  Abnormal ECG  When compared with ECG of 29-FEB-2020 13:25,  No significant change was found  Confirmed by Geri Dyer ([id number]) on 6/25/2021 10:13:30 PM     POC LACTIC ACID    Collection Time: 06/25/21  8:55 PM   Result Value Ref Range    Lactic Acid (POC) 5.89 (HH) 0.40 - 2.00 mmol/L       Assessment/Plan:     Mr. Tosin Anderson is a 64 y.o. male with Past Medical History Notable for Type 2 Diabetes, Hypertension, Hyperlipidemia and a history of an L3 Compression Fracture in March 2020, now presenting with complaint of increased weakness and unsteadiness on his feet in the setting of meeting sepsis criteria and an GAIL.      Sepsis 2/2 in the setting of Acute Alcohol Abuse   -At admission, WBC 12.9, Heart Rates in the 90s, soft BPs ~80s/50s, lactate 5.89   -S/P 1 L Bolus of NS, Levofloxacin and Zosyn x 1 for coverage of Aspiration PNA    -Concern for Aspirational PNA given vomitus on the patient's shirt  -Reportedly drinks 6-pack of beer and 4-5 mixed rum drinks per day, has not decreased or increased his intake recently  -, ALT 35 on admission   -Mild Resting Tremor on exam  -Blood Cultures (6/25) - Pending    Plan  [] CIWA protocol  [] Thiamine 304 mg  [] Folic Acid 1 mg   [] Fall Precautions  [] Daily CMP to trend transaminases  [] Trend Lactate   [] Follow up on Blood Cultures  [] Follow up on Chest X-ray  [] Follow up on Serum Ethanol and UDS   [] Pain Control with Tylenol with careful monitoring given Liver impairment in the setting of alcohol abuse   [] PT / OT / CM    Acute Kidney Injury   -Baseline Creatinine of 0.6 - 0.8   -Creatinine 2.33 on admission  -Likely Pre-renal due to dehydration in the setting of Alcohol Use Disorder  -S/P Sepsis bolus ordered in ED    Plan  [] Will start the patient on MIVF (NS at 125 mL/hr) given poor PO intake  [] Avoid nephrotoxic meds  [] Monitor Creatinine with Daily CMP    Macrocytic Anemia and Thrombocytopenia  -Likely due to malnourishment in the setting of acute alcohol disorder   -Hb 8.9, .3, and Platelets 458    Plan  [] Follow up on Serum Folic Acid  [] Monitor Hemoglobin and MCV with Daily CBC     Hypokalemia   -Potassium of 3.2, Magnesium of 1.7 on admission  -S/p KCl 40 mg PO x 1 and KCl 10 mg IV x 1     Plan  [] Will start the patient on the electrolyte repletion protocol and replete as necessary    [] Monitor Potassium with Daily CMP    Hypertension  -BP Hypotensive to the 80s/50s on Admission   -S/P 1 L Bolus of Normal Saline   -BP now 122/76 on exam  -Previously on Lisinopril 10 mg every day, but patient denies taking this medication     Plan  [] Monitor Blood Pressures while on the Floor   [] Can restart Lisinopril if the patient's Blood Pressure remains elevated once his GAIL Resolves     Type 2 Diabetes   - Last A1c (06/24/2020) - 5.8  - Previously on Metformin but not taking any medications for his Diabetes Currently  -Manages Diabetes with Diet Control     Plan    [] Monitor Blood Glucose  [] consider SSI    History of L3 Compression Fracture   -MRI Lumbar Spine (3/2/2020) - subacute appearing superior endplate  fracture of the L3 vertebral body. There is approximately 30% height loss. There  is extension of the fracture line to the posterior vertebral body wall, with 4 mm associated retropulsion  -Found on lumbar [facility]ograph in [facility] ED S/p fall and presented to ED on 2/23. -Patient was to follow up with ortho outpatient per notes in 2020  -Patient denies any numbness, tingling, or pain is his back       Plan    [] Pain Control with Tylenol PRN  [] Follow up Outpatient with Ortho    For additional problem list, assessment, and plan please see intern note.     Diet   Regular   DVT Prophylaxis  SCDs and SQH   GI Prophylaxis  Protonix   Code status  DNR / DNI   Disposition  Admit      Point of Contact  [employer]  Relationship: Brother  [phone]      Lopez Liu MD , PGY-1 [facility]   Senior Pager: [phone]   June 25, 2021, 10:17 PM       Senior Addendum    Pt is 63 yo male w/PMH as above notable for alcoholism who presents for weakness, impaired gait in setting of (+) SIRS and GAIL. Poor historian with disheveled appearance with evidence of vomitous, feces, and generalized filth on his person. LFTs:  and ALT 35. Sepsis protocol initiated in ED. Lactic acid elevated to 5.89: will trend until correcting. CXR, UDS and EtOH pending. Suspect insufficent diet and possible thiamin/folate deficiencies. These labs are pending - phos also. Pt on CIWA. Will follow-up BCx and replete electrolytes and vitamin deficiencies as they are revealed. Suspect pt condition will improve significantly w/rehydration and appropriate nutrition. Consider OP substance counseling on discharge.      Herson Morse  [phone]
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
## 2021-06-27 ENCOUNTER — APPOINTMENT (OUTPATIENT)
Dept: GENERAL RADIOLOGY | Age: 61
DRG: 871 | End: 2021-06-27
Attending: STUDENT IN AN ORGANIZED HEALTH CARE EDUCATION/TRAINING PROGRAM
Payer: MEDICARE

## 2021-06-27 PROBLEM — R65.20 SEVERE SEPSIS (HCC): Status: ACTIVE | Noted: 2020-02-29

## 2021-06-27 LAB
ALBUMIN SERPL-MCNC: 2 G/DL (ref 3.4–5)
ALBUMIN/GLOB SERPL: 0.5 {RATIO} (ref 0.8–1.7)
ALP SERPL-CCNC: 146 U/L (ref 45–117)
ALT SERPL-CCNC: 30 U/L (ref 16–61)
ANION GAP SERPL CALC-SCNC: 6 MMOL/L (ref 3–18)
AST SERPL-CCNC: 107 U/L (ref 10–38)
BACTERIA SPEC CULT: ABNORMAL
BACTERIA SPEC CULT: NORMAL
BASOPHILS # BLD: 0 K/UL (ref 0–0.1)
BASOPHILS NFR BLD: 0 % (ref 0–2)
BILIRUB SERPL-MCNC: 3.2 MG/DL (ref 0.2–1)
BUN SERPL-MCNC: 22 MG/DL (ref 7–18)
BUN/CREAT SERPL: 19 (ref 12–20)
CALCIUM SERPL-MCNC: 7.9 MG/DL (ref 8.5–10.1)
CHLORIDE SERPL-SCNC: 109 MMOL/L (ref 100–111)
CO2 SERPL-SCNC: 26 MMOL/L (ref 21–32)
CREAT SERPL-MCNC: 1.15 MG/DL (ref 0.6–1.3)
DIFFERENTIAL METHOD BLD: ABNORMAL
EOSINOPHIL # BLD: 0 K/UL (ref 0–0.4)
EOSINOPHIL NFR BLD: 0 % (ref 0–5)
ERYTHROCYTE [DISTWIDTH] IN BLOOD BY AUTOMATED COUNT: 16.7 % (ref 11.6–14.5)
GLOBULIN SER CALC-MCNC: 3.8 G/DL (ref 2–4)
GLUCOSE BLD STRIP.AUTO-MCNC: 110 MG/DL (ref 70–110)
GLUCOSE BLD STRIP.AUTO-MCNC: 128 MG/DL (ref 70–110)
GLUCOSE BLD STRIP.AUTO-MCNC: 135 MG/DL (ref 70–110)
GLUCOSE BLD STRIP.AUTO-MCNC: 144 MG/DL (ref 70–110)
GLUCOSE SERPL-MCNC: 109 MG/DL (ref 74–99)
GRAM STN SPEC: ABNORMAL
GRAM STN SPEC: ABNORMAL
HCT VFR BLD AUTO: 26.9 % (ref 36–48)
HGB BLD-MCNC: 8.5 G/DL (ref 13–16)
LYMPHOCYTES # BLD: 1.5 K/UL (ref 0.9–3.6)
LYMPHOCYTES NFR BLD: 12 % (ref 21–52)
MAGNESIUM SERPL-MCNC: 1.6 MG/DL (ref 1.6–2.6)
MCH RBC QN AUTO: 35.6 PG (ref 24–34)
MCHC RBC AUTO-ENTMCNC: 31.6 G/DL (ref 31–37)
MCV RBC AUTO: 112.6 FL (ref 74–97)
MONOCYTES # BLD: 2 K/UL (ref 0.05–1.2)
MONOCYTES NFR BLD: 16 % (ref 3–10)
NEUTS SEG # BLD: 8.8 K/UL (ref 1.8–8)
NEUTS SEG NFR BLD: 70 % (ref 40–73)
PLATELET # BLD AUTO: 81 K/UL (ref 135–420)
PLATELET COMMENTS,PCOM: ABNORMAL
PMV BLD AUTO: 10.3 FL (ref 9.2–11.8)
POTASSIUM SERPL-SCNC: 4.3 MMOL/L (ref 3.5–5.5)
PROMYELOCYTES NFR BLD MANUAL: 2 %
PROT SERPL-MCNC: 5.8 G/DL (ref 6.4–8.2)
RBC # BLD AUTO: 2.39 M/UL (ref 4.35–5.65)
RBC MORPH BLD: ABNORMAL
RBC MORPH BLD: ABNORMAL
SERVICE CMNT-IMP: ABNORMAL
SERVICE CMNT-IMP: NORMAL
SODIUM SERPL-SCNC: 141 MMOL/L (ref 136–145)
WBC # BLD AUTO: 12.5 K/UL (ref 4.6–13.2)

## 2021-06-27 PROCEDURE — 83735 ASSAY OF MAGNESIUM: CPT

## 2021-06-27 PROCEDURE — 74011250636 HC RX REV CODE- 250/636: Performed by: STUDENT IN AN ORGANIZED HEALTH CARE EDUCATION/TRAINING PROGRAM

## 2021-06-27 PROCEDURE — 74011250636 HC RX REV CODE- 250/636: Performed by: FAMILY MEDICINE

## 2021-06-27 PROCEDURE — 85025 COMPLETE CBC W/AUTO DIFF WBC: CPT

## 2021-06-27 PROCEDURE — 74011000258 HC RX REV CODE- 258: Performed by: FAMILY MEDICINE

## 2021-06-27 PROCEDURE — 80053 COMPREHEN METABOLIC PANEL: CPT

## 2021-06-27 PROCEDURE — 2709999900 HC NON-CHARGEABLE SUPPLY

## 2021-06-27 PROCEDURE — 36415 COLL VENOUS BLD VENIPUNCTURE: CPT

## 2021-06-27 PROCEDURE — 97161 PT EVAL LOW COMPLEX 20 MIN: CPT

## 2021-06-27 PROCEDURE — 82962 GLUCOSE BLOOD TEST: CPT

## 2021-06-27 PROCEDURE — 65660000000 HC RM CCU STEPDOWN

## 2021-06-27 PROCEDURE — 74011250637 HC RX REV CODE- 250/637: Performed by: STUDENT IN AN ORGANIZED HEALTH CARE EDUCATION/TRAINING PROGRAM

## 2021-06-27 RX ORDER — NAPROXEN 250 MG/1
500 TABLET ORAL
Status: DISCONTINUED | OUTPATIENT
Start: 2021-06-27 | End: 2021-07-05

## 2021-06-27 RX ORDER — DICLOFENAC SODIUM 10 MG/G
2 GEL TOPICAL
Status: DISCONTINUED | OUTPATIENT
Start: 2021-06-27 | End: 2021-07-07 | Stop reason: HOSPADM

## 2021-06-27 RX ADMIN — THERA TABS 1 TABLET: TAB at 08:31

## 2021-06-27 RX ADMIN — Medication 100 MG: at 08:31

## 2021-06-27 RX ADMIN — PIPERACILLIN AND TAZOBACTAM 3.38 G: 3; .375 INJECTION, POWDER, LYOPHILIZED, FOR SOLUTION INTRAVENOUS at 05:33

## 2021-06-27 RX ADMIN — ACETAMINOPHEN 650 MG: 325 TABLET ORAL at 06:57

## 2021-06-27 RX ADMIN — POTASSIUM CHLORIDE AND SODIUM CHLORIDE: 450; 150 INJECTION, SOLUTION INTRAVENOUS at 11:15

## 2021-06-27 RX ADMIN — LORAZEPAM 1 MG: 1 TABLET ORAL at 13:28

## 2021-06-27 RX ADMIN — VANCOMYCIN HYDROCHLORIDE 750 MG: 750 INJECTION, POWDER, LYOPHILIZED, FOR SOLUTION INTRAVENOUS at 15:29

## 2021-06-27 RX ADMIN — PIPERACILLIN AND TAZOBACTAM 3.38 G: 3; .375 INJECTION, POWDER, LYOPHILIZED, FOR SOLUTION INTRAVENOUS at 11:24

## 2021-06-27 RX ADMIN — PIPERACILLIN AND TAZOBACTAM 3.38 G: 3; .375 INJECTION, POWDER, LYOPHILIZED, FOR SOLUTION INTRAVENOUS at 22:42

## 2021-06-27 RX ADMIN — FOLIC ACID 1 MG: 1 TABLET ORAL at 09:00

## 2021-06-27 RX ADMIN — Medication 10 ML: at 15:29

## 2021-06-27 RX ADMIN — LEVOFLOXACIN 750 MG: 5 INJECTION, SOLUTION INTRAVENOUS at 23:22

## 2021-06-27 RX ADMIN — LORAZEPAM 1 MG: 2 INJECTION INTRAMUSCULAR; INTRAVENOUS at 17:15

## 2021-06-27 RX ADMIN — VANCOMYCIN HYDROCHLORIDE 1250 MG: 10 INJECTION, POWDER, LYOPHILIZED, FOR SOLUTION INTRAVENOUS at 00:16

## 2021-06-27 RX ADMIN — Medication 10 ML: at 22:32

## 2021-06-27 RX ADMIN — PIPERACILLIN AND TAZOBACTAM 3.38 G: 3; .375 INJECTION, POWDER, LYOPHILIZED, FOR SOLUTION INTRAVENOUS at 17:52

## 2021-06-27 NOTE — PROGRESS NOTES
Shift Summary Note:  Physician in room examining patient Patient c/o of shoulder pain on the right. Recommended giving patient tylenol. Incontinent of stool x 3. Condom cath on and draining yellow urine. VSS. Breath sounds coarse with productive mucous. VSS, call bell within reach.   Patient Vitals for the past 12 hrs:   Temp Pulse Resp BP SpO2   06/27/21 0400 98.2 °F (36.8 °C) 91 20 117/75 94 %   06/27/21 0000 98.3 °F (36.8 °C) 100 24 125/74 96 %   06/26/21 2000 98.5 °F (36.9 °C) 91 24 116/71 94 %

## 2021-06-27 NOTE — PROGRESS NOTES
Problem: Mobility Impaired (Adult and Pediatric)  Goal: *Acute Goals and Plan of Care (Insert Text)  Description: Physical Therapy Goals  Initiated 6/27/2021 and to be accomplished within 7 day(s)  1. Patient will move from supine to sit and sit to supine , scoot up and down, and roll side to side in bed with modified independence. 2.  Patient will transfer from bed to chair and chair to bed with supervision/set-up using the least restrictive device. 3.  Patient will perform sit to stand with supervision/set-up. 4.  Patient will ambulate with supervision/set-up for 50 feet with the least restrictive device. 5.  Patient will ascend/descend stairs pending progress and necessity for discharge    PLOF: pt reports he was ind PTA, per chart review pt was living in boarding house with bedroom on second floor     Outcome: Progressing Towards Goal     PHYSICAL THERAPY EVALUATION    Patient: Maine Lozano (68 y.o. male)  Date: 6/27/2021  Primary Diagnosis: Sepsis ([facility] [A41.9]  Chronic malnutrition ([facility] [E46]  Alcoholism ([facility] [F10.20]  GAIL (acute kidney injury) ([facility] [N17.9]  Alcohol abuse [F10.10]        Precautions:      WBAT  PLOF: see above    ASSESSMENT :  Based on the objective data described below, the patient presents with generalized weakness, decreased functional mobility, impaired functional mobility from baseline. Pt received in bed in [facility], is oriented but confused as he randomly stated, \"I dont want a cheese Vietnamese\" when such was never brought up. Pt reporting R sided rip/chest pain distal to nipple line and thus refusing to get up despite maximal encouragement to even sit on EOB. Pt FPC rolls over with SBA but wont go any further. Pt ROM to BLE is WFL with strength being generally weak but unable to formally assess. Pt will be placed on 3 day trial to determine ability to participate and progress with skilled therapy in the acute setting.  Pt left in bed with all needs met and call bell within reach. Patient will benefit from skilled intervention to address the above impairments. Patient's rehabilitation potential is considered to be Good  Factors which may influence rehabilitation potential include:   []         None noted  [x]         Mental ability/status  [x]         Medical condition  [x]         Home/family situation and support systems  [x]         Safety awareness  [x]         Pain tolerance/management  []         Other:      PLAN :  Recommendations and Planned Interventions:   [x]           Bed Mobility Training             [x]    Neuromuscular Re-Education  [x]           Transfer Training                   []    Orthotic/Prosthetic Training  [x]           Gait Training                          []    Modalities  [x]           Therapeutic Exercises           []    Edema Management/Control  [x]           Therapeutic Activities            [x]    Family Training/Education  [x]           Patient Education  []           Other (comment):    Frequency/Duration: Patient will be followed by physical therapy 1-2 times per day/3-5 days per week to address goals. Discharge Recommendations: Rehab pending progress  Further Equipment Recommendations for Discharge: TBD     SUBJECTIVE:   Patient stated I am in too much pain right now.     OBJECTIVE DATA SUMMARY:   No past medical history on file. No past surgical history on file.   Barriers to Learning/Limitations: yes;  physical and altered mental status (i.e.Sedation, Confusion)  Compensate with: Visual Cues, Verbal Cues, and Tactile Cues  Home Situation:  Home Situation  Home Environment: Shelter  One/Two Story Residence: One story  Living Alone: No  Support Systems: None  Patient Expects to be Discharged to[de-identified] Unknown  Current DME Used/Available at Home: None  Critical Behavior:  Neurologic State: Alert  Orientation Level: Oriented X4  Cognition: Follows commands     Psychosocial  Patient Behaviors: Calm  Purposeful Interaction: Yes  Pt Identified Daily Priority: Clinical issues (comment)  Neil Process: Nurture loving kindness; Attend basic human needs;Create healing environment  Caring Interventions: Reassure; Therapeutic modalities  Reassure: Therapeutic listening;Prayer;Caring rounds  Therapeutic Modalities: Intentional therapeutic touch  Skin Condition/Temp: Warm;Dry;Flaky     Skin Integrity: Other (comment) (scabs)  Skin Integumentary  Skin Color: Dusky  Skin Condition/Temp: Warm;Dry;Flaky  Skin Integrity: Other (comment) (scabs)  Turgor: Epidermis thin w/ loss of subcut tissue  Hair Growth: Present  Varicosities: Absent  Nails: Exceptions to WDL  Exceptions to WDL: Discolored; Thick     Strength:    Strength: Generally decreased, functional                    Tone & Sensation:   Tone: Normal              Sensation: Intact               Range Of Motion:  AROM: Within functional limits                       Posture:        Functional Mobility:  Bed Mobility:  Rolling: Stand-by assistance  Supine to Sit:  (pt refuses )        Transfers:  Pt declines       Pain:  Pain level pre-treatment: not rated, reports R sided rib/chest pain /10   Pain level post-treatment: \"   \" /10   Pain Intervention(s) : Medication (see MAR); Rest, Ice, Repositioning  Response to intervention: Nurse notified    Activity Tolerance:   Fair    Please refer to the flowsheet for vital signs taken during this treatment. After treatment:   []         Patient left in no apparent distress sitting up in chair  [x]         Patient left in no apparent distress in bed  [x]         Call bell left within reach  [x]         Nursing notified  []         Caregiver present  []         Bed alarm activated  []         SCDs applied    COMMUNICATION/EDUCATION:   [x]         Role of Physical Therapy in the acute care setting. [x]         Fall prevention education was provided and the patient/caregiver indicated understanding.   [x]         Patient/family have participated as able in goal setting and plan of care.  [x]         Patient/family agree to work toward stated goals and plan of care. []         Patient understands intent and goals of therapy, but is neutral about his/her participation. []         Patient is unable to participate in goal setting/plan of care: ongoing with therapy staff.  []         Other:     Thank you for this referral.  Malena Clock   Time Calculation: 10 mins      Eval Complexity: History: MEDIUM  Complexity : 1-2 comorbidities / personal factors will impact the outcome/ POC Exam:MEDIUM Complexity : 3 Standardized tests and measures addressing body structure, function, activity limitation and / or participation in recreation  Presentation: LOW Complexity : Stable, uncomplicated  Clinical Decision Making:Low Complexity    Overall Complexity:LOW

## 2021-06-27 NOTE — PROGRESS NOTES
[facility]  Intern Progress Note    Patient: Deanna Ybarra MRN: [id number]   SSN: [id number]  YOB: 1960   Age: 64 y.o. Sex: male      Admit Date: 6/25/2021    LOS: 2 days   Chief Complaint   Patient presents with    Extremity Weakness       Subjective:     No acute events overnight    Patient complaining of right shoulder pain this morning \" where they dragged me\". Asking for something for pain. Endorses occasional cough, no SOB. No nausea or vomiting. Per nursing, has been incontinent of stool several times overnight. Nursing also noted intermittent confusion overnight. Patient is intermittently confused on my exam as well.         Objective:     Visit Vitals  /75   Pulse 91   Temp 98.2 °F (36.8 °C)   Resp 20   Ht 6' 2\" (1.88 m)   Wt 86.2 kg (190 lb 0.6 oz)   SpO2 94%   BMI 24.40 kg/m²       Physical Exam:   General appearance: alert, cooperative, no distress  Lungs: coarse breath sounds throughout, wet cough  Heart: regular rate and rhythm, S1, S2 normal, no murmur, click, rub or gallop  Abdomen: soft, non-tender  Skin: Diffuse ecchymosis on back and extremities  Neuro:  A&O x 3, although intermittently confused in conversation  Exremities: 1+ edema of LLE, venous stasis changes of skin    Intake and Output:  Current Shift: 06/26 1901 - 06/27 0700  In: -   Out: 750 [Urine:750]  Last three shifts: 06/25 0701 - 06/26 1900  In: 1800 [I.V.:1800]  Out: -     Lab/Data Review:  Recent Results (from the past 12 hour(s))   CBC WITH AUTOMATED DIFF    Collection Time: 06/27/21  4:18 AM   Result Value Ref Range    WBC 12.5 4.6 - 13.2 K/uL    RBC 2.39 (L) 4.35 - 5.65 M/uL    HGB 8.5 (L) 13.0 - 16.0 g/dL    HCT 26.9 (L) 36.0 - 48.0 %    .6 (H) 74.0 - 97.0 FL    MCH 35.6 (H) 24.0 - 34.0 PG    MCHC 31.6 31.0 - 37.0 g/dL    RDW 16.7 (H) 11.6 - 14.5 %    PLATELET 81 (L) 969 - 420 K/uL    MPV 10.3 9.2 - 11.8 FL    NEUTROPHILS PENDING %    LYMPHOCYTES PENDING %    MONOCYTES PENDING %    EOSINOPHILS PENDING %    BASOPHILS PENDING %    ABS. NEUTROPHILS PENDING K/UL    ABS. LYMPHOCYTES PENDING K/UL    ABS. MONOCYTES PENDING K/UL    ABS. EOSINOPHILS PENDING K/UL    ABS. BASOPHILS PENDING K/UL    DF PENDING        Key Findings or tests:     Creatinine 1.15  K 4.3  ALT/AST 30/107   Platelets 81  WBC 99.3    Blood cultures: 1/2 G+ rods   UA: + nitrites  CXR: There is a mild left lower lobe infiltrate present best seen on the lateral  view. This could represent aspiration pneumonia. Assessment and Plan:     Sonia Rigsg a 64 y. o. male with Past Medical History Notable for Type 2 Diabetes, Hypertension, Hyperlipidemia and a history of an L3 Compression Fracture in March 2020, now presenting with complaint of increased weakness and unsteadiness on his feet in the setting of meeting sepsis criteria and an GAIL.      Sepsis 2/2 aspiration PNA vs UTI- On admission, PKS 12.4, SASBB Rates in the 90s, soft BPs ~80s/50s, lactate 5. 89. Received fluid recussitation and broad spectrum antibiotics. Lactic acid has improved. Vital signs currently stable. WBC count is decreasing. UA yesterday was positive for nitrites. Repeat CXR yesterday also showed mild left lower lobe infiltrate, concerning for possible aspiration PNA. 1/2 bottles of blood cultures growing gram positive rods. - Follow up on blood and urine Cultures  - Continue Vaqnc/Zosyn/Levaquin- will narrow as cultures result     Alcohol use disorder -Reportedly drinks 6-pack of beer and 4-5 mixed rum drinks per day. , ALT 35 on admission (>2:1 ratio suggestive of alcoholic disease). Serum Ethanol <3, Ammonia 40 on admission. CIWA scores to date have been 2-3. Has not required any Ativan  - Continue CIWA protocol   - Continue Thiamine 326 mg and Folic Acid 1 mg   - Fall Precautions    Acute Kidney Injury - Baseline Creatinine of 0.6 - 0.8. Creatinine 2.33 on admission.  Likely Pre-renal due to dehydration in the setting of Alcohol Use Disorder. Has been on maintenance fluids since admission. Has had relatively poor PO intake to date. Cr 1.15 this morning.   - Continue Maintenance IVF, although will halve this morning (1/2NS with KCl at 75 mL/hr). As long as patient has good PO intake today, can stop  - Avoid nephrotoxic meds  - Monitor Creatinine with Daily CMP     Macrocytic Anemia and Thrombocytopenia- Likely due to malnourishment in the setting of acute alcohol disorder. Hemoglobin stable this morning at 8.5, no active bleeding. MCV is 112.  B12/folate WNL. Platelets 81 this morning, have been slowly decreasing.  - continue to monitor indices with daily CBC   - keep close attention to platelets, can consider holding SQH if they decrease any further     Hypokalemia - Resolved   - continue to monitor on daily BMP     Hypertension- Initially hypotensive. Normotensive overnight. Previously on Lisinopril 10 mg every day, but patient denies taking this medication.   -Monitor  -Can consider restarting Lisinopril if the patient's Blood Pressure increases, once his GAIL Resolves      Type 2 Diabetes - Last A1c (06/24/2020) - 5.8. Diet controlled   - POC glucose ACHS  - SSI  - hypoglycemic precautions     History of L3 Compression Fracture -MRI Lumbar Spine (3/2/2020) - subacute appearing superior endplate fracture of the L3 vertebral body. There is approximately 30% height loss. There is extension of the fracture line to the posterior vertebral body wall, with 4 mm associated retropulsion. Found on lumbar radiograph in [facility] ED S/p fall and presented to ED on 2/23. Patient was to follow up with ortho outpatient per notes in 2020.  Denies any numbness, tingling, or pain is his back     -Pain Control with Tylenol PRN  -Follow up Outpatient with Ortho    Shoulder pain- full ROM, no tenderness on exam   - Tylenol PRN   - XR    Diet Regular   DVT Prophylaxis SQH   GI Prophylaxis Protonix   Code status DNR/DNI     [facility]  Relationship: Brother  [phone]     Laureen Manzano, PGY-2   [facility] Medicine   June 27, 2021, 6:17 AM

## 2021-06-27 NOTE — ROUTINE PROCESS
Pt is confused and not able to answer questions for MRI screening. Called pt brother to obtain information but could not reach him.

## 2021-06-27 NOTE — ROUTINE PROCESS
Bedside and Verbal shift change report given to GUNNER Gotti (oncoming nurse) by CARLOS EDUARDO Rehman RN (offgoing nurse). Report included the following information SBAR, Kardex, MAR and Recent Results.

## 2021-06-27 NOTE — ROUTINE PROCESS
1956:Bedside and Verbal shift change report received from [address] [place] [address] (offgoing nurse). Report included the following information SBAR, Kardex, Intake/Output, MAR, Recent Results and Cardiac Rhythm ST.     2100: Attempted to call brother for MRI screening assistance. No answer. 0600: Patient is awake. AAOx4. Able to do MRI screening form. Signed, witnessed & faxed. 0742:Bedside and Verbal shift change report given to [employer] (oncoming nurse) by me (offgoing nurse). Report included the following information SBAR, Kardex, Intake/Output, MAR, Recent Results and Cardiac Rhythm SR/ST.

## 2021-06-27 NOTE — PROGRESS NOTES
MRI Safety Screening form needs to be filled out and FAXED to [phone] BEFORE MRI can be scheduled. If unable to acquire information from patient, [facility] must be contacted, or screening xrays will need to be ordred.      If pt is Claustrophobic or needs Pain Meds, please have these ordered in advance to help facilitate MRI exam.

## 2021-06-28 ENCOUNTER — APPOINTMENT (OUTPATIENT)
Dept: GENERAL RADIOLOGY | Age: 61
DRG: 871 | End: 2021-06-28
Attending: STUDENT IN AN ORGANIZED HEALTH CARE EDUCATION/TRAINING PROGRAM
Payer: MEDICARE

## 2021-06-28 ENCOUNTER — APPOINTMENT (OUTPATIENT)
Dept: CT IMAGING | Age: 61
DRG: 871 | End: 2021-06-28
Attending: FAMILY MEDICINE
Payer: MEDICARE

## 2021-06-28 ENCOUNTER — APPOINTMENT (OUTPATIENT)
Dept: MRI IMAGING | Age: 61
DRG: 871 | End: 2021-06-28
Attending: STUDENT IN AN ORGANIZED HEALTH CARE EDUCATION/TRAINING PROGRAM
Payer: MEDICARE

## 2021-06-28 ENCOUNTER — APPOINTMENT (OUTPATIENT)
Dept: GENERAL RADIOLOGY | Age: 61
DRG: 871 | End: 2021-06-28
Attending: FAMILY MEDICINE
Payer: MEDICARE

## 2021-06-28 LAB
ALBUMIN SERPL-MCNC: 1.9 G/DL (ref 3.4–5)
ALBUMIN/GLOB SERPL: 0.5 {RATIO} (ref 0.8–1.7)
ALP SERPL-CCNC: 149 U/L (ref 45–117)
ALT SERPL-CCNC: 34 U/L (ref 16–61)
ANION GAP SERPL CALC-SCNC: 6 MMOL/L (ref 3–18)
AST SERPL-CCNC: 109 U/L (ref 10–38)
BASOPHILS # BLD: 0.1 K/UL (ref 0–0.1)
BASOPHILS NFR BLD: 1 % (ref 0–2)
BILIRUB SERPL-MCNC: 2.6 MG/DL (ref 0.2–1)
BUN SERPL-MCNC: 17 MG/DL (ref 7–18)
BUN/CREAT SERPL: 19 (ref 12–20)
CALCIUM SERPL-MCNC: 7.6 MG/DL (ref 8.5–10.1)
CHLORIDE SERPL-SCNC: 112 MMOL/L (ref 100–111)
CO2 SERPL-SCNC: 25 MMOL/L (ref 21–32)
CREAT SERPL-MCNC: 0.91 MG/DL (ref 0.6–1.3)
DIFFERENTIAL METHOD BLD: ABNORMAL
EOSINOPHIL # BLD: 0.2 K/UL (ref 0–0.4)
EOSINOPHIL NFR BLD: 2 % (ref 0–5)
ERYTHROCYTE [DISTWIDTH] IN BLOOD BY AUTOMATED COUNT: 16.9 % (ref 11.6–14.5)
GLOBULIN SER CALC-MCNC: 3.7 G/DL (ref 2–4)
GLUCOSE BLD STRIP.AUTO-MCNC: 100 MG/DL (ref 70–110)
GLUCOSE BLD STRIP.AUTO-MCNC: 104 MG/DL (ref 70–110)
GLUCOSE BLD STRIP.AUTO-MCNC: 108 MG/DL (ref 70–110)
GLUCOSE BLD STRIP.AUTO-MCNC: 96 MG/DL (ref 70–110)
GLUCOSE SERPL-MCNC: 98 MG/DL (ref 74–99)
HCT VFR BLD AUTO: 25.7 % (ref 36–48)
HGB BLD-MCNC: 7.9 G/DL (ref 13–16)
LYMPHOCYTES # BLD: 2.1 K/UL (ref 0.9–3.6)
LYMPHOCYTES NFR BLD: 19 % (ref 21–52)
MAGNESIUM SERPL-MCNC: 1.4 MG/DL (ref 1.6–2.6)
MCH RBC QN AUTO: 35.4 PG (ref 24–34)
MCHC RBC AUTO-ENTMCNC: 30.7 G/DL (ref 31–37)
MCV RBC AUTO: 115.2 FL (ref 74–97)
MONOCYTES # BLD: 0.3 K/UL (ref 0.05–1.2)
MONOCYTES NFR BLD: 3 % (ref 3–10)
NEUTS SEG # BLD: 8.3 K/UL (ref 1.8–8)
NEUTS SEG NFR BLD: 75 % (ref 40–73)
PHOSPHATE SERPL-MCNC: 1.7 MG/DL (ref 2.5–4.9)
PLATELET # BLD AUTO: 97 K/UL (ref 135–420)
PLATELET COMMENTS,PCOM: ABNORMAL
PMV BLD AUTO: 10.3 FL (ref 9.2–11.8)
POTASSIUM SERPL-SCNC: 4.1 MMOL/L (ref 3.5–5.5)
PROT SERPL-MCNC: 5.6 G/DL (ref 6.4–8.2)
RBC # BLD AUTO: 2.23 M/UL (ref 4.35–5.65)
RBC MORPH BLD: ABNORMAL
SODIUM SERPL-SCNC: 143 MMOL/L (ref 136–145)
WBC # BLD AUTO: 11 K/UL (ref 4.6–13.2)

## 2021-06-28 PROCEDURE — 74011636320 HC RX REV CODE- 636/320: Performed by: FAMILY MEDICINE

## 2021-06-28 PROCEDURE — 73030 X-RAY EXAM OF SHOULDER: CPT

## 2021-06-28 PROCEDURE — 77010033678 HC OXYGEN DAILY

## 2021-06-28 PROCEDURE — 74011250637 HC RX REV CODE- 250/637: Performed by: STUDENT IN AN ORGANIZED HEALTH CARE EDUCATION/TRAINING PROGRAM

## 2021-06-28 PROCEDURE — 74011250636 HC RX REV CODE- 250/636: Performed by: FAMILY MEDICINE

## 2021-06-28 PROCEDURE — 72158 MRI LUMBAR SPINE W/O & W/DYE: CPT

## 2021-06-28 PROCEDURE — A9575 INJ GADOTERATE MEGLUMI 0.1ML: HCPCS | Performed by: FAMILY MEDICINE

## 2021-06-28 PROCEDURE — 74011000258 HC RX REV CODE- 258: Performed by: FAMILY MEDICINE

## 2021-06-28 PROCEDURE — 84100 ASSAY OF PHOSPHORUS: CPT

## 2021-06-28 PROCEDURE — 87040 BLOOD CULTURE FOR BACTERIA: CPT

## 2021-06-28 PROCEDURE — 2709999900 HC NON-CHARGEABLE SUPPLY

## 2021-06-28 PROCEDURE — 73590 X-RAY EXAM OF LOWER LEG: CPT

## 2021-06-28 PROCEDURE — 73600 X-RAY EXAM OF ANKLE: CPT

## 2021-06-28 PROCEDURE — 65660000000 HC RM CCU STEPDOWN

## 2021-06-28 PROCEDURE — 74011250636 HC RX REV CODE- 250/636: Performed by: STUDENT IN AN ORGANIZED HEALTH CARE EDUCATION/TRAINING PROGRAM

## 2021-06-28 PROCEDURE — 82962 GLUCOSE BLOOD TEST: CPT

## 2021-06-28 RX ORDER — ACETAMINOPHEN 650 MG/1
650 SUPPOSITORY RECTAL
Status: DISCONTINUED | OUTPATIENT
Start: 2021-06-28 | End: 2021-07-07 | Stop reason: HOSPADM

## 2021-06-28 RX ORDER — MAGNESIUM SULFATE HEPTAHYDRATE 40 MG/ML
2 INJECTION, SOLUTION INTRAVENOUS
Status: DISPENSED | OUTPATIENT
Start: 2021-06-28 | End: 2021-06-28

## 2021-06-28 RX ORDER — ACETAMINOPHEN 325 MG/1
650 TABLET ORAL
Status: DISCONTINUED | OUTPATIENT
Start: 2021-06-28 | End: 2021-07-07 | Stop reason: HOSPADM

## 2021-06-28 RX ADMIN — POTASSIUM CHLORIDE AND SODIUM CHLORIDE: 450; 150 INJECTION, SOLUTION INTRAVENOUS at 05:51

## 2021-06-28 RX ADMIN — GADOTERATE MEGLUMINE 17 ML: 376.9 INJECTION INTRAVENOUS at 09:44

## 2021-06-28 RX ADMIN — Medication 10 ML: at 05:12

## 2021-06-28 RX ADMIN — THERA TABS 1 TABLET: TAB at 11:49

## 2021-06-28 RX ADMIN — PIPERACILLIN AND TAZOBACTAM 3.38 G: 3; .375 INJECTION, POWDER, LYOPHILIZED, FOR SOLUTION INTRAVENOUS at 11:49

## 2021-06-28 RX ADMIN — FOLIC ACID 1 MG: 1 TABLET ORAL at 11:49

## 2021-06-28 RX ADMIN — LEVOFLOXACIN 750 MG: 5 INJECTION, SOLUTION INTRAVENOUS at 21:35

## 2021-06-28 RX ADMIN — MAGNESIUM SULFATE HEPTAHYDRATE 2 G: 40 INJECTION, SOLUTION INTRAVENOUS at 15:42

## 2021-06-28 RX ADMIN — VANCOMYCIN HYDROCHLORIDE 750 MG: 750 INJECTION, POWDER, LYOPHILIZED, FOR SOLUTION INTRAVENOUS at 02:55

## 2021-06-28 RX ADMIN — PIPERACILLIN AND TAZOBACTAM 3.38 G: 3; .375 INJECTION, POWDER, LYOPHILIZED, FOR SOLUTION INTRAVENOUS at 05:09

## 2021-06-28 RX ADMIN — Medication 10 ML: at 15:48

## 2021-06-28 RX ADMIN — Medication 10 ML: at 21:35

## 2021-06-28 RX ADMIN — MAGNESIUM SULFATE HEPTAHYDRATE 2 G: 40 INJECTION, SOLUTION INTRAVENOUS at 14:00

## 2021-06-28 RX ADMIN — Medication 100 MG: at 11:49

## 2021-06-28 NOTE — CONSULTS
Comprehensive Nutrition Assessment    Type and Reason for Visit: Initial, Wound, Consult    Nutrition Recommendations/Plan:   - Add supplement: Ensure Enlive TID  - Encourage/ monitor po intake of meals      Nutrition Assessment:  Pt a little confused during visit. Per RN, had fair po intake for breakfast today, partly due to being taken to MRI. Noted wt loss PTA per chart hx. NFPE conducted. Has pressure injuries    Malnutrition Assessment:  Malnutrition Status:  Severe malnutrition    Context:  Acute illness     Findings of the 6 clinical characteristics of malnutrition:   Energy Intake:  7 - 50% or less of est energy requirements for 5 or more days  Weight Loss:  Unable to assess     Body Fat Loss:  Unable to assess,     Muscle Mass Loss:  7 - Moderate muscle mass loss, Calf, Temples (temporalis)  Fluid Accumulation:  Unable to assess,     Strength:  Not performed       Nutrition History and Allergies: Past medical hx: DM type 2, HTN, HLD, GAIL, alcohol abuse (6 pack of beer and 4-5 mixed rum drinks daily per H&P). Had poor po intake x week. Wt trends:  250 lb in February 2020,    198 lb in March 2020  (wt loss of 52 lb, 20.8% in 1 month); Then 190 lb on 6/26/21  (-8 lb, 4% x 15 month PTA per chart hx). No known food allergies     Estimated Daily Nutrient Needs:  Energy (kcal): 5862-5067; Weight Used for Energy Requirements: Admission (86.2 kg)  Protein (g): 103-121; Weight Used for Protein Requirements: Admission (x1.2-1.4)  Fluid (ml/day): 4179-9179; Method Used for Fluid Requirements: 1 ml/kcal      Nutrition Related Findings:  BM 6/27. +edema. Meds: magnesium sulfate, folic acid, MVI, thiamine.       Wounds:    Pressure injury, Stage II, Multiple       Current Nutrition Therapies:  ADULT DIET Regular    Anthropometric Measures:  · Height:  6' 2\" (188 cm)  · Current Body Wt:  86.2 kg (190 lb 0.6 oz)   · Admission Body Wt:  190 lb 0.6 oz    · Usual Body Wt:  113.4 kg (250 lb) (February 2020 per chart hx)     · Ideal Body Wt:  190 lbs:  100 %   · Adjusted Body Weight:   ; Weight Adjustment for: No adjustment   · BMI Category:  Normal weight (BMI 18.5-24. 9)       Nutrition Diagnosis:   · Severe malnutrition, In context of acute illness or injury related to early satiety (wt loss) as evidenced by poor intake prior to admission, moderate muscle loss    · Increased nutrient needs related to  (promotion of wound healing) as evidenced by wounds (pressure injuries)      Nutrition Interventions:   Food and/or Nutrient Delivery: Continue current diet, Vitamin supplement, Mineral supplement, Start oral nutrition supplement  Nutrition Education and Counseling: Education not indicated  Coordination of Nutrition Care: Continue to monitor while inpatient    Goals:  PO nutrition intake will meet >75% of patient's estimated nutrition needs within the next 7 days       Nutrition Monitoring and Evaluation:   Behavioral-Environmental Outcomes: None identified  Food/Nutrient Intake Outcomes: Diet advancement/tolerance, Food and nutrient intake, Supplement intake, Vitamin/mineral intake  Physical Signs/Symptoms Outcomes: Meal time behavior, Nutrition focused physical findings    Discharge Planning:     Too soon to determine     Electronically signed by Sarah Beth White RD on 6/28/2021 at [address] [place] [address] St: [phone]

## 2021-06-28 NOTE — ROUTINE PROCESS
Bedside and Verbal shift change report given to GUNNER Chun (oncoming nurse) by CARLOS EDUARDO Rehman RN (offgoing nurse). Report included the following information SBAR, Kardex, MAR and Recent Results.

## 2021-06-28 NOTE — PROGRESS NOTES
Problem: Falls - Risk of  Goal: *Absence of Falls  Description: Document [place] Fall Risk and appropriate interventions in the flowsheet.   6/28/2021 0147 by Nima Warner RN  Outcome: Progressing Towards Goal  Note: Fall Risk Interventions:  Mobility Interventions: Bed/chair exit alarm, Communicate number of staff needed for ambulation/transfer, Patient to call before getting OOB    Mentation Interventions: Adequate sleep, hydration, pain control, Bed/chair exit alarm, Door open when patient unattended, More frequent rounding, Reorient patient, Room close to nurse's station    Medication Interventions: Bed/chair exit alarm    Elimination Interventions: Bed/chair exit alarm, Call light in reach    History of Falls Interventions: Bed/chair exit alarm, Door open when patient unattended, Room close to nurse's station      6/28/2021 0147 by Nima Warner RN  Outcome: Progressing Towards Goal  Note: Fall Risk Interventions:  Mobility Interventions: Bed/chair exit alarm, Communicate number of staff needed for ambulation/transfer, Patient to call before getting OOB    Mentation Interventions: Adequate sleep, hydration, pain control, Bed/chair exit alarm, Door open when patient unattended, More frequent rounding, Reorient patient, Room close to nurse's station    Medication Interventions: Bed/chair exit alarm    Elimination Interventions: Bed/chair exit alarm, Call light in reach    History of Falls Interventions: Bed/chair exit alarm, Door open when patient unattended, Room close to nurse's station

## 2021-06-28 NOTE — PROGRESS NOTES
CM spoke to patient, patient is alert and orientated times 4, confirms home address on face sheet several times with CM, and tells CM that he is not homeless several times. CM tried to discuss discharge plan with patient. CM explained to patient that tomorrow is potential discharge date, and that PT/OT recommending SNF. Patient said he has been to a SNF before, did not give CM the name, and said he would not like to go to a SNF. CM asked him about Home with Home Health, if not agreeable to SNF. Patient said he couldn't decide about SNF or Home Health at this time, and needed some time to decide. CM also discussed transport home, patient said he may or may not have a ride home at time of discharge if going home.       Blair Feliciano, RN  Case Management [phone]

## 2021-06-28 NOTE — PROGRESS NOTES
[address] [facility]  Intern Progress Note    Patient: Franny Head MRN: [id number]   SSN: [id number]  YOB: 1960   Age: 64 y.o. Sex: male      Admit Date: 6/25/2021    LOS: 3 days   Chief Complaint   Patient presents with    Extremity Weakness       Subjective:     Overnight, nursing complaint that patient was experiencing delirium and MRI questionnaire was not able to be filled out. Patient was seen at bedside this morning, alert and oriented x3. Nursing reports that that MRI documentation was able to be filled out and patient underwent MRI for lumbar spine. Reports that he had a history of cellulitis in his left lower extremity with persistent skin changes and edema in that leg. Review of Systems   Constitutional: Negative for chills and fever. Respiratory: Positive for cough. Negative for shortness of breath. Cardiovascular: Negative for chest pain. Genitourinary: Positive for hematuria. Neurological: Positive for weakness. Negative for headaches. Objective:     Patient Vitals for the past 12 hrs:   Temp Pulse Resp BP SpO2   06/28/21 0400 98 °F (36.7 °C) 88 20 119/73 97 %   06/28/21 0000 98.2 °F (36.8 °C) 93 20 124/75 100 %   06/27/21 2000 98.4 °F (36.9 °C) 95 24 102/64 90 %     Physical Exam  Constitutional:       General: He is not in acute distress. Appearance: He is not toxic-appearing. Cardiovascular:      Rate and Rhythm: Normal rate and regular rhythm. Pulses: Normal pulses. Heart sounds: Normal heart sounds. No murmur heard. No friction rub. No gallop. Pulmonary:      Effort: Pulmonary effort is normal. No respiratory distress. Breath sounds: Normal breath sounds. No stridor. No wheezing, rhonchi or rales. Abdominal:      General: Abdomen is flat. Bowel sounds are normal. There is no distension. Palpations: Abdomen is soft. Tenderness: There is no abdominal tenderness. There is no guarding or rebound. Musculoskeletal:      Right lower leg: No edema. Left lower leg: Edema present. Comments: LLE 1+ edema with venous stasis changes of skin and ecchymosis/hematoma on shin   Skin:     Capillary Refill: Capillary refill takes less than 2 seconds. Comments: Diffuse ecchymosis on back and extremities. Bilateral Onychomycosis and excoriations in both feet   Neurological:      Mental Status: He is alert and oriented to person, place, and time. Intake and Output:  Current Shift: No intake/output data recorded. Last three shifts: 06/26 1901 - 06/28 0700  In: 1498.8 [I.V.:1498.8]  Out: 1500 [Urine:1500]    Lab/Data Review:  Recent Results (from the past 12 hour(s))   GLUCOSE, POC    Collection Time: 06/27/21  9:16 PM   Result Value Ref Range    Glucose (POC) 110 70 - 928 mg/dL   METABOLIC PANEL, COMPREHENSIVE    Collection Time: 06/27/21 11:45 PM   Result Value Ref Range    Sodium 143 136 - 145 mmol/L    Potassium 4.1 3.5 - 5.5 mmol/L    Chloride 112 (H) 100 - 111 mmol/L    CO2 25 21 - 32 mmol/L    Anion gap 6 3.0 - 18 mmol/L    Glucose 98 74 - 99 mg/dL    BUN 17 7.0 - 18 MG/DL    Creatinine 0.91 0.6 - 1.3 MG/DL    BUN/Creatinine ratio 19 12 - 20      GFR est AA >60 >60 ml/min/1.73m2    GFR est non-AA >60 >60 ml/min/1.73m2    Calcium 7.6 (L) 8.5 - 10.1 MG/DL    Bilirubin, total 2.6 (H) 0.2 - 1.0 MG/DL    ALT (SGPT) 34 16 - 61 U/L    AST (SGOT) 109 (H) 10 - 38 U/L    Alk.  phosphatase 149 (H) 45 - 117 U/L    Protein, total 5.6 (L) 6.4 - 8.2 g/dL    Albumin 1.9 (L) 3.4 - 5.0 g/dL    Globulin 3.7 2.0 - 4.0 g/dL    A-G Ratio 0.5 (L) 0.8 - 1.7     CBC WITH AUTOMATED DIFF    Collection Time: 06/27/21 11:45 PM   Result Value Ref Range    WBC 11.0 4.6 - 13.2 K/uL    RBC 2.23 (L) 4.35 - 5.65 M/uL    HGB 7.9 (L) 13.0 - 16.0 g/dL    HCT 25.7 (L) 36.0 - 48.0 %    .2 (H) 74.0 - 97.0 FL    MCH 35.4 (H) 24.0 - 34.0 PG    MCHC 30.7 (L) 31.0 - 37.0 g/dL    RDW 16.9 (H) 11.6 - 14.5 %    PLATELET 97 (L) 720 - 420 K/uL    MPV 10.3 9.2 - 11.8 FL    NEUTROPHILS 75 (H) 40 - 73 %    LYMPHOCYTES 19 (L) 21 - 52 %    MONOCYTES 3 3 - 10 %    EOSINOPHILS 2 0 - 5 %    BASOPHILS 1 0 - 2 %    ABS. NEUTROPHILS 8.3 (H) 1.8 - 8.0 K/UL    ABS. LYMPHOCYTES 2.1 0.9 - 3.6 K/UL    ABS. MONOCYTES 0.3 0.05 - 1.2 K/UL    ABS. EOSINOPHILS 0.2 0.0 - 0.4 K/UL    ABS. BASOPHILS 0.1 0.0 - 0.1 K/UL    DF MANUAL      PLATELET COMMENTS DECREASED PLATELETS      RBC COMMENTS MACROCYTOSIS  2+        RBC COMMENTS HYPOCHROMIA  2+        RBC COMMENTS POLYCHROMASIA  1+       MAGNESIUM    Collection Time: 06/27/21 11:45 PM   Result Value Ref Range    Magnesium 1.4 (L) 1.6 - 2.6 mg/dL   GLUCOSE, POC    Collection Time: 06/28/21  6:03 AM   Result Value Ref Range    Glucose (POC) 108 70 - 110 mg/dL       Assessment and Plan:     Mr. Eugene Dalal is a 64 y. o. male with Past Medical History Notable for Type 2 Diabetes, Hypertension, Hyperlipidemia and a history of an L3 Compression Fracture in March 2020, now presenting with complaint of increased weakness and unsteadiness on his feet in the setting of meeting sepsis criteria and an GAIL.      Sepsis 2/2 aspiration PNA vs UTI- On admission, GHL 61.0, MLFLJ Rates in the 90s, soft BPs ~80s/50s, lactate 5. 89. Received fluid recussitation and broad spectrum antibiotics. Lactic acid has improved. Vital signs currently stable. WBC count is decreasing. UA yesterday was positive for nitrites. Repeat CXR yesterday also showed mild left lower lobe infiltrate, concerning for possible aspiration PNA. 1/2 bottles of blood cultures growing gram positive rods (Bacillus). - Follow up on blood and urine Cultures  - Continue Levaquin  - Discontinue Vanc/Zosyn  - Repeat Blood cultures  - Repeat CXR tomorrow     Alcohol use disorder -Reportedly drinks 6-pack of beer and 4-5 mixed rum drinks per day. , ALT 35 on admission (>2:1 ratio suggestive of alcoholic disease). Serum Ethanol <3, Ammonia 40 on admission.  CIWA scores to date have been 2-3. Has not required any Ativan  - Continue CIWA protocol   - Continue Thiamine 794 mg and Folic Acid 1 mg   - Fall Precautions  - CT head w/o contrast, SR of left lower extremity and left ankle given history of fall as well as hematoma in LLE    Acute Kidney Injury - Baseline Creatinine of 0.6 - 0.8. Creatinine 2.33 on admission. Likely Pre-renal due to dehydration in the setting of Alcohol Use Disorder. Has been on maintenance fluids since admission. Has had relatively poor PO intake to date. Cr 1.15 this morning.   - Continue Maintenance IVF, although will halve this morning (1/2NS with KCl at 75 mL/hr). As long as patient has good PO intake today, can stop  - Avoid nephrotoxic meds  - Monitor Creatinine with Daily CMP     Macrocytic Anemia and Thrombocytopenia- Likely due to malnourishment in the setting of acute alcohol disorder. Hemoglobin stable this morning at 8.5, no active bleeding. MCV is 112.  B12/folate WNL. Platelets 81 this morning, have been slowly decreasing.  - continue to monitor indices with daily CBC   - keep close attention to platelets, can consider holding SQH if they decrease any further     Hypokalemia - Resolved   - continue to monitor on daily BMP     Hypertension- Initially hypotensive. Normotensive overnight. Previously on Lisinopril 10 mg every day, but patient denies taking this medication.   -Monitor  -Can consider restarting Lisinopril if the patient's Blood Pressure increases, once his GAIL Resolves      Type 2 Diabetes - Last A1c (06/24/2020) - 5.8. Diet controlled   - POC glucose ACHS  - SSI  - hypoglycemic precautions     History of L3 Compression Fracture: MRI Lumbar Spine (3/2/2020) - subacute appearing superior endplate fracture of the L3 vertebral body. There is approximately 30% height loss. There is extension of the fracture line to the posterior vertebral body wall, with 4 mm associated retropulsion.  Found on lumbar radiograph in [facility] S/p fall and presented to ED on 2/23. Patient was to follow up with ortho outpatient per notes in 2020. Denies any numbness, tingling, or pain is his back     MRI Lumbar Spine (6/28/2021) Multilevel mild degenerative changes without high-grade spinal canal or foraminal stenosis.  Acute/subacute on chronic L3 superior endplate burst type fracture.  -Pain Control with Tylenol PRN  - Ortho spine consult    Shoulder pain- full ROM, no tenderness on exam   - Tylenol PRN   - XR - right shoulder    Onychomycosis:   -  Podiatry consult    Global care:   -  Patient reports being homeless - appreciate CM assistance with discharge planning  Diet Regular   DVT Prophylaxis SQH   GI Prophylaxis Protonix   Code status DNR/DNI     Point of Contact Angela Juan Manuelglenn  Relationship: Brother  ([address] [place] [address], PGY-1   [facility] Medicine   June 28, 2021, 2:29 PM

## 2021-06-28 NOTE — PROGRESS NOTES
conducted an initial consultation and Spiritual Assessment for Isidro Alford, who is a 64 y.o.,male. Patients Primary Language is: [place]. According to the patients EMR Hinduism Affiliation is: Buddhist. The reason the Patient came to the hospital is:   Patient Active Problem List    Diagnosis Date Noted    Alcoholism ([facility] 06/25/2021    [facility] 06/25/2021    Self-care deficit for bathing and hygiene 06/25/2021    Anemia 06/25/2021    Hypokalemia 06/25/2021    Alcohol abuse 06/25/2021    Leukocytosis 02/29/2020    Hypotension 02/29/2020    Severe sepsis ([facility] 02/29/2020    GAIL (acute kidney injury) ([facility] 02/29/2020    Cellulitis of left lower extremity 02/29/2020    Acute leg pain, left 02/29/2020    Cellulitis 02/29/2020        The  provided the following Interventions:  Initiated a relationship of care and support. Explored issues of deondre, spirituality and/or Muslim needs while hospitalized. Listened empathically. Provided chaplaincy education. Provided information about Spiritual Care Services. Offered prayer and assurance of continued prayers on patient's behalf. Chart reviewed. The following outcomes were achieved:  Patient shared some information about their medical narrative and spiritual journey/beliefs. Patient processed feeling about current hospitalization. Patient expressed gratitude for the 's visit. Assessment:  Patient did not indicate any spiritual or Muslim issues which require Spiritual Care Services interventions at this time. Patient does not have any Muslim/cultural needs that will affect patients preferences in health care. Plan:  Chaplains will continue to follow and will provide pastoral care on an as needed or requested basis.  recommends bedside caregivers page  on duty if patient shows signs of acute spiritual or emotional distress.

## 2021-06-28 NOTE — PROGRESS NOTES
I have seen and examined the patient on 6/28/21. Please see separate resident's note and for details, full assessment & plan below. In summary, MRI spine completed w/ possible mild L3 central canal compromise; resident team to consult to Ortho spine. Nursing describes mild delirium w/ fluctuating levels of orientation/level of consciousness; pt was oriented to situation during rounds, he is receiving B-vitamins and nutrition consultation. CIWA scores remain low, I suspect is is out of the window for acute alcohol withdrawal, nursing aware to page us for symptoms of subacute withdrawals. CT head pending. We will de-escalate abx to Levaquin monotherapy. Repeat blood cultures and CXR in AM.  Aspiration precautions and oral care have been added. Team will assure repletion of electrolytes; magnesium, phosphorus. Re-check ammonia in the AM.      LLE hematoma and ankle edema; will check a plain-film. Follow-up Shoulder Xray. General hygiene is poor nurses helping perform full-body care, and we will consult to Podiatry for treatment of hypertrophic nails and general poor condition of his feet. Close monitoring I&O's, nursing to bladder scan later today w/ pt at risk for urinary retention, may need strait cath but I would avoid soriano placement. Team will be working w/ case management as pt is homeless andwould likely benefit from SNF vs LTC. Clarify dx;  Severe sepsis 2/2 aspiration pneumonia, lower suspicion for UTI- improving.   Acute/subacute on chronic L3 burst fracture  Chronic alcohol abuse  Delirium- multifactorial   Generalized weakness, physicial deconditioning, impaired ADLs and iADLs  Protein calorie malnutrition- awaiting nutrition consult

## 2021-06-28 NOTE — CONSULTS
Consult    Patient: Jonathan Bella MRN: [id number]  SSN: [id number]    YOB: 1960  Age: 64 y.o. Sex: male      Subjective:      Jonathan Bella is a 64 y.o. male with Hx of DM, HTN, alcohol use disorder who is being seen for increased weakness in his lower extremities. The patient denies back pain at this time. He has no numbness or tingling in his lower extremities. He does reports weakness in his legs that has recently worsened. He reports a fall approximately a week and a half ago. The patient states he was walking down the stairs when he slipped and slid down the steps on his back and bottom. No bowel or bladder dysfunction. No past medical history on file. No past surgical history on file. No family history on file.   Social History     Tobacco Use    Smoking status: Not on file   Substance Use Topics    Alcohol use: Not on file      Current Facility-Administered Medications   Medication Dose Route Frequency Provider Last Rate Last Admin    acetaminophen (TYLENOL) tablet 650 mg  650 mg Oral Q6H PRN Natalie Hightower MD        Or   Ana Paula Draft acetaminophen (TYLENOL) suppository 650 mg  650 mg Rectal Q6H PRN Natalie Hightower MD        naproxen (NAPROSYN) tablet 500 mg  500 mg Oral BID PRN Allison Navarro MD        diclofenac (VOLTAREN) 1 % topical gel 2 g  2 g Topical QID PRN Allison Navarro MD        ELECTROLYTE REPLACEMENT PROTOCOL - Potassium   1 Each Other PRN Annelise Cai MD        ELECTROLYTE REPLACEMENT PROTOCOL - Magnesium  1 Each Other PRN Annelise Cai MD        levoFLOXacin (LEVAQUIN) 750 mg in D5W IVPB  750 mg IntraVENous Q24H Sintia Ramirez  mL/hr at 06/27/21 2322 750 mg at 06/27/21 2322    insulin lispro (HUMALOG) injection   SubCUTAneous AC&HS Haven Hughes MD        glucose chewable tablet 16 g  16 g Oral PRN Haven Hughes MD        glucagon [facility]) injection 1 mg  1 mg IntraMUSCular PRN Haven Hughes MD        dextrose (D50W) injection syrg 12.5-25 g  25-50 mL IntraVENous PRN Ranulfo Uribe MD        sodium chloride (NS) flush 5-40 mL  5-40 mL IntraVENous Q8H Destiny Kee MD   10 mL at 06/28/21 1548    sodium chloride (NS) flush 5-40 mL  5-40 mL IntraVENous PRN Destiny Kee MD        polyethylene glycol [facility]) packet 17 g  17 g Oral DAILY PRN Destiny Kee MD        ondansetron (ZOFRAN ODT) tablet 4 mg  4 mg Oral Q8H PRN Destiny Kee MD        Or    ond[facility]etron [facility]) injection 4 mg  4 mg IntraVENous Q6H PRN Destiny Kee MD        folic acid (FOLVITE) tablet 1 mg  1 mg Oral DAILY Destiny Kee MD   1 mg at 06/28/21 1149    thiamine HCL (B-1) tablet 100 mg  100 mg Oral DAILY Destiny Kee MD   100 mg at 06/28/21 1149    therapeutic multivitamin (THERAGRAN) tablet 1 Tablet  1 Tablet Oral DAILY Destiny Kee MD   1 Tablet at 06/28/21 1149    LORazepam (ATIVAN) tablet 1 mg  1 mg Oral Q2H PRN Destiny Kee MD   1 mg at 06/27/21 1328    Or    LORazepam (ATIVAN) injection 1 mg  1 mg IntraVENous Q2H PRN Destiny Kee MD   1 mg at 06/27/21 1715        No Known Allergies    Review of Systems:  A comprehensive review of systems was negative except for that written in the History of Present Illness. Objective:     Vitals:    06/28/21 0815 06/28/21 1155 06/28/21 1528 06/28/21 1623   BP: 107/73 128/81 136/77    Pulse: 100 (!) 109 (!) 107    Resp: 20 20 20    Temp: 98.1 °F (36.7 °C) 99 °F (37.2 °C) 98.2 °F (36.8 °C)    SpO2: 97% 94% 96%    Weight:       Height:    6' 2\" (1.88 m)        Physical Exam:  General:  Alert, cooperative, no distress, appears stated age. Alert and oriented to person and place. He is not alert to time, when asked what year it is he reports 200. Eyes:  Conjunctivae/corneas clear. PERRL, EOMs intact. Fundi benign   Ears:  Normal TMs and external ear canals both ears. Nose: Nares normal. Septum midline. Mucosa normal. No drainage or sinus tenderness.    Mouth/Throat: Lips, mucosa, and tongue normal. Teeth and gums normal. Neck: Supple, symmetrical, trachea midline, no adenopathy, thyroid: no enlargment/tenderness/nodules, no carotid bruit and no JVD. Back:   Symmetric, no curvature. ROM normal. No CVA tenderness. Extremities: Extremities normal, atraumatic, no cyanosis or edema. Pulses: 2+ and symmetric all extremities. Skin: Skin color, texture, turgor normal. No rashes or lesions   Neurologic: Normal strength, sensation and reflexes throughout. Patient able to lift legs off of bed. Motor strength in lower extremities 4/5. MRI of lumbar spine demonstrates L3 compression fracture that was not present on MRI of lumbar spine in March of 2020.    Assessment:     Hospital Problems  Never Reviewed        Codes Class Noted POA    Alcoholism ([facility] ICD-10-CM: F10.20  ICD-9-CM: 303.90  6/25/2021 Unknown        Chronic malnutrition ([facility] ICD-10-CM: E46  ICD-9-CM: 263.9  6/25/2021 Unknown        Self-care deficit for bathing and hygiene ICD-10-CM: Z74.1  ICD-9-CM: V40.39  6/25/2021 Yes        Anemia ICD-10-CM: D64.9  ICD-9-CM: 285.9  6/25/2021 Yes        Hypokalemia ICD-10-CM: E87.6  ICD-9-CM: 276.8  6/25/2021 Yes        Alcohol abuse ICD-10-CM: F10.10  ICD-9-CM: 305.00  6/25/2021 Unknown        * (Principal) Severe sepsis ([facility] ICD-10-CM: A41.9, R65.20  ICD-9-CM: 038.9, 995.92  2/29/2020         GAIL (acute kidney injury) [facility]) ICD-10-CM: N17.9  ICD-9-CM: 584.9  2/29/2020 Unknown              Plan:     Lower extremity weakness  - MRI of lumbar spine demonstrates possible acute/subacute L3 compression fracture  - patient has no complaints of back pain at this time  - recommend progressive mobilization  - consider kyphoplasty by IR if there are complaints of pain  - would prefer to treat conservatively     Signed By: Daniel Iqbal PA-C     June 28, 2021

## 2021-06-28 NOTE — PROGRESS NOTES
Reason for Admission:  Sepsis ([facility] [A41.9]  Chronic malnutrition ([facility] [E46]  Alcoholism ([facility] [F10.20]  GAIL (acute kidney injury) ([facility] [N17.9]  Alcohol abuse [F10.10]                 RUR Score:    11%            Plan for utilizing home health: To be determined, patient said he can't decide whether he wants to go to SNF or home with home health, said he needs time to think about it. Likelihood of Readmission:   LOW                         Transition of Care Plan:              Initial assessment completed with patient. Cognitive status of patient: oriented to time, place, person and situation. Face sheet information confirmed:  yes. The patient designates his brother Justo Puentes [phone] to participate in his discharge plan and to receive any needed information. This patient lives alone in a single family home, with 15 steps to enter. Patient is not able to navigate steps as needed. Prior to hospitalization, patient was considered to be independent with ADLs/IADLS : yes . Patient has a current ACP document on file: no.      Healthcare Decision Maker:     Click here to complete [address] including selection of the Healthcare Decision Maker Relationship (ie \"Primary\")    The patient said he may or may not need a ride home to transport patient home upon discharge. The patient already has none reported,  medical equipment available in the home. Patient is not currently active with home health. Patient has stayed in a skilled nursing facility or rehab. Was  stay within last 60 days : no. This patient is on dialysis :no.    List of available SNF agencies were provided and reviewed with the patient prior to discharge. [place] of choice verbal consent given: no.     List of available Home Health agencies were provided and reviewed with the patient prior to discharge. [place] of choice verbal consent given: no. Currently, the discharge plan is SNF vs Home with Home with [address] [place] [address] Tim Maradiaga. The patient states that he can obtain his medications from the pharmacy, and take his medications as directed. Patient's current insurance is [employer]. Care Management Interventions  PCP Verified by CM:  Yes  Mode of Transport at Discharge: Self (Patient said he may or may not need a ride home at time of discharge. )  Transition of Care Consult (CM Consult): SNF  Partner SNF: Yes  Discharge Durable Medical Equipment: No  Physical Therapy Consult: Yes  Occupational Therapy Consult: Yes  Speech Therapy Consult: Yes  Current Support Network: Lives Alone  Confirm Follow Up Transport: Friends  Discharge Location  Discharge Placement: Skilled nursing facility (vs home health)        Leo Jauregui RN  Case Management [phone]

## 2021-06-29 ENCOUNTER — APPOINTMENT (OUTPATIENT)
Dept: GENERAL RADIOLOGY | Age: 61
DRG: 871 | End: 2021-06-29
Attending: FAMILY MEDICINE
Payer: MEDICARE

## 2021-06-29 LAB
25(OH)D3 SERPL-MCNC: 10.5 NG/ML (ref 30–100)
ALBUMIN SERPL-MCNC: 1.9 G/DL (ref 3.4–5)
ALBUMIN/GLOB SERPL: 0.5 {RATIO} (ref 0.8–1.7)
ALP SERPL-CCNC: 176 U/L (ref 45–117)
ALT SERPL-CCNC: 42 U/L (ref 16–61)
AMMONIA PLAS-SCNC: 29 UMOL/L (ref 11–32)
ANION GAP SERPL CALC-SCNC: 7 MMOL/L (ref 3–18)
AST SERPL-CCNC: 148 U/L (ref 10–38)
BASOPHILS # BLD: 0 K/UL (ref 0–0.1)
BASOPHILS NFR BLD: 0 % (ref 0–2)
BILIRUB SERPL-MCNC: 2.5 MG/DL (ref 0.2–1)
BUN SERPL-MCNC: 10 MG/DL (ref 7–18)
BUN/CREAT SERPL: 14 (ref 12–20)
CALCIUM SERPL-MCNC: 7.9 MG/DL (ref 8.5–10.1)
CHLORIDE SERPL-SCNC: 109 MMOL/L (ref 100–111)
CO2 SERPL-SCNC: 25 MMOL/L (ref 21–32)
CREAT SERPL-MCNC: 0.7 MG/DL (ref 0.6–1.3)
DIFFERENTIAL METHOD BLD: ABNORMAL
EOSINOPHIL # BLD: 0 K/UL (ref 0–0.4)
EOSINOPHIL NFR BLD: 0 % (ref 0–5)
ERYTHROCYTE [DISTWIDTH] IN BLOOD BY AUTOMATED COUNT: 17 % (ref 11.6–14.5)
FERRITIN SERPL-MCNC: 1035 NG/ML (ref 8–388)
GLOBULIN SER CALC-MCNC: 3.8 G/DL (ref 2–4)
GLUCOSE BLD STRIP.AUTO-MCNC: 120 MG/DL (ref 70–110)
GLUCOSE BLD STRIP.AUTO-MCNC: 138 MG/DL (ref 70–110)
GLUCOSE BLD STRIP.AUTO-MCNC: 141 MG/DL (ref 70–110)
GLUCOSE BLD STRIP.AUTO-MCNC: 148 MG/DL (ref 70–110)
GLUCOSE SERPL-MCNC: 95 MG/DL (ref 74–99)
HAV IGM SER QL: NEGATIVE
HBV CORE IGM SER QL: NEGATIVE
HBV SURFACE AG SER QL: <0.1 INDEX
HBV SURFACE AG SER QL: NEGATIVE
HCT VFR BLD AUTO: 27.2 % (ref 36–48)
HCV AB SER IA-ACNC: 0.08 INDEX
HCV AB SERPL QL IA: NEGATIVE
HCV COMMENT,HCGAC: NORMAL
HGB BLD-MCNC: 8.4 G/DL (ref 13–16)
IRON SATN MFR SERPL: 43 % (ref 20–50)
IRON SERPL-MCNC: 43 UG/DL (ref 50–175)
LYMPHOCYTES # BLD: 1.1 K/UL (ref 0.9–3.6)
LYMPHOCYTES NFR BLD: 11 % (ref 21–52)
MAGNESIUM SERPL-MCNC: 2.2 MG/DL (ref 1.6–2.6)
MCH RBC QN AUTO: 35.4 PG (ref 24–34)
MCHC RBC AUTO-ENTMCNC: 30.9 G/DL (ref 31–37)
MCV RBC AUTO: 114.8 FL (ref 74–97)
MONOCYTES # BLD: 1.1 K/UL (ref 0.05–1.2)
MONOCYTES NFR BLD: 11 % (ref 3–10)
NEUTS SEG # BLD: 8.2 K/UL (ref 1.8–8)
NEUTS SEG NFR BLD: 78 % (ref 40–73)
PHOSPHATE SERPL-MCNC: 2 MG/DL (ref 2.5–4.9)
PLATELET # BLD AUTO: 110 K/UL (ref 135–420)
PLATELET COMMENTS,PCOM: ABNORMAL
PMV BLD AUTO: 9.8 FL (ref 9.2–11.8)
POTASSIUM SERPL-SCNC: 4 MMOL/L (ref 3.5–5.5)
PROT SERPL-MCNC: 5.7 G/DL (ref 6.4–8.2)
RBC # BLD AUTO: 2.37 M/UL (ref 4.35–5.65)
RBC MORPH BLD: ABNORMAL
SODIUM SERPL-SCNC: 141 MMOL/L (ref 136–145)
SP1: NORMAL
SP2: NORMAL
SP3: NORMAL
TIBC SERPL-MCNC: 101 UG/DL (ref 250–450)
WBC # BLD AUTO: 10.4 K/UL (ref 4.6–13.2)

## 2021-06-29 PROCEDURE — 97530 THERAPEUTIC ACTIVITIES: CPT

## 2021-06-29 PROCEDURE — 83735 ASSAY OF MAGNESIUM: CPT

## 2021-06-29 PROCEDURE — 82140 ASSAY OF AMMONIA: CPT

## 2021-06-29 PROCEDURE — 85025 COMPLETE CBC W/AUTO DIFF WBC: CPT

## 2021-06-29 PROCEDURE — 2709999900 HC NON-CHARGEABLE SUPPLY

## 2021-06-29 PROCEDURE — 36415 COLL VENOUS BLD VENIPUNCTURE: CPT

## 2021-06-29 PROCEDURE — 65660000000 HC RM CCU STEPDOWN

## 2021-06-29 PROCEDURE — 80074 ACUTE HEPATITIS PANEL: CPT

## 2021-06-29 PROCEDURE — 97535 SELF CARE MNGMENT TRAINING: CPT

## 2021-06-29 PROCEDURE — 82962 GLUCOSE BLOOD TEST: CPT

## 2021-06-29 PROCEDURE — 74011250636 HC RX REV CODE- 250/636: Performed by: FAMILY MEDICINE

## 2021-06-29 PROCEDURE — 92526 ORAL FUNCTION THERAPY: CPT

## 2021-06-29 PROCEDURE — 82306 VITAMIN D 25 HYDROXY: CPT

## 2021-06-29 PROCEDURE — 84100 ASSAY OF PHOSPHORUS: CPT

## 2021-06-29 PROCEDURE — 74011250637 HC RX REV CODE- 250/637: Performed by: STUDENT IN AN ORGANIZED HEALTH CARE EDUCATION/TRAINING PROGRAM

## 2021-06-29 PROCEDURE — 83540 ASSAY OF IRON: CPT

## 2021-06-29 PROCEDURE — 86592 SYPHILIS TEST NON-TREP QUAL: CPT

## 2021-06-29 PROCEDURE — 82728 ASSAY OF FERRITIN: CPT

## 2021-06-29 PROCEDURE — 71045 X-RAY EXAM CHEST 1 VIEW: CPT

## 2021-06-29 PROCEDURE — 92610 EVALUATE SWALLOWING FUNCTION: CPT

## 2021-06-29 PROCEDURE — 80053 COMPREHEN METABOLIC PANEL: CPT

## 2021-06-29 PROCEDURE — 87389 HIV-1 AG W/HIV-1&-2 AB AG IA: CPT

## 2021-06-29 RX ORDER — MELATONIN
1000 DAILY
Status: DISCONTINUED | OUTPATIENT
Start: 2021-06-30 | End: 2021-07-07 | Stop reason: HOSPADM

## 2021-06-29 RX ADMIN — Medication 10 ML: at 16:17

## 2021-06-29 RX ADMIN — Medication 10 ML: at 06:39

## 2021-06-29 RX ADMIN — NAPROXEN 500 MG: 250 TABLET ORAL at 23:35

## 2021-06-29 RX ADMIN — Medication 10 ML: at 21:16

## 2021-06-29 RX ADMIN — LEVOFLOXACIN 750 MG: 5 INJECTION, SOLUTION INTRAVENOUS at 21:16

## 2021-06-29 RX ADMIN — LORAZEPAM 1 MG: 1 TABLET ORAL at 23:35

## 2021-06-29 RX ADMIN — Medication 100 MG: at 10:26

## 2021-06-29 RX ADMIN — THERA TABS 1 TABLET: TAB at 10:26

## 2021-06-29 RX ADMIN — FOLIC ACID 1 MG: 1 TABLET ORAL at 10:26

## 2021-06-29 NOTE — PROGRESS NOTES
Problem: Self Care Deficits Care Plan (Adult)  Goal: *Acute Goals and Plan of Care (Insert Text)  Description: Occupational Therapy Goals  Initiated 6/26/2021 within 7 day(s). 1.  Patient will perform perform self feeding with modified independence and efficiency  2. Patient will demonstrate 4/5 UB strength (including hand intrinsics) in preparation for his efficient completion of his self care routine  3. Patient will perform grooming with modified independence progressing to EOB and standing at the sink as able  4. Patient will perform LB dressing with modified independence  5. Patient will complete LB clothes management with min assist    Prior Level of Function: he reports he lives in a boarding house with others and was independent with his self care. He is reporting it was becoming more difficult to walk up and down the stairs (his room is on the second floor) and to step over the bathtub to get into the shower. Outcome: Progressing Towards Goal   OCCUPATIONAL THERAPY TREATMENT    Patient: Griffin Davalos (49 y.o. male)  Date: 6/29/2021  Diagnosis: Sepsis ([facility] [A41.9]  Chronic malnutrition ([facility] [address] [facility] [E46]  Alcoholism ([facility] [address] [F10.20]  GAIL (acute kidney injury) ([facility] [address] [N17.9]  Alcohol abuse [F10.10] Severe sepsis ([address]       Precautions: Fall, Skin, DNR  PLOF: he reports he lives in a boarding house with others and was independent with his self care. He is reporting it was becoming more difficult to walk up and down the stairs (his room is on the second floor) and to step over the bathtub to get into the shower. Chart, occupational therapy assessment, plan of care, and goals were reviewed. ASSESSMENT:  Pt cleared by RN for OT tx at this time. PT co tx to maximize pt safety and participation. Pt presented supine in bed upon therapist arrival and agreeable for participation. Pt is easily aggravated and resistant to most education/techniques to minimize pain and increase independence w/ADLs.  He was provided education on spinal precautions and how they relate to functional tasks, and on adaptive strategies for UB/LB ADLs while following spinal precautions. Pt maneuvered to EOB from supine with SBA requiring prolonged time w/ cueing to stay on task. STS transfer MAX A x 2 with RW and unable to achieve full stance 2/2 anxiety and FOF. He unsafely returned back to supine and was observed to have had BM. He required MOD A x 2 rolling L/R to change chux pad and TOTAL A with shade area hygiene. TOTAL A x 2 to scoot to [facility] and was left with HOB elevated, bed alarm active, and all needs left within reach. Progression toward goals:  []          Improving appropriately and progressing toward goals  [x]          Improving slowly and progressing toward goals  []          Not making progress toward goals and plan of care will be adjusted     PLAN:  Patient continues to benefit from skilled intervention to address the above impairments. Continue treatment per established plan of care. Discharge Recommendations: SNF  Further Equipment Recommendations for Discharge: TBA     SUBJECTIVE:   Patient stated  I get nervous easily. \"     OBJECTIVE DATA SUMMARY:   Cognitive/Behavioral Status:  Neurologic State: Alert, Confused  Orientation Level: Oriented to person, Oriented to place  Cognition: Poor safety awareness  Safety/Judgement: Fall prevention    Functional Mobility and Transfers for ADLs:   Bed Mobility:  Rolling: Moderate assistance;Assist x2  Supine to Sit: Stand-by assistance; Additional time  Sit to Supine: Stand-by assistance; Additional time  Scooting: Total assistance;Assist x2   Transfers:  Sit to Stand: Maximum assistance;Assist x2  Stand to Sit: Maximum assistance       Balance:  Sitting: Impaired; With support  Sitting - Static: Fair (occasional)  Sitting - Dynamic: Fair (occasional)  Standing: Impaired; With support  Standing - Static: Poor    ADL Intervention:     Toileting  Toileting Assistance:  Total assistance(dependent) (supine)  Bowel Hygiene: Total assistance (dependent)    Cognitive Retraining  Safety/Judgement: Fall prevention      Pain:  Pain level pre-treatment: 0/10   Pain level post-treatment: 0/10    Activity Tolerance:    Poor   Please refer to the flowsheet for vital signs taken during this treatment. After treatment:   []  Patient left in no apparent distress sitting up in chair  [x]  Patient left in no apparent distress in bed  [x]  Call bell left within reach  [x]  Nursing notified  []  Caregiver present  [x]  Bed alarm activated    COMMUNICATION/EDUCATION:   [x] Role of Occupational Therapy in the acute care setting  [] Home safety education was provided and the patient/caregiver indicated understanding. [] Patient/family have participated as able in working towards goals and plan of care. [] Patient/family agree to work toward stated goals and plan of care. [x] Patient understands intent and goals of therapy, but is neutral about his/her participation. [] Patient is unable to participate in goal setting and plan of care.       Thank you for this referral.  KAYLYNN Goodson  Time Calculation: 27 mins

## 2021-06-29 NOTE — PROGRESS NOTES
[facility]  Intern Progress Note    Patient: Tere Allen MRN: [id number]   SSN: [id number]  YOB: 1960   Age: 64 y.o. Sex: male      Admit Date: 6/25/2021    LOS: 5 days   Chief Complaint   Patient presents with    Extremity Weakness       Subjective:     Overnight, patient tachycardic to 110. Patient seen at bedside, sleepy but easily arousable. Was able to state name but wanted to go back to sleep. Will examine patient during rounds to reassess for somnolence/dilirium as well as capacity to make medical decisions. Review of Systems   Constitutional: Negative for chills and fever. Respiratory: Positive for cough. Negative for shortness of breath. Cardiovascular: Negative for chest pain. Genitourinary: Positive for hematuria. Musculoskeletal: Negative for back pain and joint pain. Neurological: Positive for weakness. Negative for headaches. Objective:     Patient Vitals for the past 12 hrs:   Temp Pulse Resp BP SpO2   06/30/21 0726 98.2 °F (36.8 °C) (!) 107 20 128/79 90 %   06/30/21 0334 98.4 °F (36.9 °C) 83 20 (!) 146/76 97 %   06/29/21 2335 98.4 °F (36.9 °C) (!) 107 16 (!) 147/72 95 %     Physical Exam  Constitutional:       General: He is not in acute distress. Appearance: He is not toxic-appearing. Cardiovascular:      Rate and Rhythm: Normal rate and regular rhythm. Pulses: Normal pulses. Heart sounds: Normal heart sounds. No murmur heard. No friction rub. No gallop. Pulmonary:      Effort: Pulmonary effort is normal. No respiratory distress. Breath sounds: Normal breath sounds. No stridor. No wheezing, rhonchi or rales. Abdominal:      General: Abdomen is flat. Bowel sounds are normal. There is no distension. Palpations: Abdomen is soft. Tenderness: There is no abdominal tenderness. There is no guarding or rebound. Musculoskeletal:      Right lower leg: No edema. Left lower leg: Edema present.       Comments: MASHA 1+ edema with venous stasis changes of skin and ecchymosis/hematoma on shin   Skin:     Capillary Refill: Capillary refill takes less than 2 seconds. Comments: Diffuse ecchymosis on back and extremities. Bilateral Onychomycosis and excoriations in both feet. Nails trimmed by staff. Neurological:      Mental Status: He is alert and oriented to person, place, and time. Intake and Output:  Current Shift: No intake/output data recorded. Last three shifts: 06/28 1901 - 06/30 0700  In: -   Out: 775 [Urine:775]    Lab/Data Review:  Recent Results (from the past 12 hour(s))   METABOLIC PANEL, COMPREHENSIVE    Collection Time: 06/30/21  4:26 AM   Result Value Ref Range    Sodium 137 136 - 145 mmol/L    Potassium 3.6 3.5 - 5.5 mmol/L    Chloride 103 100 - 111 mmol/L    CO2 26 21 - 32 mmol/L    Anion gap 8 3.0 - 18 mmol/L    Glucose 97 74 - 99 mg/dL    BUN 7 7.0 - 18 MG/DL    Creatinine 0.70 0.6 - 1.3 MG/DL    BUN/Creatinine ratio 10 (L) 12 - 20      GFR est AA >60 >60 ml/min/1.73m2    GFR est non-AA >60 >60 ml/min/1.73m2    Calcium 8.1 (L) 8.5 - 10.1 MG/DL    Bilirubin, total 3.1 (H) 0.2 - 1.0 MG/DL    ALT (SGPT) 50 16 - 61 U/L    AST (SGOT) 149 (H) 10 - 38 U/L    Alk. phosphatase 212 (H) 45 - 117 U/L    Protein, total 5.9 (L) 6.4 - 8.2 g/dL    Albumin 2.1 (L) 3.4 - 5.0 g/dL    Globulin 3.8 2.0 - 4.0 g/dL    A-G Ratio 0.6 (L) 0.8 - 1.7     CBC WITH AUTOMATED DIFF    Collection Time: 06/30/21  4:26 AM   Result Value Ref Range    WBC 11.9 4.6 - 13.2 K/uL    RBC 2.44 (L) 4.35 - 5.65 M/uL    HGB 8.5 (L) 13.0 - 16.0 g/dL    HCT 27.9 (L) 36.0 - 48.0 %    .3 (H) 74.0 - 97.0 FL    MCH 34.8 (H) 24.0 - 34.0 PG    MCHC 30.5 (L) 31.0 - 37.0 g/dL    RDW 17.0 (H) 11.6 - 14.5 %    PLATELET 978 447 - 088 K/uL    MPV 9.8 9.2 - 11.8 FL    NEUTROPHILS 80 (H) 40 - 73 %    LYMPHOCYTES 13 (L) 21 - 52 %    MONOCYTES 7 3 - 10 %    EOSINOPHILS 0 0 - 5 %    BASOPHILS 0 0 - 2 %    ABS. NEUTROPHILS 9.6 (H) 1.8 - 8.0 K/UL    ABS. LYMPHOCYTES 1.5 0.9 - 3.6 K/UL    ABS. MONOCYTES 0.8 0.05 - 1.2 K/UL    ABS. EOSINOPHILS 0.0 0.0 - 0.4 K/UL    ABS. BASOPHILS 0.0 0.0 - 0.1 K/UL    DF MANUAL      PLATELET COMMENTS ADEQUATE PLATELETS      RBC COMMENTS MACROCYTOSIS  2+        RBC COMMENTS HYPOCHROMIA  1+       MAGNESIUM    Collection Time: 06/30/21  4:26 AM   Result Value Ref Range    Magnesium 1.9 1.6 - 2.6 mg/dL   PHOSPHORUS    Collection Time: 06/30/21  4:26 AM   Result Value Ref Range    Phosphorus 1.7 (L) 2.5 - 4.9 MG/DL   GLUCOSE, POC    Collection Time: 06/30/21  6:03 AM   Result Value Ref Range    Glucose (POC) 106 70 - 110 mg/dL       Assessment and Plan:     Mr. Eugene Dalal is a 64 y. o. male with Past Medical History Notable for Type 2 Diabetes, Hypertension, Hyperlipidemia and a history of an L3 Compression Fracture in March 2020, now presenting with complaint of increased weakness and unsteadiness on his feet in the setting of meeting sepsis criteria and an GAIL.     Sepsis 2/2 aspiration PNA with lower suspicion for UTI (improving): On admission, WBC 13.1, Heart Rates in the 90s, soft BPs ~80s/50s, lactate 5. 89. Received fluid recussitation and broad spectrum antibiotics. Lactic acid has improved. Vital signs currently stable. WBC count is decreasing. UA was positive for nitrites. CXR showed mild left lower lobe infiltrate, concerning for possible aspiration PNA. 1/2 bottles of blood cultures growing gram positive rods (Bacillus). CXR (6/29): interval progression of b/l opacities @ lung bases  - Follow up on blood and urine Cultures  - Continue Levaquin  - FU repeat Blood cultures  - fall precautions  - aspiration precautions  - SLP consult, appreciate recs  - oral care  - repeat CXR (q 2-3 days)    Acute/subacute on chronic L3 burst Fracture: MRI Lumbar Spine (3/2/2020) with subacute appearing superior endplate fracture of the L3 vertebral body, approximately 30% height loss.  On admission, denied any numbness, tingling, or pain is his back     MRI Lumbar Spine (6/28/2021) Multilevel mild degenerative changes without high-grade spinal canal or foraminal stenosis. Acute/subacute on chronic L3 superior endplate burst type fracture.  -Pain Control with Tylenol PRN  - Ortho spine consulted - recommends conservative management with progressive mobilization.   - FU ortho outpatient    Chromic Alcohol use disorder: Patient endorsed consuming drinks 6-pack of beer and 4-5 mixed rum drinks per day. , ALT 35 on admission (>2:1 ratio suggestive of alcoholic disease). Serum Ethanol <3, Ammonia 40 on admission. CIWA scores to date have been 2-3. Has not required any Ativan. Acute Hepatitis panel negative  - Continue CIWA protocol   - Continue Thiamine 839 mg and Folic Acid 1 mg   - Fall Precautions  - FU Hep C, HIV, RPR labs    Delirium: Multifactorial in the setting of chronic alcohol abuse, sepsis and history of falls. - ordered CT head w/o contrast - patient refused    Generalized weakness, physicial deconditioning, impaired ADLs and iADLs, Hx of falls: multifactorial in the setting of malnutrition and chronic alcohol abuse, sepsis. XR of left lower extremity and left ankle given history of fall as well as hematoma in LLE (6/28) No acute fracture, Moderate circumferential soft tissue swelling around the ankle. - fall precautions  - PT/OT consult, appreciate recs    Severe Protein calorie malnutrition, Vitamin D deficiency: Vitamin D levels 10.5  - nutrition consulted, appreciate recs  - start Ensure Enlive TID meal supplements  - continue multivitamins and thiamine 100mg daily supplements  - monitor electrolytes, replete as per protocol prn  - start Vitamin D3 supplements 1000units daily    Acute Kidney Injury (resolved): Cr 2.33 on admission (Baseline Cre 0.6 - 0.8). Cr 0.70 this morning. Likely Pre-renal due to dehydration in the setting of Alcohol Use Disorder. Has been on maintenance fluids since admission.  Has had relatively poor PO intake to date. - Continue Maintenance IVF, although will halve this morning (1/2NS with KCl at 75 mL/hr). As long as patient has good PO intake today, can stop  - Avoid nephrotoxic meds  - Monitor Creatinine with Daily CMP     Macrocytic Anemia and Thrombocytopenia- Likely due to malnourishment in the setting of acute alcohol disorder. Hemoglobin stable, no active bleeding. MCV is 112 at admission.  B12/folate WNL. Iron profile labs consistent with anemia of chronic disease.  - continue to monitor indices with daily CBC   - FOBT     Hypokalemia (resolved):  - continue to monitor on daily BMP     Hx of Hypertension: Initially hypotensive at admission but BPs stable, WNL since admission Normotensive overnight. Previously on Lisinopril 10 mg every day, but patient denies taking this medication. - consider restarting Lisinopril if the patient's Blood Pressure increases     Type 2 Diabetes: Last A1c (06/24/2020)  5.8. Diet controlled   - POC glucose ACHS  - SSI  - hypoglycemic precautions    Right shoulder pain (resolved): Complaint at admission with full ROM, no tenderness on exam. Patient reports being pulled by right arm from his porch on day of admission. XR R.shoulder (6/28) No definite evidence of acute fracture,  No significant glenohumeral or acromioclavicular osteoarthritis. Suspected right infraspinatus calcific tendinitis versus subacromial/subdeltoid calcific bursitis.   - Tylenol PRN     Onychomycosis: Treated at bedside by podiatry  -  Podiatry consulted, appreciate recs    Global care:   - CM consult, appreciate recs for discharge planning  Diet Regular   DVT Prophylaxis SQH   GI Prophylaxis Protonix   Code status DNR/DNI     Point of Contact Caleb Grande  Relationship: Brother  [phone]     Jeanine Twin, PGY-1   [facility]   June 30, 2021, 9:35 AM

## 2021-06-29 NOTE — CONSULTS
Consult    Patient: Candi Louise MRN: [id number]  SSN: [id number]    YOB: 1960  Age: 64 y.o. Sex: male      Subjective:      Candi Louise is a 64 y.o. male who is being seen for asked to consult and treat nail infection both feet. Lizzy Horner No past medical history on file. No past surgical history on file. No family history on file.   Social History     Tobacco Use    Smoking status: Not on file   Substance Use Topics    Alcohol use: Not on file      Current Facility-Administered Medications   Medication Dose Route Frequency Provider Last Rate Last Admin    [START ON 6/30/2021] cholecalciferol (VITAMIN D3) (1000 Units /25 mcg) tablet 1,000 Units  1,000 Units Oral DAILY Wilfrido Marcano MD        acetaminophen (TYLENOL) tablet 650 mg  650 mg Oral Q6H PRN Willie Cannon MD        Or   Sherrel Eduard acetaminophen (TYLENOL) suppository 650 mg  650 mg Rectal Q6H PRN Willie Cannon MD        naproxen (NAPROSYN) tablet 500 mg  500 mg Oral BID PRN Yvonne Gaytan MD        diclofenac (VOLTAREN) 1 % topical gel 2 g  2 g Topical QID PRN Yvonne Gaytan MD        ELECTROLYTE REPLACEMENT PROTOCOL - Potassium   1 Each Other PRN Louise Kapoor MD        ELECTROLYTE REPLACEMENT PROTOCOL - Magnesium  1 Each Other PRN Louise Kapoor MD        levoFLOXacin (LEVAQUIN) 750 mg in D5W IVPB  750 mg IntraVENous Q24H Jimmy Stoddard  mL/hr at 06/28/21 2135 750 mg at 06/28/21 2135    insulin lispro (HUMALOG) injection   SubCUTAneous AC&HS Sourav Briceño MD        glucose chewable tablet 16 g  16 g Oral PRN Sourav Briceño MD        glucagon [facility]) injection 1 mg  1 mg IntraMUSCular PRN Sourav Briceño MD        dextrose (D50W) injection syrg 12.5-25 g  25-50 mL IntraVENous PRN Sourav Briceño MD        sodium chloride (NS) flush 5-40 mL  5-40 mL IntraVENous Q8H Louise Kapoor MD   10 mL at 06/29/21 1617    sodium chloride (NS) flush 5-40 mL  5-40 mL IntraVENous PRN Louise Kapoor MD        polyethylene glycol (MIRALAX) packet 17 g  17 g Oral DAILY PRN Kathy Esposito MD        ondansetron (ZOFRAN ODT) tablet 4 mg  4 mg Oral Q8H PRN Kathy Esposito MD        Or    [facility]ron [facility]) injection 4 mg  4 mg IntraVENous Q6H PRN Kathy Esposito MD        folic acid (FOLVITE) tablet 1 mg  1 mg Oral DAILY Kathy Esposito MD   1 mg at 06/29/21 1026    thiamine HCL (B-1) tablet 100 mg  100 mg Oral DAILY Kathy Esposito MD   100 mg at 06/29/21 1026    therapeutic multivitamin (THERAGRAN) tablet 1 Tablet  1 Tablet Oral DAILY Kathy Esposito MD   1 Tablet at 06/29/21 1026    LORazepam (ATIVAN) tablet 1 mg  1 mg Oral Q2H PRN Kathy Esposito MD   1 mg at 06/27/21 1328    Or    LORazepam (ATIVAN) injection 1 mg  1 mg IntraVENous Q2H PRN Kathy Esposito MD   1 mg at 06/27/21 1715        No Known Allergies    Review of Systems:  A comprehensive review of systems was negative except for that written in the History of Present Illness. Objective:     Vitals:    06/29/21 0000 06/29/21 0400 06/29/21 0818 06/29/21 1108   BP: 112/68 114/71 (!) 149/75 119/73   Pulse: (!) 105 100 96 (!) 105   Resp: 18 19 16 19   Temp: 97.8 °F (36.6 °C) 98.7 °F (37.1 °C) 98.3 °F (36.8 °C) 98.3 °F (36.8 °C)   SpO2: 98% 93% 98% 93%   Weight:       Height:            Physical Exam:  Seen at bedside awake , confused. Inspected both feet. Neurovascular grossly intact. No necrosis or heel ulcers. Nail mycotic, drainage.        Assessment:     Hospital Problems  Date Reviewed: 6/29/2021        Codes Class Noted POA    Alcoholism ([facility] ICD-10-CM: F10.20  ICD-9-CM: 303.90  6/25/2021 Unknown        Chronic malnutrition ([facility] ICD-10-CM: E46  ICD-9-CM: 263.9  6/25/2021 Unknown        Self-care deficit for bathing and hygiene ICD-10-CM: Z74.1  ICD-9-CM: V40.39  6/25/2021 Yes        Anemia ICD-10-CM: D64.9  ICD-9-CM: 285.9  6/25/2021 Yes        Hypokalemia ICD-10-CM: E87.6  ICD-9-CM: 276.8  6/25/2021 Yes        Alcohol abuse ICD-10-CM: F10.10  ICD-9-CM: 305.00  6/25/2021 Unknown        * (Principal) Severe sepsis [facility]) ICD-10-CM: A41.9, R65.20  ICD-9-CM: 038.9, 995.92  2/29/2020         GAIL (acute kidney injury) [facility]) ICD-10-CM: N17.9  ICD-9-CM: 584.9  2/29/2020 Unknown              Plan:     Nail infection treated at bedside.     Signed By: Akua Campbell DPM     June 29, 2021

## 2021-06-29 NOTE — ROUTINE PROCESS
Bedside shift change report given to Ross Chicas RN (oncoming nurse) by Kassandra Davenport RN (offgoing nurse).  Report included the following information SBAR, Kardex, MAR and Cardiac Rhythm NSR, ST.

## 2021-06-29 NOTE — PROGRESS NOTES
Problem: Mobility Impaired (Adult and Pediatric)  Goal: *Acute Goals and Plan of Care (Insert Text)  Description: Physical Therapy Goals  Initiated 6/27/2021 and to be accomplished within 7 day(s)  1. Patient will move from supine to sit and sit to supine , scoot up and down, and roll side to side in bed with modified independence. 2.  Patient will transfer from bed to chair and chair to bed with supervision/set-up using the least restrictive device. 3.  Patient will perform sit to stand with supervision/set-up. 4.  Patient will ambulate with supervision/set-up for 50 feet with the least restrictive device. 5.  Patient will ascend/descend stairs pending progress and necessity for discharge    PLOF: pt reports he was ind PTA, per chart review pt was living in boarding house with bedroom on second floor     Outcome: Progressing Towards Goal     PHYSICAL THERAPY TREATMENT    Patient: Cony Frazier (11 y.o. male)  Date: 6/29/2021  Diagnosis: Sepsis ([address] [A41.9]  Chronic malnutrition ([address] [E46]  Alcoholism ([address] [F10.20]  GAIL (acute kidney injury) ([address] [N17.9]  Alcohol abuse [F10.10] Severe sepsis ([address]       Precautions: Fall, Skin, DNR    ASSESSMENT:  RN cleared for mobility. Pt seen with both PT and OT to maximize patients safety, mobility, and participation. Pt found semi-reclined, in NAD, willing to work with PT. He was edu on spinal precautions. Sup-sit SBA very slowly with increased cueing. Once sitting, pt demonstrated frustration at times and self-limiting behavior. Also noted to have slight delirium, stating nonsensical phrases. MaxAx2 for STS with RW, pt able to reach correction standing but giving up 2/2 FOF. Pt impulsively returned back supine and found to have a BM. ModAx2 for rolling to each side. Pt stating he felt like he was going to fall on the bed with all railings up. TotalAx2 for scooting up towards [address]. Pt was left with HOB elevated, call bell nearby, bed alarm on, all needs met. Progression toward goals:   []      Improving appropriately and progressing toward goals  [x]      Improving slowly and progressing toward goals  []      Not making progress toward goals and plan of care will be adjusted     PLAN:  Patient continues to benefit from skilled intervention to address the above impairments. Continue treatment per established plan of care. Discharge Recommendations:  Rehab  Further Equipment Recommendations for Discharge:  N/A     SUBJECTIVE:   Patient stated don't call me claudia.    OBJECTIVE DATA SUMMARY:   Critical Behavior:  Neurologic State: Alert, Confused  Orientation Level: Oriented to person, Oriented to place  Cognition: Poor safety awareness  Safety/Judgement: Fall prevention  Functional Mobility Training:  Bed Mobility:  Rolling: Moderate assistance;Assist x2  Supine to Sit: Stand-by assistance; Additional time  Sit to Supine: Stand-by assistance; Additional time  Scooting: Total assistance;Assist x2         Transfers:  Sit to Stand: Maximum assistance;Assist x2  Stand to Sit: Maximum assistance    Balance:  Sitting: Impaired; With support  Sitting - Static: Fair (occasional)  Sitting - Dynamic: Fair (occasional)  Standing: Impaired; With support  Standing - Static: Poor    Pain:  Pain level pre-treatment: 0/10  Pain level post-treatment: 0/10   Pain Intervention(s): Medication (see MAR); Rest, Ice, Repositioning   Response to intervention: Nurse notified, See doc flow    Activity Tolerance:   Pt tolerated mobility fair/poor  Please refer to the flowsheet for vital signs taken during this treatment. After treatment:   [] Patient left in no apparent distress sitting up in chair  [x] Patient left in no apparent distress in bed  [x] Call bell left within reach  [x] Nursing notified  [] Caregiver present  [x] Bed alarm activated  [x] SCDs applied      COMMUNICATION/EDUCATION:   [x]         Role of Physical Therapy in the acute care setting.   [x]         Fall prevention education was provided and the patient/caregiver indicated understanding. [x]         Patient/family have participated as able in working toward goals and plan of care. []         Patient/family agree to work toward stated goals and plan of care. []         Patient understands intent and goals of therapy, but is neutral about his/her participation.   []         Patient is unable to participate in stated goals/plan of care: ongoing with therapy staff.  []         Other:        Ángel Goodwin   Time Calculation: 28 mins

## 2021-06-29 NOTE — PROGRESS NOTES
[facility]  Intern Progress Note    Patient: Darcie Cochran MRN: [id number]   SSN: [id number]  YOB: 1960   Age: 64 y.o. Sex: male      Admit Date: 6/25/2021    LOS: 4 days   Chief Complaint   Patient presents with    Extremity Weakness       Subjective:     Overnight, no acute overnight events. Patient was seen at bedside this morning, alert and oriented x3 although occasionally confused. Patient complained of persistent bilateral lower extremity weakness. Denied back pain. He is able to tolerate PO intake well. No urinary complaints although hematuria still present in urine bag. Patient continues to refuse head CT, states he will consider it tomorrow. Review of Systems   Constitutional: Negative for chills and fever. Respiratory: Positive for cough. Negative for shortness of breath. Cardiovascular: Negative for chest pain. Genitourinary: Positive for hematuria. Musculoskeletal: Negative for back pain and joint pain. Neurological: Positive for weakness. Negative for headaches. Objective:     Patient Vitals for the past 12 hrs:   Temp Pulse Resp BP SpO2   06/29/21 0818 98.3 °F (36.8 °C) 96 16 (!) 149/75 98 %   06/29/21 0400 98.7 °F (37.1 °C) 100 19 114/71 93 %   06/29/21 0000 97.8 °F (36.6 °C) (!) 105 18 112/68 98 %     Physical Exam  Constitutional:       General: He is not in acute distress. Appearance: He is not toxic-appearing. Cardiovascular:      Rate and Rhythm: Normal rate and regular rhythm. Pulses: Normal pulses. Heart sounds: Normal heart sounds. No murmur heard. No friction rub. No gallop. Pulmonary:      Effort: Pulmonary effort is normal. No respiratory distress. Breath sounds: Normal breath sounds. No stridor. No wheezing, rhonchi or rales. Abdominal:      General: Abdomen is flat. Bowel sounds are normal. There is no distension. Palpations: Abdomen is soft. Tenderness: There is no abdominal tenderness.  There is no guarding or rebound. Musculoskeletal:      Right lower leg: No edema. Left lower leg: Edema present. Comments: LLE 1+ edema with venous stasis changes of skin and ecchymosis/hematoma on shin   Skin:     Capillary Refill: Capillary refill takes less than 2 seconds. Comments: Diffuse ecchymosis on back and extremities. Bilateral Onychomycosis and excoriations in both feet. Nails trimmed by staff. Neurological:      Mental Status: He is alert and oriented to person, place, and time. Intake and Output:  Current Shift: No intake/output data recorded. Last three shifts: 06/27 1901 - 06/29 0700  In: 1978.8 [P.O.:480; I.V.:1498.8]  Out: 2100 [Urine:2100]    Lab/Data Review:  Recent Results (from the past 12 hour(s))   METABOLIC PANEL, COMPREHENSIVE    Collection Time: 06/29/21 12:27 AM   Result Value Ref Range    Sodium 141 136 - 145 mmol/L    Potassium 4.0 3.5 - 5.5 mmol/L    Chloride 109 100 - 111 mmol/L    CO2 25 21 - 32 mmol/L    Anion gap 7 3.0 - 18 mmol/L    Glucose 95 74 - 99 mg/dL    BUN 10 7.0 - 18 MG/DL    Creatinine 0.70 0.6 - 1.3 MG/DL    BUN/Creatinine ratio 14 12 - 20      GFR est AA >60 >60 ml/min/1.73m2    GFR est non-AA >60 >60 ml/min/1.73m2    Calcium 7.9 (L) 8.5 - 10.1 MG/DL    Bilirubin, total 2.5 (H) 0.2 - 1.0 MG/DL    ALT (SGPT) 42 16 - 61 U/L    AST (SGOT) 148 (H) 10 - 38 U/L    Alk.  phosphatase 176 (H) 45 - 117 U/L    Protein, total 5.7 (L) 6.4 - 8.2 g/dL    Albumin 1.9 (L) 3.4 - 5.0 g/dL    Globulin 3.8 2.0 - 4.0 g/dL    A-G Ratio 0.5 (L) 0.8 - 1.7     CBC WITH AUTOMATED DIFF    Collection Time: 06/29/21 12:27 AM   Result Value Ref Range    WBC 10.4 4.6 - 13.2 K/uL    RBC 2.37 (L) 4.35 - 5.65 M/uL    HGB 8.4 (L) 13.0 - 16.0 g/dL    HCT 27.2 (L) 36.0 - 48.0 %    .8 (H) 74.0 - 97.0 FL    MCH 35.4 (H) 24.0 - 34.0 PG    MCHC 30.9 (L) 31.0 - 37.0 g/dL    RDW 17.0 (H) 11.6 - 14.5 %    PLATELET 298 (L) 964 - 420 K/uL    MPV 9.8 9.2 - 11.8 FL    NEUTROPHILS 78 (H) 40 - 73 %    LYMPHOCYTES 11 (L) 21 - 52 %    MONOCYTES 11 (H) 3 - 10 %    EOSINOPHILS 0 0 - 5 %    BASOPHILS 0 0 - 2 %    ABS. NEUTROPHILS 8.2 (H) 1.8 - 8.0 K/UL    ABS. LYMPHOCYTES 1.1 0.9 - 3.6 K/UL    ABS. MONOCYTES 1.1 0.05 - 1.2 K/UL    ABS. EOSINOPHILS 0.0 0.0 - 0.4 K/UL    ABS. BASOPHILS 0.0 0.0 - 0.1 K/UL    DF MANUAL      PLATELET COMMENTS DECREASED PLATELETS      RBC COMMENTS MICROCYTOSIS  2+       MAGNESIUM    Collection Time: 06/29/21 12:27 AM   Result Value Ref Range    Magnesium 2.2 1.6 - 2.6 mg/dL   PHOSPHORUS    Collection Time: 06/29/21 12:27 AM   Result Value Ref Range    Phosphorus 2.0 (L) 2.5 - 4.9 MG/DL   AMMONIA    Collection Time: 06/29/21 12:27 AM   Result Value Ref Range    Ammonia 29 11 - 32 UMOL/L   GLUCOSE, POC    Collection Time: 06/29/21  7:02 AM   Result Value Ref Range    Glucose (POC) 148 (H) 70 - 110 mg/dL       Assessment and Plan:     Mr. Eugene Dalal is a 64 y. o. male with Past Medical History Notable for Type 2 Diabetes, Hypertension, Hyperlipidemia and a history of an L3 Compression Fracture in March 2020, now presenting with complaint of increased weakness and unsteadiness on his feet in the setting of meeting sepsis criteria and an GAIL.     Sepsis 2/2 aspiration PNA with lower suspicion for UTI (improving): On admission, WBC 13.1, Heart Rates in the 90s, soft BPs ~80s/50s, lactate 5. 89. Received fluid recussitation and broad spectrum antibiotics. Lactic acid has improved. Vital signs currently stable. WBC count is decreasing. UA was positive for nitrites. CXR showed mild left lower lobe infiltrate, concerning for possible aspiration PNA. 1/2 bottles of blood cultures growing gram positive rods (Bacillus).   CXR (6/29): interval progression of b/l opacities @ lung bases  - Follow up on blood and urine Cultures  - Continue Levaquin  - FU repeat Blood cultures  - repeat CXR   - fall precautions  - aspiration precautions  - SLP consult, appreciate recs  - oral care    Acute/subacute on chronic L3 burst Fracture: MRI Lumbar Spine (3/2/2020) with subacute appearing superior endplate fracture of the L3 vertebral body, approximately 30% height loss. On admission, denied any numbness, tingling, or pain is his back     MRI Lumbar Spine (6/28/2021) Multilevel mild degenerative changes without high-grade spinal canal or foraminal stenosis. Acute/subacute on chronic L3 superior endplate burst type fracture.  -Pain Control with Tylenol PRN  - Ortho spine consulted - recommends conservative management with progressive mobilization. Chromic Alcohol use disorder: Patient endorsed consuming drinks 6-pack of beer and 4-5 mixed rum drinks per day. , ALT 35 on admission (>2:1 ratio suggestive of alcoholic disease). Serum Ethanol <3, Ammonia 40 on admission. CIWA scores to date have been 2-3. Has not required any Ativan  - Continue CIWA protocol   - Continue Thiamine 776 mg and Folic Acid 1 mg   - Fall Precautions  - Hep B panel, Hep C, HIV, RPR labs    Delirium: Multifactorial in the setting of chronic alcohol abuse, sepsis and history of falls. - ordered CT head w/o contrast - patient refused    Generalized weakness, physicial deconditioning, impaired ADLs and iADLs, Hx of falls: multifactorial in the setting of malnutrition and chronic alcohol abuse, sepsis. XR of left lower extremity and left ankle given history of fall as well as hematoma in LLE (6/28) No acute fracture, Moderate circumferential soft tissue swelling around the ankle. - fall precautions  - PT/OT consult, appreciate recs    Severe Protein calorie malnutrition  - nutrition consulted, appreciate recs  - start Ensure Enlive TID meal supplements  - continue multivitamins and thiamine 100mg daily supplements  - monitor electrolytes, replete as per protocol prn  - Vitamin D levels    Acute Kidney Injury (resolved): Cr 2.33 on admission (Baseline Cre 0.6 - 0.8). Cr 0.70 this morning.  Likely Pre-renal due to dehydration in the setting of Alcohol Use Disorder. Has been on maintenance fluids since admission. Has had relatively poor PO intake to date. - Continue Maintenance IVF, although will halve this morning (1/2NS with KCl at 75 mL/hr). As long as patient has good PO intake today, can stop  - Avoid nephrotoxic meds  - Monitor Creatinine with Daily CMP     Macrocytic Anemia and Thrombocytopenia- Likely due to malnourishment in the setting of acute alcohol disorder. Hemoglobin stable, no active bleeding. MCV is 112 at admission.  B12/folate WNL. - continue to monitor indices with daily CBC   - Iron profile labs  - FOBT     Hypokalemia (resolved):  - continue to monitor on daily BMP     Hx of Hypertension: Initially hypotensive at admission but BPs stable, WNL since admission Normotensive overnight. Previously on Lisinopril 10 mg every day, but patient denies taking this medication. - consider restarting Lisinopril if the patient's Blood Pressure increases     Type 2 Diabetes: Last A1c (06/24/2020)  5.8. Diet controlled   - POC glucose ACHS  - SSI  - hypoglycemic precautions    Right shoulder pain: Complaint at admission with full ROM, no tenderness on exam. Patient reports being pulled by right arm from his porch on day of admission. XR R.shoulder (6/28) No definite evidence of acute fracture,  No significant glenohumeral or acromioclavicular osteoarthritis. Suspected right infraspinatus calcific tendinitis versus subacromial/subdeltoid calcific bursitis.   - Tylenol PRN     Onychomycosis:   -  Podiatry consulted, appreciate recs    Global care:   - CM consult, appreciate recs for discharge planning  Diet Regular   DVT Prophylaxis SQH   GI Prophylaxis Protonix   Code status DNR/DNI     Point of Contact Richard Dumont  Relationship: Brother  [phone]     Nicholette Phalen, PGY-1   [facility]   June 29, 2021, 10:41 AM

## 2021-06-29 NOTE — PROGRESS NOTES
Problem: Dysphagia (Adult)  Goal: *Acute Goals and Plan of Care (Insert Text)  Description: Patient will:  1. Tolerate PO trials with 0 s/sx overt distress in 4/5 trials  2. Utilize compensatory swallow strategies/maneuvers (decrease bite/sip, size/rate, alt. liq/sol) with min cues in 4/5 trials  3. Perform oral-motor/laryngeal exercises to increase oropharyngeal swallow function with min cues  4. Complete an objective swallow study (i.e., MBSS) to assess swallow integrity, r/o aspiration, and determine of safest LRD, min A as indicated/ordered by MD    Recommend:   Easy to chew diet with thin liquids   Meds per pt preference   Aspiration precautions  HOB >45 degrees during all intake and for at least 30 min after po   Small bites/sips, slow rate of intake, alternating bites/sips  Oral care post meals    Outcome: Progressing Towards Goal    SPEECH LANGUAGE PATHOLOGY BEDSIDE SWALLOW EVALUATION/TREATMENT    Patient: Peggy Mills (44 y.o. male)  Date: 6/29/2021  Primary Diagnosis: Sepsis ([address] [A41.9]  Chronic malnutrition ([address] [E46]  Alcoholism ([address] [F10.20]  GAIL (acute kidney injury) ([address] [N17.9]  Alcohol abuse [F10.10]  Precautions: Aspiration     PLOF: As per H&P    ASSESSMENT :  Based on the objective data described below, the patient presents with mild oral and suspected mild pharyngeal dysphagia. Pt alert but confused, oriented to person and year only. Oral [facility] exam revealed edentulous status with decreased orolingual strength/control. Pt with prolonged/inefficient mastication of regular solids requiring mod cues for liquid washes to clear. Tolerating thin liquids + straw with no overt s/sx aspiration. Timely swallow initiation and adequate laryngeal elevation to palpation noted. Demo decreased endurance across trials. Recommend change diet to easy to chew with thin liquids with strict aspiration precautions. Available for MBS if aspiration a concern.      TREATMENT :  Skilled therapy initiated; Educated pt on aspiration precautions and importance of compensatory swallow techniques to decrease aspiration risk (decrease rate of intake & sip/bite size, upright @HOB for all po intake and ~30 minutes after po); verbalized comprehension; however, suspect limited carryover. Patient will benefit from skilled intervention to address the above impairments. Patient's rehabilitation potential is considered to be Fair   Factors which may influence rehabilitation potential include:   []            None noted  []            Mental ability/status  [x]            Medical condition  [x]            Home/family situation and support systems  [x]            Safety awareness  []            Pain tolerance/management  []            Other:      PLAN :  Recommendations and Planned Interventions:  As above   Frequency/Duration: Patient will be followed by speech-language pathology 1-2 times per day/4-7 days per week to address goals. Discharge Recommendations: To Be Determined     SUBJECTIVE:   Patient stated Somebody keeps taking my wallet    OBJECTIVE:   No past medical history on file. No past surgical history on file. Prior Level of Function/Home Situation:   Home Situation  Home Environment: Shelter  One/Two Story Residence: One story  Living Alone: No  Support Systems: None  Patient Expects to be Discharged to[de-identified] Unknown  Current DME Used/Available at Home: None  Diet prior to admission: unknown   Current Diet:  Regular/thin liquids; recommend change to easy to chew/thin liquids    Cognitive and Communication Status:  Neurologic State: Alert  Orientation Level: Oriented to person, Oriented to time, Disoriented to time, Disoriented to situation  Cognition: Poor safety awareness, Impulsive   Oral Assessment:  Oral Assessment  Labial: No impairment  Dentition: Edentulous; Poor  Oral Hygiene: Poor  Lingual: Decreased strength;Decreased rate; Incoordinated  Velum: No impairment  Mandible: No impairment  P.O.  Trials:  Patient Position: [facility] at [facility]  Vocal quality prior to P.O.: Low volume;Hoarse  Consistency Presented: Thin liquid; Solid  How Presented: Self-fed/presented;SLP-fed/presented;Cup/sip;Spoon;Straw;Successive swallows  Bolus Acceptance: No impairment  Bolus Formation/Control: Impaired  Type of Impairment: Mastication;Delayed;Poor;Posterior  Propulsion: Discoordination;Delayed (# of seconds)  Oral Residue: Less than 10% of bolus; Lingual  Initiation of Swallow: Delayed (# of seconds)  Laryngeal Elevation: Functional  Aspiration Signs/Symptoms: None  Pharyngeal Phase Characteristics: Easily fatigued   Effective Modifications: Small sips and bites; Alternate liquids/solids  Cues for Modifications: Moderate  Oral Phase Severity: Mild  Pharyngeal Phase Severity : Mild    PAIN:  Start of Eval: not verbalized   End of Eval: not verbalized      After treatment:   []            Patient left in no apparent distress sitting up in chair  [x]            Patient left in no apparent distress in bed  [x]            Call bell left within reach  [x]            Nursing notified  []            Family present  []            Caregiver present  []            Bed alarm activated    COMMUNICATION/EDUCATION:   [x]            Aspiration precautions; swallow safety; compensatory techniques. []            Patient/family have participated as able in goal setting and plan of care. [x]            Patient/family agree to work toward stated goals and plan of care. []            Patient understands intent and goals of therapy; neutral about participation. []            Patient unable to participate in goal setting/plan of care; educ ongoing with interdisciplinary staff  [x]         Posted safety precautions in patient's room.     Thank you for this referral,  MU Reardon.S., [id number] [facility]  Speech-Language Pathologist

## 2021-06-29 NOTE — PROGRESS NOTES
Patient is confused, stating he is at his group home. Also, told this  that the group home evicted everyone. Called Brother to verify living situation, brother is not aware of patients living situation. Informed Brother that PT/OT are recommending SNF. Matched with local SNF in [place]. Brother in agreement.       Sesar Perdomo RN BSN  Care Manager  [phone]

## 2021-06-30 ENCOUNTER — APPOINTMENT (OUTPATIENT)
Dept: CT IMAGING | Age: 61
DRG: 871 | End: 2021-06-30
Attending: FAMILY MEDICINE
Payer: MEDICARE

## 2021-06-30 LAB
ALBUMIN SERPL-MCNC: 2.1 G/DL (ref 3.4–5)
ALBUMIN/GLOB SERPL: 0.6 {RATIO} (ref 0.8–1.7)
ALP SERPL-CCNC: 212 U/L (ref 45–117)
ALT SERPL-CCNC: 50 U/L (ref 16–61)
ANION GAP SERPL CALC-SCNC: 8 MMOL/L (ref 3–18)
AST SERPL-CCNC: 149 U/L (ref 10–38)
BASOPHILS # BLD: 0 K/UL (ref 0–0.1)
BASOPHILS NFR BLD: 0 % (ref 0–2)
BILIRUB SERPL-MCNC: 3.1 MG/DL (ref 0.2–1)
BUN SERPL-MCNC: 7 MG/DL (ref 7–18)
BUN/CREAT SERPL: 10 (ref 12–20)
CALCIUM SERPL-MCNC: 8.1 MG/DL (ref 8.5–10.1)
CHLORIDE SERPL-SCNC: 103 MMOL/L (ref 100–111)
CO2 SERPL-SCNC: 26 MMOL/L (ref 21–32)
CREAT SERPL-MCNC: 0.7 MG/DL (ref 0.6–1.3)
DIFFERENTIAL METHOD BLD: ABNORMAL
EOSINOPHIL # BLD: 0 K/UL (ref 0–0.4)
EOSINOPHIL NFR BLD: 0 % (ref 0–5)
ERYTHROCYTE [DISTWIDTH] IN BLOOD BY AUTOMATED COUNT: 17 % (ref 11.6–14.5)
GLOBULIN SER CALC-MCNC: 3.8 G/DL (ref 2–4)
GLUCOSE BLD STRIP.AUTO-MCNC: 100 MG/DL (ref 70–110)
GLUCOSE BLD STRIP.AUTO-MCNC: 106 MG/DL (ref 70–110)
GLUCOSE BLD STRIP.AUTO-MCNC: 92 MG/DL (ref 70–110)
GLUCOSE BLD STRIP.AUTO-MCNC: 94 MG/DL (ref 70–110)
GLUCOSE SERPL-MCNC: 97 MG/DL (ref 74–99)
HCT VFR BLD AUTO: 27.9 % (ref 36–48)
HGB BLD-MCNC: 8.5 G/DL (ref 13–16)
HIV 1+2 AB+HIV1 P24 AG SERPL QL IA: NONREACTIVE
HIV12 RESULT COMMENT, HHIVC: NORMAL
LYMPHOCYTES # BLD: 1.5 K/UL (ref 0.9–3.6)
LYMPHOCYTES NFR BLD: 13 % (ref 21–52)
MAGNESIUM SERPL-MCNC: 1.9 MG/DL (ref 1.6–2.6)
MCH RBC QN AUTO: 34.8 PG (ref 24–34)
MCHC RBC AUTO-ENTMCNC: 30.5 G/DL (ref 31–37)
MCV RBC AUTO: 114.3 FL (ref 74–97)
MONOCYTES # BLD: 0.8 K/UL (ref 0.05–1.2)
MONOCYTES NFR BLD: 7 % (ref 3–10)
NEUTS SEG # BLD: 9.6 K/UL (ref 1.8–8)
NEUTS SEG NFR BLD: 80 % (ref 40–73)
PHOSPHATE SERPL-MCNC: 1.7 MG/DL (ref 2.5–4.9)
PLATELET # BLD AUTO: 137 K/UL (ref 135–420)
PLATELET COMMENTS,PCOM: ABNORMAL
PMV BLD AUTO: 9.8 FL (ref 9.2–11.8)
POTASSIUM SERPL-SCNC: 3.6 MMOL/L (ref 3.5–5.5)
PROT SERPL-MCNC: 5.9 G/DL (ref 6.4–8.2)
RBC # BLD AUTO: 2.44 M/UL (ref 4.35–5.65)
RBC MORPH BLD: ABNORMAL
RBC MORPH BLD: ABNORMAL
RPR SER QL: NONREACTIVE
SODIUM SERPL-SCNC: 137 MMOL/L (ref 136–145)
WBC # BLD AUTO: 11.9 K/UL (ref 4.6–13.2)

## 2021-06-30 PROCEDURE — 84100 ASSAY OF PHOSPHORUS: CPT

## 2021-06-30 PROCEDURE — 70450 CT HEAD/BRAIN W/O DYE: CPT

## 2021-06-30 PROCEDURE — 83735 ASSAY OF MAGNESIUM: CPT

## 2021-06-30 PROCEDURE — 80053 COMPREHEN METABOLIC PANEL: CPT

## 2021-06-30 PROCEDURE — 36415 COLL VENOUS BLD VENIPUNCTURE: CPT

## 2021-06-30 PROCEDURE — 97530 THERAPEUTIC ACTIVITIES: CPT

## 2021-06-30 PROCEDURE — 2709999900 HC NON-CHARGEABLE SUPPLY

## 2021-06-30 PROCEDURE — 74011250636 HC RX REV CODE- 250/636: Performed by: STUDENT IN AN ORGANIZED HEALTH CARE EDUCATION/TRAINING PROGRAM

## 2021-06-30 PROCEDURE — 82962 GLUCOSE BLOOD TEST: CPT

## 2021-06-30 PROCEDURE — 74011250637 HC RX REV CODE- 250/637: Performed by: FAMILY MEDICINE

## 2021-06-30 PROCEDURE — 74011250636 HC RX REV CODE- 250/636: Performed by: FAMILY MEDICINE

## 2021-06-30 PROCEDURE — 74011250637 HC RX REV CODE- 250/637: Performed by: STUDENT IN AN ORGANIZED HEALTH CARE EDUCATION/TRAINING PROGRAM

## 2021-06-30 PROCEDURE — 65660000000 HC RM CCU STEPDOWN

## 2021-06-30 PROCEDURE — 97535 SELF CARE MNGMENT TRAINING: CPT

## 2021-06-30 PROCEDURE — 85025 COMPLETE CBC W/AUTO DIFF WBC: CPT

## 2021-06-30 RX ORDER — SODIUM,POTASSIUM PHOSPHATES 280-250MG
1 POWDER IN PACKET (EA) ORAL 3 TIMES DAILY
Status: DISCONTINUED | OUTPATIENT
Start: 2021-06-30 | End: 2021-07-01

## 2021-06-30 RX ADMIN — CHOLECALCIFEROL TAB 25 MCG (1000 UNIT) 1000 UNITS: 25 TAB at 09:26

## 2021-06-30 RX ADMIN — Medication 100 MG: at 09:26

## 2021-06-30 RX ADMIN — LORAZEPAM 1 MG: 2 INJECTION INTRAMUSCULAR; INTRAVENOUS at 04:54

## 2021-06-30 RX ADMIN — POTASSIUM & SODIUM PHOSPHATES POWDER PACK 280-160-250 MG 1 PACKET: 280-160-250 PACK at 21:59

## 2021-06-30 RX ADMIN — Medication 10 ML: at 21:59

## 2021-06-30 RX ADMIN — POTASSIUM & SODIUM PHOSPHATES POWDER PACK 280-160-250 MG 1 PACKET: 280-160-250 PACK at 18:18

## 2021-06-30 RX ADMIN — FOLIC ACID 1 MG: 1 TABLET ORAL at 09:26

## 2021-06-30 RX ADMIN — POTASSIUM & SODIUM PHOSPHATES POWDER PACK 280-160-250 MG 1 PACKET: 280-160-250 PACK at 09:31

## 2021-06-30 RX ADMIN — Medication 10 ML: at 15:18

## 2021-06-30 RX ADMIN — THERA TABS 1 TABLET: TAB at 09:26

## 2021-06-30 RX ADMIN — LEVOFLOXACIN 750 MG: 5 INJECTION, SOLUTION INTRAVENOUS at 21:59

## 2021-06-30 RX ADMIN — Medication 10 ML: at 05:59

## 2021-06-30 NOTE — MED STUDENT NOTES
*ATTENTION:  This note has been created by a medical student for educational purposes only. Please do not refer to the content of this note for clinical decision-making, billing, or other purposes. Please see attending physicians note to obtain clinical information on this patient. *     This is a medical student note for learning purposes only. Please refer to the attending physician note for final recommendations. Contact our [address] [place] [address] team with any questions or concerns. [facility]  Progress Note    Patient: Jeffry Hatfield MRN: [id number]   SSN: [id number]  YOB: 1960   Age: 64 y.o. Sex: male      Admit Date: 6/25/2021    LOS: 5 days   Chief Complaint   Patient presents with    Extremity Weakness       Subjective:     Mr. Aleksandar Jorgensen is a 64 y.o male with a PMH significant for - T2DM, HTN, HLD, L3 compression fracture and chronic alcohol use disorder who is presenting with sustained weakness and confusion in the lower extremity 2/2 a fall.     Overnight - patient states he slept fine. No significant acute events. This morning he was super somnolent. I could only get him to respond to a few questions and even then he was difficult to understand and falling asleep between questions. Patient was oriented to person and place but confused about time. Patient states that nothing hurts except his ankle. He could not answer why his ankle was bothering him. Patient stated he knew he had food next to him, but was unsure when the last time he ate was. Review of Systems   Constitutional: Negative for chills and fever. Respiratory: Negative for cough. Gastrointestinal: Negative for abdominal pain and nausea. Genitourinary: Negative for dysuria. Neurological: Positive for weakness. Negative for headaches.        Objective:     Visit Vitals  BP (!) 146/76   Pulse 83   Temp 98.4 °F (36.9 °C)   Resp 20   Ht 6' 2\" (1.88 m)   Wt 83.5 kg (184 lb 1.6 oz)   SpO2 97% BMI 23.64 kg/m²        Physical Exam:         General appearance:   -patient was somnolent, well appearing, and in no distress and oriented to person, place, but not time     Cardiovascular:  -Normal rate- Regular rhythm  -No rubs or gallops  -Normal radial and dorsalis pedis pulses bilaterally     Pulmonary:  - Clear to auscultation bilaterally   - Not in respiratory distress - normal effort     Abdominal:  - Abdomen is flat and not distended  -Bowel sounds are normoactive  -There is no pain to deep or superficial palpation - there is no guarding or rebound tenderness     MSK:  -No pain to palpation of the ankle or during motion      Skin:  -ecchymosis diffusely   -skin is flaky and dry  -Feet skin looks improved and treated, but still have bilateral onychomycosis     Neurological:  -oriented to person and place, but not time    :  -patient incontinent of urine  -no suprapubic tenderness     Intake and Output:  Current Shift: No intake/output data recorded. Last three shifts: 06/28 1901 - 06/30 0700  In: -   Out: 775 [Urine:775]    Lab/Data Review:  Recent Results (from the past 12 hour(s))   GLUCOSE, POC    Collection Time: 06/29/21  9:15 PM   Result Value Ref Range    Glucose (POC) 120 (H) 70 - 327 mg/dL   METABOLIC PANEL, COMPREHENSIVE    Collection Time: 06/30/21  4:26 AM   Result Value Ref Range    Sodium 137 136 - 145 mmol/L    Potassium 3.6 3.5 - 5.5 mmol/L    Chloride 103 100 - 111 mmol/L    CO2 26 21 - 32 mmol/L    Anion gap 8 3.0 - 18 mmol/L    Glucose 97 74 - 99 mg/dL    BUN 7 7.0 - 18 MG/DL    Creatinine 0.70 0.6 - 1.3 MG/DL    BUN/Creatinine ratio 10 (L) 12 - 20      GFR est AA >60 >60 ml/min/1.73m2    GFR est non-AA >60 >60 ml/min/1.73m2    Calcium 8.1 (L) 8.5 - 10.1 MG/DL    Bilirubin, total 3.1 (H) 0.2 - 1.0 MG/DL    ALT (SGPT) 50 16 - 61 U/L    AST (SGOT) 149 (H) 10 - 38 U/L    Alk.  phosphatase 212 (H) 45 - 117 U/L    Protein, total 5.9 (L) 6.4 - 8.2 g/dL    Albumin 2.1 (L) 3.4 - 5.0 g/dL Globulin 3.8 2.0 - 4.0 g/dL    A-G Ratio 0.6 (L) 0.8 - 1.7     CBC WITH AUTOMATED DIFF    Collection Time: 06/30/21  4:26 AM   Result Value Ref Range    WBC 11.9 4.6 - 13.2 K/uL    RBC 2.44 (L) 4.35 - 5.65 M/uL    HGB 8.5 (L) 13.0 - 16.0 g/dL    HCT 27.9 (L) 36.0 - 48.0 %    .3 (H) 74.0 - 97.0 FL    MCH 34.8 (H) 24.0 - 34.0 PG    MCHC 30.5 (L) 31.0 - 37.0 g/dL    RDW 17.0 (H) 11.6 - 14.5 %    PLATELET 569 150 - 275 K/uL    MPV 9.8 9.2 - 11.8 FL    NEUTROPHILS 80 (H) 40 - 73 %    LYMPHOCYTES 13 (L) 21 - 52 %    MONOCYTES 7 3 - 10 %    EOSINOPHILS 0 0 - 5 %    BASOPHILS 0 0 - 2 %    ABS. NEUTROPHILS 9.6 (H) 1.8 - 8.0 K/UL    ABS. LYMPHOCYTES 1.5 0.9 - 3.6 K/UL    ABS. MONOCYTES 0.8 0.05 - 1.2 K/UL    ABS. EOSINOPHILS 0.0 0.0 - 0.4 K/UL    ABS. BASOPHILS 0.0 0.0 - 0.1 K/UL    DF MANUAL      PLATELET COMMENTS ADEQUATE PLATELETS      RBC COMMENTS MACROCYTOSIS  2+        RBC COMMENTS HYPOCHROMIA  1+       MAGNESIUM    Collection Time: 06/30/21  4:26 AM   Result Value Ref Range    Magnesium 1.9 1.6 - 2.6 mg/dL   PHOSPHORUS    Collection Time: 06/30/21  4:26 AM   Result Value Ref Range    Phosphorus 1.7 (L) 2.5 - 4.9 MG/DL   GLUCOSE, POC    Collection Time: 06/30/21  6:03 AM   Result Value Ref Range    Glucose (POC) 106 70 - 110 mg/dL       RECENT RESULTS  MODALITY IMPRESSION   XR Results from Hospital Encounter encounter on 06/25/21    XR CHEST PORT    Narrative  EXAM: XR CHEST PORT    INDICATION: cough, aspiration pneumonia    COMPARISON: 2 days prior    FINDINGS: A portable AP radiograph of the chest was obtained at 0516 hours. The  patient is on a cardiac monitor. Interval progression of airspace opacities in  both lungs primarily at the lung bases. Probable left pleural opacity. . Stable  cardiac silhouette. .  The bones and soft tissues are grossly within normal  limits. Impression  Interval progression in bilateral airspace opacities primarily at the bases.      [place] [facility] [place] on [place]/29/20    CT LOW EXT LT W CONT    Narrative  CT of left lower extremity with contrast    HISTORY: Left foreleg edema. Clinical concern of abscess. COMPARISON: None. TECHNIQUE: Contiguous thin axial images to the left foreleg are performed from  the level of above the knee to the level of heel after IV contrast  administration. Computer coronal and sagittal reconstruction images are  performed for better evaluation of the bony structures in relation to the  adjacent vasculature and soft tissue structures and reduce the radiation dose. 3D postprocessed images, including surface shaded display, also produce for this  exam, permanently archived, and interpreted. All CT scans at this facility performed using dose optimization techniques as  appreciated to a performed exam, to include automated exposure control,  adjustment of the mA and or KU according to patient size (including appropriate  matching for site specific examination), or use of iterative reconstruction  technique. Contrast:100 Isovue 300. FINDINGS:    There is diffuse and significant edema identified in imaged left foreleg  including subcutaneous compartment and also mildly involving the muscular  compartments with mild skin thickening, more pronounced toward to the ankle. Although, no focal fluid collection or abscess identified. No definite open  wound or radiopaque foreign body seen. There are moderate atherosclerotic calcified plaques noted in the popliteal and  pretibial arteries. The tibia and fibula appear intact with no definite osseous  lesion seen. No fracture or dislocation. Mild knee joint space narrowing noted  but no significant osteophytic changes. No joint effusion. .    Impression  IMPRESSION:    1.. Extensive edema of left the foreleg in subcutaneous and muscular  compartments but no focal fluid collection or abscess seen. 2. Mild DJD of right knee.     Thank you for your referral.     MRI Results from [facility] [place] encounter on 06/25/21    MRI LUMB SPINE W WO CONT    Narrative  MR lumbar spine with and without contrast    HISTORY: , H/o L3 burst fracture, lost to follow up, bowel incontinence x3    COMPARISON: MRI 3/2/2020. TECHNIQUE: Lumbar spine scanned with axial and sagittal T1W scans, axial and  sagittal T2W scans, and with post gadolinium axial and sagittal T1W scans. Patient received 17 mL Dotarem  IV contrast.    FINDINGS:    Normal lumbar lordosis. Chronic L3 superior endplate compression fracture with  extension to the posterior endplate and mild retropulsion. There is interval  worsening of the central vertebral body height (about 50% height loss) with  increased central enhancing bone marrow edema. Slightly worsening retropulsion 5  mm with mild compromise of the central canal. No epidural collection. No  discrete underlying bone marrow lesion. Otherwise vertebral body heights are obtained. No additional bone marrow signal  abnormality. Disc space heights are relatively maintained and hydrated. Conus terminates at the L1-2 level with normal signal.    Mild bilateral psoas edema bilaterally at multiple levels centered at the L3  fracture, likely due to strain or related to fracture. Also mild posterior  paraspinal muscular edema and mild atrophy similar to prior. Correlation of sagittal and axial images demonstrates the following:    T11-12 and T12-L1: Sagittal images only. Preserved disc heights. No central  canal or foraminal stenosis. Larry Olden L1-L2: No significant disc pathology. Patent canal and foramina. L2-L3: Mild disc bulge related to 3 fracture but otherwise preserved disc  height. Mild compromise of the central canal due to retropulsion at superior L3  level but otherwise patent canal and foramina    L3-L4: No significant disc pathology. . Patent canal and foramina    L4-L5: No significant disc pathology. Patent canal and foramina. L5-S1: Central annular fissure and small protrusion. Patent canal and foramina. .    Impression  1. Acute/subacute on chronic L3 superior endplate burst type fracture with  extension to the posterior body wall, worsening vertebral body height (about 50%  height loss) and slightly worsening retropulsion. Mild compromise of the central  canal at this level. No epidural hematoma.  -Central L3 vertebral body enhancing bone marrow edema signal but no discrete  underlying bone marrow lesion to suggest pathologic fracture. 2. Multilevel mild degenerative changes without high-grade spinal canal or  foraminal stenosis. 3. Chronic annular fissure and small protrusion at L5-S1.  4. Chronic posterior paraspinal mild muscular atrophy and edema. ULTRASOUND Results from [place] encounter on 02/29/20    US EXT NONVAS LT LTD    Impression  IMPRESSION:  1. Negative for abscess at the 2 separately evaluated sites within the left  lower extremity.  -Profound subcutaneous tissue edema at each location, as can be seen with severe  cellulitis      US EXT NONVAS LT LTD    Impression  IMPRESSION:  1.   Negative for abscess at the 2 separately evaluated sites within the left  lower extremity.  -Profound subcutaneous tissue edema at each location, as can be seen with severe  cellulitis       Cardiology Procedures/Testing:  MODALITY RESULTS   EKG Results for orders placed or performed during the hospital encounter of 06/25/21   EKG, 12 LEAD, INITIAL   Result Value Ref Range    Ventricular Rate 94 BPM    Atrial Rate 94 BPM    P-R Interval 146 ms    QRS Duration 74 ms    Q-T Interval 388 ms    QTC Calculation (Bezet) 485 ms    Calculated P Axis 65 degrees    Calculated R Axis 72 degrees    Calculated T Axis 74 degrees    Diagnosis       Normal sinus rhythm  Prolonged QT  Abnormal ECG  When compared with ECG of 29-FEB-2020 13:25,  No significant change was found  Confirmed by Sandra Banegas ([id number]) on 6/25/2021 10:13:30 PM         ECHO 02/29/20    ECHO ADULT FOLLOW-UP OR LIMITED 03/10/2020 3/10/2020    Interpretation Summary  · Technically difficult study due to lung interference and limited patient mobility. · Normal cavity size, wall thickness and systolic function (ejection fraction normal). Estimated left ventricular ejection fraction is 55 - 60%. Visually measured ejection fraction. No regional wall motion abnormality noted. · Tricuspid regurgitation is inadequate for estimation of right ventricular systolic pressure. Signed by: Kaykay Richter DO on 3/10/2020  4:06 PM       Special Testing/Procedures:  MODALITY RESULTS   MICRO All Micro Results     Procedure Component Value Units Date/Time    CULTURE, BLOOD [[id number]] Collected: 06/28/21 1234    Order Status: Completed Specimen: Blood Updated: 06/30/21 0654     Special Requests: NO SPECIAL REQUESTS        Culture result: NO GROWTH 2 DAYS       CULTURE, BLOOD [[id number]] Collected: 06/25/21 2100    Order Status: Completed Specimen: Blood Updated: 06/30/21 0654     Special Requests: NO SPECIAL REQUESTS        Culture result: NO GROWTH 5 DAYS       CULTURE, URINE [[id number]] Collected: 06/26/21 0622    Order Status: Completed Specimen: Urine from Clean catch Updated: 06/27/21 1858     Special Requests: NO SPECIAL REQUESTS        Culture result: No growth (<1,000 CFU/ML)       CULTURE, BLOOD [[id number]]  (Abnormal) Collected: 06/25/21 2045    Order Status: Completed Specimen: Blood Updated: 06/27/21 1617     Special Requests: NO SPECIAL REQUESTS        GRAM STAIN       AEROBIC BOTTLE GRAM POSITIVE RODS                  CALLED TO AND CORRECTLY REPEATED BY: NANNETTE GREENWOOD. RN 4S AT 9715 TO S           Culture result:       BACILLUS SPECIES, NOT ANTHRACIS GROWING IN THE AEROBIC BOTTLE               UA No results found for this or any previous visit.    PATH        Assessment and Plan:     Assessment:    Mr. Jeremi Jay is a 64 y.o. male with a - PMH significant for - T2DM, HTN, HLD, L3 compression fracture and chronic alcohol use disorder who is presenting with sustained weakness, in the lower extremity, and confusion 2/2 a fall. Plan:     Sepsis 2/2 Aspiration Pneumonia and UTI:  [ ] Course of Zosyn and Vancomycin finished (6/28), continue Levofloxacin 750mg IV(6/25-), order sputum culture confirms diagnosis of suspected pneumonia, continue to monitor WBC  [ ] follow speech therapy recs w/ swallow trial  [ ] follow aspiration protocols    Chronic L3 Burst Fracture:  [ ] Pain control with tylenol PRN - currently not in pain  [ ] Ortho recommends management with progressive mobilization following recs from physical therapy  [ ] Continue Cholecalciferol 1000U   [ ] Continue to monitor Vit D levels, Phos and Calcium to potentially identify if patient pain is due to osteomalacia     Chronic Alcohol Use Disorder:     [ ] Continue to monitor CIWA scores via CIWA protocol. Most recent have been 2-3  [ ] Continue Thiamine 862WQ, Folic Acid 1mg  [ ] Fall Precautions     Altered Mental Status:  [ ] Follow up CT Head Scan w/ Contrast for potential encepholopathy  [ ] continue to monitor mental status and check for capacity when capable    Severe Malnutrition:  [ ] Follow dialectician recs from 6/28  [ ] MVI daily    Macrocytic Anemia and Thrombocytopenia  [ ] Likely 2/2 alcohol use disorder, continue thiamine, folic acid supplementation  [ ] Order a heme stool study to check for bleeding     Chronic Hypertension:  [ ] Consider restarting lisinopril if  Blood pressure increase is noted     Type 2 DM:  [ ] Last A1C of 5.8 on 6/24   [ ] continue to monitor diet and use SSI  [ ] monitor for hypoglycemic events     Right Shoulder Pain:  [ ] X ray on 6/28 showed no evidence of fracture or osteoarthritis of the glenohumeral joint. Suspected right infraspinatus tendinitis.   [ ] tylenol PRN  [ ]  Continue Cholecalciferol 1000U     Bilateral Onychomycosis:  [ ] Continue bedside treatment with plan from podiatry          Diet Regular   DVT Prophylaxis SQH   GI Prophylaxis Protonix   Code status DNR/DNI     Point of Contact Shaan Ordoñez  Relationship: Brother  ([phone], [facility]   June 30, 2021, 8:07 AM

## 2021-06-30 NOTE — PROGRESS NOTES
Patient hardly aroused when name was called. Per RN, patient had a rough morning after therapy and rest is much needed. Patient is not available to be assessed at this time.       [address], [address]   [phone]

## 2021-06-30 NOTE — PROGRESS NOTES
Problem: Self Care Deficits Care Plan (Adult)  Goal: *Acute Goals and Plan of Care (Insert Text)  Description: Occupational Therapy Goals  Initiated 6/26/2021 within 7 day(s). 1.  Patient will perform perform self feeding with modified independence and efficiency  2. Patient will demonstrate 4/5 UB strength (including hand intrinsics) in preparation for his efficient completion of his self care routine  3. Patient will perform grooming with modified independence progressing to EOB and standing at the sink as able  4. Patient will perform LB dressing with modified independence  5. Patient will complete LB clothes management with min assist    Prior Level of Function: he reports he lives in a boarding house with others and was independent with his self care. He is reporting it was becoming more difficult to walk up and down the stairs (his room is on the second floor) and to step over the bathtub to get into the shower. Outcome: Progressing Towards Goal   OCCUPATIONAL THERAPY TREATMENT    Patient: Zunilda Martines (89 y.o. male)  Date: 6/30/2021  Diagnosis: Sepsis ([address] [A41.9]  Chronic malnutrition ([address] [E46]  Alcoholism ([address] [F10.20]  GAIL (acute kidney injury) ([address] [N17.9]  Alcohol abuse [F10.10] Severe sepsis ([address]       Precautions: Fall, Skin, DNR  PLOF: he reports he lives in a boarding house with others and was independent with his self care. He is reporting it was becoming more difficult to walk up and down the stairs (his room is on the second floor) and to step over the bathtub to get into the shower. Chart, occupational therapy assessment, plan of care, and goals were reviewed. ASSESSMENT:  Pt cleared by RN for OT tx at this time. PT co tx to maximize pt safety and participation. Pt presented supine in bed drowsy from sedation with confusion. Attempted EOB activity however pt requiring increased time with max cueing to stay on task and for sequencing.  Pt was found to have soiled chux from urine. Pt was assisted rolling L/R with MOD A x 2 and required MAX A for shade area hygiene. TOTAL A x 2 to scoot to [facility] for optimal bed positioning/ He was left with HOB elevated, bed alarm active, and all needs left within reach. Progression toward goals:  []          Improving appropriately and progressing toward goals  [x]          Improving slowly and progressing toward goals  []          Not making progress toward goals and plan of care will be adjusted     PLAN:  Patient continues to benefit from skilled intervention to address the above impairments. Continue treatment per established plan of care. Discharge Recommendations:  SNF  Further Equipment Recommendations for Discharge:  N/A     SUBJECTIVE:   Patient stated  I will take my time. \"     OBJECTIVE DATA SUMMARY:   Cognitive/Behavioral Status:  Neurologic State: Confused  Orientation Level: Disoriented X4  Cognition: Impaired decision making  Safety/Judgement: Fall prevention    Functional Mobility and Transfers for ADLs:   Bed Mobility:  Rolling: Moderate assistance;Assist x2  Supine to Sit: Maximum assistance;Assist x2; Additional time  Sit to Supine: Total assistance        ADL Intervention:       Toileting  Toileting Assistance: Maximum assistance (supine)  Bladder Hygiene: Maximum assistance    Cognitive Retraining  Safety/Judgement: Fall prevention      Pain:  Pain level pre-treatment: 0/10   Pain level post-treatment: 0/10    Activity Tolerance:    Poor   Please refer to the flowsheet for vital signs taken during this treatment. After treatment:   []  Patient left in no apparent distress sitting up in chair  [x]  Patient left in no apparent distress in bed  [x]  Call bell left within reach  [x]  Nursing notified  []  Caregiver present  [x]  Bed alarm activated    COMMUNICATION/EDUCATION:   [x] Role of Occupational Therapy in the acute care setting  [] Home safety education was provided and the patient/caregiver indicated understanding.   [] Patient/family have participated as able in working towards goals and plan of care. [] Patient/family agree to work toward stated goals and plan of care. [x] Patient understands intent and goals of therapy, but is neutral about his/her participation. [] Patient is unable to participate in goal setting and plan of care.       Thank you for this referral.  KAYLYNN Oconnor Asa  Time Calculation: 15 mins

## 2021-06-30 NOTE — PROGRESS NOTES
Authorization started with [employer] [facility] [employer]. The following information was submitted to [employer] [facility] [employer] for initial authorization:  Face Sheet/Demographics    (Include: Usual living situation)  History and Physical  Last 24 hours of MD and RN notes   (Include: Current Level of Functioning; cognitive status)  PT/OT/ST Evaluations and/or notes within the last 48 hours  MAR  MD orders  (Include: Attending or ordering MDs name and phone #; skilled nursing needs;    Wound care/etc)    Requested SNF: ELY Hinson, RN BSN  Care Manager  [phone]

## 2021-06-30 NOTE — ROUTINE PROCESS
Bedside shift change report given to Sonya Douglas RN (oncoming nurse) by Stephie Leavitt RN (offgoing nurse).  Report included the following information SBAR, Kardex, MAR and Cardiac Rhythm NSR, ST.

## 2021-06-30 NOTE — PROGRESS NOTES
Problem: Falls - Risk of  Goal: *Absence of Falls  Outcome: Progressing Towards Goal  Note: Fall Risk Interventions:  Mobility Interventions: Bed/chair exit alarm    Mentation Interventions: Adequate sleep, hydration, pain control    Medication Interventions: Bed/chair exit alarm    Elimination Interventions: Bed/chair exit alarm, Call light in reach, Patient to call for help with toileting needs, Toilet paper/wipes in reach    History of Falls Interventions: Bed/chair exit alarm         Problem: Pain  Goal: *Control of Pain  Outcome: Progressing Towards Goal     Problem: Patient Education: Go to Patient Education Activity  Goal: Patient/Family Education  Outcome: Progressing Towards Goal     Problem: Alcohol Withdrawal  Goal: *STG: Participates in treatment plan  Outcome: Progressing Towards Goal     Problem: Patient Education: Go to Patient Education Activity  Goal: Patient/Family Education  Outcome: Progressing Towards Goal     Problem: Nutrition Deficit  Goal: *Optimize nutritional status  Outcome: Progressing Towards Goal     Problem: Pressure Injury - Risk of  Goal: *Prevention of pressure injury  Outcome: Progressing Towards Goal  Note: Pressure Injury Interventions:  Sensory Interventions: Check visual cues for pain    Moisture Interventions: Absorbent underpads    Activity Interventions: Increase time out of bed    Mobility Interventions: HOB 30 degrees or less    Nutrition Interventions: Document food/fluid/supplement intake    Friction and Shear Interventions: Apply protective barrier, creams and emollients                Problem: Patient Education: Go to Patient Education Activity  Goal: Patient/Family Education  Outcome: Progressing Towards Goal

## 2021-06-30 NOTE — PROGRESS NOTES
Physician Progress Note      PATIENT:               Yancy Alonzo  CSN #:                  [id number]  :                       1960  ADMIT DATE:       2021 7:30 PM  [address] [place] DATE:  RESPONDING  PROVIDER #:        Amanda SALAS MD          QUERY TEXT:    Dear PFM,    Patient admitted with sepsis. Noted documentation of no ulcers in H&P and pressure ulcers to left buttock and gluteal fold. If possible, please document in progress notes and discharge summary if you are evaluating and /or treating any of the following: The medical record reflects the following:  Risk Factors: sepsis, severe malnutrition, diabetes mellitus, homelessness    Clinical Indicators:  \"Skin:  No rashes, lesions, or ulcers. \"  per Dr. Mendel Blackbird, H&P. \"pressure ulcer to left buttock and gluteal fold, stage 2\"  per Lynette Rodriguez RN, 2021 0145, nursing flowsheet. Treatment: silicone pad    Thank you,  RIVER Adams RN, [facility]  Office:  [phone]  Options provided:  -- pressure ulcers to left buttock and gluteal fold confirmed  -- Other - I will add my own diagnosis  -- Disagree - Not applicable / Not valid  -- Disagree - Clinically unable to determine / Unknown  -- Refer to Clinical Documentation Reviewer    PROVIDER RESPONSE TEXT:    After study, pressure ulcers to left buttock and gluteal fold confirmed. This was present prior to admission. See documentation from nursing on 2021 at 0615 for further assessment. Query created by:  John Walton on 2021 11:53 AM      Electronically signed by:  Amanda SALAS MD 2021 4:58 PM

## 2021-06-30 NOTE — PROGRESS NOTES
Problem: Mobility Impaired (Adult and Pediatric)  Goal: *Acute Goals and Plan of Care (Insert Text)  Description: Physical Therapy Goals  Initiated 6/27/2021 and to be accomplished within 7 day(s)  1. Patient will move from supine to sit and sit to supine , scoot up and down, and roll side to side in bed with modified independence. 2.  Patient will transfer from bed to chair and chair to bed with supervision/set-up using the least restrictive device. 3.  Patient will perform sit to stand with supervision/set-up. 4.  Patient will ambulate with supervision/set-up for 50 feet with the least restrictive device. 5.  Patient will ascend/descend stairs pending progress and necessity for discharge    PLOF: pt reports he was ind PTA, per chart review pt was living in boarding house with bedroom on second floor     Outcome: Progressing Towards Goal     PHYSICAL THERAPY TREATMENT    Patient: Karen Walters (13 y.o. male)  Date: 6/30/2021  Diagnosis: Sepsis ([facility] [address] [A41.9]  Chronic malnutrition ([address] [E46]  Alcoholism ([address] [F10.20]  GAIL (acute kidney injury) ([address] [N17.9]  Alcohol abuse [F10.10] Severe sepsis ([address]       Precautions: Fall, Skin, DNR    ASSESSMENT:  RN cleared for PT. Pt seen with both PT and OT to maximize patients safety, mobility, and participation. Pt alert but confused 2/2 sedation. Attempted sup-sit transfer, pt requiring increased time and refused assist to push up to sit. Pt able to come correction sitting but laying back down to bed. TotalA for bringing B Le's up to bed. Pt found to be soiled, rolling modAx2 to R/L for dependent pericare. TotalA for scooting up towards [facility]. Pt left with HOB elevated, call bell nearby, all needs met, bed alarm on.      Progression toward goals:   []      Improving appropriately and progressing toward goals  [x]      Improving slowly and progressing toward goals  []      Not making progress toward goals and plan of care will be adjusted     PLAN:  Patient continues to benefit from skilled intervention to address the above impairments. Continue treatment per established plan of care. Discharge Recommendations:  Joseph Goss  Further Equipment Recommendations for Discharge:  N/A     SUBJECTIVE:   Patient stated don't push me too hard.     OBJECTIVE DATA SUMMARY:   Critical Behavior:  Neurologic State: Confused  Orientation Level: Disoriented X4  Cognition: Impaired decision making  Safety/Judgement: Fall prevention  Functional Mobility Training:  Bed Mobility:  Rolling: Moderate assistance;Assist x2  Supine to Sit: Maximum assistance;Assist x2; Additional time  Sit to Supine: Total assistance    Pain:  Pain level pre-treatment: 0/10  Pain level post-treatment: 0/10   Pain Intervention(s): Medication (see MAR); Rest, Ice, Repositioning   Response to intervention: Nurse notified, See doc flow    Activity Tolerance:   Pt tolerated mobility fair/poor 2/2 AMS  Please refer to the flowsheet for vital signs taken during this treatment. After treatment:   [] Patient left in no apparent distress sitting up in chair  [x] Patient left in no apparent distress in bed  [x] Call bell left within reach  [x] Nursing notified  [] Caregiver present  [x] Bed alarm activated  [] SCDs applied      COMMUNICATION/EDUCATION:   [x]         Role of Physical Therapy in the acute care setting. [x]         Fall prevention education was provided and the patient/caregiver indicated understanding. [x]         Patient/family have participated as able in working toward goals and plan of care. []         Patient/family agree to work toward stated goals and plan of care. []         Patient understands intent and goals of therapy, but is neutral about his/her participation.   []         Patient is unable to participate in stated goals/plan of care: ongoing with therapy staff.  []         Other:        Kyle Ellsworth   Time Calculation: 15 mins

## 2021-07-01 ENCOUNTER — APPOINTMENT (OUTPATIENT)
Dept: GENERAL RADIOLOGY | Age: 61
DRG: 871 | End: 2021-07-01
Attending: FAMILY MEDICINE
Payer: MEDICARE

## 2021-07-01 LAB
ALBUMIN SERPL-MCNC: 1.7 G/DL (ref 3.4–5)
ALBUMIN/GLOB SERPL: 0.5 {RATIO} (ref 0.8–1.7)
ALP SERPL-CCNC: 199 U/L (ref 45–117)
ALT SERPL-CCNC: 40 U/L (ref 16–61)
ANION GAP SERPL CALC-SCNC: 7 MMOL/L (ref 3–18)
AST SERPL-CCNC: 105 U/L (ref 10–38)
BACTERIA SPEC CULT: NORMAL
BASOPHILS # BLD: 0 K/UL (ref 0–0.1)
BASOPHILS # BLD: 0.1 K/UL (ref 0–0.1)
BASOPHILS NFR BLD: 0 % (ref 0–2)
BASOPHILS NFR BLD: 0 % (ref 0–2)
BILIRUB SERPL-MCNC: 2.7 MG/DL (ref 0.2–1)
BUN SERPL-MCNC: 6 MG/DL (ref 7–18)
BUN/CREAT SERPL: 11 (ref 12–20)
CALCIUM SERPL-MCNC: 8.2 MG/DL (ref 8.5–10.1)
CHLORIDE SERPL-SCNC: 105 MMOL/L (ref 100–111)
CO2 SERPL-SCNC: 26 MMOL/L (ref 21–32)
CREAT SERPL-MCNC: 0.56 MG/DL (ref 0.6–1.3)
DIFFERENTIAL METHOD BLD: ABNORMAL
DIFFERENTIAL METHOD BLD: ABNORMAL
EOSINOPHIL # BLD: 0.1 K/UL (ref 0–0.4)
EOSINOPHIL # BLD: 0.1 K/UL (ref 0–0.4)
EOSINOPHIL NFR BLD: 1 % (ref 0–5)
EOSINOPHIL NFR BLD: 1 % (ref 0–5)
ERYTHROCYTE [DISTWIDTH] IN BLOOD BY AUTOMATED COUNT: 17.5 % (ref 11.6–14.5)
ERYTHROCYTE [DISTWIDTH] IN BLOOD BY AUTOMATED COUNT: 17.6 % (ref 11.6–14.5)
GLOBULIN SER CALC-MCNC: 3.7 G/DL (ref 2–4)
GLUCOSE BLD STRIP.AUTO-MCNC: 100 MG/DL (ref 70–110)
GLUCOSE BLD STRIP.AUTO-MCNC: 116 MG/DL (ref 70–110)
GLUCOSE BLD STRIP.AUTO-MCNC: 123 MG/DL (ref 70–110)
GLUCOSE BLD STRIP.AUTO-MCNC: 131 MG/DL (ref 70–110)
GLUCOSE SERPL-MCNC: 111 MG/DL (ref 74–99)
HCT VFR BLD AUTO: 26.1 % (ref 36–48)
HCT VFR BLD AUTO: 26.2 % (ref 36–48)
HEMOCCULT STL QL: NEGATIVE
HGB BLD-MCNC: 8.3 G/DL (ref 13–16)
HGB BLD-MCNC: 8.5 G/DL (ref 13–16)
LYMPHOCYTES # BLD: 1.7 K/UL (ref 0.9–3.6)
LYMPHOCYTES # BLD: 2.1 K/UL (ref 0.9–3.6)
LYMPHOCYTES NFR BLD: 12 % (ref 21–52)
LYMPHOCYTES NFR BLD: 14 % (ref 21–52)
MAGNESIUM SERPL-MCNC: 1.6 MG/DL (ref 1.6–2.6)
MCH RBC QN AUTO: 35.3 PG (ref 24–34)
MCH RBC QN AUTO: 35.8 PG (ref 24–34)
MCHC RBC AUTO-ENTMCNC: 31.8 G/DL (ref 31–37)
MCHC RBC AUTO-ENTMCNC: 32.4 G/DL (ref 31–37)
MCV RBC AUTO: 108.7 FL (ref 74–97)
MCV RBC AUTO: 112.5 FL (ref 74–97)
MONOCYTES # BLD: 1 K/UL (ref 0.05–1.2)
MONOCYTES # BLD: 1.1 K/UL (ref 0.05–1.2)
MONOCYTES NFR BLD: 7 % (ref 3–10)
MONOCYTES NFR BLD: 8 % (ref 3–10)
NEUTS SEG # BLD: 10.8 K/UL (ref 1.8–8)
NEUTS SEG # BLD: 11.1 K/UL (ref 1.8–8)
NEUTS SEG NFR BLD: 75 % (ref 40–73)
NEUTS SEG NFR BLD: 80 % (ref 40–73)
PHOSPHATE SERPL-MCNC: 3.1 MG/DL (ref 2.5–4.9)
PLATELET # BLD AUTO: 138 K/UL (ref 135–420)
PLATELET # BLD AUTO: 150 K/UL (ref 135–420)
PLATELET COMMENTS,PCOM: ABNORMAL
PMV BLD AUTO: 10.1 FL (ref 9.2–11.8)
PMV BLD AUTO: 9.6 FL (ref 9.2–11.8)
POTASSIUM SERPL-SCNC: 4.2 MMOL/L (ref 3.5–5.5)
PROT SERPL-MCNC: 5.4 G/DL (ref 6.4–8.2)
RBC # BLD AUTO: 2.32 M/UL (ref 4.35–5.65)
RBC # BLD AUTO: 2.41 M/UL (ref 4.35–5.65)
RBC MORPH BLD: ABNORMAL
RBC MORPH BLD: ABNORMAL
SERVICE CMNT-IMP: NORMAL
SODIUM SERPL-SCNC: 138 MMOL/L (ref 136–145)
WBC # BLD AUTO: 13.9 K/UL (ref 4.6–13.2)
WBC # BLD AUTO: 14.5 K/UL (ref 4.6–13.2)

## 2021-07-01 PROCEDURE — 97530 THERAPEUTIC ACTIVITIES: CPT

## 2021-07-01 PROCEDURE — 2709999900 HC NON-CHARGEABLE SUPPLY

## 2021-07-01 PROCEDURE — 71045 X-RAY EXAM CHEST 1 VIEW: CPT

## 2021-07-01 PROCEDURE — 74011250637 HC RX REV CODE- 250/637: Performed by: FAMILY MEDICINE

## 2021-07-01 PROCEDURE — 87186 SC STD MICRODIL/AGAR DIL: CPT

## 2021-07-01 PROCEDURE — 87070 CULTURE OTHR SPECIMN AEROBIC: CPT

## 2021-07-01 PROCEDURE — 74011250636 HC RX REV CODE- 250/636: Performed by: FAMILY MEDICINE

## 2021-07-01 PROCEDURE — 74011250637 HC RX REV CODE- 250/637: Performed by: STUDENT IN AN ORGANIZED HEALTH CARE EDUCATION/TRAINING PROGRAM

## 2021-07-01 PROCEDURE — 94760 N-INVAS EAR/PLS OXIMETRY 1: CPT

## 2021-07-01 PROCEDURE — 87077 CULTURE AEROBIC IDENTIFY: CPT

## 2021-07-01 PROCEDURE — 92526 ORAL FUNCTION THERAPY: CPT

## 2021-07-01 PROCEDURE — 36415 COLL VENOUS BLD VENIPUNCTURE: CPT

## 2021-07-01 PROCEDURE — 97535 SELF CARE MNGMENT TRAINING: CPT

## 2021-07-01 PROCEDURE — 80053 COMPREHEN METABOLIC PANEL: CPT

## 2021-07-01 PROCEDURE — 84100 ASSAY OF PHOSPHORUS: CPT

## 2021-07-01 PROCEDURE — 82272 OCCULT BLD FECES 1-3 TESTS: CPT

## 2021-07-01 PROCEDURE — 82962 GLUCOSE BLOOD TEST: CPT

## 2021-07-01 PROCEDURE — 65660000000 HC RM CCU STEPDOWN

## 2021-07-01 PROCEDURE — 77010033678 HC OXYGEN DAILY

## 2021-07-01 PROCEDURE — 83735 ASSAY OF MAGNESIUM: CPT

## 2021-07-01 PROCEDURE — 97110 THERAPEUTIC EXERCISES: CPT

## 2021-07-01 PROCEDURE — 85025 COMPLETE CBC W/AUTO DIFF WBC: CPT

## 2021-07-01 RX ADMIN — FOLIC ACID 1 MG: 1 TABLET ORAL at 09:02

## 2021-07-01 RX ADMIN — Medication 100 MG: at 09:03

## 2021-07-01 RX ADMIN — Medication 10 ML: at 22:12

## 2021-07-01 RX ADMIN — POTASSIUM & SODIUM PHOSPHATES POWDER PACK 280-160-250 MG 1 PACKET: 280-160-250 PACK at 09:02

## 2021-07-01 RX ADMIN — CHOLECALCIFEROL TAB 25 MCG (1000 UNIT) 1000 UNITS: 25 TAB at 09:02

## 2021-07-01 RX ADMIN — Medication 10 ML: at 15:52

## 2021-07-01 RX ADMIN — Medication 10 ML: at 06:30

## 2021-07-01 RX ADMIN — THERA TABS 1 TABLET: TAB at 09:03

## 2021-07-01 RX ADMIN — LEVOFLOXACIN 750 MG: 5 INJECTION, SOLUTION INTRAVENOUS at 22:12

## 2021-07-01 NOTE — PROGRESS NOTES
Contacted Dr Michael Palacios who stated that patient is not ready for discharge today due to white count. Possible discharge for Saturday.      Kailash Grace RN BSN  Care Manager  [phone]

## 2021-07-01 NOTE — PROGRESS NOTES
Problem: Mobility Impaired (Adult and Pediatric)  Goal: *Acute Goals and Plan of Care (Insert Text)  Description: Physical Therapy Goals  Initiated 6/27/2021 and to be accomplished within 7 day(s)  1. Patient will move from supine to sit and sit to supine , scoot up and down, and roll side to side in bed with modified independence. 2.  Patient will transfer from bed to chair and chair to bed with supervision/set-up using the least restrictive device. 3.  Patient will perform sit to stand with supervision/set-up. 4.  Patient will ambulate with supervision/set-up for 50 feet with the least restrictive device. 5.  Patient will ascend/descend stairs pending progress and necessity for discharge    PLOF: pt reports he was ind PTA, per chart review pt was living in boarding house with bedroom on second floor     Outcome: Progressing Towards Goal     PHYSICAL THERAPY TREATMENT    Patient: Tosin Anderson (59 y.o. male)  Date: 7/1/2021  Diagnosis: Sepsis ([address] [A41.9]  Chronic malnutrition ([address] [E46]  Alcoholism ([address] [F10.20]  GAIL (acute kidney injury) ([address] [N17.9]  Alcohol abuse [F10.10] Severe sepsis ([address]       Precautions: Fall, Skin, Aspiration    ASSESSMENT:  RN cleared for mobility. Pt seen with both PT and OT to maximize patients safety, mobility, and participation. Pt found semi-reclined in bed, in NAD, A&Ox4 today. Sup-sit slowly performed with modA. Once sitting, pt then able to come to standing with modAx2 with RW. Pt standing for ~2.5 minutes while performing dependent pericare as pt had large BM. Once sitting, pt performed exercises per flow sheet. Pt up to standing x1 more trial, only able to stand for ~10 sec this time. Pt returned back to bed with modA. He was left with Hob elevated, call bell nearby, all needs met, bed alarm on. RN made aware at end of session.        Progression toward goals:   []      Improving appropriately and progressing toward goals  [x]      Improving slowly and progressing toward goals  []      Not making progress toward goals and plan of care will be adjusted     PLAN:  Patient continues to benefit from skilled intervention to address the above impairments. Continue treatment per established plan of care. Discharge Recommendations:  Rehab  Further Equipment Recommendations for Discharge:  N/A     SUBJECTIVE:   Patient stated my legs feel shake-y.     OBJECTIVE DATA SUMMARY:   Critical Behavior:  Neurologic State: Alert  Orientation Level: Oriented to person, Oriented to place (reports 2019 as year)  Cognition: Follows commands, Poor safety awareness  Safety/Judgement: Fall prevention  Functional Mobility Training:  Bed Mobility:     Supine to Sit: Moderate assistance;Assist x1  Sit to Supine: Moderate assistance (for BLE into bed)  Scooting: Contact guard assistance (towards EOB)         Transfers:  Sit to Stand: Moderate assistance;Assist x2 (x 2 trials this session)  Stand to Sit: Moderate assistance;Minimum assistance;Assist x2       Balance:  Sitting: Intact  Sitting - Static: Good (unsupported)  Sitting - Dynamic: Fair (occasional) (+)  Standing: Impaired; With support  Standing - Static: Fair  Standing - Dynamic :  (pt declined)   Therapeutic Exercises:   Pt performed exercises per flow sheet sitting on EOB      EXERCISE   Sets   Reps   Active Active Assist   Passive Self ROM   Comments   Ankle Pumps 1 15  [x] [] [] []    Quad Sets/Glut Sets    [] [] [] [] Hold for 5 secs   Hamstring Sets   [] [] [] []    Short Arc Quads   [] [] [] []    Heel Slides   [] [] [] []    Straight Leg Raises   [] [] [] []    Hip Add   [] [] [] [] Hold for 5 secs, w/ pillow squeeze   Long Arc Quads 1 15 [x] [] [] []    Seated Marching 1 5 [x] [] [] []    Standing Marching   [] [] [] []       [] [] [] []        Pain:  Pain level pre-treatment: 0/10  Pain level post-treatment: 7/10   Pain Intervention(s): Medication (see MAR);  Rest, Ice, Repositioning   Response to intervention: Nurse notified, See doc flow    Activity Tolerance:   Pt tolerated mobility well  Please refer to the flowsheet for vital signs taken during this treatment. After treatment:   [] Patient left in no apparent distress sitting up in chair  [x] Patient left in no apparent distress in bed  [x] Call bell left within reach  [x] Nursing notified  [] Caregiver present  [x] Bed alarm activated  [] SCDs applied      COMMUNICATION/EDUCATION:   [x]         Role of Physical Therapy in the acute care setting. [x]         Fall prevention education was provided and the patient/caregiver indicated understanding. [x]         Patient/family have participated as able in working toward goals and plan of care. []         Patient/family agree to work toward stated goals and plan of care. []         Patient understands intent and goals of therapy, but is neutral about his/her participation.   []         Patient is unable to participate in stated goals/plan of care: ongoing with therapy staff.  []         Other:        Shayla Shah   Time Calculation: 23 mins

## 2021-07-01 NOTE — PROGRESS NOTES
Problem: Dysphagia (Adult)  Goal: *Acute Goals and Plan of Care (Insert Text)  Description: Patient will:  1. Tolerate PO trials with 0 s/s overt distress in 4/5 trials-met   2. Utilize compensatory swallow strategies/maneuvers (decrease bite/sip, size/rate, alt. liq/sol) with min cues in 4/5 trials-met   3. Perform oral-motor/laryngeal exercises to increase oropharyngeal swallow function with min cues-n/a  4. Complete an objective swallow study (i.e., MBSS) to assess swallow integrity, r/o aspiration, and determine of safest LRD, min A as indicated/ordered by MD-n/a     Recommend:   Easy to chew diet with thin liquids   Meds per pt preference   Aspiration precautions  HOB >45 degrees during all intake and for at least 30 min after po   Small bites/sips, slow rate of intake, alternating bites/sips  Oral care post meals  Outcome: Resolved/Met    SPEECH LANGUAGE PATHOLOGY DYSPHAGIA TREATMENT & DISCHARGE    Patient: Yadira Turner (50 y.o. male)  Date: 7/1/2021  Diagnosis: Sepsis ([address] [A41.9]  Chronic malnutrition ([address] [E46]  Alcoholism ([address] [F10.20]  GAIL (acute kidney injury) ([address] [N17.9]  Alcohol abuse [F10.10] Severe sepsis ([address]  Precautions: Aspiration, Fall, Skin, DNR  PLOF: As per H&P     ASSESSMENT:  Pt seen for follow up dysphagia treatment with breakfast meal.  Tolerating all items from tray with no overt s/sx aspiration. Demo mildly prolonged but thorough mastication of solids from tray; able to clear with no residue post intake. Pt edentulous with overall decreased orolingual strength/coordination. Pt continuing to tolerate thin liquids with no overt s/sx aspiration. Recommend continue current diet. Will sign off as no further skilled needs identified as pt currently tolerating safest, least restrictive diet. D/w pt and RN.       Progression toward goals:  [x]         Goals met      PLAN:  Recommendations and Planned Interventions:  Maximum therapeutic gains met; safest, least restrictive diet achieved. D/C ST intervention at this time. Discharge Recommendations:  Do not anticipate further SLP needs upon discharge      SUBJECTIVE:   Patient stated It was ok    OBJECTIVE:   Cognitive and Communication Status:  Neurologic State: Confused  Orientation Level: Oriented to person, Disoriented to time, Disoriented to situation, Disoriented to place  Cognition: Poor safety awareness, Impaired decision making, Decreased command following, Decreased attention/concentration  Safety/Judgement: Fall prevention  Dysphagia Treatment:  Oral Assessment:  Oral Assessment  Labial: No impairment  Dentition: Edentulous, Poor  Oral Hygiene: Poor  Lingual: Decreased strength, Decreased rate, Incoordinated  Velum: No impairment  Mandible: No impairment  P.O. Trials:   Patient Position: 45 at [facility]   Vocal quality prior to P.O.: Low volume, Hoarse   Consistency Presented: Thin liquid, Solid   How Presented: Self-fed/presented, SLP-fed/presented, Cup/sip, Spoon, Straw, Successive swallows   Bolus Acceptance: No impairment   Bolus Formation/Control: Impaired   Type of Impairment: Mastication, Delayed, Poor, Posterior   Propulsion: Discoordination, Delayed (# of seconds)   Oral Residue: Less than 10% of bolus, Lingual   Initiation of Swallow: Delayed (# of seconds)   Laryngeal Elevation: Functional   Aspiration Signs/Symptoms: None   Pharyngeal Phase Characteristics: Easily fatigued    Effective Modifications: Small sips and bites, Alternate liquids/solids   Cues for Modifications:  Moderate   Oral Phase Severity: Mild   Pharyngeal Phase Severity : Mild     PAIN:  Start of Tx: not reported   End of Tx: not reported      After treatment:   []              Patient left in no apparent distress sitting up in chair  [x]              Patient left in no apparent distress in bed  [x]              Call bell left within reach  [x]              Nursing notified  []              Caregiver present  []              Bed alarm activated      COMMUNICATION/EDUCATION:   [x] Aspiration precautions; swallow safety; compensatory techniques  []        Patient/family able to participate in training and education   [x]  Patient unable to participate in education; education ongoing with staff  [] Patient understands goals/intent of therapy; neutral about participation     Thank you for this referral,  Danni Horton M.S., [id number] [facility]  Speech-Language Pathologist

## 2021-07-01 NOTE — MED STUDENT NOTES
This is a medical student note for learning purposes only. Please refer to the attending physician note for final recommendations. Contact our [address] [place] [address] [place] team with any questions or concerns. [facility][1] Family Medicine  Progress Note    Patient: Jonathan Bella MRN: [id number]   SSN: [id number]  YOB: 1960   Age: 64 y.o. Sex: male      Admit Date: 6/25/2021    LOS: 6 days   Chief Complaint   Patient presents with    Extremity Weakness       Subjective:     Overnight - patient states he slept fine. No significant events. Patient was admitting how tired he was yesterday and was unable to respond in the ways he wanted. Patient was oriented to person, place and time. Patient admits that he feels really weak and wants to get stronger. He knows that the PT/OT people came to see him, but admits he was annoyed he was unable to interact with them yesterday and hopes the come back today. He was alert and eating while I talked with him. He said his legs hurt, but no more than before. Review of Systems   Constitutional: Negative for chills, diaphoresis and fever. Respiratory: Negative for cough and shortness of breath. Gastrointestinal: Negative for abdominal pain and nausea. Musculoskeletal: Positive for back pain. Neurological: Positive for weakness. Negative for headaches.          Objective:     Visit Vitals  BP 96/63   Pulse 100   Temp 97.4 °F (36.3 °C)   Resp 18   Ht 6' 2\" (1.88 m)   Wt 83.5 kg (184 lb 1.6 oz)   SpO2 94%   BMI 23.64 kg/m²       Physical Exam:     Physical Exam:      General appearance:   -patient was alert, well appearing, and in no distress and oriented to person, place, and time     Cardiovascular:  -Normal rate- Regular rhythm  -No rubs or gallops  -Normal radial and dorsalis pedis pulses bilaterally     Pulmonary:  - Clear to auscultation bilaterally   - Not in respiratory distress - needed more effort to breath     Abdominal:  - Abdomen is flat and not distended  -Bowel sounds are normoactive  -There is no pain to deep or superficial palpation - there is no guarding or rebound tenderness     MSK:  -No pain to palpation of the ankle or during motion   - 1+ strength in upper extremity  - 1+ strength on hand   - No pain on palpation to leg      Skin:  -ecchymosis diffusely   -skin is flaky and dry  -Feet skin looks improved and treated, dry and flaky     Neurological:  -oriented to person and place, and time  - Understood medical procedures and procedures and why he was in hospital upon questioning     :  -no suprapubic tenderness       Intake and Output:  Current Shift: No intake/output data recorded. Last three shifts: 06/29 1901 - 07/01 0700  In: -   Out: 775 [Urine:775]    Lab/Data Review:  Recent Results (from the past 12 hour(s))   GLUCOSE, POC    Collection Time: 06/30/21  9:32 PM   Result Value Ref Range    Glucose (POC) 92 70 - 110 mg/dL   CBC WITH AUTOMATED DIFF    Collection Time: 07/01/21  4:38 AM   Result Value Ref Range    WBC 13.9 (H) 4.6 - 13.2 K/uL    RBC 2.32 (L) 4.35 - 5.65 M/uL    HGB 8.3 (L) 13.0 - 16.0 g/dL    HCT 26.1 (L) 36.0 - 48.0 %    .5 (H) 74.0 - 97.0 FL    MCH 35.8 (H) 24.0 - 34.0 PG    MCHC 31.8 31.0 - 37.0 g/dL    RDW 17.6 (H) 11.6 - 14.5 %    PLATELET 403 638 - 845 K/uL    MPV 10.1 9.2 - 11.8 FL    NEUTROPHILS PENDING %    LYMPHOCYTES PENDING %    MONOCYTES PENDING %    EOSINOPHILS PENDING %    BASOPHILS PENDING %    ABS. NEUTROPHILS PENDING K/UL    ABS. LYMPHOCYTES PENDING K/UL    ABS. MONOCYTES PENDING K/UL    ABS. EOSINOPHILS PENDING K/UL    ABS.  BASOPHILS PENDING K/UL    DF PENDING    GLUCOSE, POC    Collection Time: 07/01/21  6:42 AM   Result Value Ref Range    Glucose (POC) 100 70 - 110 mg/dL       RECENT RESULTS  MODALITY IMPRESSION   XR Results from Hospital Encounter encounter on 06/25/21    XR CHEST PORT    Narrative  EXAM: XR CHEST PORT    INDICATION: cough, aspiration pneumonia    COMPARISON: 2 days prior    FINDINGS: A portable AP radiograph of the chest was obtained at 0516 hours. The  patient is on a cardiac monitor. Interval progression of airspace opacities in  both lungs primarily at the lung bases. Probable left pleural opacity. . Stable  cardiac silhouette. .  The bones and soft tissues are grossly within normal  limits. Impression  Interval progression in bilateral airspace opacities primarily at the bases. CT Results from [place] encounter on 06/25/21    CT HEAD WO CONT    Narrative  CT of the head without contrast    HISTORY: Trauma. COMPARISON: 6/20/19    TECHNIQUE: Helical axial scan to the head was performed from the skull base to  the vertex without IV contrast administration. All CT scans at this facility performed using dose optimization techniques as  appreciated to a performed exam, to include automated exposure control,  adjustment of the mA and or KU according to patient size (including appropriate  matching for site specific examination), or use of iterative reconstruction  technique. FINDINGS: Moderate global atrophy is pronounced for patient's age. Associated  mild ventricular dilatation also seen. The gray-white matter differentiation is  preserved. No new finding demonstrated. There is no evidence of acute  intracranial hemorrhage, mass effect or midline shift identified. No skull  fracture or extra axial fluid collections identified. Visualized sinuses and  mastoid air cells appear unremarkable. Impression  No acute intracranial abnormality. Moderate global atrophy again noted and pronounced for patient's age. Recommend  clinical correlation to exclude potential encephalopathy.     Thank you for your referral.     MRI Results from [place] encounter on 06/25/21    MRI LUMB SPINE W WO CONT    Narrative  MR lumbar spine with and without contrast    HISTORY: , H/o L3 burst fracture, lost to follow up, bowel incontinence x3    COMPARISON: MRI 3/2/2020. TECHNIQUE: Lumbar spine scanned with axial and sagittal T1W scans, axial and  sagittal T2W scans, and with post gadolinium axial and sagittal T1W scans. Patient received 17 mL Dotarem  IV contrast.    FINDINGS:    Normal lumbar lordosis. Chronic L3 superior endplate compression fracture with  extension to the posterior endplate and mild retropulsion. There is interval  worsening of the central vertebral body height (about 50% height loss) with  increased central enhancing bone marrow edema. Slightly worsening retropulsion 5  mm with mild compromise of the central canal. No epidural collection. No  discrete underlying bone marrow lesion. Otherwise vertebral body heights are obtained. No additional bone marrow signal  abnormality. Disc space heights are relatively maintained and hydrated. Conus terminates at the L1-2 level with normal signal.    Mild bilateral psoas edema bilaterally at multiple levels centered at the L3  fracture, likely due to strain or related to fracture. Also mild posterior  paraspinal muscular edema and mild atrophy similar to prior. Correlation of sagittal and axial images demonstrates the following:    T11-12 and T12-L1: Sagittal images only. Preserved disc heights. No central  canal or foraminal stenosis. Ssuan Joey L1-L2: No significant disc pathology. Patent canal and foramina. L2-L3: Mild disc bulge related to 3 fracture but otherwise preserved disc  height. Mild compromise of the central canal due to retropulsion at superior L3  level but otherwise patent canal and foramina    L3-L4: No significant disc pathology. . Patent canal and foramina    L4-L5: No significant disc pathology. Patent canal and foramina. L5-S1: Central annular fissure and small protrusion. Patent canal and foramina. .    Impression  1.  Acute/subacute on chronic L3 superior endplate burst type fracture with  extension to the posterior body wall, worsening vertebral body height (about 50%  height loss) and slightly worsening retropulsion. Mild compromise of the central  canal at this level. No epidural hematoma.  -Central L3 vertebral body enhancing bone marrow edema signal but no discrete  underlying bone marrow lesion to suggest pathologic fracture. 2. Multilevel mild degenerative changes without high-grade spinal canal or  foraminal stenosis. 3. Chronic annular fissure and small protrusion at L5-S1.  4. Chronic posterior paraspinal mild muscular atrophy and edema. ULTRASOUND Results from [place] encounter on 02/29/20    US EXT NONVAS LT LTD    Impression  IMPRESSION:  1. Negative for abscess at the 2 separately evaluated sites within the left  lower extremity.  -Profound subcutaneous tissue edema at each location, as can be seen with severe  cellulitis      US EXT NONVAS LT LTD    Impression  IMPRESSION:  1. Negative for abscess at the 2 separately evaluated sites within the left  lower extremity.  -Profound subcutaneous tissue edema at each location, as can be seen with severe  cellulitis       Cardiology Procedures/Testing:  MODALITY RESULTS   EKG Results for orders placed or performed during the hospital encounter of 06/25/21   EKG, 12 LEAD, INITIAL   Result Value Ref Range    Ventricular Rate 94 BPM    Atrial Rate 94 BPM    P-R Interval 146 ms    QRS Duration 74 ms    Q-T Interval 388 ms    QTC Calculation (Bezet) 485 ms    Calculated P Axis 65 degrees    Calculated R Axis 72 degrees    Calculated T Axis 74 degrees    Diagnosis       Normal sinus rhythm  Prolonged QT  Abnormal ECG  When compared with ECG of 29-FEB-2020 13:25,  No significant change was found  Confirmed by Jeffrey Philippe ([id number]) on 6/25/2021 10:13:30 PM         ECHO 02/29/20    ECHO ADULT FOLLOW-UP OR LIMITED 03/10/2020 3/10/2020    Interpretation Summary  · Technically difficult study due to lung interference and limited patient mobility.   · Normal cavity size, wall thickness and systolic function (ejection fraction normal). Estimated left ventricular ejection fraction is 55 - 60%. Visually measured ejection fraction. No regional wall motion abnormality noted. · Tricuspid regurgitation is inadequate for estimation of right ventricular systolic pressure. Signed by: Margie Lopez DO on 3/10/2020  4:06 PM       Special Testing/Procedures:  MODALITY RESULTS   MICRO All Micro Results     Procedure Component Value Units Date/Time    CULTURE, BLOOD [[id number]] Collected: 06/28/21 1234    Order Status: Completed Specimen: Blood Updated: 07/01/21 0713     Special Requests: NO SPECIAL REQUESTS        Culture result: NO GROWTH 3 DAYS       CULTURE, BLOOD [[id number]] Collected: 06/25/21 2100    Order Status: Completed Specimen: Blood Updated: 07/01/21 0713     Special Requests: NO SPECIAL REQUESTS        Culture result: NO GROWTH 6 DAYS       CULTURE, RESPIRATORY/SPUTUM/BRONCH Nunez Magdi STAIN [[id number]]     Order Status: Sent Specimen: Sputum     CULTURE, URINE [[id number]] Collected: 06/26/21 0622    Order Status: Completed Specimen: Urine from Clean catch Updated: 06/27/21 1858     Special Requests: NO SPECIAL REQUESTS        Culture result: No growth (<1,000 CFU/ML)       CULTURE, BLOOD [[id number]]  (Abnormal) Collected: 06/25/21 2045    Order Status: Completed Specimen: Blood Updated: 06/27/21 1617     Special Requests: NO SPECIAL REQUESTS        GRAM STAIN       AEROBIC BOTTLE GRAM POSITIVE RODS                  CALLED TO AND CORRECTLY REPEATED BY: NANNETTE GREENWOOD. RN 4S AT 6104 TO [facility]           Culture result:       BACILLUS SPECIES, NOT ANTHRACIS GROWING IN THE AEROBIC BOTTLE               UA No results found for this or any previous visit. PATH None     Telemetry None   Oxygen 2L Nasal Canula     Assessment and Plan:     Assessment:     Mr. Smiley Garcia is a 64 y.o. male with a - PMH significant for - T2DM, HTN, HLD, L3 compression fracture and chronic alcohol use disorder who is presenting with sustained weakness in the lower extremity 2/2 a fall.      Plan:     Aspiration Pneumonia and UTI:  [ ] Course of Zosyn and Vancomycin finished (6/28), continue Levofloxacin 750mg IV(6/25-), continue to monitor WBC(increased to 13.9 from yesterday)   [ ] Order new chest X-ray to follow up identified lobar consolidations on 6/29  [ ] Follow aspiration protocols per speech therapy  [ ] Follow up on sputum culture       Chronic L3 Burst Fracture:  [ ] Pain control with tylenol PRN - currently not in pain  [ ] Continued progressive mobilization w/ Pt/OT     Chronic Alcohol Use Disorder:     [ ] Continue to monitor CIWA scores via CIWA protocol.  Most recent have been 2-3  [ ] Continue Thiamine 486UA, Folic Acid 1mg  [ ] Fall Precautions     Altered Mental Status:  [ ] CT Scan W/ Contrast showed no signs of bleeds or trauma, atrophy of cortices pronounced for age  [ ] continue to monitor capacity and stay in contact w/  - hold ativan     Severe Malnutrition:  [ ] Follow dialectician recs from 6/28  [ ] MVI daily  [ ] Potassium supplement      Macrocytic Anemia and Thrombocytopenia  [ ] Likely 2/2 alcohol use disorder, continue thiamine, folic acid supplementation  [ ] Continue to monitor iron and ferritin levels as needed  [ ] Monitor blood smear - new study demonstrates Spherocytes in addition to macrocytosis  [ ] Order a Peripheral Blood Smear to check reticulocyte count - potential hemolysis as cause of thrombocytopenia and anemia  [ ] Order a heme stool study to check for bleeding     Chronic Hypertension:  [ ] Consider restarting lisinopril if  Blood pressure increase is noted     Type 2 DM:  [ ] Last A1C of 5.8 on 6/24   [ ] continue to monitor diet and use SSI  [ ] monitor for hypoglycemic events     Right Shoulder Pain:  [ ] tylenol PRN  [ ] Mobilization via PT/OT    Bilateral Onychomycosis:  [ ] Continue to monitor feet - follow podiatry recs to keep clean and trimmed         Diet Regular   DVT Prophylaxis SQH   GI Prophylaxis Protonix   Code status DNR/DNI     Point of Contact Shaan Ordoñez  Relationship: Brother  ([phone], [facility]   July 1, 2021, 8:24 AM     *ATTENTION:  This note has been created by a medical student for educational purposes only. Please do not refer to the content of this note for clinical decision-making, billing, or other purposes. Please see attending physicians note to obtain clinical information on this patient. *

## 2021-07-01 NOTE — PROGRESS NOTES
Updated Therapy notes sent to [facility] for authorization. Updated SNF location to [facility] with [facility]. Authorization still pending.      Jana Herrera RN BSN  Care Manager  [phone]

## 2021-07-01 NOTE — PROGRESS NOTES
Nutrition Note      Pt eating <50% of most meals per CNA report; sometimes eats >50% of a meal, but usually if he skipped the previous meal. Eating ~30% average intake. Tolerating diet; SLP following. Pt reported poor intake of Ensure Enlive supplements due to complain that they cause loose stools; agreeable to trying [employer]. Po intake and nutrition discussed with Dr Kecia Murillo; discussed consideration for NGT placement and supplemental EN support as medically appropriate if po intake remains poor; per MD, will hold off and continue to monitor meal intake and intake of supplements once modified. BM 6/29.  Poor progression towards nutrition goals        Nutrition Recommendations/Plan:   - Modify supplements: change to Magic Cup TID  - Encourage/ monitor po intake of meals and supplements  - If po intake remains poor, consider NGT placement and starting supplemental EN support, consult Nutrition as needed - MD to address as appropriate      Electronically signed by Jad Painter RD on 7/1/2021 at 2:56 PM    Contact: [phone]

## 2021-07-01 NOTE — PROGRESS NOTES
Bedside shift change report given to Satya Alan (oncoming nurse) by Benji Fitch (offgoing nurse). Report included the following information SBAR, Kardex and Recent Results.

## 2021-07-01 NOTE — DISCHARGE SUMMARY
[address] Medicine  Discharge Summary    Patient: Kal Seen MRN: [id number]  CSN: [id number]    YOB: 1960  Age: 64 y.o. Sex: male      Admission Date: 6/25/2021 Discharge Date: July 6 2021   Attending: Cira Mason MD PCP: Annice Sever, MD     ===================================================================    Reason for Admission: Sepsis [facility]) [A41.9]  Chronic malnutrition ([facility] [E46]  Alcoholism ([facility] [F10.20]  GAIL (acute kidney injury) ([facility] [facility] [N17.9]  Alcohol abuse [F10.10]    Discharge Diagnoses:   Aspiration Pneumonia  Urinary Tract Infection  Acute/subacute on chronic L3 burst Fracture  Chronic Alcohol use disorder  Delirium  Generalized weakness, physicial deconditioning, impaired ADLs and iADLs, Hx of falls  Severe Protein calorie malnutrition  Vitamin D deficiency  Acute Kidney Injury (resolved)  Macrocytic   Anemia and Thrombocytopenia  Hypokalemia (resolved)  Hx of hypertension  Type 2 Diabetes  Onychomycosis    Important notes to PCP/ follow-up studies and evaluations   Patient discharged to SNF following hospital admission for sepsis. Sepsis most likely secondary to aspiration pneumonia. During hospital course, patient with intermittent delirium, multifactorial etiology including acholic encephalopathy and sepsis. Patient to follow-up outpatient with ortho-spine for further management of Acute/subacute on chronic L3 burst Fracture    Pending labs and studies:  NONE    Operative Procedures:   NONE    Discharge Medications:     Current Discharge Medication List      START taking these medications    Details   thiamine HCL (B-1) 100 mg tablet Take 1 Tablet by mouth daily for 30 days. Indications: deficiency in thiamine or vitamin B1  Qty: 30 Tablet, Refills: 0  Start date: 7/7/2021, End date: 8/6/2021      therapeutic multivitamin [facility]) tablet Take 1 Tablet by mouth daily for 30 days.  Indications: vitamin deficiency  Qty: 30 Tablet, Refills: 0  Start date: 7/7/2021, End date: 8/6/2021      cholecalciferol (VITAMIN D3) (1000 Units /25 mcg) tablet Take 1 Tablet by mouth daily for 30 days. Indications: low vitamin D levels  Qty: 30 Tablet, Refills: 0  Start date: 7/7/2021, End date: 1/9/5530      folic acid (FOLVITE) 1 mg tablet Take 1 Tablet by mouth daily for 30 days. Qty: 30 Tablet, Refills: 0  Start date: 7/7/2021, End date: 8/6/2021         CONTINUE these medications which have CHANGED    Details   polyethylene glycol (MIRALAX) 17 gram packet Take 1 Packet by mouth daily as needed for Constipation. Qty: 30 Packet, Refills: 0  Start date: 7/6/2021      lidocaine 4 % patch Back pain  Qty: 30 Package, Refills: 0  Start date: 7/6/2021      atorvastatin (LIPITOR) 10 mg tablet Take 1 Tablet by mouth daily. Qty: 30 Tablet, Refills: 0  Start date: 7/6/2021         STOP taking these medications       lisinopriL (PRINIVIL, ZESTRIL) 10 mg tablet Comments:   Reason for Stopping:         acetaminophen (TYLENOL) 325 mg tablet Comments:   Reason for Stopping:         clotrimazole (LOTRIMIN) 1 % topical cream Comments:   Reason for Stopping:         metFORMIN ER (GLUCOPHAGE XR) 750 mg tablet Comments:   Reason for Stopping:             Disposition: [address]    Consultants:  Podiatry, infectious disease      Brief Hospital Course (including pertinent history and physical findings)  Reason for admission: Sepsis    Mr. Eugene Dalal is a 64 y. o. male with Past Medical History Notable for Type 2 Diabetes, Hypertension, Hyperlipidemia and a history of an L3 Compression Fracture in March 2020, now presented with complaint of increased weakness. He was admitted with sepsis and GAIL.     Sepsis, most likely secondary to aspiration PNA (resolved): On admission, met 2/4 sepsis criteria including leukocytosis (UKA 55.3) and tachypnea (RR 28) with lactic acidosis (lactate 5.89).  Sepsis protocol was initiated with fluid recussitation and broad spectrum antibiotics. CXR showed mild left lower lobe infiltrate, concerning for possible aspiration PNA. 1/2 bottles of blood cultures growing gram positive rods (Bacillus). Lower suspicion for UTI as source of infection since UA was positive for nitrites and negative urine cultures. Patient was admitted with improvement in lactic acid. Patient continued on Levaquin for aspiration pneumonia. WBC persistently elevated. Infectious disease was consulted and recommended. Repeat Chest Xray showed evidence for pneumonitis. Other concerns addressed during this admission:  Acute/subacute on chronic L3 burst Fracture: MRI Lumbar Spine (3/2/2020) with subacute appearing superior endplate fracture of the L3 vertebral body, approximately 30% height loss. On admission, denied any numbness, tingling, or pain is his back. Repeat MRI Lumbar Spine (6/28/2021) showed multilevel mild degenerative changes without high-grade spinal canal or foraminal stenosis, Acute/subacute on chronic L3 superior endplate burst type fracture. Ortho spine consulted and recommended conservative management with progressive mobilization. Patient to follow-up outpatient with ortho-spine for further management. Adequate pain control achieved with Tylenol PRN    Chromic Alcohol use disorder: Patient endorsed consuming alcoholic drinks daily  (6-pack of beer and 4-5 mixed rum drinks per day). On admission, , ALT 35 on admission (>2:1 ratio suggestive of alcoholic disease). Serum Ethanol  Negative (<3), Ammonia 40 on admission. CIWA scores to date have been 0 requiring Ativan. Acute Hepatitis panel negative  Patient given Thiamine 146 mg and Folic Acid 1 mg daily.  Hep C, HIV, RPR all negative.     Delirium: Multifactorial in the setting of chronic alcohol abuse, sepsis and history of falls.     Generalized weakness, physicial deconditioning, impaired ADLs and iADLs, Hx of falls: multifactorial in the setting of malnutrition and chronic alcohol abuse, sepsis. XR of left lower extremity and left ankle given history of fall as well as hematoma in LLE (6/28) showed no acute fracture, Moderate circumferential soft tissue swelling around the ankle.     Severe Protein calorie malnutrition, Vitamin D deficiency: Vitamin D levels 10.5. Nutrition consulted inpatient and recommended meal supplements. Patient continued on multivitamins and thiamine 100mg,  Vitamin D3 supplements 1000units daily supplements     Acute Kidney Injury (resolved): Cr 2.33 on admission (Baseline Cre 0.6 - 0.8). Likely Pre-renal due to dehydration in the setting of Alcohol Use Disorder. Was initially placed on maintenance fluids since at admission given poor PO intake which was discontinued with improved PO intake. Has had relatively poor PO intake to date.      Macrocytic Anemia and Thrombocytopenia: Likely due to malnourishment in the setting of acute alcohol disorder. Hemoglobin stable, no active bleeding. MCV is 112 at admission.  B12/folate WNL. Iron profile labs consistent with anemia of chronic disease. FOBT was negative      Hx of Hypertension: Initially hypotensive at admission but BPs stable, WNL since admission  Previously on Lisinopril 10 mg every day, but patient denies taking this medication. Lisinoprilwas not estarted as blood pressures were normal during hospital course.     Type 2 Diabetes: Last A1c (06/24/2020)  5.8. Not on any home medications,  diet controlled. Glucose levels managed with correctional scale insulin while inpatient.       Right shoulder pain (resolved): Complained of right shoulder pain at admission with full ROM on exam wit no tenderness on exam. Patient reports being pulled by right arm from his porch on day of admission.   XR right shoulder showed no definite evidence of acute fracture, no significant glenohumeral or acromioclavicular osteoarthritis. Results showed suspected right infraspinatus calcific tendinitis versus subacromial/subdeltoid calcific bursitis.     Onychomycosis: Treated at bedside by podiatry    CURRENT ADMISSION IMAGING RESULTS   XR CHEST PA LAT    Result Date: 6/26/2021  There is a mild left lower lobe infiltrate present best seen on the lateral view. This could represent aspiration pneumonia. XR SHOULDER RT AP/LAT MIN 2 V    Result Date: 6/28/2021  1. No definite evidence of acute fracture, given osteopenia. 2.  No significant glenohumeral or acromioclavicular osteoarthritis. 3.  Suspected right infraspinatus calcific tendinitis versus subacromial/subdeltoid calcific bursitis. XR TIB/FIB LT    Result Date: 6/28/2021  Left tibia-fibula: No evidence of acute fracture or dislocation. Left ankle: 1. No definite evidence of acute fracture, allowing for lack of a third projection. 2.  Moderate circumferential soft tissue swelling around the ankle. MRI LUMB SPINE W WO CONT    Result Date: 6/28/2021  1. Acute/subacute on chronic L3 superior endplate burst type fracture with extension to the posterior body wall, worsening vertebral body height (about 50% height loss) and slightly worsening retropulsion. Mild compromise of the central canal at this level. No epidural hematoma. -Central L3 vertebral body enhancing bone marrow edema signal but no discrete underlying bone marrow lesion to suggest pathologic fracture. 2. Multilevel mild degenerative changes without high-grade spinal canal or foraminal stenosis. 3. Chronic annular fissure and small protrusion at L5-S1. 4. Chronic posterior paraspinal mild muscular atrophy and edema. CT HEAD WO CONT    Result Date: 6/30/2021  No acute intracranial abnormality. Moderate global atrophy again noted and pronounced for patient's age. Recommend clinical correlation to exclude potential encephalopathy. Thank you for your referral.    CT MAXILLOFACIAL WO CONT    Result Date: 7/4/2021  Small right frontal and left sphenoidal mucous retention cysts versus polyps.  Mild left maxillary sinus mucosal thickening. No evidence of acute sinusitis. CT CHEST ABD PELV W CONT    Result Date: 7/4/2021  Small bilateral pleural effusions, left slightly larger than right, with adjacent predominately band shaped consolidations, left greater than right, compatible with atelectasis which could be with or without underlying airspace disease. Additional region of micronodular airspace disease in the right lower lobe suspicious for pneumonitis. Aspiration? Cirrhotic hepatic morphology. Decreased hepatic attenuation suspicious for fatty infiltration. Small volume of ascites without walled off drainable fluid collection identified. Borderline splenomegaly. Small focal nodular increased density along the posterior left bladder wall. Follow-up evaluation is recommended to exclude neoplasm, please see discussion above. Otherwise likely chronic and/or incidental findings as above without additional findings thought to be localizing for site of active infection. XR CHEST PORT    Result Date: 7/1/2021  1. Unchanged bibasilar opacities, suspicious for pneumonia or aspiration. 2.  Unchanged small left pleural effusion. Recommend continued imaging follow-up until resolution. XR CHEST PORT    Result Date: 6/29/2021  Interval progression in bilateral airspace opacities primarily at the bases. XR CHEST PORT    Result Date: 6/26/2021  No acute cardiopulmonary abnormalities. XR ANKLE LT  1 V    Result Date: 6/28/2021  Left tibia-fibula: No evidence of acute fracture or dislocation. Left ankle: 1. No definite evidence of acute fracture, allowing for lack of a third projection. 2.  Moderate circumferential soft tissue swelling around the ankle.         Cardiology Procedures/Testing:  MODALITY RESULTS   EKG Results for orders placed or performed during the hospital encounter of 06/25/21   EKG, 12 LEAD, INITIAL   Result Value Ref Range    Ventricular Rate 94 BPM    Atrial Rate 94 BPM    P-R Interval 146 ms    QRS Duration 74 ms Q-T Interval 388 ms    QTC Calculation (Bezet) 485 ms    Calculated P Axis 65 degrees    Calculated R Axis 72 degrees    Calculated T Axis 74 degrees    Diagnosis       Normal sinus rhythm  Prolonged QT  Abnormal ECG  When compared with ECG of 29-FEB-2020 13:25,  No significant change was found  Confirmed by Lexus Johnson ([id number]) on 6/25/2021 10:13:30 PM         ECHO 02/29/20    ECHO ADULT FOLLOW-UP OR LIMITED 03/10/2020 3/10/2020    Interpretation Summary  · Technically difficult study due to lung interference and limited patient mobility. · Normal cavity size, wall thickness and systolic function (ejection fraction normal). Estimated left ventricular ejection fraction is 55 - 60%. Visually measured ejection fraction. No regional wall motion abnormality noted. · Tricuspid regurgitation is inadequate for estimation of right ventricular systolic pressure. Signed by: Margie Lopez DO on 3/10/2020  4:06 PM     Nuclear Medicine No results found for this or any previous visit.      IR Results from Hospital Encounter encounter on 01/19/11    TRANSCRIBED VENOUS MAPPING     CATH      Special Testing/Procedures:  MODALITY RESULTS   MICRO All Micro Results     Procedure Component Value Units Date/Time    CULTURE, BLOOD [[id number]] Collected: 06/28/21 1234    Order Status: Completed Specimen: Blood Updated: 07/04/21 0625     Special Requests: NO SPECIAL REQUESTS        Culture result: NO GROWTH 6 DAYS       CULTURE, RESPIRATORY/SPUTUM/BRONCH Nunez Magdi STAIN [[id number]]  (Abnormal) Collected: 07/01/21 0910    Order Status: Completed Specimen: Sputum Updated: 07/03/21 1605     Special Requests: NO SPECIAL REQUESTS        GRAM STAIN RARE WBCS SEEN               RARE EPITHELIAL CELLS SEEN            NO ORGANISMS SEEN        Culture result: RARE GRAM NEGATIVE RODS               LIGHT YEAST, (APPARENT CANDIDA ALBICANS)                  RARE NORMAL RESPIRATORY TOM          CULTURE, BLOOD [[id number]] Collected: 06/25/21 2100 Order Status: Completed Specimen: Blood Updated: 07/01/21 0713     Special Requests: NO SPECIAL REQUESTS        Culture result: NO GROWTH 6 DAYS       CULTURE, URINE [[id number]] Collected: 06/26/21 0622    Order Status: Completed Specimen: Urine from Clean catch Updated: 06/27/21 1858     Special Requests: NO SPECIAL REQUESTS        Culture result: No growth (<1,000 CFU/ML)       CULTURE, BLOOD [[id number]]  (Abnormal) Collected: 06/25/21 2045    Order Status: Completed Specimen: Blood Updated: 06/27/21 1617     Special Requests: NO SPECIAL REQUESTS        GRAM STAIN       AEROBIC BOTTLE GRAM POSITIVE RODS                  CALLED TO AND CORRECTLY REPEATED BY: NANNETTE GREENWOOD. RN 4S AT 4216 TO [facility]           Culture result:       BACILLUS SPECIES, NOT ANTHRACIS GROWING IN THE AEROBIC BOTTLE               ABG No results found for: PH, PHI, PCO2, PCO2I, PO2, PO2I, HCO3, HCO3I, FIO2, FIO2I   UA No results found for this or any previous visit.    ENDO [unfilled]   [unfilled]    [facility] None     Laboratory Results:  LABORATORY RESULTS   HEMATOLOGY Lab Results   Component Value Date/Time    WBC 16.6 (H) 07/04/2021 02:40 AM    HGB 9.2 (L) 07/04/2021 02:40 AM    HCT 28.7 (L) 07/04/2021 02:40 AM    PLATELET 630 66/00/3298 02:40 AM    .1 (H) 07/04/2021 02:40 AM       CHEMISTRIES Lab Results   Component Value Date/Time    Sodium 136 07/04/2021 02:40 AM    Potassium 4.0 07/04/2021 02:40 AM    Chloride 102 07/04/2021 02:40 AM    CO2 31 07/04/2021 02:40 AM    Anion gap 3 07/04/2021 02:40 AM    Glucose 100 (H) 07/04/2021 02:40 AM    BUN 3 (L) 07/04/2021 02:40 AM    Creatinine 0.56 (L) 07/04/2021 02:40 AM    BUN/Creatinine ratio 5 (L) 07/04/2021 02:40 AM    GFR est AA >60 07/04/2021 02:40 AM    GFR est non-AA >60 07/04/2021 02:40 AM    Calcium 8.3 (L) 07/04/2021 02:40 AM      HEPATIC FUNCTION Lab Results   Component Value Date/Time    Albumin 1.9 (L) 07/04/2021 02:40 AM    Bilirubin, direct 0.6 (H) 02/29/2020 11:11 AM    Bilirubin, total 3.3 (H) 07/04/2021 02:40 AM    Protein, total 5.7 (L) 07/04/2021 02:40 AM    Globulin 3.8 07/04/2021 02:40 AM    A-G Ratio 0.5 (L) 07/04/2021 02:40 AM    ALT (SGPT) 27 07/04/2021 02:40 AM    Alk. phosphatase 185 (H) 07/04/2021 02:40 AM       LACTIC ACID Lab Results   Component Value Date/Time    Lactic acid 1.7 02/29/2020 11:11 AM      CARDIAC PANEL Lab Results   Component Value Date/Time    CK 24 (L) 06/26/2021 05:00 PM    CK - MB <1.0 06/26/2021 05:00 PM    CK-MB Index  06/26/2021 05:00 PM     CALCULATION NOT PERFORMED WHEN RESULT IS BELOW LINEAR LIMIT    Troponin-I, QT 0.02 06/25/2021 08:00 PM      NT-proBNP No results found for: BNP, BNPP, BNPPPOC, XBNPT, BNPNT   THYROID Lab Results   Component Value Date/Time    TSH 2.35 06/19/2019 09:15 PM      LIPID PANEL No results found for: CHOL, CHOLPOCT, CHOLX, CHLST, CHOLV, HDL, HDLPOC, HDLP, LDL, LDLCPOC, LDLC, DLDLP, VLDLC, VLDL, TGLX, TRIGL, TRIGP, TGLPOCT, CHHD, CHHDX      RISK CALCULATORS:  SCORE RESULT   ASCVD The ASCVD Risk score (Arielle Velásquez, et al., 2013) failed to calculate for the following reasons:    Cannot find a previous HDL lab    Cannot find a previous total cholesterol lab    The smoking status is invalid    [id number]     HAS-BLED    READMISSION RISK SCORE Low Risk            8 Total Score    3 Patient Length of Stay (>5 days = 3)    5 Pt. Coverage (Medicare=5 , Medicaid, or Self-Pay=4)        Criteria that do not apply:    Has Seen PCP in Last 6 Months (Yes=3, No=0)    . Living with Significant Other. Assisted Living. LTAC. SNF. or   Rehab    IP Visits Last 12 Months (1-3=4, 4=9, >4=11)    Charlson Comorbidity Score (Age + Comorbid Conditions)           Functional status and cognitive function:    Ambulates. Status: cognitivley intact  Condition: STABLE  Disposition: SNF    Diet: resume normal diet.     Code status and advanced care plan: DNR    Patient Education:  Patient was educated on the following topics prior to discharge: alcohol consumption    Follow-up:   Follow-up Information     Follow up With Specialties Details Why Contact Info    [address] [facility] Joseph Wolfgang   Taye Payne [address] [facility]    Elda Tamayo MD Orthopedic Surgery Schedule an appointment as soon as possible for a visit  [address] [address]  [phone]    [facility] EMERGENCY DEPT Emergency Medicine   [address] [place] [address] Dr Tino Franco [facility]  [phone]       Maryam Luevano MD, PGY-1   [address] [place]   Intern Pager: [phone]   July 4, 2021

## 2021-07-01 NOTE — PROGRESS NOTES
Problem: Self Care Deficits Care Plan (Adult)  Goal: *Acute Goals and Plan of Care (Insert Text)  Description: Occupational Therapy Goals  Initiated 6/26/2021 within 7 day(s). 1.  Patient will perform perform self feeding with modified independence and efficiency  2. Patient will demonstrate 4/5 UB strength (including hand intrinsics) in preparation for his efficient completion of his self care routine  3. Patient will perform grooming with modified independence progressing to EOB and standing at the sink as able  4. Patient will perform LB dressing with modified independence  5. Patient will complete LB clothes management with min assist    Prior Level of Function: he reports he lives in a boarding house with others and was independent with his self care. He is reporting it was becoming more difficult to walk up and down the stairs (his room is on the second floor) and to step over the bathtub to get into the shower. Outcome: Progressing Towards Goal     OCCUPATIONAL THERAPY TREATMENT    Patient: Deni Troncoso (4 y.o. male)  Date: 7/1/2021  Diagnosis: Sepsis ([address] [A41.9]  Chronic malnutrition ([address] [E46]  Alcoholism ([address] [F10.20]  GAIL (acute kidney injury) ([address] [N17.9]  Alcohol abuse [F10.10] Severe sepsis ([address]  Precautions: Fall, Skin, Aspiration      Chart, occupational therapy assessment, plan of care, and goals were reviewed. ASSESSMENT:  Patient cleared to participate in OT treatment by RN. Upon entering the room, the patient was supine in bed, alert, and agreeable to participate in OT session. Patient was seen with PT to maximize patient safety, participation, and functional mobility in preparation for self-care tasks. Patient mentation improved since initial evaluation therefore progress increased this session. Patient moderate assist for bed mobility, moderate - minimal assist x 2 for sit <> stand, total assist for BM hygiene standing and moderate assist for lower body dressing.  Patient required verbal cues this session for placement of hands/feet during sit <> stand and vcs for upright posture. Patient able to tailor sit this session but observed manually lifting BLE into position and increased pain reported in back with LLE. Patient able to stand x 2 trials but declined lateral steps this session. BLE exercises performed EOB to decrease pain/stiffness, see PT note for further information. Patient left semi-reclined in bed, all needs met and call bell in reach. RN notified of session. Progression toward goals:  []          Improving appropriately and progressing toward goals  [x]          Improving slowly and progressing toward goals  []          Not making progress toward goals and plan of care will be adjusted     PLAN:  Patient continues to benefit from skilled intervention to address the above impairments. Continue treatment per established plan of care. Discharge Recommendations:  Joseph Goss  Further Equipment Recommendations for Discharge:  bedside commode, shower chair, and rolling walker     SUBJECTIVE:   Patient stated hell no when asked if ready to take lateral steps this session    OBJECTIVE DATA SUMMARY:   Cognitive/Behavioral Status:  Neurologic State: Alert  Orientation Level: Oriented to person, Oriented to place (reports 2019 as year)  Cognition: Follows commands, Poor safety awareness  Safety/Judgement: Fall prevention    Functional Mobility and Transfers for ADLs:   Bed Mobility:  Supine to Sit: Moderate assistance;Assist x1  Sit to Supine: Moderate assistance (for BLE into bed)  Scooting: Contact guard assistance (towards EOB)     Transfers:  Sit to Stand: Moderate assistance;Assist x2 (x 2 trials this session)  Stand to Sit: Moderate assistance;Minimum assistance;Assist x2      Balance:  Sitting: Intact  Sitting - Static: Good (unsupported)  Sitting - Dynamic: Fair (occasional) (+)  Standing: Impaired; With support  Standing - Static: [address]  Standing - Dynamic :  (pt declined)    ADL Intervention:  Lower Body Dressing Assistance  Dressing Assistance: Moderate assistance  Socks: Moderate assistance  Leg Crossed Method Used: Yes (attempted; increased difficulty w LLE)    Toileting  Toileting Assistance: Total assistance(dependent)  Bowel Hygiene: Total assistance (dependent) (standing)    Cognitive Retraining  Safety/Judgement: Fall prevention      Pain:  Pain level pre-treatment: 0/10   Pain level post-treatment; - pain increased with LBD in back  Pain Intervention(s): Medication (see MAR); Response to intervention: Nurse notified, See doc flow    Activity Tolerance:    Patient demonstrated fair activity tolerance during OT evaluation. Please refer to the flowsheet for vital signs taken during this treatment. After treatment:   []  Patient left in no apparent distress sitting up in chair  [x]  Patient left in no apparent distress in bed  [x]  Call bell left within reach  [x]  Nursing notified  []  Caregiver present  [x]  Bed alarm activated    COMMUNICATION/EDUCATION:   [x] Role of Occupational Therapy in the acute care setting  [x] Home safety education was provided and the patient/caregiver indicated understanding. [x] Patient/family have participated as able in working towards goals and plan of care. [x] Patient/family agree to work toward stated goals and plan of care. [] Patient understands intent and goals of therapy, but is neutral about his/her participation. [] Patient is unable to participate in goal setting and plan of care.       Thank you for this referral.  Johny Cotter OTR/L  Time Calculation: 23 mins

## 2021-07-01 NOTE — PROGRESS NOTES
[facility]  Intern Progress Note    Patient: Olu Llamas MRN: [id number]   SSN: [id number]  YOB: 1960   Age: 64 y.o. Sex: male      Admit Date: 6/25/2021    LOS: 6 days   Chief Complaint   Patient presents with    Extremity Weakness   weakness in the setting of acute alcohol disorder. Subjective:      Patient seen at bedside this morning, sleepy but easily arousable. Patient reported feeling weak and was able to state his name. Will examine patient during rounds to reassess for somnolence/dilirium as well as capacity to make medical decisions.     Review of Systems   Constitutional: Negative for chills and fever. Respiratory: negative for cough. Negative for shortness of breath. Cardiovascular: Negative for chest pain. Genitourinary: Positive for hematuria. Musculoskeletal: Negative for back pain and joint pain. Neurological: Positive for weakness. Negative for headaches.              Objective:     Visit Vitals  /61   Pulse 98   Temp 97.5 °F (36.4 °C)   Resp 18   Ht 6' 2\" (1.88 m)   Wt 83.5 kg (184 lb 1.6 oz)   SpO2 95%   BMI 23.64 kg/m²       Physical Exam  Constitutional:       General: not in acute distress. Appearance: not toxic-appearing. Cardiovascular:      Rate and Rhythm: Normal rate and regular rhythm. Pulses: Normal pulses. Heart sounds: Normal heart sounds. No murmur heard. No friction rub. No gallop. Pulmonary:      Effort: Pulmonary effort is normal. No respiratory distress. Breath sounds: was not able to auscultate from the back due to pt feeling too weak to sit up. Abdominal:      General: Abdomen is flat. Bowel sounds are normal. There is no distension. Palpations: Abdomen is soft. Tenderness: There is no abdominal tenderness. There is no guarding or rebound. Musculoskeletal:      Right lower leg: No edema. Left lower leg: no edema. Comments:  there was eccymosis.    Skin:     Capillary Refill: Capillary refill takes less than 2 seconds. Comments: Diffuse ecchymosis on back and extremities. Bilateral Onychomycosis and excoriations in both feet. Nails trimmed by staff. Neurological:      Mental Status: He is alert and oriented to person, place, and time.       Intake and Output:  Current Shift: No intake/output data recorded. Last three shifts: 06/29 1901 - 07/01 0700  In: -   Out: 775 [Urine:775]    Lab/Data Review:  Recent Results (from the past 12 hour(s))   GLUCOSE, POC    Collection Time: 06/30/21  9:32 PM   Result Value Ref Range    Glucose (POC) 92 70 - 512 mg/dL   METABOLIC PANEL, COMPREHENSIVE    Collection Time: 07/01/21  4:38 AM   Result Value Ref Range    Sodium 138 136 - 145 mmol/L    Potassium 4.2 3.5 - 5.5 mmol/L    Chloride 105 100 - 111 mmol/L    CO2 26 21 - 32 mmol/L    Anion gap 7 3.0 - 18 mmol/L    Glucose 111 (H) 74 - 99 mg/dL    BUN 6 (L) 7.0 - 18 MG/DL    Creatinine 0.56 (L) 0.6 - 1.3 MG/DL    BUN/Creatinine ratio 11 (L) 12 - 20      GFR est AA >60 >60 ml/min/1.73m2    GFR est non-AA >60 >60 ml/min/1.73m2    Calcium 8.2 (L) 8.5 - 10.1 MG/DL    Bilirubin, total 2.7 (H) 0.2 - 1.0 MG/DL    ALT (SGPT) 40 16 - 61 U/L    AST (SGOT) 105 (H) 10 - 38 U/L    Alk. phosphatase 199 (H) 45 - 117 U/L    Protein, total 5.4 (L) 6.4 - 8.2 g/dL    Albumin 1.7 (L) 3.4 - 5.0 g/dL    Globulin 3.7 2.0 - 4.0 g/dL    A-G Ratio 0.5 (L) 0.8 - 1.7     CBC WITH AUTOMATED DIFF    Collection Time: 07/01/21  4:38 AM   Result Value Ref Range    WBC 13.9 (H) 4.6 - 13.2 K/uL    RBC 2.32 (L) 4.35 - 5.65 M/uL    HGB 8.3 (L) 13.0 - 16.0 g/dL    HCT 26.1 (L) 36.0 - 48.0 %    .5 (H) 74.0 - 97.0 FL    MCH 35.8 (H) 24.0 - 34.0 PG    MCHC 31.8 31.0 - 37.0 g/dL    RDW 17.6 (H) 11.6 - 14.5 %    PLATELET 340 408 - 473 K/uL    MPV 10.1 9.2 - 11.8 FL    NEUTROPHILS 80 (H) 40 - 73 %    LYMPHOCYTES 12 (L) 21 - 52 %    MONOCYTES 7 3 - 10 %    EOSINOPHILS 1 0 - 5 %    BASOPHILS 0 0 - 2 %    ABS.  NEUTROPHILS 11.1 (H) 1.8 - 8.0 K/UL    ABS. LYMPHOCYTES 1.7 0.9 - 3.6 K/UL    ABS. MONOCYTES 1.0 0.05 - 1.2 K/UL    ABS. EOSINOPHILS 0.1 0.0 - 0.4 K/UL    ABS. BASOPHILS 0.0 0.0 - 0.1 K/UL    DF SMEAR SCANNED      PLATELET COMMENTS ADEQUATE PLATELETS      RBC COMMENTS MACROCYTOSIS  2+        RBC COMMENTS SPHEROCYTES  1+       MAGNESIUM    Collection Time: 07/01/21  4:38 AM   Result Value Ref Range    Magnesium 1.6 1.6 - 2.6 mg/dL   PHOSPHORUS    Collection Time: 07/01/21  4:38 AM   Result Value Ref Range    Phosphorus 3.1 2.5 - 4.9 MG/DL   GLUCOSE, POC    Collection Time: 07/01/21  6:42 AM   Result Value Ref Range    Glucose (POC) 100 70 - 110 mg/dL           Assessment and Plan:     Mr. Eugene Dalal is a 64 y. o. male with Past Medical History Notable for Type 2 Diabetes, Hypertension, Hyperlipidemia and a history of an L3 Compression Fracture in March 2020, now presenting with complaint of increased weakness and unsteadiness on his feet in the setting of meeting sepsis criteria and an GAIL.      Sepsis 2/2 aspiration PNA with lower suspicion for UTI (improving): On admission, WBC 13.1, Heart Rates in the 90s, soft BPs ~80s/50s, lactate 5. 89. Received fluid recussitation and broad spectrum antibiotics. Lactic acid has improved. Vital signs currently stable. WBC count is increasing since 6/27/21 - currently today at 13.9. UA was positive for nitrites. CXR showed mild left lower lobe infiltrate, concerning for possible aspiration PNA. 1/2 bottles of blood cultures growing gram positive rods (Bacillus). CXR (6/29): interval progression of b/l opacities @ lung bases  -  blood cultures - no growth in 6 days.     urine Cultures - no growth  - Continue Levaquin  - FU repeat Blood cultures  - fall precautions  - aspiration precautions  - SLP consult, appreciate recs  - oral care  - repeat CXR (q 2-3 days)     Acute/subacute on chronic L3 burst Fracture: MRI Lumbar Spine (3/2/2020) with subacute appearing superior endplate fracture of the L3 vertebral body, approximately 30% height loss. On admission, denied any numbness, tingling, or pain is his back     MRI Lumbar Spine (6/28/2021) Multilevel mild degenerative changes without high-grade spinal canal or foraminal stenosis. Acute/subacute on chronic L3 superior endplate burst type fracture.  -Pain Control with Tylenol PRN  - Ortho spine consulted - recommends conservative management with progressive mobilization.   - FU ortho outpatient     Chromic Alcohol use disorder: Patient endorsed consuming drinks 6-pack of beer and 4-5 mixed rum drinks per day. , ALT 35 on admission (>2:1 ratio suggestive of alcoholic disease). Serum Ethanol <3, Ammonia 40 on admission. CIWA scores to date have been 2-3. Has not required any Ativan. Acute Hepatitis panel negative  - Continue CIWA protocol   - Continue Thiamine 552 mg and Folic Acid 1 mg   - Fall Precautions  - FU Hep C, HIV, RPR labs     Delirium: Multifactorial in the setting of chronic alcohol abuse, sepsis and history of falls. CT scan of head -   performed for altered mental status  -no acute intracranial abnormality     Generalized weakness, physicial deconditioning, impaired ADLs and iADLs, Hx of falls: multifactorial in the setting of malnutrition and chronic alcohol abuse, sepsis. XR of left lower extremity and left ankle given history of fall as well as hematoma in LLE (6/28) No acute fracture, Moderate circumferential soft tissue swelling around the ankle. - fall precautions  - PT/OT consult, appreciate recs     Severe Protein calorie malnutrition, Vitamin D deficiency: Vitamin D levels 10.5  - nutrition consulted, appreciate recs  - start Ensure Enlive TID meal supplements  - continue multivitamins and thiamine 100mg daily supplements  - monitor electrolytes, replete as per protocol prn  - start Vitamin D3 supplements 1000units daily     Acute Kidney Injury (resolved): Cr 2.33 on admission (Baseline Cre 0.6 - 0.8).  Cr 0.59 this morning. Likely Pre-renal due to dehydration in the setting of Alcohol Use Disorder. Has a better PO intake this morning.  - Avoid nephrotoxic meds  - Monitor Creatinine with Daily CMP     Macrocytic Anemia and Thrombocytopenia- Likely due to malnourishment in the setting of acute alcohol disorder. Hemoglobin stable, no active bleeding. MCV is 112 at admission.  B12/folate WNL. Iron profile labs consistent with anemia of chronic disease.  - continue to monitor indices with daily CBC   - FOBT     Hypokalemia (resolved):  - continue to monitor on daily BMP     Hx of Hypertension: Initially hypotensive at admission but BPs stable, WNL since admission Normotensive overnight. Previously on Lisinopril 10 mg every day, but patient denies taking this medication. - consider restarting Lisinopril if the patient's Blood Pressure increases     Type 2 Diabetes: Last A1c (06/24/2020)  5.8. Diet controlled   - POC glucose ACHS  - SSI  - hypoglycemic precautions     Right shoulder pain (resolved): Complaint at admission with full ROM, no tenderness on exam. Patient reports being pulled by right arm from his porch on day of admission. XR R.shoulder (6/28) No definite evidence of acute fracture,  No significant glenohumeral or acromioclavicular osteoarthritis. Suspected right infraspinatus calcific tendinitis versus subacromial/subdeltoid calcific bursitis.   - Tylenol PRN      Onychomycosis: Treated at bedside by podiatry  -  Podiatry consulted, appreciate recs     Global care:   - CM consult, appreciate recs for discharge planning  Diet Regular   DVT Prophylaxis SQH   GI Prophylaxis Protonix   Code status DNR/DNI      Point of Contact Dewane Runner  [phone]           Usama Johnson MD, PGY-1   [address]   Intern Pager: [phone]   July 1, 2021, 9:21 AM

## 2021-07-01 NOTE — PROGRESS NOTES
Checked on pt this evening for daytime somnolence. Found pt sleeping in bed. He was arousable to voice and A&Ox3. Briefly discussed somnolence concern with RN staff. Staff reported pt was a danger to himself last night, necessitating Ativan. RN staff will notify PFM should pt become agitated and disruptive enough to warrant intervention.     Daljit Bear MD

## 2021-07-01 NOTE — ROUTINE PROCESS
Bedside and Verbal shift change report given to Lissette Rodgers (oncoming nurse) by Mis Fernandes (offgoing nurse). Report included the following information SBAR.

## 2021-07-02 LAB
ALBUMIN SERPL-MCNC: 1.8 G/DL (ref 3.4–5)
ALBUMIN/GLOB SERPL: 0.4 {RATIO} (ref 0.8–1.7)
ALP SERPL-CCNC: 228 U/L (ref 45–117)
ALT SERPL-CCNC: 46 U/L (ref 16–61)
ANION GAP SERPL CALC-SCNC: 6 MMOL/L (ref 3–18)
AST SERPL-CCNC: 140 U/L (ref 10–38)
BASOPHILS # BLD: 0 K/UL (ref 0–0.1)
BASOPHILS NFR BLD: 0 % (ref 0–2)
BILIRUB SERPL-MCNC: 3 MG/DL (ref 0.2–1)
BUN SERPL-MCNC: 5 MG/DL (ref 7–18)
BUN/CREAT SERPL: 8 (ref 12–20)
CALCIUM SERPL-MCNC: 8.2 MG/DL (ref 8.5–10.1)
CHLORIDE SERPL-SCNC: 103 MMOL/L (ref 100–111)
CO2 SERPL-SCNC: 29 MMOL/L (ref 21–32)
CREAT SERPL-MCNC: 0.62 MG/DL (ref 0.6–1.3)
DIFFERENTIAL METHOD BLD: ABNORMAL
EOSINOPHIL # BLD: 0.1 K/UL (ref 0–0.4)
EOSINOPHIL NFR BLD: 1 % (ref 0–5)
ERYTHROCYTE [DISTWIDTH] IN BLOOD BY AUTOMATED COUNT: 17.8 % (ref 11.6–14.5)
GLOBULIN SER CALC-MCNC: 4.2 G/DL (ref 2–4)
GLUCOSE BLD STRIP.AUTO-MCNC: 113 MG/DL (ref 70–110)
GLUCOSE BLD STRIP.AUTO-MCNC: 128 MG/DL (ref 70–110)
GLUCOSE BLD STRIP.AUTO-MCNC: 162 MG/DL (ref 70–110)
GLUCOSE BLD STRIP.AUTO-MCNC: 163 MG/DL (ref 70–110)
GLUCOSE SERPL-MCNC: 113 MG/DL (ref 74–99)
HCT VFR BLD AUTO: 29.3 % (ref 36–48)
HGB BLD-MCNC: 9.2 G/DL (ref 13–16)
LYMPHOCYTES # BLD: 1.9 K/UL (ref 0.9–3.6)
LYMPHOCYTES NFR BLD: 13 % (ref 21–52)
MAGNESIUM SERPL-MCNC: 1.6 MG/DL (ref 1.6–2.6)
MCH RBC QN AUTO: 34.6 PG (ref 24–34)
MCHC RBC AUTO-ENTMCNC: 31.4 G/DL (ref 31–37)
MCV RBC AUTO: 110.2 FL (ref 74–97)
MONOCYTES # BLD: 1 K/UL (ref 0.05–1.2)
MONOCYTES NFR BLD: 7 % (ref 3–10)
NEUTS SEG # BLD: 11.4 K/UL (ref 1.8–8)
NEUTS SEG NFR BLD: 79 % (ref 40–73)
PERIPHERAL SMEAR,PSM: NORMAL
PHOSPHATE SERPL-MCNC: 3.3 MG/DL (ref 2.5–4.9)
PLATELET # BLD AUTO: 189 K/UL (ref 135–420)
PLATELET COMMENTS,PCOM: ABNORMAL
PMV BLD AUTO: 10.3 FL (ref 9.2–11.8)
POTASSIUM SERPL-SCNC: 4 MMOL/L (ref 3.5–5.5)
PROCALCITONIN SERPL-MCNC: 0.29 NG/ML
PROT SERPL-MCNC: 6 G/DL (ref 6.4–8.2)
RBC # BLD AUTO: 2.66 M/UL (ref 4.35–5.65)
RBC MORPH BLD: ABNORMAL
SODIUM SERPL-SCNC: 138 MMOL/L (ref 136–145)
WBC # BLD AUTO: 14.4 K/UL (ref 4.6–13.2)

## 2021-07-02 PROCEDURE — 80053 COMPREHEN METABOLIC PANEL: CPT

## 2021-07-02 PROCEDURE — 74011250636 HC RX REV CODE- 250/636: Performed by: FAMILY MEDICINE

## 2021-07-02 PROCEDURE — 85025 COMPLETE CBC W/AUTO DIFF WBC: CPT

## 2021-07-02 PROCEDURE — 77010033678 HC OXYGEN DAILY

## 2021-07-02 PROCEDURE — 74011250637 HC RX REV CODE- 250/637: Performed by: FAMILY MEDICINE

## 2021-07-02 PROCEDURE — 97535 SELF CARE MNGMENT TRAINING: CPT

## 2021-07-02 PROCEDURE — 74011250637 HC RX REV CODE- 250/637: Performed by: STUDENT IN AN ORGANIZED HEALTH CARE EDUCATION/TRAINING PROGRAM

## 2021-07-02 PROCEDURE — 74011250636 HC RX REV CODE- 250/636: Performed by: INTERNAL MEDICINE

## 2021-07-02 PROCEDURE — 84100 ASSAY OF PHOSPHORUS: CPT

## 2021-07-02 PROCEDURE — 74011000250 HC RX REV CODE- 250: Performed by: INTERNAL MEDICINE

## 2021-07-02 PROCEDURE — 83735 ASSAY OF MAGNESIUM: CPT

## 2021-07-02 PROCEDURE — 74011000258 HC RX REV CODE- 258: Performed by: FAMILY MEDICINE

## 2021-07-02 PROCEDURE — 82962 GLUCOSE BLOOD TEST: CPT

## 2021-07-02 PROCEDURE — 84145 PROCALCITONIN (PCT): CPT

## 2021-07-02 PROCEDURE — 74011250637 HC RX REV CODE- 250/637: Performed by: INTERNAL MEDICINE

## 2021-07-02 PROCEDURE — 74011636637 HC RX REV CODE- 636/637: Performed by: STUDENT IN AN ORGANIZED HEALTH CARE EDUCATION/TRAINING PROGRAM

## 2021-07-02 PROCEDURE — 65660000000 HC RM CCU STEPDOWN

## 2021-07-02 PROCEDURE — 2709999900 HC NON-CHARGEABLE SUPPLY

## 2021-07-02 PROCEDURE — 36415 COLL VENOUS BLD VENIPUNCTURE: CPT

## 2021-07-02 RX ORDER — CHOLESTYRAMINE 4 G/4.8G
4 POWDER, FOR SUSPENSION ORAL
Status: DISCONTINUED | OUTPATIENT
Start: 2021-07-02 | End: 2021-07-07 | Stop reason: HOSPADM

## 2021-07-02 RX ORDER — LORAZEPAM 0.5 MG/1
0.25 TABLET ORAL ONCE
Status: COMPLETED | OUTPATIENT
Start: 2021-07-02 | End: 2021-07-02

## 2021-07-02 RX ORDER — METRONIDAZOLE 500 MG/1
500 TABLET ORAL EVERY 8 HOURS
Status: DISCONTINUED | OUTPATIENT
Start: 2021-07-02 | End: 2021-07-05

## 2021-07-02 RX ORDER — LOPERAMIDE HYDROCHLORIDE 2 MG/1
2 CAPSULE ORAL ONCE
Status: COMPLETED | OUTPATIENT
Start: 2021-07-02 | End: 2021-07-02

## 2021-07-02 RX ADMIN — Medication 100 MG: at 08:32

## 2021-07-02 RX ADMIN — PIPERACILLIN AND TAZOBACTAM 4.5 G: 4; .5 INJECTION, POWDER, FOR SOLUTION INTRAVENOUS at 05:59

## 2021-07-02 RX ADMIN — CHOLECALCIFEROL TAB 25 MCG (1000 UNIT) 1000 UNITS: 25 TAB at 08:32

## 2021-07-02 RX ADMIN — Medication 10 ML: at 05:59

## 2021-07-02 RX ADMIN — LORAZEPAM 0.25 MG: 0.5 TABLET ORAL at 13:48

## 2021-07-02 RX ADMIN — METRONIDAZOLE 500 MG: 500 TABLET ORAL at 12:33

## 2021-07-02 RX ADMIN — METRONIDAZOLE 500 MG: 500 TABLET ORAL at 22:33

## 2021-07-02 RX ADMIN — INSULIN LISPRO 2 UNITS: 100 INJECTION, SOLUTION INTRAVENOUS; SUBCUTANEOUS at 12:36

## 2021-07-02 RX ADMIN — PIPERACILLIN AND TAZOBACTAM 4.5 G: 4; .5 INJECTION, POWDER, FOR SOLUTION INTRAVENOUS at 00:47

## 2021-07-02 RX ADMIN — FOLIC ACID 1 MG: 1 TABLET ORAL at 08:32

## 2021-07-02 RX ADMIN — WATER 1 G: 1 INJECTION INTRAMUSCULAR; INTRAVENOUS; SUBCUTANEOUS at 12:32

## 2021-07-02 RX ADMIN — THERA TABS 1 TABLET: TAB at 08:32

## 2021-07-02 RX ADMIN — Medication 10 ML: at 22:33

## 2021-07-02 RX ADMIN — LOPERAMIDE HYDROCHLORIDE 2 MG: 2 CAPSULE ORAL at 12:33

## 2021-07-02 RX ADMIN — Medication 10 ML: at 14:00

## 2021-07-02 RX ADMIN — CHOLESTYRAMINE 4 G: 4 POWDER, FOR SUSPENSION ORAL at 12:33

## 2021-07-02 NOTE — CONSULTS
Infectious Disease Consultation Note        Reason: Worsening leukocytosis, sepsis/pneumonia    Current abx Prior abx   Piperacillin/tazobactam since 7/2  Levofloxacin since 6/26 Piperacillin/tazobactam 6/26-6/28  Vancomycin 6/26-6/28     Lines:       Assessment :     64 y.o. male with Past Medical History Notable for Type 2 Diabetes, Hypertension, Hyperlipidemia and a history of an L3 Compression Fracture in March 2020 presented to [facility] on 6/25/2021 with complaint of increased weakness and unsteadiness on his feet. Leukocytosis, elevated lactate on admission    Clinical presentation  suggestive of probable sepsis-present on admission due to aspiration pneumonia    Leukocytosis/elevated lactate on admission could have been due to hemoconcentration, volume depletion too    Acute kidney injury-likely due to volume depletion from poor p.o. intake- improved    Chest x-ray reveals bibasilar abnormalities, patchy right-sided opacity suggestive of aspiration pneumonia    Worsening leukocytosis could be due to partially treated aspiration pneumonia due to quinolone resistant pathogens. We will alter antibiotics to cover for this possibility    Recommendations:    1. Discontinue levofloxacin, piperacillin/tazobactam.  Start ceftriaxone, metronidazole  2. Start Questran for postprandial diarrhea  3. Obtain procalcitonin  4. Monitor CBC, clinically  5. We will switch to oral antibiotics in a.m. if continued clinical and lab improvement noted      Thank you for consultation request. Above plan was discussed in details with patient, RN and dr Saray Lala. Please call me if any further questions or concerns. Will continue to participate in the care of this patient. HPI:   64 y.o. male with Past Medical History Notable for Type 2 Diabetes, Hypertension, Hyperlipidemia and a history of an L3 Compression Fracture in March 2020 presented to [facility] on 6/25/2021 with complaint of increased weakness and unsteadiness on his feet.     I obtained history by talking to patient, review of records. Patient states that the main reason he came into the hospital is because he fell and bruised his extremities. Patient reports that his weakness occurred the day he came into ED when he was found on his front porch and an was unable to stand up due to weakness in his lower extremities. He reports poor PO intake for the past week prior to admission due to forgetting because he \"gets too drunk\". Labs obtained in ED: Lactic Acid 5.89, WBC 12.9, Hemoglobin 8.9, .3, Platelets 959. He was started on broad-spectrum antibiotics due to concern for sepsis. Chest x-ray revealed infiltrates concerning for aspiration pneumonia. He clinically improved and his antibiotics were deescalated to levofloxacin on 6/29/2021. He was noted to have worsening leukocytosis with a WBC count of 13 K on 7/1 and 14 K today. Zosyn was added to his regimen. I have been consulted for further recommendations. Patient denies any subjective fever or chills. Currently denies any increasing chest pain, shortness of breath. He states that he has chronic diarrhea at home and he takes medicine for that. He notices bowel movement after each meal and is requesting medications. He denies any burning while urinating, flank pain, abdominal pain.         Home Medication List    Details   lisinopriL (PRINIVIL, ZESTRIL) 10 mg tablet Take 1 Tab by mouth daily. Qty: 30 Tab, Refills: 0      atorvastatin (LIPITOR) 10 mg tablet Take 1 Tab by mouth daily. Qty: 30 Tab, Refills: 0      acetaminophen (TYLENOL) 325 mg tablet Take 2 Tabs by mouth every six (6) hours. Qty: 30 Tab, Refills: 0      clotrimazole (LOTRIMIN) 1 % topical cream Apply  to affected area two (2) times a day. Qty: 15 g, Refills: 0      lidocaine 4 % patch Back pain  Qty: 30 Package, Refills: 0      polyethylene glycol (MIRALAX) 17 gram packet Take 1 Packet by mouth daily as needed for Constipation.   Qty: 30 Packet, Refills: 0      metFORMIN ER (GLUCOPHAGE XR) 750 mg tablet Take 2 Tabs by mouth daily. Qty: 30 Tab, Refills: 0             Current Facility-Administered Medications   Medication Dose Route Frequency    cholestyramine-aspartame (QUESTRAN LIGHT) packet 4 g  4 g Oral TID WITH MEALS    cefTRIAXone (ROCEPHIN) 1 g in sterile water (preservative free) 10 mL IV syringe  1 g IntraVENous Q24H    metroNIDAZOLE (FLAGYL) tablet 500 mg  500 mg Oral Q8H    cholecalciferol (VITAMIN D3) (1000 Units /25 mcg) tablet 1,000 Units  1,000 Units Oral DAILY    acetaminophen (TYLENOL) tablet 650 mg  650 mg Oral Q6H PRN    Or    acetaminophen (TYLENOL) suppository 650 mg  650 mg Rectal Q6H PRN    naproxen (NAPROSYN) tablet 500 mg  500 mg Oral BID PRN    diclofenac (VOLTAREN) 1 % topical gel 2 g  2 g Topical QID PRN    ELECTROLYTE REPLACEMENT PROTOCOL - Potassium   1 Each Other PRN    ELECTROLYTE REPLACEMENT PROTOCOL - Magnesium  1 Each Other PRN    insulin lispro (HUMALOG) injection   SubCUTAneous AC&HS    glucose chewable tablet 16 g  16 g Oral PRN    glucagon (GLUCAGEN) injection 1 mg  1 mg IntraMUSCular PRN    dextrose (D50W) injection syrg 12.5-25 g  25-50 mL IntraVENous PRN    sodium chloride (NS) flush 5-40 mL  5-40 mL IntraVENous Q8H    sodium chloride (NS) flush 5-40 mL  5-40 mL IntraVENous PRN    polyethylene glycol (MIRALAX) packet 17 g  17 g Oral DAILY PRN    folic acid (FOLVITE) tablet 1 mg  1 mg Oral DAILY    thiamine HCL (B-1) tablet 100 mg  100 mg Oral DAILY    therapeutic multivitamin (THERAGRAN) tablet 1 Tablet  1 Tablet Oral DAILY       Allergies: Patient has no known allergies. No family history on file.   Social History     Socioeconomic History    Marital status:      Spouse name: Not on file    Number of children: Not on file    Years of education: Not on file    Highest education level: Not on file   Occupational History    Not on file   Tobacco Use    Smoking status: Not on file Substance and Sexual Activity    Alcohol use: Not on file    Drug use: Not on file    Sexual activity: Not on file   Other Topics Concern    Not on file   Social History Narrative    Not on file     Social Determinants of Health     Financial Resource Strain:     Difficulty of Paying Living Expenses:    Food Insecurity:     Worried About Running Out of Food in the Last Year:     [address] in the Last Year:    Transportation Needs:     Lack of Transportation (Medical):  Lack of Transportation (Non-Medical):    Physical Activity:     Days of Exercise per Week:     Minutes of Exercise per Session:    Stress:     Feeling of Stress :    Social Connections:     Frequency of Communication with Friends and Family:     Frequency of Social Gatherings with Friends and Family:     Attends [employer] Services:     Active Member of Clubs or Organizations:     Attends Club or Organization Meetings:     Marital Status:    Intimate Partner Violence:     Fear of Current or Ex-Partner:     Emotionally Abused:     Physically Abused:     Sexually Abused:      Social History     Tobacco Use   Smoking Status Not on file        Temp (24hrs), Av.5 °F (36.9 °C), Min:98.2 °F (36.8 °C), Max:98.9 °F (37.2 °C)    Visit Vitals  BP (!) 141/79   Pulse (!) 110   Temp 98.2 °F (36.8 °C)   Resp 20   Ht 6' 2\" (1.88 m)   Wt 83.5 kg (184 lb 1.4 oz)   SpO2 (!) 8%   BMI 23.64 kg/m²       ROS: 12 point ROS obtained in details. Pertinent positives as mentioned in HPI,   otherwise negative    Physical Exam:    General:  AAOx3, NAD  HEENT: Conjunctiva pink, sclera anicteric. PERRL. EOMI. Pharynx moist, nonerythematous. Moist mucous membranes. Thyroid not enlarged, no nodules. lymphadenopathy. No other gross abnormalities present. CV:  RRR, no rubs or galops appreciated. No visible pulsations or thrills. RESP:  Unlabored breathing. Lungs clear to auscultation without adventitious breath sounds.  Equal expansion bilaterally. ABD:  Soft, nontender, nondistended. BS (+). No hepatosplenomegaly. No suprapubic tenderness. MS:  No joint deformity or instability. No atrophy. Neuro:  CN II-XII grossly intact. 5/5 strength bilateral upper extremities and lower extremities. Ext:  No edema. 2+ radial and dp pulses bilaterally. Skin:  No rashes, lesions, or ulcers. Good turgor. Labs: Results:   Chemistry Recent Labs     07/02/21  0242 07/01/21  0438 06/30/21  0426   * 111* 97    138 137   K 4.0 4.2 3.6    105 103   CO2 29 26 26   BUN 5* 6* 7   CREA 0.62 0.56* 0.70   CA 8.2* 8.2* 8.1*   AGAP 6 7 8   BUCR 8* 11* 10*   * 199* 212*   TP 6.0* 5.4* 5.9*   ALB 1.8* 1.7* 2.1*   GLOB 4.2* 3.7 3.8   AGRAT 0.4* 0.5* 0.6*      CBC w/Diff Recent Labs     07/02/21  0242 07/01/21  1720 07/01/21  0438   WBC 14.4* 14.5* 13.9*   RBC 2.66* 2.41* 2.32*   HGB 9.2* 8.5* 8.3*   HCT 29.3* 26.2* 26.1*    150 138   GRANS 79* 75* 80*   LYMPH 13* 14* 12*   EOS 1 1 1      Microbiology Recent Labs     07/01/21  0910   CULT PENDING          RADIOLOGY:    All available imaging studies/reports in [facility] for this admission were reviewed      Disclaimer: Sections of this note are dictated utilizing voice recognition software, which may have resulted in some phonetic based errors in grammar and contents. Even though attempts were made to correct all the mistakes, some may have been missed, and remained in the body of the document. If questions arise, please contact our department.     Dr. Pricila Pemberton, Infectious Disease Specialist  [phone]  July 2, 2021  12:56 PM

## 2021-07-02 NOTE — PROGRESS NOTES
Received call from pt's Mihai Fess [phone], who stated pt was given notice that pt needed to be out of the house by July 1st and pt can not return to home.  DEMI Moya, [place]- [phone]

## 2021-07-02 NOTE — PROGRESS NOTES
Received call from [facility] does not have a bed tomorrow, but [facility] can accept patient. Called Brenton Lerma, [facility], and changed SNF location to [facility]. Called [place], [place], and changed trip to [facility] for 7/3/21 at 2pm.  New PCS faxed.      Romana Greaves, RN BSN  Care Manager  [phone]

## 2021-07-02 NOTE — PROGRESS NOTES
[facility]  Intern Progress Note    Patient: Darwin Meier MRN: [id number]   SSN: [id number]  YOB: 1960   Age: 64 y.o. Sex: male      Admit Date: 6/25/2021    LOS: 7 days   Chief Complaint   Patient presents with    Extremity Weakness       Subjective:   Patient seen at bedside this morning, was awake and cooperative. Patient reported having looser bowel movements  Will examine patient during rounds to reassess for anti-diarrheal measures.     Review of Systems   Constitutional: Negative for chills and fever. Respiratory: negative for cough. Negative for shortness of breath.    Cardiovascular: Negative for chest pain. Genitourinary: Positive for hematuria. Musculoskeletal: Negative for back pain and joint pain. Bruising on back of the right shoulder and upper back form a previous fall. Neurological: Positive for weakness. Negative for headaches.     Objective:     Visit Vitals  /74   Pulse 93   Temp 98.4 °F (36.9 °C)   Resp 24   Ht 6' 2\" (1.88 m)   Wt 83.5 kg (184 lb 1.4 oz)   SpO2 91%   BMI 23.64 kg/m²       Physical Exam  Constitutional:       General: not in acute distress.     Appearance: not toxic-appearing. Cardiovascular:      Rate and Rhythm: Normal rate and regular rhythm.      Pulses: Normal pulses.      Heart sounds: Normal heart sounds. No murmur heard. No friction rub. No gallop.    Pulmonary:      Effort: Pulmonary effort is normal. No respiratory distress.      Breath sounds: was not able to auscultate from the back due to pt feeling too weak to sit up. Abdominal:      General: Abdomen is flat. Bowel sounds are normal. There is no distension.      Palpations: Abdomen is soft.      Tenderness: There is no abdominal tenderness. There is no guarding or rebound. Musculoskeletal:      Right lower leg: No edema.      Left lower leg: no edema.      Comments:  there was eccymosis.    Skin:     Capillary Refill: Capillary refill takes less than 2 seconds.      Comments: Diffuse ecchymosis on back and extremities. Bilateral Onychomycosis and excoriations in both feet. Nails trimmed by staff.   Neurological:      Mental Status: He is alert and oriented to person, place, and time.       Intake and Output:  Current Shift: No intake/output data recorded. Last three shifts: 06/29 1901 - 07/01 0700  In: -   Out: 775 [Urine:775]            Intake and Output:  Current Shift: No intake/output data recorded. Last three shifts: 06/30 1901 - 07/02 0700  In: -   Out: 1200 [Urine:1200]    Lab/Data Review:  Recent Results (from the past 12 hour(s))   GLUCOSE, POC    Collection Time: 07/01/21  9:41 PM   Result Value Ref Range    Glucose (POC) 131 (H) 70 - 314 mg/dL   METABOLIC PANEL, COMPREHENSIVE    Collection Time: 07/02/21  2:42 AM   Result Value Ref Range    Sodium 138 136 - 145 mmol/L    Potassium 4.0 3.5 - 5.5 mmol/L    Chloride 103 100 - 111 mmol/L    CO2 29 21 - 32 mmol/L    Anion gap 6 3.0 - 18 mmol/L    Glucose 113 (H) 74 - 99 mg/dL    BUN 5 (L) 7.0 - 18 MG/DL    Creatinine 0.62 0.6 - 1.3 MG/DL    BUN/Creatinine ratio 8 (L) 12 - 20      GFR est AA >60 >60 ml/min/1.73m2    GFR est non-AA >60 >60 ml/min/1.73m2    Calcium 8.2 (L) 8.5 - 10.1 MG/DL    Bilirubin, total 3.0 (H) 0.2 - 1.0 MG/DL    ALT (SGPT) 46 16 - 61 U/L    AST (SGOT) 140 (H) 10 - 38 U/L    Alk.  phosphatase 228 (H) 45 - 117 U/L    Protein, total 6.0 (L) 6.4 - 8.2 g/dL    Albumin 1.8 (L) 3.4 - 5.0 g/dL    Globulin 4.2 (H) 2.0 - 4.0 g/dL    A-G Ratio 0.4 (L) 0.8 - 1.7     CBC WITH AUTOMATED DIFF    Collection Time: 07/02/21  2:42 AM   Result Value Ref Range    WBC 14.4 (H) 4.6 - 13.2 K/uL    RBC 2.66 (L) 4.35 - 5.65 M/uL    HGB 9.2 (L) 13.0 - 16.0 g/dL    HCT 29.3 (L) 36.0 - 48.0 %    .2 (H) 74.0 - 97.0 FL    MCH 34.6 (H) 24.0 - 34.0 PG    MCHC 31.4 31.0 - 37.0 g/dL    RDW 17.8 (H) 11.6 - 14.5 %    PLATELET 366 281 - 401 K/uL    MPV 10.3 9.2 - 11.8 FL    NEUTROPHILS 79 (H) 40 - 73 %    LYMPHOCYTES 13 (L) 21 - 52 %    MONOCYTES 7 3 - 10 %    EOSINOPHILS 1 0 - 5 %    BASOPHILS 0 0 - 2 %    ABS. NEUTROPHILS 11.4 (H) 1.8 - 8.0 K/UL    ABS. LYMPHOCYTES 1.9 0.9 - 3.6 K/UL    ABS. MONOCYTES 1.0 0.05 - 1.2 K/UL    ABS. EOSINOPHILS 0.1 0.0 - 0.4 K/UL    ABS. BASOPHILS 0.0 0.0 - 0.1 K/UL    DF MANUAL      PLATELET COMMENTS ADEQUATE PLATELETS      RBC COMMENTS MACROCYTOSIS  2+        RBC COMMENTS HYPOCHROMIA  1+        RBC COMMENTS POLYCHROMASIA  1+        RBC COMMENTS TARGET CELLS  1+       MAGNESIUM    Collection Time: 07/02/21  2:42 AM   Result Value Ref Range    Magnesium 1.6 1.6 - 2.6 mg/dL   PHOSPHORUS    Collection Time: 07/02/21  2:42 AM   Result Value Ref Range    Phosphorus 3.3 2.5 - 4.9 MG/DL   GLUCOSE, POC    Collection Time: 07/02/21  6:32 AM   Result Value Ref Range    Glucose (POC) 113 (H) 70 - 110 mg/dL           Assessment and Plan:     Mr. Eugene Dalal is a 64 y. o. male with Past Medical History Notable for Type 2 Diabetes, Hypertension, Hyperlipidemia and a history of an L3 Compression Fracture in March 2020, now presenting with complaint of increased weakness and unsteadiness on his feet in the setting of meeting sepsis criteria and an GAIL.      : On admission, WBC 13.1, Heart Rates in the 90s, soft BPs ~80s/50s, lactate 5. 89. Received fluid recussitation and broad spectrum antibiotics. Lactic acid has improved. Vital signs currently stable. WBC count is increasing since 6/27/21 - currently today at 14.4. UA was positive for nitrites. CXR showed mild left lower lobe infiltrate, concerning for possible aspiration PNA. 1/2 bottles of blood cultures growing gram positive rods (Bacillus). CXR (6/29): interval progression of b/l opacities @ lung bases  -  blood cultures - no growth in 6 days.     urine Cultures - no growth  - Continue Levaquin  - FU repeat Blood cultures  - fall precautions  - aspiration precautions  - SLP consult, appreciate recs  - oral care  - repeat CXR (q 2-3 days)  -ID consult with Dr. Aric Thompson for recs     Acute/subacute on chronic L3 burst Fracture: MRI Lumbar Spine (3/2/2020) with subacute appearing superior endplate fracture of the L3 vertebral body, approximately 30% height loss. On admission, denied any numbness, tingling, or pain is his back     MRI Lumbar Spine (6/28/2021) Multilevel mild degenerative changes without high-grade spinal canal or foraminal stenosis. Acute/subacute on chronic L3 superior endplate burst type fracture.  -Pain Control with Tylenol PRN  - Ortho spine consulted - recommends conservative management with progressive mobilization.   - FU ortho outpatient     Chromic Alcohol use disorder: Patient endorsed consuming drinks 6-pack of beer and 4-5 mixed rum drinks per day. , ALT 35 on admission (>2:1 ratio suggestive of alcoholic disease). Serum Ethanol <3, Ammonia 40 on admission. CIWA scores to date have been 2-3. Has not required any Ativan. Acute Hepatitis panel negative  - Continue CIWA protocol   - Continue Thiamine 050 mg and Folic Acid 1 mg   - Fall Precautions  -  Hep C, HIV, RPR  - all negative     Delirium: Multifactorial in the setting of chronic alcohol abuse, sepsis and history of falls.      CT scan of head on 6/30 -   performed for altered mental status  -no acute intracranial abnormality     Generalized weakness, physicial deconditioning, impaired ADLs and iADLs, Hx of falls: multifactorial in the setting of malnutrition and chronic alcohol abuse, sepsis. XR of left lower extremity and left ankle given history of fall as well as hematoma in LLE (6/28) No acute fracture, Moderate circumferential soft tissue swelling around the ankle.   - fall precautions  - PT/OT consult, appreciate recs     Severe Protein calorie malnutrition, Vitamin D deficiency: Vitamin D levels 10.5  - nutrition consulted, appreciate recs  - start Ensure Enlive TID meal supplements  - continue multivitamins and thiamine 100mg daily supplements  - monitor electrolytes, replete as per protocol prn  - receiving Vitamin D3 supplements 1000units daily     Acute Kidney Injury (resolved): Cr 2.33 on admission (Baseline Cre 0.6 - 0.8). Cr 0.59 this morning. Likely Pre-renal due to dehydration in the setting of Alcohol Use Disorder. Has a better PO intake this morning.  - Avoid nephrotoxic meds  - Monitor Creatinine with Daily CMP     Macrocytic Anemia and Thrombocytopenia- Likely due to malnourishment in the setting of acute alcohol disorder. Hemoglobin stable, no active bleeding. MCV is 112 at admission.  B12/folate WNL. Iron profile labs consistent with anemia of chronic disease.  - continue to monitor indices with daily CBC   - FOBT     Hypokalemia (resolved):  - continue to monitor on daily BMP     Hx of Hypertension: Initially hypotensive at admission but BPs stable, WNL since admission Normotensive overnight. Previously on Lisinopril 10 mg every day, but patient denies taking this medication. - consider restarting Lisinopril if the patient's Blood Pressure increases     Type 2 Diabetes: Last A1c (06/24/2020)  5.8. Diet controlled   - POC glucose ACHS  - SSI  - hypoglycemic precautions     Right shoulder pain (resolved): Complaint at admission with full ROM, no tenderness on exam. Patient reports being pulled by right arm from his porch on day of admission. XR R.shoulder (6/28) No definite evidence of acute fracture,  No significant glenohumeral or acromioclavicular osteoarthritis. Suspected right infraspinatus calcific tendinitis versus subacromial/subdeltoid calcific bursitis. - Tylenol PRN      Onychomycosis: Treated at bedside by podiatry  -  Podiatry consulted, appreciate recs    Bowel Movement changes :   -patient presents with 5 bouts of looser than baseline stools, patient has been taking levofolxacin since 6/25, was added back on zosyn yesterday and had a dietary change as per nutritionist - from ensure to magic cup - nutritional supplement.  Will re assess during rounds for better idea.     Global care:   - CM consult, appreciate recs for discharge planning  Diet Regular   DVT Prophylaxis SQH   GI Prophylaxis Protonix   Code status DNR/DNI      Point of Contact Gianna Antonio  Relationship: Brother  [phone]       Tory Ramirez MD, PGY-1   [address] Medicine   Intern Pager: [phone]   July 2, 2021, 8:15 AM

## 2021-07-02 NOTE — PROGRESS NOTES
Called [facility] to arrange transportation to [facility]. Address is [address] [place] [address] dr   Patient will require BLS transport. Pt requires Stretcher If stretcher, reason: confused, debility. Patient is currently requiring oxygen Yes Yes: Comment: 2L  Height:6'2   Weight: 184  Pt is on isolation: No    Is the pt ready now? no  Requested time: 7/3/21 at [facility]  PCS Faxed: yes  Insurance verified on face sheet: yes  Auth needed for transport: no  CM completed PCS/ Envelope and placed on chart.      Arranged for transport with PAT for 7/3/21 at [address], [address] [place] [address]  [phone]

## 2021-07-02 NOTE — PROGRESS NOTES
Problem: Falls - Risk of  Goal: *Absence of Falls  Description: Document Maddie Jefferson Fall Risk and appropriate interventions in the flowsheet. Outcome: Progressing Towards Goal  Note: Fall Risk Interventions:  Mobility Interventions: Bed/chair exit alarm, Utilize walker, cane, or other assistive device, Communicate number of staff needed for ambulation/transfer    Mentation Interventions: Toileting rounds, Room close to nurse's station, Reorient patient, More frequent rounding, Door open when patient unattended, Bed/chair exit alarm    Medication Interventions: Teach patient to arise slowly, Patient to call before getting OOB, Evaluate medications/consider consulting pharmacy, Bed/chair exit alarm    Elimination Interventions: Toileting schedule/hourly rounds, Toilet paper/wipes in reach, Patient to call for help with toileting needs, Call light in reach, Bed/chair exit alarm    History of Falls Interventions: Room close to nurse's station, Door open when patient unattended, Bed/chair exit alarm         Problem: Pain  Goal: *Control of Pain  Outcome: Progressing Towards Goal     Problem: Nutrition Deficit  Goal: *Optimize nutritional status  Outcome: Progressing Towards Goal     Problem: Pressure Injury - Risk of  Goal: *Prevention of pressure injury  Description: Document Kahlil Scale and appropriate interventions in the flowsheet. Outcome: Progressing Towards Goal  Note: Pressure Injury Interventions:  Sensory Interventions: Assess changes in LOC, Avoid rigorous massage over bony prominences, Check visual cues for pain, Minimize linen layers    Moisture Interventions: Absorbent underpads, Apply protective barrier, creams and emollients, Internal/External urinary devices    Activity Interventions: Increase time out of bed, Pressure redistribution bed/mattress(bed type)    Mobility Interventions: Pressure redistribution bed/mattress (bed type), Turn and reposition approx.  every two hours(pillow and wedges)    Nutrition Interventions: Document food/fluid/supplement intake, Offer support with meals,snacks and hydration    Friction and Shear Interventions: Apply protective barrier, creams and emollients                Problem: Pressure Injury - Risk of  Goal: *Prevention of pressure injury  Description: Document Kahlil Scale and appropriate interventions in the flowsheet. Outcome: Progressing Towards Goal  Note: Pressure Injury Interventions:  Sensory Interventions: Assess changes in LOC, Avoid rigorous massage over bony prominences, Check visual cues for pain, Minimize linen layers    Moisture Interventions: Absorbent underpads, Apply protective barrier, creams and emollients, Internal/External urinary devices    Activity Interventions: Increase time out of bed, Pressure redistribution bed/mattress(bed type)    Mobility Interventions: Pressure redistribution bed/mattress (bed type), Turn and reposition approx.  every two hours(pillow and wedges)    Nutrition Interventions: Document food/fluid/supplement intake, Offer support with meals,snacks and hydration    Friction and Shear Interventions: Apply protective barrier, creams and emollients

## 2021-07-02 NOTE — PROGRESS NOTES
Problem: Self Care Deficits Care Plan (Adult)  Goal: *Acute Goals and Plan of Care (Insert Text)  Description: Occupational Therapy Goals  Initiated 6/26/2021 within 7 day(s). 1.  Patient will perform perform self feeding with modified independence and efficiency  2. Patient will demonstrate 4/5 UB strength (including hand intrinsics) in preparation for his efficient completion of his self care routine  3. Patient will perform grooming with modified independence progressing to EOB and standing at the sink as able  4. Patient will perform LB dressing with modified independence  5. Patient will complete LB clothes management with min assist    Prior Level of Function: he reports he lives in a boarding house with others and was independent with his self care. He is reporting it was becoming more difficult to walk up and down the stairs (his room is on the second floor) and to step over the bathtub to get into the shower. Outcome: Progressing Towards Goal   OCCUPATIONAL THERAPY TREATMENT    Patient: Samia Luo (28 y.o. male)  Date: 7/2/2021  Diagnosis: Sepsis ([facility] [address] [A41.9]  Chronic malnutrition ([facility] [address] [E46]  Alcoholism ([facility] [address] [F10.20]  GAIL (acute kidney injury) ([facility] [address] [N17.9]  Alcohol abuse [F10.10] Severe sepsis ([address]       Precautions: Fall, Skin, Aspiration    Chart, occupational therapy assessment, plan of care, and goals were reviewed. ASSESSMENT:  Pt oriented to self only. Re-oriented and encouraged EOB. Pt requires increase time and vc's for use of side rail to maneuver to EOB. Pt tolerates EOB ~ 15 minutes performing ADL grooming tasks. Generalized weakness requires Mod Assist w/functional transfer to standing w/RW and lateral steps to [facility] in prep for functional transfer to BSC/toilet. Pt c/o fatigues requesting return to supine. Pt requires assist w/BLE for return to supine. Pt left w/all needs within reach.    Progression toward goals:  []          Improving appropriately and progressing toward goals  [x]          Improving slowly and progressing toward goals  []          Not making progress toward goals and plan of care will be adjusted     PLAN:  Patient continues to benefit from skilled intervention to address the above impairments. Continue treatment per established plan of care. Discharge Recommendations:  Skilled Nursing Facility/LTC  Further Equipment Recommendations for Discharge:  transfer bench and rolling walker     SUBJECTIVE:   Patient stated I ain't going to [address] [place] [address],[address].     OBJECTIVE DATA SUMMARY:   Cognitive/Behavioral Status:  Neurologic State: Confused  Orientation Level: Oriented to person  Cognition: Follows commands  Safety/Judgement: Fall prevention    Functional Mobility and Transfers for ADLs:   Bed Mobility:  Supine to Sit: Additional time;Minimum assistance (w/HOB raised and SR)  Sit to Supine: Additional time; Moderate assistance   Transfers:  Sit to Stand: Moderate assistance (w/RW from elevated surface)  Balance:  Sitting: Intact  Standing: Impaired; With support  Standing - Static: Fair  Standing - Dynamic : Fair (fair minus)    ADL Intervention:  Grooming  Position Performed: Seated edge of bed  Washing Hands: Minimum assistance (assist for thoroughness)    Toileting  Bowel Hygiene: Maximum assistance  Clothing Management: Maximum assistance    Pain:  Pain level pre-treatment: 0/10   Pain level post-treatment: 0/10    Activity Tolerance:    Fair, pt fatigues easily    Please refer to the flowsheet for vital signs taken during this treatment. After treatment:   []  Patient left in no apparent distress sitting up in chair  [x]  Patient left in no apparent distress in bed  [x]  Call bell left within reach  [x]  Cassandria Perches,  notified  []  Caregiver present  []  Bed alarm activated    COMMUNICATION/EDUCATION:   [] Role of Occupational Therapy in the acute care setting  [] Home safety education was provided and the patient/caregiver indicated understanding.   [] Patient/family have participated as able in working towards goals and plan of care. [] Patient/family agree to work toward stated goals and plan of care. [] Patient understands intent and goals of therapy, but is neutral about his/her participation. [x] Patient is unable to participate in goal setting and plan of care 2/2 confusion.       Thank you for this referral.  KAYLYNN Haskins  Time Calculation: 24 mins

## 2021-07-02 NOTE — PROGRESS NOTES
Received call from [facility] with Sally Tillman [address] regarding insurance auth. Soraidaconstantine Jalloh has been approved # [id number]. Ana CABRERA updated. Kristy calling facility with update.  DEMI Cool, [place]- [phone]

## 2021-07-02 NOTE — ROUTINE PROCESS
Bedside shift change report given to Lissette Rodgers (oncoming nurse) by Polo Costa (offgoing nurse). Report included the following information SBAR, Procedure Summary, MAR and Med Rec Status.

## 2021-07-02 NOTE — PROGRESS NOTES
Physician Progress Note      PATIENT:               Abi Mock  CSN #:                  [id number]  :                       1960  ADMIT DATE:       2021 7:30 PM  [address] [place] DATE:  RESPONDING  PROVIDER #:        Lian SALAS MD          QUERY TEXT:    Dear PFM,    Pt admitted with sepsis. Pt noted to have delirium. If possible, please document in the progress notes and discharge summary if you are able to further specify the delirium:    The medical record reflects the following:  Risk Factors: dehydration, severe malnutrition, GAIL, DM, severe sepsis, alcohol dependence    Clinical Indicators: \"Delirium: Multifactorial in the setting of chronic alcohol abuse, sepsis and history of falls. .  ordered CT head w/o contrast - patient refused\"  per Dr. Katie Ng, progress note, 2021. \"IMPRESSION:  No acute intracranial abnormality. Moderate global atrophy again noted and pronounced for patient's age. Recommend clinical correlation to exclude potential encephalopathy. \"  per CT head report, 2021. \"Checked on pt this evening for daytime somnolence. Found pt sleeping in bed. He was arousable to voice and A&Ox3. Briefly discussed somnolence concern with RN staff. Staff reported pt was a danger to himself last night, necessitating Ativan. RN staff will notify PFM should pt become agitated and disruptive enough to warrant intervention. \"  per  Diana Ash MD, progress note, 2021. Na results range:  137 - 143  H/H range:  7.5-9.4/24.2-29.6    Treatment: normal saline 1,000ml bolus IV then continuous @ 125 ml/hr x2 days, K+ supplementation IV, antibiotics IV    Thank you,  RIVER Adams RN, CDS  Office:  [phone]  Options provided:  -- Alcoholic encephalopathy  -- Septic encephalopathy  -- Wernicke?s encephalopathy  -- Delirium due to, Please specify cause.   -- Delirium due to alcohol abuse and sepsis  -- Other - I will add my own diagnosis  -- Disagree - Not applicable / Not valid  -- Disagree - Clinically unable to determine / Unknown  -- Refer to Clinical Documentation Reviewer    PROVIDER RESPONSE TEXT:    Patient has delirium due to alcoholic encephalopathy and pneumonia    Query created by:  Val Silva on 7/2/2021 10:23 AM      Electronically signed by:  David SALAS MD 7/2/2021 6:05 PM

## 2021-07-02 NOTE — PROGRESS NOTES
[facility] ANTIMICROBIAL STEWARDSHIP TEAM  PRESCRIBER COMMUNICATION FORM      We have briefly reviewed the antimicrobials  currently prescribed for your patient along with  the clinical indications and would like to Make the following recommendations:    DISCONTINUE ANTIBIOTICS      Rationale:    (  )  No evidence of bacterial infection    (  )  Microbiology report does not support use    (  )  Narrowing broad-spectrum empiric coverage    (  )  Therapeutic duplication: overlapping antimicrobial spectrum    (  )  Clinical situation dictates change    (  )  Prolonged duration of therapy not needed    (  )  Allergy/toxicity/drug interaction present    (  ) More clinically/cost effective therapy available  ADD ANTIBIOTICS  Rationale:        (  ) Microbiology report supports use    (  ) Expanded coverage needed: gm positive /negative / anaerobic /                               atypical    ( ) More clinicaly/cost effective therapy than current regimen    ( ) Needed for synergy    ( ) Double coverage needed    ( ) Less toxic therapy    ( ) Antifungal therapy needed  ADDITIONAL SUGGESTIONS    ( ) continue current therapy with the following change    ( ) additional laboratory testing    (x) consider infectious disease consultation    ( ) consider outpatient IV therapy    ( ) other    COMMENTS: recommend infectious disease consultation to determine need for antibiotic modification versus further work up of leukocytosis. thanks       This communication is not to be considered a consultation. You are under no obligation to follow these suggestions. A physician-patient relationship has not been established between the physician Completing this form and your patient. If a thorough analysis of the case is desired, please request an ID consultation.

## 2021-07-02 NOTE — PROGRESS NOTES
Informed Dr Carolina Escalona that insurance authorization received. Was informed that pt is not ready for discharge today. Possibly tomorrow 7/3/21.      Sadia Castro, RN BSN  Care Manager  [phone]

## 2021-07-02 NOTE — MED STUDENT NOTES
This is a medical student note for learning purposes only. Please refer to the attending physician note for final recommendations. Contact our [address] [place] [address] team with any questions or concerns. [facility]  Progress Note    Patient: Darwin Meier MRN: [id number]   SSN: [id number]  YOB: 1960   Age: 64 y.o. Sex: male      Admit Date: 6/25/2021    LOS: 7 days   Chief Complaint   Patient presents with    Extremity Weakness       Subjective:     Overnight Mr. Kraig Hudson said he was having abdominal pain and urgency to have a bowel movement immediately after he consumed food or water. He thinks this problem began after he ate the \"icecream\" that the nutritionist added to his diet. The nurse said that the stools were semi loose and not watery. Mr. Kraig Hudson confirmed this as well. He states that he was not feeling nauseous but that his stomach hurt diffusely at a level of 7/10. He denied any other accompanying symptoms during this episode. When I saw him this morning he was drinking water and trying to eat. He said this morning that his stomach was not hurting anymore and that still if he tried to eat he would immediately have to use the restroom. The bowels had the same consistency as during the night. He said he really wants to get stronger in hopes of getting home. He asked if we could find him some clothes for when he leaves. He says he has been feeling a little short of breath and that he thinks it is a little worse at night than during the day. Review of Systems   Constitutional: Negative for chills, diaphoresis and fever. Respiratory: Positive for shortness of breath. Negative for cough. Gastrointestinal: Positive for abdominal pain and diarrhea. Negative for nausea and vomiting. Neurological: Positive for weakness.        Objective:     Visit Vitals  BP (!) 140/85   Pulse (!) 102   Temp 98.9 °F (37.2 °C)   Resp 20   Ht 6' 2\" (1.88 m)   Wt 83.5 kg (184 lb 1.4 oz)   SpO2 (!) 88%   BMI 23.64 kg/m²       Physical Exam:   Physical Exam  Constitutional:       General: He is not in acute distress. Appearance: Normal appearance. Cardiovascular:      Rate and Rhythm: Regular rhythm. Tachycardia present. Heart sounds: No murmur heard. No friction rub. No gallop. Pulmonary:      Effort: Pulmonary effort is normal.      Breath sounds: Normal breath sounds. Abdominal:      General: Abdomen is flat. Bowel sounds are normal. There is no distension. Palpations: Abdomen is soft. Tenderness: There is no abdominal tenderness. There is no guarding. Neurological:      Mental Status: He is alert and oriented to person, place, and time. Motor: Weakness present. Comments: +1 Shoulder strength  +3   +1 Leg strength         Intake and Output:  Current Shift: No intake/output data recorded. Last three shifts: 06/30 1901 - 07/02 0700  In: -   Out: 1200 [Urine:1200]    Lab/Data Review:  Recent Results (from the past 12 hour(s))   GLUCOSE, POC    Collection Time: 07/01/21  9:41 PM   Result Value Ref Range    Glucose (POC) 131 (H) 70 - 324 mg/dL   METABOLIC PANEL, COMPREHENSIVE    Collection Time: 07/02/21  2:42 AM   Result Value Ref Range    Sodium 138 136 - 145 mmol/L    Potassium 4.0 3.5 - 5.5 mmol/L    Chloride 103 100 - 111 mmol/L    CO2 29 21 - 32 mmol/L    Anion gap 6 3.0 - 18 mmol/L    Glucose 113 (H) 74 - 99 mg/dL    BUN 5 (L) 7.0 - 18 MG/DL    Creatinine 0.62 0.6 - 1.3 MG/DL    BUN/Creatinine ratio 8 (L) 12 - 20      GFR est AA >60 >60 ml/min/1.73m2    GFR est non-AA >60 >60 ml/min/1.73m2    Calcium 8.2 (L) 8.5 - 10.1 MG/DL    Bilirubin, total 3.0 (H) 0.2 - 1.0 MG/DL    ALT (SGPT) 46 16 - 61 U/L    AST (SGOT) 140 (H) 10 - 38 U/L    Alk.  phosphatase 228 (H) 45 - 117 U/L    Protein, total 6.0 (L) 6.4 - 8.2 g/dL    Albumin 1.8 (L) 3.4 - 5.0 g/dL    Globulin 4.2 (H) 2.0 - 4.0 g/dL    A-G Ratio 0.4 (L) 0.8 - 1.7     CBC WITH AUTOMATED DIFF Collection Time: 07/02/21  2:42 AM   Result Value Ref Range    WBC 14.4 (H) 4.6 - 13.2 K/uL    RBC 2.66 (L) 4.35 - 5.65 M/uL    HGB 9.2 (L) 13.0 - 16.0 g/dL    HCT 29.3 (L) 36.0 - 48.0 %    .2 (H) 74.0 - 97.0 FL    MCH 34.6 (H) 24.0 - 34.0 PG    MCHC 31.4 31.0 - 37.0 g/dL    RDW 17.8 (H) 11.6 - 14.5 %    PLATELET 623 169 - 676 K/uL    MPV 10.3 9.2 - 11.8 FL    NEUTROPHILS 79 (H) 40 - 73 %    LYMPHOCYTES 13 (L) 21 - 52 %    MONOCYTES 7 3 - 10 %    EOSINOPHILS 1 0 - 5 %    BASOPHILS 0 0 - 2 %    ABS. NEUTROPHILS 11.4 (H) 1.8 - 8.0 K/UL    ABS. LYMPHOCYTES 1.9 0.9 - 3.6 K/UL    ABS. MONOCYTES 1.0 0.05 - 1.2 K/UL    ABS. EOSINOPHILS 0.1 0.0 - 0.4 K/UL    ABS. BASOPHILS 0.0 0.0 - 0.1 K/UL    DF MANUAL      PLATELET COMMENTS ADEQUATE PLATELETS      RBC COMMENTS MACROCYTOSIS  2+        RBC COMMENTS HYPOCHROMIA  1+        RBC COMMENTS POLYCHROMASIA  1+        RBC COMMENTS TARGET CELLS  1+       MAGNESIUM    Collection Time: 07/02/21  2:42 AM   Result Value Ref Range    Magnesium 1.6 1.6 - 2.6 mg/dL   PHOSPHORUS    Collection Time: 07/02/21  2:42 AM   Result Value Ref Range    Phosphorus 3.3 2.5 - 4.9 MG/DL   GLUCOSE, POC    Collection Time: 07/02/21  6:32 AM   Result Value Ref Range    Glucose (POC) 113 (H) 70 - 110 mg/dL       RECENT RESULTS  MODALITY IMPRESSION   XR Results from [place] encounter on 06/25/21    XR CHEST PORT    Narrative  EXAM: XR CHEST PORT    INDICATION: 64 years Male. PNA. ADDITIONAL HISTORY: None. TECHNIQUE: Frontal view of the chest.    COMPARISON: Chest 6/29/2021 at 0516 hours    FINDINGS:    Leftward rotation. Apical lordotic positioning. The cardiac silhouette is unchanged in size. Atherosclerotic calcifications are  noted in the aorta. Blunting of the left costophrenic sulcus with adjacent left lung base opacity. Patchy right basilar opacity, without significant interval change. The right costophrenic sulcus appears sharp. No definite pneumothorax.     No acute osseous abnormality appreciated. Impression  1. Unchanged bibasilar opacities, suspicious for pneumonia or aspiration. 2.  Unchanged small left pleural effusion. Recommend continued imaging follow-up until resolution. CT Results from [place] encounter on 06/25/21    CT HEAD WO CONT    Narrative  CT of the head without contrast    HISTORY: Trauma. COMPARISON: 6/20/19    TECHNIQUE: Helical axial scan to the head was performed from the skull base to  the vertex without IV contrast administration. All CT scans at this facility performed using dose optimization techniques as  appreciated to a performed exam, to include automated exposure control,  adjustment of the mA and or KU according to patient size (including appropriate  matching for site specific examination), or use of iterative reconstruction  technique. FINDINGS: Moderate global atrophy is pronounced for patient's age. Associated  mild ventricular dilatation also seen. The gray-white matter differentiation is  preserved. No new finding demonstrated. There is no evidence of acute  intracranial hemorrhage, mass effect or midline shift identified. No skull  fracture or extra axial fluid collections identified. Visualized sinuses and  mastoid air cells appear unremarkable. Impression  No acute intracranial abnormality. Moderate global atrophy again noted and pronounced for patient's age. Recommend  clinical correlation to exclude potential encephalopathy. Thank you for your referral.     MRI Results from [place] encounter on 06/25/21    MRI LUMB SPINE W WO CONT    Narrative  MR lumbar spine with and without contrast    HISTORY: , H/o L3 burst fracture, lost to follow up, bowel incontinence x3    COMPARISON: MRI 3/2/2020. TECHNIQUE: Lumbar spine scanned with axial and sagittal T1W scans, axial and  sagittal T2W scans, and with post gadolinium axial and sagittal T1W scans.   Patient received 17 mL Dotarem  IV contrast.    FINDINGS:    Normal lumbar lordosis. Chronic L3 superior endplate compression fracture with  extension to the posterior endplate and mild retropulsion. There is interval  worsening of the central vertebral body height (about 50% height loss) with  increased central enhancing bone marrow edema. Slightly worsening retropulsion 5  mm with mild compromise of the central canal. No epidural collection. No  discrete underlying bone marrow lesion. Otherwise vertebral body heights are obtained. No additional bone marrow signal  abnormality. Disc space heights are relatively maintained and hydrated. Conus terminates at the L1-2 level with normal signal.    Mild bilateral psoas edema bilaterally at multiple levels centered at the L3  fracture, likely due to strain or related to fracture. Also mild posterior  paraspinal muscular edema and mild atrophy similar to prior. Correlation of sagittal and axial images demonstrates the following:    T11-12 and T12-L1: Sagittal images only. Preserved disc heights. No central  canal or foraminal stenosis. Maria Eugenia Brittle L1-L2: No significant disc pathology. Patent canal and foramina. L2-L3: Mild disc bulge related to 3 fracture but otherwise preserved disc  height. Mild compromise of the central canal due to retropulsion at superior L3  level but otherwise patent canal and foramina    L3-L4: No significant disc pathology. . Patent canal and foramina    L4-L5: No significant disc pathology. Patent canal and foramina. L5-S1: Central annular fissure and small protrusion. Patent canal and foramina. .    Impression  1. Acute/subacute on chronic L3 superior endplate burst type fracture with  extension to the posterior body wall, worsening vertebral body height (about 50%  height loss) and slightly worsening retropulsion. Mild compromise of the central  canal at this level.  No epidural hematoma.  -Central L3 vertebral body enhancing bone marrow edema signal but no discrete  underlying bone marrow lesion to suggest pathologic fracture. 2. Multilevel mild degenerative changes without high-grade spinal canal or  foraminal stenosis. 3. Chronic annular fissure and small protrusion at L5-S1.  4. Chronic posterior paraspinal mild muscular atrophy and edema. ULTRASOUND Results from [place] encounter on 02/29/20    US EXT NONVAS LT LTD    Impression  IMPRESSION:  1. Negative for abscess at the 2 separately evaluated sites within the left  lower extremity.  -Profound subcutaneous tissue edema at each location, as can be seen with severe  cellulitis      US EXT NONVAS LT LTD    Impression  IMPRESSION:  1. Negative for abscess at the 2 separately evaluated sites within the left  lower extremity.  -Profound subcutaneous tissue edema at each location, as can be seen with severe  cellulitis       Cardiology Procedures/Testing:  MODALITY RESULTS   EKG Results for orders placed or performed during the hospital encounter of 06/25/21   EKG, 12 LEAD, INITIAL   Result Value Ref Range    Ventricular Rate 94 BPM    Atrial Rate 94 BPM    P-R Interval 146 ms    QRS Duration 74 ms    Q-T Interval 388 ms    QTC Calculation (Bezet) 485 ms    Calculated P Axis 65 degrees    Calculated R Axis 72 degrees    Calculated T Axis 74 degrees    Diagnosis       Normal sinus rhythm  Prolonged QT  Abnormal ECG  When compared with ECG of 29-FEB-2020 13:25,  No significant change was found  Confirmed by Darrius Reed ([id number]) on 6/25/2021 10:13:30 PM         ECHO 02/29/20    ECHO ADULT FOLLOW-UP OR LIMITED 03/10/2020 3/10/2020    Interpretation Summary  · Technically difficult study due to lung interference and limited patient mobility. · Normal cavity size, wall thickness and systolic function (ejection fraction normal). Estimated left ventricular ejection fraction is 55 - 60%. Visually measured ejection fraction. No regional wall motion abnormality noted.   · Tricuspid regurgitation is inadequate for estimation of right ventricular systolic pressure. Signed by: Terrell Joshi DO on 3/10/2020  4:06 PM       Special Testing/Procedures:  MODALITY RESULTS   MICRO All Micro Results     Procedure Component Value Units Date/Time    CULTURE, BLOOD [[id number]] Collected: 06/28/21 1234    Order Status: Completed Specimen: Blood Updated: 07/02/21 0735     Special Requests: NO SPECIAL REQUESTS        Culture result: NO GROWTH 4 DAYS       CULTURE, RESPIRATORY/SPUTUM/BRONCH Daleen Sleight STAIN [[id number]] Collected: 07/01/21 0910    Order Status: Completed Specimen: Sputum Updated: 07/02/21 0050     Special Requests: NO SPECIAL REQUESTS        GRAM STAIN RARE WBCS SEEN               RARE EPITHELIAL CELLS SEEN            NO ORGANISMS SEEN        Culture result: PENDING    CULTURE, BLOOD [[id number]] Collected: 06/25/21 2100    Order Status: Completed Specimen: Blood Updated: 07/01/21 0713     Special Requests: NO SPECIAL REQUESTS        Culture result: NO GROWTH 6 DAYS       CULTURE, URINE [[id number]] Collected: 06/26/21 0622    Order Status: Completed Specimen: Urine from Clean catch Updated: 06/27/21 1858     Special Requests: NO SPECIAL REQUESTS        Culture result: No growth (<1,000 CFU/ML)       CULTURE, BLOOD [[id number]]  (Abnormal) Collected: 06/25/21 2045    Order Status: Completed Specimen: Blood Updated: 06/27/21 1617     Special Requests: NO SPECIAL REQUESTS        GRAM STAIN       AEROBIC BOTTLE GRAM POSITIVE RODS                  CALLED TO AND CORRECTLY REPEATED BY: NANNETTE GREENWOOD. RN 4S AT 6081 TO S           Culture result:       BACILLUS SPECIES, NOT ANTHRACIS GROWING IN THE AEROBIC BOTTLE               UA No results found for this or any previous visit. PATH Blood Smear   Macrocytic, normochromic anemia with mild anisocytosis. Absolute neutrophilia with mild toxic granulation. Platelets adequate with unremarkable morphology. Telemetry NONE   Oxygen 2L nasal     Assessment and Plan:     Assessment:     Mr. Anai Soto is a 64 y.o. male with a - PMH significant for - T2DM, HTN, HLD, L3 compression fracture and chronic alcohol use disorder who is presenting with sustained weakness in the lower extremity 2/2 a fall, and new onset diarrhea upon consumption of food.     Plan:     Aspiration Pneumonia and UTI:  [ ] Course Vancomycin finished (6/28), continue Levofloxacin 750mg IV(6/25-), initial Zosyn course ended (6/28), restarted (7/1), follow ID recs from today for expected end dates -  continue to monitor WBC(increased to 13.9 from yesterday)   [ ] Evaluate for atypical pneumonia demonstrated on Chest Xray from 7/1. Potential Mycoplasma (pts. Pmh/ demonstrates anemia - gram stain shows no growth)   [ ] Follow aspiration protocols per speech therapy  [ ] Follow up on sputum culture - still pending  [ ] Blood smear demonstrates toxic granulation - consistent w/ bacterial infx.     Acute Diarrhea:  [ ] Stop Magic Cup Consumption  [ ] Continue to monitor fluid status  [ ] Order pro-biotic      Chronic L3 Burst Fracture:  [ ] Pain control with tylenol PRN - currently not in pain  [ ] Continued progressive mobilization w/ Pt/OT     Chronic Alcohol Use Disorder:     [ ] Continue to monitor CIWA scores via CIWA protocol.  Most recent have been 2-3  [ ] Continue Thiamine 364DO, Folic Acid 1mg  [ ] Fall Precautions     Altered Mental Status:  [ ] CT Scan W/ Contrast showed no signs of bleeds or trauma, atrophy of cortices pronounced for age  [ ] continue to monitor capacity and stay in contact w/  - hold ativan      Severe Malnutrition:  [ ] Follow dialectician recs from 6/28  [ ] MVI daily  [ ] Potassium supplement      Macrocytic Anemia and Thrombocytopenia  [ ] Likely 2/2 alcohol use disorder, continue thiamine, folic acid supplementation  [ ] Continue to monitor iron and ferritin levels as needed  [ ] blood smear shows consistency with macrocytic anemia due to thiamine deficiency or chronic alcoholism      Chronic Hypertension:  [ ] Consider restarting lisinopril if  Blood pressure increase is noted     Type 2 DM:  [ ] Last A1C of 5.8 on 6/24   [ ] continue to monitor diet and use SSI  [ ] monitor for hypoglycemic events     Right Shoulder Pain:  [ ] tylenol PRN  [ ] Mobilization via PT/OT     Bilateral Onychomycosis:  [ ] Continue to monitor feet - follow podiatry recs to keep clean and trimmed            Diet Regular   DVT Prophylaxis SQH   GI Prophylaxis Protonix   Code status DNR/DNI     Point of Contact Janet Lane  Relationship: Brother  ([address], [facility]   July 2, 2021, 9:27 AM     *ATTENTION:  This note has been created by a medical student for educational purposes only. Please do not refer to the content of this note for clinical decision-making, billing, or other purposes. Please see attending physicians note to obtain clinical information on this patient. *

## 2021-07-03 ENCOUNTER — APPOINTMENT (OUTPATIENT)
Dept: CT IMAGING | Age: 61
DRG: 871 | End: 2021-07-03
Attending: INTERNAL MEDICINE
Payer: MEDICARE

## 2021-07-03 LAB
ALBUMIN SERPL-MCNC: 1.8 G/DL (ref 3.4–5)
ALBUMIN/GLOB SERPL: 0.4 {RATIO} (ref 0.8–1.7)
ALP SERPL-CCNC: 202 U/L (ref 45–117)
ALT SERPL-CCNC: 42 U/L (ref 16–61)
ANION GAP SERPL CALC-SCNC: 5 MMOL/L (ref 3–18)
AST SERPL-CCNC: 109 U/L (ref 10–38)
BASOPHILS # BLD: 0.1 K/UL (ref 0–0.1)
BASOPHILS NFR BLD: 1 % (ref 0–2)
BILIRUB SERPL-MCNC: 2.8 MG/DL (ref 0.2–1)
BUN SERPL-MCNC: 4 MG/DL (ref 7–18)
BUN/CREAT SERPL: 6 (ref 12–20)
CALCIUM SERPL-MCNC: 8.3 MG/DL (ref 8.5–10.1)
CHLORIDE SERPL-SCNC: 101 MMOL/L (ref 100–111)
CO2 SERPL-SCNC: 32 MMOL/L (ref 21–32)
CREAT SERPL-MCNC: 0.62 MG/DL (ref 0.6–1.3)
DIFFERENTIAL METHOD BLD: ABNORMAL
EOSINOPHIL # BLD: 0.1 K/UL (ref 0–0.4)
EOSINOPHIL NFR BLD: 0 % (ref 0–5)
ERYTHROCYTE [DISTWIDTH] IN BLOOD BY AUTOMATED COUNT: 18 % (ref 11.6–14.5)
GLOBULIN SER CALC-MCNC: 4.2 G/DL (ref 2–4)
GLUCOSE BLD STRIP.AUTO-MCNC: 114 MG/DL (ref 70–110)
GLUCOSE BLD STRIP.AUTO-MCNC: 118 MG/DL (ref 70–110)
GLUCOSE BLD STRIP.AUTO-MCNC: 129 MG/DL (ref 70–110)
GLUCOSE BLD STRIP.AUTO-MCNC: 131 MG/DL (ref 70–110)
GLUCOSE SERPL-MCNC: 128 MG/DL (ref 74–99)
HCT VFR BLD AUTO: 27 % (ref 36–48)
HGB BLD-MCNC: 8.7 G/DL (ref 13–16)
LYMPHOCYTES # BLD: 1.8 K/UL (ref 0.9–3.6)
LYMPHOCYTES NFR BLD: 12 % (ref 21–52)
MAGNESIUM SERPL-MCNC: 1.6 MG/DL (ref 1.6–2.6)
MCH RBC QN AUTO: 34.7 PG (ref 24–34)
MCHC RBC AUTO-ENTMCNC: 32.2 G/DL (ref 31–37)
MCV RBC AUTO: 107.6 FL (ref 74–97)
MONOCYTES # BLD: 1.3 K/UL (ref 0.05–1.2)
MONOCYTES NFR BLD: 9 % (ref 3–10)
NEUTS SEG # BLD: 11.5 K/UL (ref 1.8–8)
NEUTS SEG NFR BLD: 76 % (ref 40–73)
PHOSPHATE SERPL-MCNC: 3.3 MG/DL (ref 2.5–4.9)
PLATELET # BLD AUTO: 183 K/UL (ref 135–420)
PMV BLD AUTO: 10.2 FL (ref 9.2–11.8)
POTASSIUM SERPL-SCNC: 3.7 MMOL/L (ref 3.5–5.5)
PROCALCITONIN SERPL-MCNC: 0.34 NG/ML
PROT SERPL-MCNC: 6 G/DL (ref 6.4–8.2)
RBC # BLD AUTO: 2.51 M/UL (ref 4.35–5.65)
SODIUM SERPL-SCNC: 138 MMOL/L (ref 136–145)
VIT B1 BLD-SCNC: 55.6 NMOL/L (ref 66.5–200)
WBC # BLD AUTO: 15 K/UL (ref 4.6–13.2)

## 2021-07-03 PROCEDURE — 74011250637 HC RX REV CODE- 250/637: Performed by: FAMILY MEDICINE

## 2021-07-03 PROCEDURE — 82962 GLUCOSE BLOOD TEST: CPT

## 2021-07-03 PROCEDURE — 74011250636 HC RX REV CODE- 250/636: Performed by: INTERNAL MEDICINE

## 2021-07-03 PROCEDURE — 74011250637 HC RX REV CODE- 250/637: Performed by: INTERNAL MEDICINE

## 2021-07-03 PROCEDURE — 2709999900 HC NON-CHARGEABLE SUPPLY

## 2021-07-03 PROCEDURE — 74011250637 HC RX REV CODE- 250/637: Performed by: STUDENT IN AN ORGANIZED HEALTH CARE EDUCATION/TRAINING PROGRAM

## 2021-07-03 PROCEDURE — 36415 COLL VENOUS BLD VENIPUNCTURE: CPT

## 2021-07-03 PROCEDURE — 80053 COMPREHEN METABOLIC PANEL: CPT

## 2021-07-03 PROCEDURE — 84145 PROCALCITONIN (PCT): CPT

## 2021-07-03 PROCEDURE — 85025 COMPLETE CBC W/AUTO DIFF WBC: CPT

## 2021-07-03 PROCEDURE — 74011000250 HC RX REV CODE- 250: Performed by: INTERNAL MEDICINE

## 2021-07-03 PROCEDURE — 83735 ASSAY OF MAGNESIUM: CPT

## 2021-07-03 PROCEDURE — 84100 ASSAY OF PHOSPHORUS: CPT

## 2021-07-03 PROCEDURE — 65660000000 HC RM CCU STEPDOWN

## 2021-07-03 RX ADMIN — CHOLESTYRAMINE 4 G: 4 POWDER, FOR SUSPENSION ORAL at 18:52

## 2021-07-03 RX ADMIN — THERA TABS 1 TABLET: TAB at 08:07

## 2021-07-03 RX ADMIN — WATER 1 G: 1 INJECTION INTRAMUSCULAR; INTRAVENOUS; SUBCUTANEOUS at 11:48

## 2021-07-03 RX ADMIN — METRONIDAZOLE 500 MG: 500 TABLET ORAL at 22:03

## 2021-07-03 RX ADMIN — CHOLECALCIFEROL TAB 25 MCG (1000 UNIT) 1000 UNITS: 25 TAB at 08:07

## 2021-07-03 RX ADMIN — METRONIDAZOLE 500 MG: 500 TABLET ORAL at 15:11

## 2021-07-03 RX ADMIN — METRONIDAZOLE 500 MG: 500 TABLET ORAL at 06:28

## 2021-07-03 RX ADMIN — Medication 10 ML: at 06:28

## 2021-07-03 RX ADMIN — CHOLESTYRAMINE 4 G: 4 POWDER, FOR SUSPENSION ORAL at 11:48

## 2021-07-03 RX ADMIN — Medication 10 ML: at 14:00

## 2021-07-03 RX ADMIN — CHOLESTYRAMINE 4 G: 4 POWDER, FOR SUSPENSION ORAL at 08:07

## 2021-07-03 RX ADMIN — ACETAMINOPHEN 650 MG: 325 TABLET ORAL at 10:08

## 2021-07-03 RX ADMIN — Medication 10 ML: at 22:03

## 2021-07-03 RX ADMIN — FOLIC ACID 1 MG: 1 TABLET ORAL at 08:07

## 2021-07-03 RX ADMIN — Medication 100 MG: at 08:07

## 2021-07-03 NOTE — ROUTINE PROCESS
Received a call from Dr Marina Dominique. Pt is not medically ready for discharge today and requested to cancel transport. CM called [facility] and canceled the . CM informed bedside RN Daniel Cohen.       Dionna Rodriges RN BSN  Care Manager  [phone]

## 2021-07-03 NOTE — PROGRESS NOTES
Infectious Disease progress Note        Reason: Worsening leukocytosis, sepsis/pneumonia    Current abx Prior abx     Ceftriaxone, metronidazole since 7/2 Piperacillin/tazobactam 6/26-6/28  Vancomycin 6/26-6/28  Piperacillin/tazobactam  7/2  Levofloxacin 6/26-7/1     Lines:       Assessment :     64 y.o. male with Past Medical History Notable for Type 2 Diabetes, Hypertension, Hyperlipidemia and a history of an L3 Compression Fracture in March 2020 presented to [facility] on 6/25/2021 with complaint of increased weakness and unsteadiness on his feet. Leukocytosis, elevated lactate on admission    Clinical presentation  suggestive of probable sepsis-present on admission due to aspiration pneumonia    Leukocytosis/elevated lactate on admission could have been due to hemoconcentration, volume depletion too    Acute kidney injury-likely due to volume depletion from poor p.o. intake- improved    Chest x-ray reveals bibasilar abnormalities, patchy right-sided opacity suggestive of aspiration pneumonia    Persistent leukocytosis despite antibiotic change on 7/2 no clinical evidence of new infection. ?drug induced ?occult infection/inflammation. Will obtain imaging studies to rule out occult infection/inflammation  No diarrhea/no abdominal tenderness  Procalcitonin 0.29 on 7/2 argues against bacterial sepsis  Subjective sense of improvement    Recommendations:    1. Continue ceftriaxone, metronidazole  2. Obtain CT maxillofacial, CT chest/abdomen/pelvis to evaluate for occult infection  3. Monitor procalcitonin  4. Monitor CBC, clinically  5. Will make final decisions regarding antibiotics based on above test results, clinical course    Above plan was discussed in details with patient, RN . Please call me if any further questions or concerns. Will continue to participate in the care of this patient. HPI:     Patient denies any subjective fever or chills. Currently denies any increasing chest pain, shortness of breath. No diarrhea. He denies any burning while urinating, flank pain, abdominal pain.         Home Medication List    Details   lisinopriL (PRINIVIL, ZESTRIL) 10 mg tablet Take 1 Tab by mouth daily. Qty: 30 Tab, Refills: 0      atorvastatin (LIPITOR) 10 mg tablet Take 1 Tab by mouth daily. Qty: 30 Tab, Refills: 0      acetaminophen (TYLENOL) 325 mg tablet Take 2 Tabs by mouth every six (6) hours. Qty: 30 Tab, Refills: 0      clotrimazole (LOTRIMIN) 1 % topical cream Apply  to affected area two (2) times a day. Qty: 15 g, Refills: 0      lidocaine 4 % patch Back pain  Qty: 30 Package, Refills: 0      polyethylene glycol (MIRALAX) 17 gram packet Take 1 Packet by mouth daily as needed for Constipation. Qty: 30 Packet, Refills: 0      metFORMIN ER (GLUCOPHAGE XR) 750 mg tablet Take 2 Tabs by mouth daily.   Qty: 30 Tab, Refills: 0             Current Facility-Administered Medications   Medication Dose Route Frequency    cholestyramine-aspartame (QUESTRAN LIGHT) packet 4 g  4 g Oral TID WITH MEALS    cefTRIAXone (ROCEPHIN) 1 g in sterile water (preservative free) 10 mL IV syringe  1 g IntraVENous Q24H    metroNIDAZOLE (FLAGYL) tablet 500 mg  500 mg Oral Q8H    cholecalciferol (VITAMIN D3) (1000 Units /25 mcg) tablet 1,000 Units  1,000 Units Oral DAILY    acetaminophen (TYLENOL) tablet 650 mg  650 mg Oral Q6H PRN    Or    acetaminophen (TYLENOL) suppository 650 mg  650 mg Rectal Q6H PRN    naproxen (NAPROSYN) tablet 500 mg  500 mg Oral BID PRN    diclofenac (VOLTAREN) 1 % topical gel 2 g  2 g Topical QID PRN    ELECTROLYTE REPLACEMENT PROTOCOL - Potassium   1 Each Other PRN    ELECTROLYTE REPLACEMENT PROTOCOL - Magnesium  1 Each Other PRN    insulin lispro (HUMALOG) injection   SubCUTAneous AC&HS    glucose chewable tablet 16 g  16 g Oral PRN    glucagon (GLUCAGEN) injection 1 mg  1 mg IntraMUSCular PRN    dextrose (D50W) injection syrg 12.5-25 g  25-50 mL IntraVENous PRN    sodium chloride (NS) flush 5-40 mL 5-40 mL IntraVENous Q8H    sodium chloride (NS) flush 5-40 mL  5-40 mL IntraVENous PRN    folic acid (FOLVITE) tablet 1 mg  1 mg Oral DAILY    thiamine HCL (B-1) tablet 100 mg  100 mg Oral DAILY    therapeutic multivitamin (THERAGRAN) tablet 1 Tablet  1 Tablet Oral DAILY       Allergies: Patient has no known allergies. Temp (24hrs), Av.6 °F (37 °C), Min:98.2 °F (36.8 °C), Max:99.4 °F (37.4 °C)    Visit Vitals  BP (!) 167/93 (BP 1 Location: Right upper arm, BP Patient Position: At rest)   Pulse 91   Temp 98.4 °F (36.9 °C)   Resp 18   Ht 6' 2\" (1.88 m)   Wt 83.5 kg (184 lb 1.4 oz)   SpO2 98%   BMI 23.64 kg/m²       ROS: 12 point ROS obtained in details. Pertinent positives as mentioned in HPI,   otherwise negative    Physical Exam:    General:  AAOx3, NAD  HEENT: Conjunctiva pink, sclera anicteric. PERRL. EOMI. Pharynx moist, nonerythematous. Moist mucous membranes. Thyroid not enlarged, no nodules. lymphadenopathy. No other gross abnormalities present. CV:  RRR, no rubs or galops appreciated. No visible pulsations or thrills. RESP:  Unlabored breathing. Lungs clear to auscultation without adventitious breath sounds. Equal expansion bilaterally. ABD:  Soft, nontender, nondistended. BS (+). No hepatosplenomegaly. No suprapubic tenderness. MS:  No joint deformity or instability. No atrophy. Neuro:  CN II-XII grossly intact. 5/5 strength bilateral upper extremities and lower extremities. Ext:  No edema. 2+ radial and dp pulses bilaterally. Skin:  No rashes, lesions, or ulcers. Good turgor.     Labs: Results:   Chemistry Recent Labs     21  0041 21  0242 21  0438   * 113* 111*    138 138   K 3.7 4.0 4.2    103 105   CO2 32 29 26   BUN 4* 5* 6*   CREA 0.62 0.62 0.56*   CA 8.3* 8.2* 8.2*   AGAP 5 6 7   BUCR 6* 8* 11*   * 228* 199*   TP 6.0* 6.0* 5.4*   ALB 1.8* 1.8* 1.7*   GLOB 4.2* 4.2* 3.7   AGRAT 0.4* 0.4* 0.5*      CBC w/Diff Recent Labs 07/03/21  0041 07/02/21  0242 07/01/21  1720   WBC 15.0* 14.4* 14.5*   RBC 2.51* 2.66* 2.41*   HGB 8.7* 9.2* 8.5*   HCT 27.0* 29.3* 26.2*    189 150   GRANS 76* 79* 75*   LYMPH 12* 13* 14*   EOS 0 1 1      Microbiology Recent Labs     07/01/21  0910   CULT NO NORMAL RESPIRATORY TOM ISOLATED          RADIOLOGY:    All available imaging studies/reports in [facility] for this admission were reviewed  High complexity decision making was performed during the evaluation of this patient at high risk for decompensation with multiple organ involvement         Disclaimer: Sections of this note are dictated utilizing voice recognition software, which may have resulted in some phonetic based errors in grammar and contents. Even though attempts were made to correct all the mistakes, some may have been missed, and remained in the body of the document. If questions arise, please contact our department.     Dr. Washington Payne, Infectious Disease Specialist  [phone]  July 3, 2021  12:56 PM

## 2021-07-03 NOTE — PROGRESS NOTES
[facility]  Intern Progress Note    Patient: Jonathan Bella MRN: [id number]   SSN: [id number]  YOB: 1960   Age: 64 y.o. Sex: male      Admit Date: 6/25/2021    LOS: 8 days   Chief Complaint   Patient presents with    Extremity Weakness       Subjective:   Patient seen at bedside this morning, was awake and cooperative, and no longer agitated. No significant overnight events. Patient did not express any changes in bowel movements. Continuing to appreciate ID med recommendations. Started on ceftriaxone and metronidazole. Procal of .29. No si    Review of Systems   Constitutional: Negative for chills and fever. Respiratory: negative for cough. Negative for shortness of breath.    Cardiovascular: Negative for chest pain. Genitourinary: Positive for hematuria. Musculoskeletal: Negative for back pain and joint pain. Bruising on back of the right shoulder and upper back form a previous fall. Neurological: Positive for weakness. Negative for headaches.     Objective:     Visit Vitals  BP (!) 167/93 (BP 1 Location: Right upper arm, BP Patient Position: At rest)   Pulse 91   Temp 98.4 °F (36.9 °C)   Resp 18   Ht 6' 2\" (1.88 m)   Wt 83.5 kg (184 lb 1.4 oz)   SpO2 98%   BMI 23.64 kg/m²       Physical Exam  Constitutional:       General: not in acute distress.     Appearance: not toxic-appearing. Cardiovascular:      Rate and Rhythm: Normal rate and regular rhythm.      Pulses: Normal pulses.      Heart sounds: Normal heart sounds. No murmur heard. No friction rub. No gallop.    Pulmonary:      Effort: Pulmonary effort is normal. No respiratory distress.      Breath sounds: was not able to auscultate from the back due to pt feeling too weak to sit up. Abdominal:      General: Abdomen is flat. Bowel sounds are normal. There is no distension.      Palpations: Abdomen is soft.      Tenderness: There is no abdominal tenderness. There is no guarding or rebound.    Musculoskeletal:    Right lower leg: No edema.      Left lower leg: no edema.      Comments:  there was eccymosis. Skin:     Capillary Refill: Capillary refill takes less than 2 seconds.      Comments: Diffuse ecchymosis on back and extremities. Bilateral Onychomycosis and excoriations in both feet. Nails trimmed by staff.   Neurological:      Mental Status: He is alert and oriented to person, place, and time.       Intake and Output:  Current Shift: No intake/output data recorded. Last three shifts: 06/29 1901 - 07/01 0700  In: -   Out: 775 [Urine:775]            Intake and Output:  Current Shift: 07/03 0701 - 07/03 1900  In: 120 [P.O.:120]  Out: -   Last three shifts: 07/01 1901 - 07/03 0700  In: -   Out: 1350 [GOJZW:5556]    Lab/Data Review:  Recent Results (from the past 12 hour(s))   METABOLIC PANEL, COMPREHENSIVE    Collection Time: 07/03/21 12:41 AM   Result Value Ref Range    Sodium 138 136 - 145 mmol/L    Potassium 3.7 3.5 - 5.5 mmol/L    Chloride 101 100 - 111 mmol/L    CO2 32 21 - 32 mmol/L    Anion gap 5 3.0 - 18 mmol/L    Glucose 128 (H) 74 - 99 mg/dL    BUN 4 (L) 7.0 - 18 MG/DL    Creatinine 0.62 0.6 - 1.3 MG/DL    BUN/Creatinine ratio 6 (L) 12 - 20      GFR est AA >60 >60 ml/min/1.73m2    GFR est non-AA >60 >60 ml/min/1.73m2    Calcium 8.3 (L) 8.5 - 10.1 MG/DL    Bilirubin, total 2.8 (H) 0.2 - 1.0 MG/DL    ALT (SGPT) 42 16 - 61 U/L    AST (SGOT) 109 (H) 10 - 38 U/L    Alk.  phosphatase 202 (H) 45 - 117 U/L    Protein, total 6.0 (L) 6.4 - 8.2 g/dL    Albumin 1.8 (L) 3.4 - 5.0 g/dL    Globulin 4.2 (H) 2.0 - 4.0 g/dL    A-G Ratio 0.4 (L) 0.8 - 1.7     CBC WITH AUTOMATED DIFF    Collection Time: 07/03/21 12:41 AM   Result Value Ref Range    WBC 15.0 (H) 4.6 - 13.2 K/uL    RBC 2.51 (L) 4.35 - 5.65 M/uL    HGB 8.7 (L) 13.0 - 16.0 g/dL    HCT 27.0 (L) 36.0 - 48.0 %    .6 (H) 74.0 - 97.0 FL    MCH 34.7 (H) 24.0 - 34.0 PG    MCHC 32.2 31.0 - 37.0 g/dL    RDW 18.0 (H) 11.6 - 14.5 %    PLATELET 198 623 - 006 K/uL MPV 10.2 9.2 - 11.8 FL    NEUTROPHILS 76 (H) 40 - 73 %    LYMPHOCYTES 12 (L) 21 - 52 %    MONOCYTES 9 3 - 10 %    EOSINOPHILS 0 0 - 5 %    BASOPHILS 1 0 - 2 %    ABS. NEUTROPHILS 11.5 (H) 1.8 - 8.0 K/UL    ABS. LYMPHOCYTES 1.8 0.9 - 3.6 K/UL    ABS. MONOCYTES 1.3 (H) 0.05 - 1.2 K/UL    ABS. EOSINOPHILS 0.1 0.0 - 0.4 K/UL    ABS. BASOPHILS 0.1 0.0 - 0.1 K/UL    DF AUTOMATED     MAGNESIUM    Collection Time: 07/03/21 12:41 AM   Result Value Ref Range    Magnesium 1.6 1.6 - 2.6 mg/dL   PHOSPHORUS    Collection Time: 07/03/21 12:41 AM   Result Value Ref Range    Phosphorus 3.3 2.5 - 4.9 MG/DL   GLUCOSE, POC    Collection Time: 07/03/21  6:06 AM   Result Value Ref Range    Glucose (POC) 129 (H) 70 - 110 mg/dL           Assessment and Plan:     Mr. Eugene Dalal is a 64 y. o. male with Past Medical History Notable for Type 2 Diabetes, Hypertension, Hyperlipidemia and a history of an L3 Compression Fracture in March 2020, now presenting with complaint of increased weakness and unsteadiness on his feet in the setting of meeting sepsis criteria and an GAIL.      : On admission, WBC 13.1, Heart Rates in the 90s, soft BPs ~80s/50s, lactate 5. 89. Received fluid recussitation and broad spectrum antibiotics. Lactic acid has improved. Vital signs currently stable. WBC count is increasing since 6/27/21 - currently today at 14.4. UA was positive for nitrites. CXR showed mild left lower lobe infiltrate, concerning for possible aspiration PNA. 1/2 bottles of blood cultures growing gram positive rods (Bacillus). CXR (6/29): interval progression of b/l opacities @ lung bases  -  blood cultures - no growth in 6 days.     urine Cultures - no growth  - Continue Levaquin  - FU repeat Blood cultures  - fall precautions  - aspiration precautions  - SLP consult, appreciate recs  - oral care  - repeat CXR (q 2-3 days)  -ID consult with Dr. Lynnette Mckenzie for recs     Acute/subacute on chronic L3 burst Fracture: MRI Lumbar Spine (3/2/2020) with subacute appearing superior endplate fracture of the L3 vertebral body, approximately 30% height loss. On admission, denied any numbness, tingling, or pain is his back     MRI Lumbar Spine (6/28/2021) Multilevel mild degenerative changes without high-grade spinal canal or foraminal stenosis. Acute/subacute on chronic L3 superior endplate burst type fracture.  -Pain Control with Tylenol PRN  - Ortho spine consulted - recommends conservative management with progressive mobilization.   - FU ortho outpatient     Chromic Alcohol use disorder: Patient endorsed consuming drinks 6-pack of beer and 4-5 mixed rum drinks per day. , ALT 35 on admission (>2:1 ratio suggestive of alcoholic disease). Serum Ethanol <3, Ammonia 40 on admission. CIWA scores to date have been 2-3. Has not required any Ativan. Acute Hepatitis panel negative  - Continue CIWA protocol   - Continue Thiamine 866 mg and Folic Acid 1 mg   - Fall Precautions  -  Hep C, HIV, RPR  - all negative     Delirium: Multifactorial in the setting of chronic alcohol abuse, sepsis and history of falls.      CT scan of head on 6/30 -   performed for altered mental status  -no acute intracranial abnormality     Generalized weakness, physicial deconditioning, impaired ADLs and iADLs, Hx of falls: multifactorial in the setting of malnutrition and chronic alcohol abuse, sepsis. XR of left lower extremity and left ankle given history of fall as well as hematoma in LLE (6/28) No acute fracture, Moderate circumferential soft tissue swelling around the ankle.   - fall precautions  - PT/OT consult, appreciate recs     Severe Protein calorie malnutrition, Vitamin D deficiency: Vitamin D levels 10.5  - nutrition consulted, appreciate recs  - start Ensure Enlive TID meal supplements  - continue multivitamins and thiamine 100mg daily supplements  - monitor electrolytes, replete as per protocol prn  - receiving Vitamin D3 supplements 1000units daily     Acute Kidney Injury (resolved): Cr 2.33 on admission (Baseline Cre 0.6 - 0.8). Cr 0.59 this morning. Likely Pre-renal due to dehydration in the setting of Alcohol Use Disorder. Has a better PO intake this morning.  - Avoid nephrotoxic meds  - Monitor Creatinine with Daily CMP     Macrocytic Anemia and Thrombocytopenia- Likely due to malnourishment in the setting of acute alcohol disorder. Hemoglobin stable, no active bleeding. MCV is 112 at admission.  B12/folate WNL. Iron profile labs consistent with anemia of chronic disease.  - continue to monitor indices with daily CBC   - FOBT     Hypokalemia (resolved):  - continue to monitor on daily BMP     Hx of Hypertension: Initially hypotensive at admission but BPs stable, WNL since admission Normotensive overnight. Previously on Lisinopril 10 mg every day, but patient denies taking this medication. - consider restarting Lisinopril if the patient's Blood Pressure increases     Type 2 Diabetes: Last A1c (06/24/2020)  5.8. Diet controlled   - POC glucose ACHS  - SSI  - hypoglycemic precautions     Right shoulder pain (resolved): Complaint at admission with full ROM, no tenderness on exam. Patient reports being pulled by right arm from his porch on day of admission. XR R.shoulder (6/28) No definite evidence of acute fracture,  No significant glenohumeral or acromioclavicular osteoarthritis. Suspected right infraspinatus calcific tendinitis versus subacromial/subdeltoid calcific bursitis. - Tylenol PRN      Onychomycosis: Treated at bedside by podiatry  -  Podiatry consulted, appreciate recs    Bowel Movement changes :   -patient presents with 5 bouts of looser than baseline stools, patient has been taking levofolxacin since 6/25, was added back on zosyn yesterday and had a dietary change as per nutritionist - from ensure to magic cup - nutritional supplement.  Will re assess during rounds for better idea.     Global care:   - CM consult, appreciate recs for discharge planning  Diet Regular   DVT Prophylaxis SQH   GI Prophylaxis Protonix   Code status DNR/DNI      Point of Contact Angela Junior  Relationship: Brother  [phone]       Mahsa Johnson MD, PGY-1   [address] Medicine   Intern Pager: [phone]   July 3, 2021, 8:15 AM

## 2021-07-03 NOTE — ROUTINE PROCESS
Bedside shift change report given to Lissette Rodgers (oncoming nurse) by Myla Barnes (offgoing nurse). Report included the following information SBAR, Procedure Summary, MAR and Recent Results.

## 2021-07-04 ENCOUNTER — APPOINTMENT (OUTPATIENT)
Dept: CT IMAGING | Age: 61
DRG: 871 | End: 2021-07-04
Attending: INTERNAL MEDICINE
Payer: MEDICARE

## 2021-07-04 LAB
ALBUMIN SERPL-MCNC: 1.9 G/DL (ref 3.4–5)
ALBUMIN/GLOB SERPL: 0.5 {RATIO} (ref 0.8–1.7)
ALP SERPL-CCNC: 185 U/L (ref 45–117)
ALT SERPL-CCNC: 27 U/L (ref 16–61)
ANION GAP SERPL CALC-SCNC: 3 MMOL/L (ref 3–18)
AST SERPL-CCNC: 68 U/L (ref 10–38)
BACTERIA SPEC CULT: NORMAL
BASOPHILS # BLD: 0.1 K/UL (ref 0–0.1)
BASOPHILS NFR BLD: 0 % (ref 0–2)
BILIRUB SERPL-MCNC: 3.3 MG/DL (ref 0.2–1)
BUN SERPL-MCNC: 3 MG/DL (ref 7–18)
BUN/CREAT SERPL: 5 (ref 12–20)
CALCIUM SERPL-MCNC: 8.3 MG/DL (ref 8.5–10.1)
CHLORIDE SERPL-SCNC: 102 MMOL/L (ref 100–111)
CO2 SERPL-SCNC: 31 MMOL/L (ref 21–32)
CREAT SERPL-MCNC: 0.56 MG/DL (ref 0.6–1.3)
DIFFERENTIAL METHOD BLD: ABNORMAL
EOSINOPHIL # BLD: 0.1 K/UL (ref 0–0.4)
EOSINOPHIL NFR BLD: 0 % (ref 0–5)
ERYTHROCYTE [DISTWIDTH] IN BLOOD BY AUTOMATED COUNT: 18 % (ref 11.6–14.5)
GLOBULIN SER CALC-MCNC: 3.8 G/DL (ref 2–4)
GLUCOSE BLD STRIP.AUTO-MCNC: 104 MG/DL (ref 70–110)
GLUCOSE BLD STRIP.AUTO-MCNC: 113 MG/DL (ref 70–110)
GLUCOSE BLD STRIP.AUTO-MCNC: 119 MG/DL (ref 70–110)
GLUCOSE BLD STRIP.AUTO-MCNC: 99 MG/DL (ref 70–110)
GLUCOSE SERPL-MCNC: 100 MG/DL (ref 74–99)
HCT VFR BLD AUTO: 28.7 % (ref 36–48)
HGB BLD-MCNC: 9.2 G/DL (ref 13–16)
LYMPHOCYTES # BLD: 1.8 K/UL (ref 0.9–3.6)
LYMPHOCYTES NFR BLD: 11 % (ref 21–52)
MAGNESIUM SERPL-MCNC: 1.7 MG/DL (ref 1.6–2.6)
MCH RBC QN AUTO: 35 PG (ref 24–34)
MCHC RBC AUTO-ENTMCNC: 32.1 G/DL (ref 31–37)
MCV RBC AUTO: 109.1 FL (ref 74–97)
MONOCYTES # BLD: 1.2 K/UL (ref 0.05–1.2)
MONOCYTES NFR BLD: 7 % (ref 3–10)
NEUTS SEG # BLD: 13.3 K/UL (ref 1.8–8)
NEUTS SEG NFR BLD: 80 % (ref 40–73)
PHOSPHATE SERPL-MCNC: 3.3 MG/DL (ref 2.5–4.9)
PLATELET # BLD AUTO: 207 K/UL (ref 135–420)
PMV BLD AUTO: 10.7 FL (ref 9.2–11.8)
POTASSIUM SERPL-SCNC: 4 MMOL/L (ref 3.5–5.5)
PROCALCITONIN SERPL-MCNC: 0.3 NG/ML
PROT SERPL-MCNC: 5.7 G/DL (ref 6.4–8.2)
RBC # BLD AUTO: 2.63 M/UL (ref 4.35–5.65)
SERVICE CMNT-IMP: NORMAL
SODIUM SERPL-SCNC: 136 MMOL/L (ref 136–145)
WBC # BLD AUTO: 16.6 K/UL (ref 4.6–13.2)

## 2021-07-04 PROCEDURE — 71260 CT THORAX DX C+: CPT

## 2021-07-04 PROCEDURE — 84100 ASSAY OF PHOSPHORUS: CPT

## 2021-07-04 PROCEDURE — 80053 COMPREHEN METABOLIC PANEL: CPT

## 2021-07-04 PROCEDURE — 36415 COLL VENOUS BLD VENIPUNCTURE: CPT

## 2021-07-04 PROCEDURE — 85025 COMPLETE CBC W/AUTO DIFF WBC: CPT

## 2021-07-04 PROCEDURE — 82962 GLUCOSE BLOOD TEST: CPT

## 2021-07-04 PROCEDURE — 74011250637 HC RX REV CODE- 250/637: Performed by: STUDENT IN AN ORGANIZED HEALTH CARE EDUCATION/TRAINING PROGRAM

## 2021-07-04 PROCEDURE — 70486 CT MAXILLOFACIAL W/O DYE: CPT

## 2021-07-04 PROCEDURE — 74011000250 HC RX REV CODE- 250: Performed by: INTERNAL MEDICINE

## 2021-07-04 PROCEDURE — 65660000000 HC RM CCU STEPDOWN

## 2021-07-04 PROCEDURE — 83735 ASSAY OF MAGNESIUM: CPT

## 2021-07-04 PROCEDURE — 74011250636 HC RX REV CODE- 250/636: Performed by: INTERNAL MEDICINE

## 2021-07-04 PROCEDURE — 74011250637 HC RX REV CODE- 250/637: Performed by: INTERNAL MEDICINE

## 2021-07-04 PROCEDURE — 84145 PROCALCITONIN (PCT): CPT

## 2021-07-04 PROCEDURE — 74011000636 HC RX REV CODE- 636: Performed by: FAMILY MEDICINE

## 2021-07-04 PROCEDURE — 74011250637 HC RX REV CODE- 250/637: Performed by: FAMILY MEDICINE

## 2021-07-04 RX ADMIN — METRONIDAZOLE 500 MG: 500 TABLET ORAL at 22:43

## 2021-07-04 RX ADMIN — THERA TABS 1 TABLET: TAB at 08:28

## 2021-07-04 RX ADMIN — CHOLESTYRAMINE 4 G: 4 POWDER, FOR SUSPENSION ORAL at 11:44

## 2021-07-04 RX ADMIN — CHOLESTYRAMINE 4 G: 4 POWDER, FOR SUSPENSION ORAL at 18:14

## 2021-07-04 RX ADMIN — METRONIDAZOLE 500 MG: 500 TABLET ORAL at 18:14

## 2021-07-04 RX ADMIN — Medication 100 MG: at 08:28

## 2021-07-04 RX ADMIN — CHOLESTYRAMINE 4 G: 4 POWDER, FOR SUSPENSION ORAL at 08:27

## 2021-07-04 RX ADMIN — IOPAMIDOL 100 ML: 612 INJECTION, SOLUTION INTRAVENOUS at 10:44

## 2021-07-04 RX ADMIN — WATER 1 G: 1 INJECTION INTRAMUSCULAR; INTRAVENOUS; SUBCUTANEOUS at 11:44

## 2021-07-04 RX ADMIN — Medication 10 ML: at 05:42

## 2021-07-04 RX ADMIN — CHOLECALCIFEROL TAB 25 MCG (1000 UNIT) 1000 UNITS: 25 TAB at 08:28

## 2021-07-04 RX ADMIN — Medication 10 ML: at 22:41

## 2021-07-04 RX ADMIN — FOLIC ACID 1 MG: 1 TABLET ORAL at 08:27

## 2021-07-04 RX ADMIN — METRONIDAZOLE 500 MG: 500 TABLET ORAL at 05:42

## 2021-07-04 NOTE — ROUTINE PROCESS
Bedside shift change report given to Nkechi Avila RN (oncoming nurse) by Nat Perez RN (offgoing nurse).  Report included the following information SBAR, Kardex, MAR and Cardiac Rhythm ST.

## 2021-07-04 NOTE — PROGRESS NOTES
Infectious Disease progress Note        Reason: Worsening leukocytosis, sepsis/pneumonia    Current abx Prior abx     Ceftriaxone, metronidazole since 7/2 Piperacillin/tazobactam 6/26-6/28  Vancomycin 6/26-6/28  Piperacillin/tazobactam  7/2  Levofloxacin 6/26-7/1     Lines:       Assessment :     64 y.o. male with Past Medical History Notable for Type 2 Diabetes, Hypertension, Hyperlipidemia and a history of an L3 Compression Fracture in March 2020 presented to [facility] on 6/25/2021 with complaint of increased weakness and unsteadiness on his feet. Leukocytosis, elevated lactate on admission    Clinical presentation  suggestive of probable sepsis-present on admission due to aspiration pneumonia    Leukocytosis/elevated lactate on admission could have been due to hemoconcentration, volume depletion too    Acute kidney injury-likely due to volume depletion from poor p.o. intake- improved    Chest x-ray reveals bibasilar abnormalities, patchy right-sided opacity suggestive of aspiration pneumonia    Persistent leukocytosis despite antibiotic change on 7/2 no clinical evidence of new infection. ?drug induced ?occult infection/inflammation. CT scan ordered yesterday not done since patient refused  No diarrhea/no abdominal tenderness  Procalcitonin 0.29 on 7/2, 0.3 today argues against bacterial sepsis  Subjective sense of improvement  Patient is now agreeable to get CT scan    Recommendations:    1. Continue ceftriaxone, metronidazole  2. Obtain CT maxillofacial, CT chest/abdomen/pelvis to evaluate for occult infection  3. Monitor CBC, clinically  4. Will make final decisions regarding antibiotic duration/discontinuation based on above test results, clinical course    I am away 7/6-7/9. Dr. Malcolm Reyes will be covering for me during that time. She can be reached at [phone]. Please call me if any questions in the interim.     Above plan was discussed in details with patient, RN, primary team. Please call me if any further questions or concerns. Will continue to participate in the care of this patient. HPI:   Feels good. Patient denies any subjective fever or chills. Currently denies any increasing chest pain, shortness of breath. No diarrhea. He denies any burning while urinating, flank pain, abdominal pain.         Home Medication List    Details   lisinopriL (PRINIVIL, ZESTRIL) 10 mg tablet Take 1 Tab by mouth daily. Qty: 30 Tab, Refills: 0      atorvastatin (LIPITOR) 10 mg tablet Take 1 Tab by mouth daily. Qty: 30 Tab, Refills: 0      acetaminophen (TYLENOL) 325 mg tablet Take 2 Tabs by mouth every six (6) hours. Qty: 30 Tab, Refills: 0      clotrimazole (LOTRIMIN) 1 % topical cream Apply  to affected area two (2) times a day. Qty: 15 g, Refills: 0      lidocaine 4 % patch Back pain  Qty: 30 Package, Refills: 0      polyethylene glycol (MIRALAX) 17 gram packet Take 1 Packet by mouth daily as needed for Constipation. Qty: 30 Packet, Refills: 0      metFORMIN ER (GLUCOPHAGE XR) 750 mg tablet Take 2 Tabs by mouth daily.   Qty: 30 Tab, Refills: 0             Current Facility-Administered Medications   Medication Dose Route Frequency    cholestyramine-aspartame (QUESTRAN LIGHT) packet 4 g  4 g Oral TID WITH MEALS    cefTRIAXone (ROCEPHIN) 1 g in sterile water (preservative free) 10 mL IV syringe  1 g IntraVENous Q24H    metroNIDAZOLE (FLAGYL) tablet 500 mg  500 mg Oral Q8H    cholecalciferol (VITAMIN D3) (1000 Units /25 mcg) tablet 1,000 Units  1,000 Units Oral DAILY    acetaminophen (TYLENOL) tablet 650 mg  650 mg Oral Q6H PRN    Or    acetaminophen (TYLENOL) suppository 650 mg  650 mg Rectal Q6H PRN    naproxen (NAPROSYN) tablet 500 mg  500 mg Oral BID PRN    diclofenac (VOLTAREN) 1 % topical gel 2 g  2 g Topical QID PRN    ELECTROLYTE REPLACEMENT PROTOCOL - Potassium   1 Each Other PRN    ELECTROLYTE REPLACEMENT PROTOCOL - Magnesium  1 Each Other PRN    insulin lispro (HUMALOG) injection SubCUTAneous AC&HS    glucose chewable tablet 16 g  16 g Oral PRN    glucagon (GLUCAGEN) injection 1 mg  1 mg IntraMUSCular PRN    dextrose (D50W) injection syrg 12.5-25 g  25-50 mL IntraVENous PRN    sodium chloride (NS) flush 5-40 mL  5-40 mL IntraVENous Q8H    sodium chloride (NS) flush 5-40 mL  5-40 mL IntraVENous PRN    folic acid (FOLVITE) tablet 1 mg  1 mg Oral DAILY    thiamine HCL (B-1) tablet 100 mg  100 mg Oral DAILY    therapeutic multivitamin (THERAGRAN) tablet 1 Tablet  1 Tablet Oral DAILY       Allergies: Patient has no known allergies. Temp (24hrs), Av.2 °F (36.8 °C), Min:97.8 °F (36.6 °C), Max:99 °F (37.2 °C)    Visit Vitals  BP (!) 149/81   Pulse 93   Temp 97.8 °F (36.6 °C)   Resp 19   Ht 6' 2\" (1.88 m)   Wt 83.5 kg (184 lb 1.4 oz)   SpO2 96%   BMI 23.64 kg/m²       ROS: 12 point ROS obtained in details. Pertinent positives as mentioned in HPI,   otherwise negative    Physical Exam:    General:  AAOx3, NAD  HEENT: Conjunctiva pink, sclera anicteric. PERRL. EOMI. Pharynx moist, nonerythematous. Moist mucous membranes. Thyroid not enlarged, no nodules. lymphadenopathy. No other gross abnormalities present. CV:  RRR, no rubs or galops appreciated. No visible pulsations or thrills. RESP:  Unlabored breathing. Lungs clear to auscultation without adventitious breath sounds. Equal expansion bilaterally. ABD:  Soft, nontender, nondistended. BS (+). No hepatosplenomegaly. No suprapubic tenderness. MS:  No joint deformity or instability. No atrophy. Neuro:  CN II-XII grossly intact. 5/5 strength bilateral upper extremities and lower extremities. Ext:  No edema. 2+ radial and dp pulses bilaterally. Skin:  No rashes, lesions, or ulcers. Good turgor.     Labs: Results:   Chemistry Recent Labs     21  0240 21  0041 21  0242   * 128* 113*    138 138   K 4.0 3.7 4.0    101 103   CO2 31 32 29   BUN 3* 4* 5*   CREA 0.56* 0.62 0.62   CA 8.3* 8. 3* 8.2*   AGAP 3 5 6   BUCR 5* 6* 8*   * 202* 228*   TP 5.7* 6.0* 6.0*   ALB 1.9* 1.8* 1.8*   GLOB 3.8 4.2* 4.2*   AGRAT 0.5* 0.4* 0.4*      CBC w/Diff Recent Labs     07/04/21  0240 07/03/21  0041 07/02/21  0242   WBC 16.6* 15.0* 14.4*   RBC 2.63* 2.51* 2.66*   HGB 9.2* 8.7* 9.2*   HCT 28.7* 27.0* 29.3*    183 189   GRANS 80* 76* 79*   LYMPH 11* 12* 13*   EOS 0 0 1      Microbiology Recent Labs     07/01/21  0910   CULT RARE GRAM NEGATIVE RODS*  LIGHT YEAST, (APPARENT CANDIDA ALBICANS)*  RARE NORMAL RESPIRATORY TOM          RADIOLOGY:    All available imaging studies/reports in [facility] care for this admission were reviewed  High complexity decision making was performed during the evaluation of this patient at high risk for decompensation with multiple organ involvement         Disclaimer: Sections of this note are dictated utilizing voice recognition software, which may have resulted in some phonetic based errors in grammar and contents. Even though attempts were made to correct all the mistakes, some may have been missed, and remained in the body of the document. If questions arise, please contact our department.     Dr. Mervin Valdez, Infectious Disease Specialist  [phone]  July 4, 2021  12:56 PM

## 2021-07-04 NOTE — PROGRESS NOTES
Problem: Falls - Risk of  Goal: *Absence of Falls  Description: Document Chayo Patton Fall Risk and appropriate interventions in the flowsheet. Outcome: Progressing Towards Goal  Note: Fall Risk Interventions:  Mobility Interventions: Bed/chair exit alarm, Communicate number of staff needed for ambulation/transfer, Utilize walker, cane, or other assistive device    Mentation Interventions: Bed/chair exit alarm, Door open when patient unattended, More frequent rounding, Reorient patient, Room close to nurse's station    Medication Interventions: Teach patient to arise slowly, Bed/chair exit alarm    Elimination Interventions: Toileting schedule/hourly rounds, Toilet paper/wipes in reach, Patient to call for help with toileting needs, Call light in reach, Bed/chair exit alarm    History of Falls Interventions: Room close to nurse's station, Door open when patient unattended, Bed/chair exit alarm         Problem: Pain  Goal: *Control of Pain  Outcome: Progressing Towards Goal     Problem: Pressure Injury - Risk of  Goal: *Prevention of pressure injury  Description: Document Kahlil Scale and appropriate interventions in the flowsheet. Outcome: Progressing Towards Goal  Note: Pressure Injury Interventions:  Sensory Interventions: Assess changes in LOC, Avoid rigorous massage over bony prominences, Check visual cues for pain, Minimize linen layers, Pad between skin to skin    Moisture Interventions: Absorbent underpads, Apply protective barrier, creams and emollients, Check for incontinence Q2 hours and as needed, Internal/External urinary devices    Activity Interventions: Increase time out of bed, Pressure redistribution bed/mattress(bed type)    Mobility Interventions: Assess need for specialty bed, Pressure redistribution bed/mattress (bed type), Turn and reposition approx.  every two hours(pillow and wedges)    Nutrition Interventions: Document food/fluid/supplement intake, Offer support with meals,snacks and hydration    Friction and Shear Interventions: Apply protective barrier, creams and emollients, Foam dressings/transparent film/skin sealants, Minimize layers                Problem: Pressure Injury - Risk of  Goal: *Prevention of pressure injury  Description: Document Kahlil Scale and appropriate interventions in the flowsheet. Outcome: Progressing Towards Goal  Note: Pressure Injury Interventions:  Sensory Interventions: Assess changes in LOC, Avoid rigorous massage over bony prominences, Check visual cues for pain, Minimize linen layers, Pad between skin to skin    Moisture Interventions: Absorbent underpads, Apply protective barrier, creams and emollients, Check for incontinence Q2 hours and as needed, Internal/External urinary devices    Activity Interventions: Increase time out of bed, Pressure redistribution bed/mattress(bed type)    Mobility Interventions: Assess need for specialty bed, Pressure redistribution bed/mattress (bed type), Turn and reposition approx.  every two hours(pillow and wedges)    Nutrition Interventions: Document food/fluid/supplement intake, Offer support with meals,snacks and hydration    Friction and Shear Interventions: Apply protective barrier, creams and emollients, Foam dressings/transparent film/skin sealants, Minimize layers
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
## 2021-07-04 NOTE — ROUTINE PROCESS
Bedside shift change report given to [address] (oncoming nurse) by Dano Yu (offgoing nurse). Report included the following information SBAR, Procedure Summary, MAR and Recent Results.

## 2021-07-05 LAB
ALBUMIN SERPL-MCNC: 1.9 G/DL (ref 3.4–5)
ALBUMIN/GLOB SERPL: 0.5 {RATIO} (ref 0.8–1.7)
ALP SERPL-CCNC: 180 U/L (ref 45–117)
ALT SERPL-CCNC: 27 U/L (ref 16–61)
ANION GAP SERPL CALC-SCNC: 3 MMOL/L (ref 3–18)
AST SERPL-CCNC: 61 U/L (ref 10–38)
BACTERIA SPEC CULT: ABNORMAL
BASOPHILS # BLD: 0.1 K/UL (ref 0–0.1)
BASOPHILS NFR BLD: 1 % (ref 0–2)
BILIRUB SERPL-MCNC: 3.1 MG/DL (ref 0.2–1)
BUN SERPL-MCNC: 3 MG/DL (ref 7–18)
BUN/CREAT SERPL: 5 (ref 12–20)
CALCIUM SERPL-MCNC: 8.4 MG/DL (ref 8.5–10.1)
CHLORIDE SERPL-SCNC: 102 MMOL/L (ref 100–111)
CO2 SERPL-SCNC: 30 MMOL/L (ref 21–32)
CREAT SERPL-MCNC: 0.61 MG/DL (ref 0.6–1.3)
DIFFERENTIAL METHOD BLD: ABNORMAL
EOSINOPHIL # BLD: 0 K/UL (ref 0–0.4)
EOSINOPHIL NFR BLD: 0 % (ref 0–5)
ERYTHROCYTE [DISTWIDTH] IN BLOOD BY AUTOMATED COUNT: 18 % (ref 11.6–14.5)
GLOBULIN SER CALC-MCNC: 4.2 G/DL (ref 2–4)
GLUCOSE BLD STRIP.AUTO-MCNC: 101 MG/DL (ref 70–110)
GLUCOSE BLD STRIP.AUTO-MCNC: 108 MG/DL (ref 70–110)
GLUCOSE BLD STRIP.AUTO-MCNC: 109 MG/DL (ref 70–110)
GLUCOSE BLD STRIP.AUTO-MCNC: 115 MG/DL (ref 70–110)
GLUCOSE SERPL-MCNC: 139 MG/DL (ref 74–99)
GRAM STN SPEC: ABNORMAL
HCT VFR BLD AUTO: 28.5 % (ref 36–48)
HGB BLD-MCNC: 8.9 G/DL (ref 13–16)
LYMPHOCYTES # BLD: 1.8 K/UL (ref 0.9–3.6)
LYMPHOCYTES NFR BLD: 11 % (ref 21–52)
MAGNESIUM SERPL-MCNC: 1.5 MG/DL (ref 1.6–2.6)
MCH RBC QN AUTO: 34.1 PG (ref 24–34)
MCHC RBC AUTO-ENTMCNC: 31.2 G/DL (ref 31–37)
MCV RBC AUTO: 109.2 FL (ref 74–97)
MONOCYTES # BLD: 1.2 K/UL (ref 0.05–1.2)
MONOCYTES NFR BLD: 7 % (ref 3–10)
NEUTS SEG # BLD: 13.1 K/UL (ref 1.8–8)
NEUTS SEG NFR BLD: 80 % (ref 40–73)
PHOSPHATE SERPL-MCNC: 2.8 MG/DL (ref 2.5–4.9)
PLATELET # BLD AUTO: 188 K/UL (ref 135–420)
PMV BLD AUTO: 10.6 FL (ref 9.2–11.8)
POTASSIUM SERPL-SCNC: 4.1 MMOL/L (ref 3.5–5.5)
PROT SERPL-MCNC: 6.1 G/DL (ref 6.4–8.2)
RBC # BLD AUTO: 2.61 M/UL (ref 4.35–5.65)
SERVICE CMNT-IMP: ABNORMAL
SODIUM SERPL-SCNC: 135 MMOL/L (ref 136–145)
WBC # BLD AUTO: 16.5 K/UL (ref 4.6–13.2)

## 2021-07-05 PROCEDURE — 83735 ASSAY OF MAGNESIUM: CPT

## 2021-07-05 PROCEDURE — 74011250637 HC RX REV CODE- 250/637: Performed by: STUDENT IN AN ORGANIZED HEALTH CARE EDUCATION/TRAINING PROGRAM

## 2021-07-05 PROCEDURE — 80053 COMPREHEN METABOLIC PANEL: CPT

## 2021-07-05 PROCEDURE — 36415 COLL VENOUS BLD VENIPUNCTURE: CPT

## 2021-07-05 PROCEDURE — 84100 ASSAY OF PHOSPHORUS: CPT

## 2021-07-05 PROCEDURE — 82962 GLUCOSE BLOOD TEST: CPT

## 2021-07-05 PROCEDURE — 74011250637 HC RX REV CODE- 250/637: Performed by: INTERNAL MEDICINE

## 2021-07-05 PROCEDURE — 85025 COMPLETE CBC W/AUTO DIFF WBC: CPT

## 2021-07-05 PROCEDURE — 74011250637 HC RX REV CODE- 250/637: Performed by: FAMILY MEDICINE

## 2021-07-05 PROCEDURE — 65660000000 HC RM CCU STEPDOWN

## 2021-07-05 PROCEDURE — 77030040831 HC BAG URINE DRNG MDII -A

## 2021-07-05 PROCEDURE — 2709999900 HC NON-CHARGEABLE SUPPLY

## 2021-07-05 RX ADMIN — FOLIC ACID 1 MG: 1 TABLET ORAL at 08:07

## 2021-07-05 RX ADMIN — Medication 10 ML: at 22:13

## 2021-07-05 RX ADMIN — Medication 100 MG: at 08:07

## 2021-07-05 RX ADMIN — Medication 10 ML: at 15:50

## 2021-07-05 RX ADMIN — CHOLECALCIFEROL TAB 25 MCG (1000 UNIT) 1000 UNITS: 25 TAB at 08:07

## 2021-07-05 RX ADMIN — CHOLESTYRAMINE 4 G: 4 POWDER, FOR SUSPENSION ORAL at 08:07

## 2021-07-05 RX ADMIN — METRONIDAZOLE 500 MG: 500 TABLET ORAL at 06:32

## 2021-07-05 RX ADMIN — Medication 10 ML: at 06:33

## 2021-07-05 RX ADMIN — CHOLESTYRAMINE 4 G: 4 POWDER, FOR SUSPENSION ORAL at 12:19

## 2021-07-05 RX ADMIN — THERA TABS 1 TABLET: TAB at 08:07

## 2021-07-05 RX ADMIN — CHOLESTYRAMINE 4 G: 4 POWDER, FOR SUSPENSION ORAL at 17:16
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
## 2021-07-05 NOTE — PROGRESS NOTES
Interim events noted. Afebrile. Hemodynamically stable. Labs reviewed. Wbc:16k  Ct c/a/p, ct maxillofacial- no signs of new infection   Imp-  Aspiration pneumonia   -persistent leukocytosis could be due to non infectious etiology   Rec-  - d/c ceftriaxone, flagyl . monitor off abx  - d/c planning per primary team  D/w dr Marek Bañuelos will be covering for me 7/6-7/9. Please call her if any new question s or concerns.    Thanks

## 2021-07-05 NOTE — PROGRESS NOTES
1915: Assumed patient care from [facility]. Patient is alert and oriented to person,but disoriented to place, time and situation. Respiratory status is stable on room air. Vital signs are stable. MEWS score is a one. Patient denies any pain, discomfort, nausea vomiting dizziness or anxiety. White board and fall card is updated. Bed is locked and in lowest position. Call bell, water and personal belongings are within reach. Patient has no questions, comments or concerns after bedside shift report. 0700: Patient had an uneventful shift. Respiratory status, vital signs and MEWS score remained stable. Patient was resting quietly with no signs of distress noted. Bed locked and in lowest position. Call bell water and personal belongings were within reach. Patient had no questions, comments or concerns after bedside shift report.  Bedside report given to [facility].

## 2021-07-05 NOTE — PROGRESS NOTES
Pt not seen for skilled PT due to:    []  Nausea/vomiting  []  Eating  []  Pain  []  Pt lethargic  []  Off Unit  Other: Per nurse pt has been agitated this morning and requested to try back later in the day. Will f/u later as schedule allows. Thank you.   Thomasine Goldmann, PT, DPT
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
## 2021-07-06 VITALS
DIASTOLIC BLOOD PRESSURE: 81 MMHG | BODY MASS INDEX: 22.33 KG/M2 | WEIGHT: 174 LBS | RESPIRATION RATE: 20 BRPM | SYSTOLIC BLOOD PRESSURE: 130 MMHG | HEIGHT: 74 IN | HEART RATE: 101 BPM | TEMPERATURE: 98.5 F | OXYGEN SATURATION: 95 %

## 2021-07-06 LAB
ALBUMIN SERPL-MCNC: 1.9 G/DL (ref 3.4–5)
ALBUMIN/GLOB SERPL: 0.4 {RATIO} (ref 0.8–1.7)
ALP SERPL-CCNC: 161 U/L (ref 45–117)
ALT SERPL-CCNC: 23 U/L (ref 16–61)
ANION GAP SERPL CALC-SCNC: 3 MMOL/L (ref 3–18)
AST SERPL-CCNC: 55 U/L (ref 10–38)
BASOPHILS # BLD: 0.2 K/UL (ref 0–0.1)
BASOPHILS NFR BLD: 1 % (ref 0–2)
BILIRUB SERPL-MCNC: 3.1 MG/DL (ref 0.2–1)
BUN SERPL-MCNC: 4 MG/DL (ref 7–18)
BUN/CREAT SERPL: 8 (ref 12–20)
CALCIUM SERPL-MCNC: 8.6 MG/DL (ref 8.5–10.1)
CHLORIDE SERPL-SCNC: 102 MMOL/L (ref 100–111)
CO2 SERPL-SCNC: 30 MMOL/L (ref 21–32)
CREAT SERPL-MCNC: 0.49 MG/DL (ref 0.6–1.3)
CRP SERPL-MCNC: 7.3 MG/DL (ref 0–0.3)
DIFFERENTIAL METHOD BLD: ABNORMAL
EOSINOPHIL # BLD: 0 K/UL (ref 0–0.4)
EOSINOPHIL NFR BLD: 0 % (ref 0–5)
ERYTHROCYTE [DISTWIDTH] IN BLOOD BY AUTOMATED COUNT: 17.9 % (ref 11.6–14.5)
ERYTHROCYTE [SEDIMENTATION RATE] IN BLOOD: 61 MM/HR (ref 0–20)
GLOBULIN SER CALC-MCNC: 4.3 G/DL (ref 2–4)
GLUCOSE BLD STRIP.AUTO-MCNC: 117 MG/DL (ref 70–110)
GLUCOSE BLD STRIP.AUTO-MCNC: 123 MG/DL (ref 70–110)
GLUCOSE BLD STRIP.AUTO-MCNC: 99 MG/DL (ref 70–110)
GLUCOSE SERPL-MCNC: 85 MG/DL (ref 74–99)
HCT VFR BLD AUTO: 28.1 % (ref 36–48)
HGB BLD-MCNC: 8.8 G/DL (ref 13–16)
LYMPHOCYTES # BLD: 2.2 K/UL (ref 0.9–3.6)
LYMPHOCYTES NFR BLD: 12 % (ref 21–52)
MAGNESIUM SERPL-MCNC: 1.6 MG/DL (ref 1.6–2.6)
MCH RBC QN AUTO: 34.8 PG (ref 24–34)
MCHC RBC AUTO-ENTMCNC: 31.3 G/DL (ref 31–37)
MCV RBC AUTO: 111.1 FL (ref 74–97)
MONOCYTES # BLD: 1.4 K/UL (ref 0.05–1.2)
MONOCYTES NFR BLD: 8 % (ref 3–10)
NEUTS SEG # BLD: 14.2 K/UL (ref 1.8–8)
NEUTS SEG NFR BLD: 79 % (ref 40–73)
PHOSPHATE SERPL-MCNC: 2.9 MG/DL (ref 2.5–4.9)
PLATELET # BLD AUTO: 202 K/UL (ref 135–420)
PLATELET COMMENTS,PCOM: ABNORMAL
PMV BLD AUTO: 10.5 FL (ref 9.2–11.8)
POTASSIUM SERPL-SCNC: 4 MMOL/L (ref 3.5–5.5)
PROT SERPL-MCNC: 6.2 G/DL (ref 6.4–8.2)
RBC # BLD AUTO: 2.53 M/UL (ref 4.35–5.65)
RBC MORPH BLD: ABNORMAL
SODIUM SERPL-SCNC: 135 MMOL/L (ref 136–145)
WBC # BLD AUTO: 18 K/UL (ref 4.6–13.2)

## 2021-07-06 PROCEDURE — 74011250637 HC RX REV CODE- 250/637: Performed by: INTERNAL MEDICINE

## 2021-07-06 PROCEDURE — 85025 COMPLETE CBC W/AUTO DIFF WBC: CPT

## 2021-07-06 PROCEDURE — 97116 GAIT TRAINING THERAPY: CPT

## 2021-07-06 PROCEDURE — 86140 C-REACTIVE PROTEIN: CPT

## 2021-07-06 PROCEDURE — 97110 THERAPEUTIC EXERCISES: CPT

## 2021-07-06 PROCEDURE — 97164 PT RE-EVAL EST PLAN CARE: CPT

## 2021-07-06 PROCEDURE — 2709999900 HC NON-CHARGEABLE SUPPLY

## 2021-07-06 PROCEDURE — 82962 GLUCOSE BLOOD TEST: CPT

## 2021-07-06 PROCEDURE — 74011250637 HC RX REV CODE- 250/637: Performed by: FAMILY MEDICINE

## 2021-07-06 PROCEDURE — 97168 OT RE-EVAL EST PLAN CARE: CPT

## 2021-07-06 PROCEDURE — 97535 SELF CARE MNGMENT TRAINING: CPT

## 2021-07-06 PROCEDURE — 84100 ASSAY OF PHOSPHORUS: CPT

## 2021-07-06 PROCEDURE — 80053 COMPREHEN METABOLIC PANEL: CPT

## 2021-07-06 PROCEDURE — 74011250637 HC RX REV CODE- 250/637: Performed by: STUDENT IN AN ORGANIZED HEALTH CARE EDUCATION/TRAINING PROGRAM

## 2021-07-06 PROCEDURE — 85652 RBC SED RATE AUTOMATED: CPT

## 2021-07-06 PROCEDURE — 83735 ASSAY OF MAGNESIUM: CPT

## 2021-07-06 PROCEDURE — 36415 COLL VENOUS BLD VENIPUNCTURE: CPT

## 2021-07-06 RX ORDER — LIDOCAINE 4 G/100G
PATCH TOPICAL
Qty: 30 PACKAGE | Refills: 0 | Status: SHIPPED | OUTPATIENT
Start: 2021-07-06 | End: 2021-07-25

## 2021-07-06 RX ORDER — THERA TABS 400 MCG
1 TAB ORAL DAILY
Qty: 30 TABLET | Refills: 0 | Status: SHIPPED | OUTPATIENT
Start: 2021-07-07 | End: 2021-07-25

## 2021-07-06 RX ORDER — LANOLIN ALCOHOL/MO/W.PET/CERES
100 CREAM (GRAM) TOPICAL DAILY
Qty: 30 TABLET | Refills: 0 | Status: SHIPPED | OUTPATIENT
Start: 2021-07-07 | End: 2021-07-25

## 2021-07-06 RX ORDER — FOLIC ACID 1 MG/1
1 TABLET ORAL DAILY
Qty: 30 TABLET | Refills: 0 | Status: SHIPPED | OUTPATIENT
Start: 2021-07-07 | End: 2021-07-25

## 2021-07-06 RX ORDER — ATORVASTATIN CALCIUM 10 MG/1
10 TABLET, FILM COATED ORAL DAILY
Qty: 30 TABLET | Refills: 0 | Status: SHIPPED | OUTPATIENT
Start: 2021-07-06 | End: 2021-07-25

## 2021-07-06 RX ORDER — POLYETHYLENE GLYCOL 3350 17 G/17G
17 POWDER, FOR SOLUTION ORAL
Qty: 30 PACKET | Refills: 0 | Status: SHIPPED | OUTPATIENT
Start: 2021-07-06 | End: 2021-07-25

## 2021-07-06 RX ORDER — MELATONIN
1000 DAILY
Qty: 30 TABLET | Refills: 0 | Status: SHIPPED | OUTPATIENT
Start: 2021-07-07 | End: 2021-07-25

## 2021-07-06 RX ADMIN — THERA TABS 1 TABLET: TAB at 09:42

## 2021-07-06 RX ADMIN — CHOLESTYRAMINE 4 G: 4 POWDER, FOR SUSPENSION ORAL at 09:41

## 2021-07-06 RX ADMIN — CHOLESTYRAMINE 4 G: 4 POWDER, FOR SUSPENSION ORAL at 11:39

## 2021-07-06 RX ADMIN — CHOLECALCIFEROL TAB 25 MCG (1000 UNIT) 1000 UNITS: 25 TAB at 09:42

## 2021-07-06 RX ADMIN — Medication 10 ML: at 05:47

## 2021-07-06 RX ADMIN — FOLIC ACID 1 MG: 1 TABLET ORAL at 09:42

## 2021-07-06 RX ADMIN — Medication 100 MG: at 09:42

## 2021-07-06 NOTE — PROGRESS NOTES
Problem: Mobility Impaired (Adult and Pediatric)  Goal: *Acute Goals and Plan of Care (Insert Text)  Description: Physical Therapy Goals  Initiated 6/27/2021, re-evaled on 7/6/21 and to be accomplished within 7 day(s)  1. Patient will move from supine to sit and sit to supine , scoot up and down, and roll side to side in bed with modified independence. 2.  Patient will transfer from bed to chair and chair to bed with supervision/set-up using the least restrictive device. 3.  Patient will perform sit to stand with supervision/set-up. 4.  Patient will ambulate with supervision/set-up for 50 feet with the least restrictive device. 5.  Patient will ascend/descend stairs pending progress and necessity for discharge    PLOF: pt reports he was ind PTA, per chart review pt was living in boarding house with bedroom on second floor     Outcome: Progressing Towards Goal     PHYSICAL THERAPY RE-EVALUATION    Patient: Deni Troncoso (28 y.o. male)  Date: 7/6/2021  Primary Diagnosis: Sepsis ([address] [A41.9]  Chronic malnutrition ([address] [E46]  Alcoholism ([address] [F10.20]  GAIL (acute kidney injury) ([address] [N17.9]  Alcohol abuse [F10.10]        Precautions:  Fall, Skin, Aspiration    ASSESSMENT :  Based on the objective data described below, the patient presents with generalized weakness, decreased functional mobility tolerance, dec endurance. Pt found semi-reclined in bed, in NAD, wiling to work with PT. Sup-sit with SBA and additional time. Pt found to have large BM, stating he cannot feel when he has to go. Pt stood with modA and RW, able to stand for dependent pericare. Once finished, pt amb 10 ft of side steps along EOB. Pt sitting for exercises per flow sheet. X1 more round of STS, 15 ft of side steps and exercises. Pt returned back semi-reclined in bed with call bell and all needs met. Patient will benefit from skilled intervention to address the above impairments.   Patient's rehabilitation potential is considered to be Fair  Factors which may influence rehabilitation potential include:   []         None noted  [x]         Mental ability/status  [x]         Medical condition  [x]         Home/family situation and support systems  [x]         Safety awareness  []         Pain tolerance/management  []         Other:      PLAN :  Recommendations and Planned Interventions:   [x]           Bed Mobility Training             [x]    Neuromuscular Re-Education  [x]           Transfer Training                   []    Orthotic/Prosthetic Training  [x]           Gait Training                          []    Modalities  [x]           Therapeutic Exercises           []    Edema Management/Control  [x]           Therapeutic Activities            [x]    Family Training/Education  [x]           Patient Education  []           Other (comment):    Frequency/Duration: Patient will be followed by physical therapy 1-2 times per day/4-7 days per week to address goals. Discharge Recommendations: Rehab  Further Equipment Recommendations for Discharge: N/A     SUBJECTIVE:   Patient stated those people opened my blinds at 4 am.    OBJECTIVE DATA SUMMARY:   Hospital course since last seen and reason for re-evaluation: Pt has slowly progressed with mobility, able to take side steps with RW  No past medical history on file. No past surgical history on file. Barriers to Learning/Limitations: None  Compensate with: N/A  Home Situation:   Home Situation  Home Environment: Shelter  One/Two Story Residence: One story  Living Alone: No  Support Systems: None  Patient Expects to be Discharged to[de-identified] Unknown  Current DME Used/Available at Home: None  Critical Behavior:  Neurologic State: Alert  Orientation Level: Oriented to person;Oriented to place;Oriented to situation  Cognition: Follows commands  Safety/Judgement: Fall prevention  Psychosocial  Patient Behaviors: Calm; Cooperative  Purposeful Interaction: Yes  Pt Identified Daily Priority: Clinical issues (comment)  Neil Process: Nurture loving kindness; Attend basic human needs; Teaching/learning;Create healing environment  Caring Interventions: Reassure; Therapeutic modalities  Reassure: Therapeutic listening;Prayer; Sit with patient  Therapeutic Modalities: Humor; Intentional therapeutic touch  Skin Condition/Temp: Warm;Dry     Skin Integrity: Excoriation  Skin Integumentary  Skin Color: Appropriate for ethnicity  Skin Condition/Temp: Warm;Dry  Skin Integrity: Excoriation  Turgor: Epidermis thin w/ loss of subcut tissue  Hair Growth: Present  Varicosities: Absent  Nails: Exceptions to WDL  Exceptions to WDL: Thick; Discolored     Strength:    Strength: Generally decreased, functional    Tone & Sensation:   Tone: Normal    Sensation: Intact    Range Of Motion:  AROM: Within functional limits    Functional Mobility:  Bed Mobility:     Supine to Sit: Stand-by assistance; Additional time  Sit to Supine: Stand-by assistance; Additional time  Scooting: Contact guard assistance  Transfers:  Sit to Stand: Moderate assistance  Stand to Sit: Contact guard assistance       Balance:   Sitting: Intact  Standing: Impaired; With support  Standing - Static: Good  Standing - Dynamic : Fair       Ambulation/Gait Training:  Distance (ft): 15 Feet (ft) (x2)  Assistive Device: Walker, rolling  Ambulation - Level of Assistance: Contact guard assistance        Gait Abnormalities: Decreased step clearance        Base of Support: Center of gravity altered     Speed/Sujata: Slow;Shuffled  Step Length: Right shortened;Left shortened    Therapeutic Exercises:   Pt performed exercises per flow sheet sitting on EOB      EXERCISE   Sets   Reps   Active Active Assist   Passive Self ROM   Comments   Ankle Pumps 2 20  [x] [] [] []    Quad Sets/Glut Sets   [] [] [] []    Hamstring Sets   [] [] [] []    Short Arc Quads   [] [] [] []    Heel Slides   [] [] [] []    Straight Leg Raises   [] [] [] []    Hip Abd/Add   [] [] [] []    Long Arc Quads 2 15 [x] [] [] []    Seated Marching 2 10 [x] [] [] []    Standing Marching   [] [] [] []       [] [] [] []       Pain:  Pain level pre-treatment: 0/10   Pain level post-treatment: 0/10   Pain Intervention(s) : Medication (see MAR); Rest, Ice, Repositioning   Response to intervention: Nurse notified, See doc flow    Activity Tolerance:   Pt tolerated mobility well today  Please refer to the flowsheet for vital signs taken during this treatment. After treatment:   []         Patient left in no apparent distress sitting up in chair  [x]         Patient left in no apparent distress in bed  [x]         Call bell left within reach  [x]         Nursing notified  []         Caregiver present  []         Bed alarm activated  []         SCDs applied    COMMUNICATION/EDUCATION:   [x]         Role of Physical Therapy in the acute care setting. [x]         Fall prevention education was provided and the patient/caregiver indicated understanding. [x]         Patient/family have participated as able in goal setting and plan of care. []         Patient/family agree to work toward stated goals and plan of care. []         Patient understands intent and goals of therapy, but is neutral about his/her participation. []         Patient is unable to participate in goal setting/plan of care: ongoing with therapy staff.  []         Other:     Thank you for this referral.  Sarai Benson   Time Calculation: 23 mins

## 2021-07-06 NOTE — PROGRESS NOTES
1915: Assumed patient care from [facility]. Patient is alert and oriented to person, place and time, but disoriented to situation. Respiratory status is stable on 2L via nasal cannula. Vital signs are stable. MEWS score is a one. Patient denies any pain, discomfort, nausea vomiting dizziness or anxiety. White board and fall card is updated. Bed is locked and in lowest position. Call bell, water and personal belongings are within reach. Patient has no questions, comments or concerns after bedside shift report. 0700: Patient had an uneventful shift. Respiratory status, vital signs and MEWS score remained stable. Patient was resting quietly with no signs of distress noted. Bed locked and in lowest position. Call bell water and personal belongings were within reach. Patient had no questions, comments or concerns after bedside shift report.  Bedside report given to [facility] BENJI

## 2021-07-06 NOTE — DISCHARGE INSTRUCTIONS
Patient Education        Learning About Alcohol Use Disorder  What is alcohol use disorder? Alcohol use disorder means that a person drinks alcohol even though it causes harm to themselves or others. It can range from mild to severe. The more signs of this disorder you have, the more severe it may be. Moderate to severe alcohol use disorder is sometimes called addiction. People who have it may find it hard to control their use of alcohol. People who have this disorder may argue with others about how much they're drinking. Their job may be affected because of drinking. They may drink when it's dangerous or illegal, such as when they drive. They also may have a strong need, or craving, to drink. They may feel like they must drink just to get by. Their drinking may increase their risk of getting hurt or being in a car crash. Over time, drinking too much alcohol may cause health problems. These may include high blood pressure, liver problems, or problems with digestion. What are the signs? Maybe you've wondered about your alcohol habits, or how to tell if your drinking is becoming a problem. Here are some of the signs of alcohol use disorder. You may have it if you have two or more of the following signs:  · You drink larger amounts of alcohol than you ever meant to. Or you've been drinking for a longer time than you ever meant to. · You can't cut down or control your use. Or you constantly wish you could cut down. · You spend a lot of time getting or drinking alcohol or recovering from its effects. · You have strong cravings for alcohol. · You can no longer do your main jobs at work, at school, or at home. · You keep drinking alcohol, even though your use hurts your relationships. · You have stopped doing important activities because of your alcohol use. · You drink alcohol in situations where doing so is dangerous. · You keep drinking alcohol even though you know it's causing health problems.   · You need more and more alcohol to get the same effect, or you get less effect from the same amount over time. This is called tolerance. · You have uncomfortable symptoms when you stop drinking alcohol or use less. This is called withdrawal.  Alcohol use disorder can range from mild to severe. The more signs you have, the more severe the disorder may be. Moderate to severe alcohol use disorder is sometimes called addiction. You might not realize that your drinking is a problem. You might not drink large amounts when you drink. Or you might go for days or weeks between drinking episodes. But even if you don't drink very often, your drinking could still be harmful and put you at risk. How is alcohol use disorder treated? Getting help is up to you. But you don't have to do it alone. There are many people and kinds of treatments that can help. Treatment for alcohol use disorder can include:  · Group therapy, one or more types of counseling, and alcohol education. · Medicines that help to:  ? Reduce withdrawal symptoms and help you safely stop drinking. ? Reduce cravings for alcohol. · Support groups. These groups include Alcoholics Anonymous and RxResults (Self-Management and Recovery Training). Some people are able to stop or cut back on drinking with help from a counselor. People who have moderate to severe alcohol use disorder may need medical treatment. They may need to stay in a hospital or treatment center. You may have a treatment team to help you. This team may include a psychologist or psychiatrist, counselors, doctors, social workers, nurses, and a . A  helps plan and manage your treatment. Follow-up care is a key part of your treatment and safety. Be sure to make and go to all appointments, and call your doctor if you are having problems. It's also a good idea to know your test results and keep a list of the medicines you take. Where can you learn more?   Go to http://www.gray.com/  Enter [id number] in the search box to learn more about \"Learning About Alcohol Use Disorder. \"  Current as of: June 29, 2020               Content Version: 12.8  © 2006-2021 Healthwise, [facility]. Care instructions adapted under license by [employer] ( [employer] ) (which disclaims liability or warranty for this information). If you have questions about a medical condition or this instruction, always ask your healthcare professional. [place] [address] any warranty or liability for your use of this information.

## 2021-07-06 NOTE — PROGRESS NOTES
Called [employer] to check on when authorization expires. Patient was not discharged on Saturday as planned. Authorization expires today. If patient does not discharge today a new authorization will need to be started tomorrow 7/8/21. Needing new PT/OT notes due to the holiday weekend.       Maximiliano Perea RN BSN  Care Manager  [phone]

## 2021-07-06 NOTE — PROGRESS NOTES
Transition of Care Plan to SNF/Rehab    SNF/Rehab Transition:  Patient has been accepted to [facility] and meets criteria for admission. Patient will  be transported by [facility] and expected to leave at 6:30pm.    Communication to Patient/Family:  Met with patient and brother (identified care giver) and they are agreeable to the transition plan. Communication to SNF/Rehab:  Bedside RN,, has been notified of the transition plan to the facility and to call report (phone number [phone]). Discharge information has been updated on the AVS. And communicated to facility via [facility], or CC link. SNF/Rehab Transition:    PCP/Specialist:     Nursing Please include all hard scripts for controlled substances, med rec and dc summary, and AVS in packet. Reviewed and confirmed with facility representative, Jameel Montgomery   at [facility] they can manage the patient care needs for the following:     Beni Diver with (X) only those applicable:    COVID Status  Patient has had the Covid vaccine No .    Medication:  [x]  Medications will be available at the facility  []  IV Antibiotics   []  Controlled Substance - hard copy to be sent with patient   []  Weekly Labs    Documents:  [] Hard RX  Number sent   [] MAR  [] Kardex  [] AVS  [] Wound care note  [x]Transfer Summary/Discharge Summary    Equipment:  []  CPAP/BiPAP  []  Wound Vacuum  []  Veliz or Urinary Device  []  PICC/Central Line  []  Nebulizer  []  Ventilator    Treatment:  []Isolation (for MRSA, VRE, etc.)  []Surgical Drain Management  []Tracheostomy Care  []Dressing Changes  []Dialysis with transportation and chair time  Center Mode of tranpsort []PEG Care  []Oxygen  []Daily Weights for Heart Failure    Dietary:  []Any diet limitations  []Tube Feedings   []Total Parenteral Management (TPN)    Eligible for Medicaid Long Term Services and Supports  Yes:  [] Eligible for medical assistance or will become eligible within 180 days and LTSS completed.    [] Provider/Patient and/or support system has requested screening. [x] LTSS copy provided to patient or responsible party, .  [] LTSS unavailable at discharge will send once processed to SNF provider.  [] LTSS  unavailable at discharged mailed to patient  [] LTSS performed by outside agency  on  with tracking number   No:   [] Private pay and is not financially eligible for Medicaid within the next 180 days. [] Reside out-of-state.   [] A residents of a state owned/operated facility that is licensed  by [facility] and [facility] or [facility]  [] Enrollment in [facility] hospice services  [] [address] [place] [address]  [] Patient /Family declines to have screening completed or provide financial information for screening          Financial Resources:  Medicaid    [] Initiated and application pending   [] Full coverage      Advanced Care Plan:  []Surrogate Decision Maker of Care  []POA  []Communicated Code Status/ sent  (DDNR, POST, Advance Directive)     Other  Henny Campbell RN BSN  Care Manager  [phone]

## 2021-07-06 NOTE — PROGRESS NOTES
[facility]  Intern Progress Note    Patient: Mely Mulligan MRN: [id number]   SSN: [id number]  YOB: 1960   Age: 64 y.o. Sex: male      Admit Date: 6/25/2021    LOS: 11 days   Chief Complaint   Patient presents with    Extremity Weakness       Subjective:     Patient was seen at bedside this morning   patient was asleep upon encounter.       Review of Systems   Constitutional: Negative.    HENT: Negative.    Eyes: Negative.    Respiratory: Negative.    Cardiovascular: Negative.    Gastrointestinal: Negative.    Genitourinary: Negative.    Skin: Negative.           Objective:     Visit Vitals  /75 (BP 1 Location: Left upper arm)   Pulse 93   Temp 98.1 °F (36.7 °C)   Resp 20   Ht 6' 2\" (1.88 m)   Wt 78.9 kg (174 lb)   SpO2 98%   BMI 22.34 kg/m²       Physical Exam:   General: Patient was nonresponsive  HEENT: Conjunctiva pink, sclera anicteric. PERRL. EOMI   CV:  RRR, no M/G/R  RESP: Unlabored breathing. Lungs CTAB, no wheezes, rales or rhonchi appreciated. Equal expansion bilaterally. ABD:  Soft, nontender, nondistended. No hepatosplenomegaly. MS:  No joint deformity or instability. No atrophy. Ext:  No edema.  2+ radial and dp pulses bilaterally. Skin: Warm & dry. No rashes, lesions, or ulcers.  Good turgor. Psych: patient was asleep upon encounter    Intake and Output:  Current Shift: No intake/output data recorded.   Last three shifts: 07/04 1901 - 07/06 0700  In: 480 [P.O.:480]  Out: 1000 [Urine:1000]    Lab/Data Review:  Recent Results (from the past 12 hour(s))   METABOLIC PANEL, COMPREHENSIVE    Collection Time: 07/06/21  2:39 AM   Result Value Ref Range    Sodium 135 (L) 136 - 145 mmol/L    Potassium 4.0 3.5 - 5.5 mmol/L    Chloride 102 100 - 111 mmol/L    CO2 30 21 - 32 mmol/L    Anion gap 3 3.0 - 18 mmol/L    Glucose 85 74 - 99 mg/dL    BUN 4 (L) 7.0 - 18 MG/DL    Creatinine 0.49 (L) 0.6 - 1.3 MG/DL    BUN/Creatinine ratio 8 (L) 12 - 20      GFR est AA >60 >60 ml/min/1.73m2    GFR est non-AA >60 >60 ml/min/1.73m2    Calcium 8.6 8.5 - 10.1 MG/DL    Bilirubin, total 3.1 (H) 0.2 - 1.0 MG/DL    ALT (SGPT) 23 16 - 61 U/L    AST (SGOT) 55 (H) 10 - 38 U/L    Alk. phosphatase 161 (H) 45 - 117 U/L    Protein, total 6.2 (L) 6.4 - 8.2 g/dL    Albumin 1.9 (L) 3.4 - 5.0 g/dL    Globulin 4.3 (H) 2.0 - 4.0 g/dL    A-G Ratio 0.4 (L) 0.8 - 1.7     CBC WITH AUTOMATED DIFF    Collection Time: 07/06/21  2:39 AM   Result Value Ref Range    WBC 18.0 (H) 4.6 - 13.2 K/uL    RBC 2.53 (L) 4.35 - 5.65 M/uL    HGB 8.8 (L) 13.0 - 16.0 g/dL    HCT 28.1 (L) 36.0 - 48.0 %    .1 (H) 74.0 - 97.0 FL    MCH 34.8 (H) 24.0 - 34.0 PG    MCHC 31.3 31.0 - 37.0 g/dL    RDW 17.9 (H) 11.6 - 14.5 %    PLATELET 440 907 - 415 K/uL    MPV 10.5 9.2 - 11.8 FL    NEUTROPHILS 79 (H) 40 - 73 %    LYMPHOCYTES 12 (L) 21 - 52 %    MONOCYTES 8 3 - 10 %    EOSINOPHILS 0 0 - 5 %    BASOPHILS 1 0 - 2 %    ABS. NEUTROPHILS 14.2 (H) 1.8 - 8.0 K/UL    ABS. LYMPHOCYTES 2.2 0.9 - 3.6 K/UL    ABS. MONOCYTES 1.4 (H) 0.05 - 1.2 K/UL    ABS. EOSINOPHILS 0.0 0.0 - 0.4 K/UL    ABS. BASOPHILS 0.2 (H) 0.0 - 0.1 K/UL    DF SMEAR SCANNED      PLATELET COMMENTS ADEQUATE PLATELETS      RBC COMMENTS MACROCYTOSIS  1+        RBC COMMENTS POLYCHROMASIA  1+        RBC COMMENTS TARGET CELLS  1+       MAGNESIUM    Collection Time: 07/06/21  2:39 AM   Result Value Ref Range    Magnesium 1.6 1.6 - 2.6 mg/dL   PHOSPHORUS    Collection Time: 07/06/21  2:39 AM   Result Value Ref Range    Phosphorus 2.9 2.5 - 4.9 MG/DL   GLUCOSE, POC    Collection Time: 07/06/21  6:19 AM   Result Value Ref Range    Glucose (POC) 99 70 - 110 mg/dL           Assessment and Plan:     Mr. Eugene Dalal is a 64 y. o. male with Past Medical History Notable for Type 2 Diabetes, Hypertension, Hyperlipidemia and a history of an L3 Compression Fracture in March 2020, now presenting with complaint of increased weakness and unsteadiness on his feet in the setting of meeting sepsis criteria and an GAIL.      : On admission, WBC 13.1, Heart Rates in the 90s, soft BPs ~80s/50s, lactate 5. 89. Received fluid recussitation and broad spectrum antibiotics. Lactic acid has improved. Vital signs currently stable. WBC count is increasing since 6/27/21 - currently today at 18. UA was positive for nitrites. CXR showed mild left lower lobe infiltrate, concerning for possible aspiration PNA. 1/2 bottles of blood cultures growing gram positive rods (Bacillus). CXR (6/29): interval progression of b/l opacities @ lung bases  -  blood cultures - no growth in 6 days.    urine Cultures - no growth  - FU repeat Blood cultures  - fall precautions  - aspiration precautions  - SLP consult, appreciate recs  - oral care  - repeat CXR (q 2-3 days)  -ID consult with Dr. Fritz Castro for recs as WBC count continues to trend upwards. -  levaquin and flagyll discontinued as  per Dr. Fritz Castro  -WBC continues trending upwards (today it was at 18.)  -CT abd/ pelvis, maxillofacial completed on 7/4/21     Acute/subacute on chronic L3 burst Fracture: MRI Lumbar Spine (3/2/2020) with subacute appearing superior endplate fracture of the L3 vertebral body, approximately 30% height loss. On admission, denied any numbness, tingling, or pain is his back     MRI Lumbar Spine (6/28/2021) Multilevel mild degenerative changes without high-grade spinal canal or foraminal stenosis. Acute/subacute on chronic L3 superior endplate burst type fracture.  -Pain Control with Tylenol PRN  - Ortho spine consulted - recommends conservative management with progressive mobilization.   - FU ortho outpatient     Chromic Alcohol use disorder: Patient endorsed consuming drinks 6-pack of beer and 4-5 mixed rum drinks per day. , ALT 35 on admission (>2:1 ratio suggestive of alcoholic disease). Serum Ethanol <3, Ammonia 40 on admission. CIWA scores to date have been 2-3. Has not required any Ativan.  Acute Hepatitis panel negative  - Continue CIWA protocol   - Continue Thiamine 290 mg and Folic Acid 1 mg   - Fall Precautions  -  Hep C, HIV, RPR  - all negative     Delirium: Multifactorial in the setting of chronic alcohol abuse, sepsis and history of falls.         Generalized weakness, physicial deconditioning, impaired ADLs and iADLs, Hx of falls: multifactorial in the setting of malnutrition and chronic alcohol abuse, sepsis. XR of left lower extremity and left ankle given history of fall as well as hematoma in LLE (6/28) No acute fracture, Moderate circumferential soft tissue swelling around the ankle. - fall precautions  - PT/OT consult, appreciate recs     Severe Protein calorie malnutrition, Vitamin D deficiency:- nutrition consulted, appreciate recs  - start Ensure Enlive TID meal supplements  - continue multivitamins and thiamine 100mg daily supplements  - monitor electrolytes, replete as per protocol prn  - receiving Vitamin D3 supplements 1000units daily     Acute Kidney Injury (resolved): Cr 2.33 on admission (Baseline Cre 0.6 - 0.8). Cr 0.49 this morning. Likely Pre-renal due to dehydration in the setting of Alcohol Use Disorder.     Avoid nephrotoxic meds  - Monitor Creatinine with Daily CMP  - Nurse Rickie Go has placed a condom catheter on 7/5/21     Macrocytic Anemia and Thrombocytopenia- Likely due to malnourishment in the setting of acute alcohol disorder. Hemoglobin stable, no active bleeding. MCV is 112 at admission.  B12/folate WNL. Iron profile labs consistent with anemia of chronic disease.  - continue to monitor indices with daily CBC   - FOBT     Hypokalemia (resolved) - 4.0 today 7/4/21  - continue to monitor on daily BMP     Hx of Hypertension: Initially hypotensive at admission but BPs stable, WNL since admission Normotensive overnight. Previously on Lisinopril 10 mg every day, but patient denies taking this medication.    - consider restarting Lisinopril if the patient's Blood Pressure increases     Type 2 Diabetes: Last A1c (06/24/2020)  5.8. Diet controlled   - POC glucose ACHS  - SSI  - hypoglycemic precautions     Right shoulder pain (resolved): Complaint at admission with full ROM, no tenderness on exam. Patient reports being pulled by right arm from his porch on day of admission. XR R.shoulder (6/28) No definite evidence of acute fracture,  No significant glenohumeral or acromioclavicular osteoarthritis. Suspected right infraspinatus calcific tendinitis versus subacromial/subdeltoid calcific bursitis. - Tylenol PRN      Onychomycosis: Treated at bedside by podiatry  -  Podiatry consulted,     Imaging:   - 7/4/21 CT chest, abdo, pelvis with contrast.      Chest -                                       -Small bilateral pleural effusions                                      -left slightly greater than right                                      - lower lobe consolidation                                       -left slightly greater than right, likely atelectasis,                                       -underlying airspace disease also possible.                                        -There is additional regional micronodular airspace disease in the                          right lower lobe, suspicious for pneumonitis.                          -Small pericardial effusion, up to approximately 1 cm AP anteriorly.                          -Few scattered small subcentimeter short axis mediastinal/hilar lymph nodes without evidence of pathologic lymph node enlargement seen.    Abdomen and pelvis - 7/4/21   -Small volume of ascites                                  -Spleen is borderline enlarged, 13 cm in maximum craniocaudal                                            dimension on coronal reformation images.                                   -There is asymmetric small focal vaguely nodular increased soft                                               tissue density along the posterior left bladder wall                                      -Differential considerations could include bladder wall                                              neoplasm/nodule. In the context of gross hematuria blood clot could                                  have similar appearance, clinical correlation with urinalysis might be                              Helpful                          -CT maxillofacial 7/4/21                          - no acute sinusitis.                                      -Urology consult to determine bladder module   Global care:   - CM consult, appreciate recs for discharge planning  -Continue to f/u with CM re placement     Discussion with Case Management  For placement -   - could not be reached, will attempt to contact again and discuss.     Diet Regular   DVT Prophylaxis  held for fall risk. SCD ordered.     GI Prophylaxis Protonix   Code status DNR/DNI      Point of Contact Kelton Garcia  Relationship: Brother  [phone]   Capri Garcia MD, PGY-1   [address] Medicine   Intern Pager: [phone]   July 6, 2021, 9:06 AM

## 2021-07-06 NOTE — PROGRESS NOTES
Nutrition Note      Pt with therapy at time of visit. Eating 0-50% of meals per CNA report, partly due to filling up on desserts/ sweeter foods from meals and nutrition supplement; tolerating diet. Usually eating the sweet foods. Eats Magic Cup supplements. Did drink a few of Ensure drinks that he received from the previous week that he had saved in his room (had discontinued supplement due to pt previous complaint that they caused loose stools). Good po fluid intake. Discussed pt and po intake with Dr Dennis Jacinto earlier today; discussed consideration for NGT/ supplemental EN support versus continue to hold off and encourage po intake of meals and supplements, also adding Ensure Pudding. Per MD, will continue to hold off on NGT placement/ supplemental EN support (not medically appropriate) and encourage po intake of meals and supplements. BM 7/4. Slowly progressing towards nutrition goals.           Nutrition Recommendations/Plan:   - Modify supplements: add Ensure Pudding BID, continue Magic Cup TID  - Encourage/ monitor po intake of meals and supplements        Electronically signed by Tera Mathews RD on 7/6/2021 at 3:29 PM    Contact: [phone]

## 2021-07-06 NOTE — ROUTINE PROCESS
Bedside shift change report given to Jaime May RN (oncoming nurse) by Jayashree Anderson RN (offgoing nurse). Report included the following information SBAR, Kardex, MAR and Cardiac Rhythm NSR.

## 2021-07-07 NOTE — PROGRESS NOTES
Problem: Self Care Deficits Care Plan (Adult)  Goal: *Acute Goals and Plan of Care (Insert Text)  Description: Occupational Therapy Goals  Initiated 6/26/2021, re-evaluated on 7/6/2021 within 7 day(s). Goal 1 met and discontinued. Continue all other previously set goals until met consistently. 1.  Patient will perform perform self feeding with modified independence and efficiency-met. 2. Patient will demonstrate 4/5 UB strength (including hand intrinsics) in preparation for his efficient completion of his self care routine  3. Patient will perform grooming with modified independence progressing to EOB and standing at the sink as able  4. Patient will perform LB dressing including clothing management with supervision  5. Patient will perform toilet transfers with supervision. Prior Level of Function: he reports he lives in a boarding house with others and was independent with his self care. He is reporting it was becoming more difficult to walk up and down the stairs (his room is on the second floor) and to step over the bathtub to get into the shower. Outcome: Progressing Towards Goal   OCCUPATIONAL THERAPY RE-EVALUATION    Patient: Janell Smiley (26 y.o. male)  Date: 7/6/2021  Primary Diagnosis: Sepsis ([facility] [address] [A41.9]  Chronic malnutrition ([facility] [address] [E46]  Alcoholism ([facility] [address] [F10.20]  GAIL (acute kidney injury) ([facility] [address] [N17.9]  Alcohol abuse [F10.10]        Precautions:   Fall, Skin, Aspiration    ASSESSMENT :  Based on the objective data described below, the patient continues to present with decreased ADLs, decreased functional mobility and muscle weakness vs his PLOF. Encouragement needed for participation initially including explanation of difference between OT and PT. He verbalized understanding. Min to mod assist given for self care tasks and functional standing/transfers. Patient returned to supine in bed at end of session with all needs met. Continue to recommend SNF/LTC for safety.      Patient will benefit from skilled intervention to address the above impairments. Patient's rehabilitation potential is considered to be Fair  Factors which may influence rehabilitation potential include:   []             None noted  []             Mental ability/status  [x]             Medical condition  []             Home/family situation and support systems  []             Safety awareness  []             Pain tolerance/management  []             Other:      PLAN :  Recommendations and Planned Interventions:   [x]               Self Care Training                  [x]      Therapeutic Activities  [x]               Functional Mobility Training   []      Cognitive Retraining  [x]               Therapeutic Exercises           [x]      Endurance Activities  [x]               Balance Training                    []      Neuromuscular Re-Education  []               Visual/Perceptual Training     [x]      Home Safety Training  [x]               Patient Education                   [x]      Family Training/Education  []               Other (comment):    Frequency/Duration: Patient will be followed by occupational therapy 1-2 times per day/4-7 days per week to address goals. Discharge Recommendations: [facility] vs [facility]  Further Equipment Recommendations for Discharge: shower chair and rolling walker     SUBJECTIVE:   Patient stated I have no control; I didn't know that I pooped all over the bed.     OBJECTIVE DATA SUMMARY:   Hospital course since last seen and reason for reevaluation: Patient making slow progress toward goals and continues to need increased assist with self care tasks. No past medical history on file. No past surgical history on file.   Barriers to Learning/Limitations: None  Compensate with: visual, verbal, tactile, kinesthetic cues/model    Home Situation:   Home Situation  Home Environment: Shelter  One/Two Story Residence: One story  Living Alone: No  Support Systems: None  Patient Expects to be Discharged to[de-identified] Unknown  Current DME Used/Available at Home: None  [x]  Right hand dominant   []  Left hand dominant    Cognitive/Behavioral Status:  Neurologic State: Alert  Orientation Level: Oriented to person;Oriented to place;Oriented to situation  Cognition: Follows commands  Safety/Judgement: Fall prevention    Skin: Intact on UEs  Edema: None noted in UEs    Vision/Perceptual:    Acuity: Able to read clock/calendar on wall without difficulty      Coordination: BUE  Coordination: Generally decreased, functional  Fine Motor Skills-Upper: Left Intact; Right Intact    Gross Motor Skills-Upper: Left Intact; Right Intact    Balance:  Sitting: Intact  Standing: Impaired; With support  Standing - Static: Good  Standing - Dynamic : Fair    Strength: BUE  Strength: Generally decreased, functional (4/5 throughout)    Tone & Sensation: BUE  Tone: Normal  Sensation: Intact    Range of Motion: BUE  AROM: Within functional limits    Functional Mobility and Transfers for ADLs:  Bed Mobility:  Supine to Sit: Stand-by assistance; Additional time  Sit to Supine: Stand-by assistance; Additional time  Scooting: Contact guard assistance  Transfers:  Sit to Stand: Moderate assistance  Stand to Sit: Contact guard assistance   Toilet Transfer : Minimum assistance     ADL Assessment:   Feeding: Modified independent    Oral Facial Hygiene/Grooming: Setup    Bathing: Moderate assistance    Upper Body Dressing: Setup;Supervision    Lower Body Dressing: Moderate assistance    Toileting: Moderate assistance    ADL Intervention:  Patient with bowel accident in bed. He required mod assist for completion of bowel hygiene and removal of dirty pad. He was able to roll to his right side without assistance and bridge minimally to assist with cleaning.     Cognitive Retraining  Safety/Judgement: Fall prevention    Pain:  Pain level pre-treatment: 0/10   Pain level post-treatment: 0/10  Pain Intervention(s): NA  Response to intervention: NA    Activity Tolerance:   Good  Please refer to the flowsheet for vital signs taken during this treatment. After treatment:   [] Patient left in no apparent distress sitting up in chair  [x] Patient left in no apparent distress in bed  [x] Call bell left within reach  [x] Nursing notified  [] Caregiver present  [x] Bed alarm activated    COMMUNICATION/EDUCATION:   [x] Role of Occupational Therapy in the acute care setting  [x] Home safety education was provided and the patient/caregiver indicated understanding. [x] Patient/family have participated as able in goal setting and plan of care. [x] Patient/family agree to work toward stated goals and plan of care. [] Patient understands intent and goals of therapy, but is neutral about his/her participation. [] Patient is unable to participate in goal setting and plan of care.     Thank you for this referral.  Karthik Silva MS OTR/L   Time Calculation: 24 mins

## 2021-07-24 ENCOUNTER — HOSPITAL ENCOUNTER (EMERGENCY)
Age: 61
Discharge: HOME | End: 2021-07-24
Attending: EMERGENCY MEDICINE
Payer: MEDICARE

## 2021-07-24 VITALS
HEART RATE: 96 BPM | HEIGHT: 74 IN | RESPIRATION RATE: 26 BRPM | OXYGEN SATURATION: 95 % | WEIGHT: 174 LBS | TEMPERATURE: 98.5 F | DIASTOLIC BLOOD PRESSURE: 69 MMHG | BODY MASS INDEX: 22.33 KG/M2 | SYSTOLIC BLOOD PRESSURE: 133 MMHG

## 2021-07-24 DIAGNOSIS — N39.0 URINARY TRACT INFECTION WITHOUT HEMATURIA, SITE UNSPECIFIED: Primary | ICD-10-CM

## 2021-07-24 LAB
ALBUMIN SERPL-MCNC: 2.5 G/DL (ref 3.4–5)
ALBUMIN/GLOB SERPL: 0.4 {RATIO} (ref 0.8–1.7)
ALP SERPL-CCNC: 229 U/L (ref 45–117)
ALT SERPL-CCNC: 32 U/L (ref 16–61)
ANION GAP SERPL CALC-SCNC: 5 MMOL/L (ref 3–18)
APPEARANCE UR: CLEAR
AST SERPL-CCNC: 94 U/L (ref 10–38)
BACTERIA URNS QL MICRO: ABNORMAL /HPF
BASOPHILS # BLD: 0 K/UL (ref 0–0.1)
BASOPHILS NFR BLD: 0 % (ref 0–2)
BILIRUB SERPL-MCNC: 1.6 MG/DL (ref 0.2–1)
BILIRUB UR QL: ABNORMAL
BUN SERPL-MCNC: 8 MG/DL (ref 7–18)
BUN/CREAT SERPL: 7 (ref 12–20)
CALCIUM SERPL-MCNC: 9.5 MG/DL (ref 8.5–10.1)
CHLORIDE SERPL-SCNC: 103 MMOL/L (ref 100–111)
CO2 SERPL-SCNC: 29 MMOL/L (ref 21–32)
COLOR UR: ABNORMAL
CREAT SERPL-MCNC: 1.13 MG/DL (ref 0.6–1.3)
DIFFERENTIAL METHOD BLD: ABNORMAL
EOSINOPHIL # BLD: 0 K/UL (ref 0–0.4)
EOSINOPHIL NFR BLD: 0 % (ref 0–5)
EPITH CASTS URNS QL MICRO: ABNORMAL /LPF (ref 0–5)
ERYTHROCYTE [DISTWIDTH] IN BLOOD BY AUTOMATED COUNT: 13 % (ref 11.6–14.5)
ETHANOL SERPL-MCNC: 9 MG/DL (ref 0–3)
GLOBULIN SER CALC-MCNC: 6.2 G/DL (ref 2–4)
GLUCOSE SERPL-MCNC: 149 MG/DL (ref 74–99)
GLUCOSE UR STRIP.AUTO-MCNC: NEGATIVE MG/DL
HCT VFR BLD AUTO: 35.3 % (ref 36–48)
HGB BLD-MCNC: 11.3 G/DL (ref 13–16)
HGB UR QL STRIP: NEGATIVE
HYALINE CASTS URNS QL MICRO: ABNORMAL /LPF (ref 0–2)
KETONES UR QL STRIP.AUTO: NEGATIVE MG/DL
LEUKOCYTE ESTERASE UR QL STRIP.AUTO: ABNORMAL
LYMPHOCYTES # BLD: 2.9 K/UL (ref 0.9–3.6)
LYMPHOCYTES NFR BLD: 15 % (ref 21–52)
MCH RBC QN AUTO: 33.3 PG (ref 24–34)
MCHC RBC AUTO-ENTMCNC: 32 G/DL (ref 31–37)
MCV RBC AUTO: 104.1 FL (ref 74–97)
MONOCYTES # BLD: 0.6 K/UL (ref 0.05–1.2)
MONOCYTES NFR BLD: 3 % (ref 3–10)
NEUTS SEG # BLD: 15.9 K/UL (ref 1.8–8)
NEUTS SEG NFR BLD: 82 % (ref 40–73)
NITRITE UR QL STRIP.AUTO: NEGATIVE
PH UR STRIP: 5.5 [PH] (ref 5–8)
PLATELET # BLD AUTO: 321 K/UL (ref 135–420)
PLATELET COMMENTS,PCOM: ABNORMAL
PMV BLD AUTO: 9.8 FL (ref 9.2–11.8)
POTASSIUM SERPL-SCNC: 4.1 MMOL/L (ref 3.5–5.5)
PROT SERPL-MCNC: 8.7 G/DL (ref 6.4–8.2)
PROT UR STRIP-MCNC: ABNORMAL MG/DL
RBC # BLD AUTO: 3.39 M/UL (ref 4.35–5.65)
RBC #/AREA URNS HPF: NEGATIVE /HPF (ref 0–5)
RBC MORPH BLD: ABNORMAL
SODIUM SERPL-SCNC: 137 MMOL/L (ref 136–145)
SP GR UR REFRACTOMETRY: 1.02 (ref 1–1.03)
UROBILINOGEN UR QL STRIP.AUTO: 2 EU/DL (ref 0.2–1)
WBC # BLD AUTO: 19.4 K/UL (ref 4.6–13.2)
WBC URNS QL MICRO: ABNORMAL /HPF (ref 0–4)

## 2021-07-24 PROCEDURE — 80053 COMPREHEN METABOLIC PANEL: CPT

## 2021-07-24 PROCEDURE — 81001 URINALYSIS AUTO W/SCOPE: CPT

## 2021-07-24 PROCEDURE — 82077 ASSAY SPEC XCP UR&BREATH IA: CPT

## 2021-07-24 PROCEDURE — 99285 EMERGENCY DEPT VISIT HI MDM: CPT

## 2021-07-24 PROCEDURE — 85025 COMPLETE CBC W/AUTO DIFF WBC: CPT

## 2021-07-24 RX ORDER — NITROFURANTOIN 25; 75 MG/1; MG/1
100 CAPSULE ORAL 2 TIMES DAILY
Qty: 6 CAPSULE | Refills: 0 | Status: SHIPPED | OUTPATIENT
Start: 2021-07-24 | End: 2021-07-25

## 2021-07-24 NOTE — ED PROVIDER NOTES
EMERGENCY DEPARTMENT HISTORY AND PHYSICAL EXAM    1:30 PM patient seen at this time in room 12      Date: 7/24/2021  Patient Name: Ana María Billingsley    History of Presenting Illness     Chief Complaint   Patient presents with    Extremity Weakness         History Provided By: patient    Additional History (Context): Ana María Billingsley is a 64 y.o. male presents with woke up this morning with vertigo, his equilibrium was off. Unable to characterize whether changed with position movements said his legs were weak. No lack of coordination no focal deficit. No back pain though he has previously had an injury. Asymptomatic at this time. He wonders if his potassium was low again. Kayleigh Claire PCP: Sofiya Jorgensen MD    Chief Complaint:   Duration:    Timing:    Location:   Quality:   Severity:   Modifying Factors:   Associated Symptoms:       Current Outpatient Medications   Medication Sig Dispense Refill    nitrofurantoin, macrocrystal-monohydrate, (Macrobid) 100 mg capsule Take 1 Capsule by mouth two (2) times a day for 3 days. 6 Capsule 0    thiamine HCL (B-1) 100 mg tablet Take 1 Tablet by mouth daily for 30 days. Indications: deficiency in thiamine or vitamin B1 30 Tablet 0    therapeutic multivitamin (THERAGRAN) tablet Take 1 Tablet by mouth daily for 30 days. Indications: vitamin deficiency 30 Tablet 0    cholecalciferol (VITAMIN D3) (1000 Units /25 mcg) tablet Take 1 Tablet by mouth daily for 30 days. Indications: low vitamin D levels 30 Tablet 0    folic acid (FOLVITE) 1 mg tablet Take 1 Tablet by mouth daily for 30 days. 30 Tablet 0    polyethylene glycol (MIRALAX) 17 gram packet Take 1 Packet by mouth daily as needed for Constipation. 30 Packet 0    lidocaine 4 % patch Back pain 30 Package 0    atorvastatin (LIPITOR) 10 mg tablet Take 1 Tablet by mouth daily. 30 Tablet 0       Past History     Past Medical History:  No past medical history on file.     Past Surgical History:  No past surgical history on file.    Family History:  No family history on file. Social History:  Social History     Tobacco Use    Smoking status: Not on file   Substance Use Topics    Alcohol use: Not on file    Drug use: Not on file       Allergies:  No Known Allergies      Review of Systems     Review of Systems   Constitutional: Negative for diaphoresis and fever. HENT: Negative for congestion and sore throat. Eyes: Negative for pain and itching. Respiratory: Negative for cough and shortness of breath. Cardiovascular: Negative for chest pain and palpitations. Gastrointestinal: Negative for abdominal pain and diarrhea. Endocrine: Negative for polydipsia and polyuria. Genitourinary: Negative for dysuria and hematuria. Musculoskeletal: Negative for arthralgias and myalgias. Skin: Negative for rash and wound. Neurological: Positive for weakness. Negative for seizures and syncope. Hematological: Does not bruise/bleed easily. Psychiatric/Behavioral: Negative for agitation and hallucinations. Physical Exam       Patient Vitals for the past 12 hrs:   Temp Pulse Resp BP SpO2   07/24/21 1240 -- -- -- -- 100 %   07/24/21 1237 98.5 °F (36.9 °C) (!) 102 20 130/71 95 %       Physical Exam  Vitals and nursing note reviewed. Constitutional:       Appearance: He is well-developed. HENT:      Head: Normocephalic and atraumatic. Eyes:      General: No scleral icterus. Conjunctiva/sclera: Conjunctivae normal.   Neck:      Vascular: No JVD. Cardiovascular:      Rate and Rhythm: Normal rate and regular rhythm. Heart sounds: Normal heart sounds. Comments: 4 intact extremity pulses  Pulmonary:      Effort: Pulmonary effort is normal.      Breath sounds: Normal breath sounds. Abdominal:      Palpations: Abdomen is soft. There is no mass. Tenderness: There is no abdominal tenderness. Musculoskeletal:         General: Normal range of motion.       Cervical back: Normal range of motion and neck supple. Lymphadenopathy:      Cervical: No cervical adenopathy. Skin:     General: Skin is warm and dry. Neurological:      General: No focal deficit present. Mental Status: He is alert. Cranial Nerves: No cranial nerve deficit. Coordination: Coordination normal.      Comments: Negative hints exam no coordination or strength deficits           Diagnostic Study Results   Labs -  Recent Results (from the past 12 hour(s))   METABOLIC PANEL, COMPREHENSIVE    Collection Time: 07/24/21  1:15 PM   Result Value Ref Range    Sodium 137 136 - 145 mmol/L    Potassium 4.1 3.5 - 5.5 mmol/L    Chloride 103 100 - 111 mmol/L    CO2 29 21 - 32 mmol/L    Anion gap 5 3.0 - 18 mmol/L    Glucose 149 (H) 74 - 99 mg/dL    BUN 8 7.0 - 18 MG/DL    Creatinine 1.13 0.6 - 1.3 MG/DL    BUN/Creatinine ratio 7 (L) 12 - 20      GFR est AA >60 >60 ml/min/1.73m2    GFR est non-AA >60 >60 ml/min/1.73m2    Calcium 9.5 8.5 - 10.1 MG/DL    Bilirubin, total 1.6 (H) 0.2 - 1.0 MG/DL    ALT (SGPT) 32 16 - 61 U/L    AST (SGOT) 94 (H) 10 - 38 U/L    Alk. phosphatase 229 (H) 45 - 117 U/L    Protein, total 8.7 (H) 6.4 - 8.2 g/dL    Albumin 2.5 (L) 3.4 - 5.0 g/dL    Globulin 6.2 (H) 2.0 - 4.0 g/dL    A-G Ratio 0.4 (L) 0.8 - 1.7     CBC WITH AUTOMATED DIFF    Collection Time: 07/24/21  1:15 PM   Result Value Ref Range    WBC 19.4 (H) 4.6 - 13.2 K/uL    RBC 3.39 (L) 4.35 - 5.65 M/uL    HGB 11.3 (L) 13.0 - 16.0 g/dL    HCT 35.3 (L) 36.0 - 48.0 %    .1 (H) 74.0 - 97.0 FL    MCH 33.3 24.0 - 34.0 PG    MCHC 32.0 31.0 - 37.0 g/dL    RDW 13.0 11.6 - 14.5 %    PLATELET 981 649 - 739 K/uL    MPV 9.8 9.2 - 11.8 FL    NEUTROPHILS PENDING %    LYMPHOCYTES PENDING %    MONOCYTES PENDING %    EOSINOPHILS PENDING %    BASOPHILS PENDING %    ABS. NEUTROPHILS PENDING K/UL    ABS. LYMPHOCYTES PENDING K/UL    ABS. MONOCYTES PENDING K/UL    ABS. EOSINOPHILS PENDING K/UL    ABS.  BASOPHILS PENDING K/UL    DF PENDING    ETHYL ALCOHOL    Collection Time: 07/24/21  1:15 PM   Result Value Ref Range    ALCOHOL(ETHYL),SERUM 9 (H) 0 - 3 MG/DL   URINALYSIS W/ RFLX MICROSCOPIC    Collection Time: 07/24/21  1:58 PM   Result Value Ref Range    Color DARK YELLOW      Appearance CLEAR      Specific gravity 1.016 1.005 - 1.030      pH (UA) 5.5 5.0 - 8.0      Protein TRACE (A) NEG mg/dL    Glucose Negative NEG mg/dL    Ketone Negative NEG mg/dL    Bilirubin MODERATE (A) NEG      Blood Negative NEG      Urobilinogen 2.0 (H) 0.2 - 1.0 EU/dL    Nitrites Negative NEG      Leukocyte Esterase TRACE (A) NEG     URINE MICROSCOPIC ONLY    Collection Time: 07/24/21  1:58 PM   Result Value Ref Range    WBC 5 to 10 0 - 4 /hpf    RBC Negative 0 - 5 /hpf    Epithelial cells 2+ 0 - 5 /lpf    Bacteria FEW (A) NEG /hpf    Hyaline cast 1 to 5 0 - 2 /lpf       Radiologic Studies -   No orders to display     No results found. Medications ordered:   Medications - No data to display      Medical Decision Making   Initial Medical Decision Making and DDx:  Patient is a poor historian. Cannot calculate cannot communicate how long this episode lasted or be more specific in it. Does not know if he had double vision. Entirely asymptomatic at this time and says he feels fine. There is no stroke syndrome that would require intervention at this time. I am not highly suspicious for a TIA. Review of records history of alcoholism and sepsis we will get urinalysis as well. ED Course: Progress Notes, Reevaluation, and Consults:     2:53 PM Pt reevaluated at this time. Discussed results and findings, as well as, diagnosis and plan for discharge. Follow up with doctors/services listed. Return to the emergency department for alarming symptoms. Pt verbalizes understanding and agreement with plan. All questions addressed. In summary, evidence of urinary tract infection will put him on Macrobid no low potassium as he was suspicious for.   No focal neurologic deficits disease or stroke reviewed symptoms of stroke need to return to the emergency department. Close follow-up with his primary care doctor is agreeable to plan    I am the first provider for this patient. I reviewed the vital signs, available nursing notes, past medical history, past surgical history, family history and social history. Patient Vitals for the past 12 hrs:   Temp Pulse Resp BP SpO2   07/24/21 1240 -- -- -- -- 100 %   07/24/21 1237 98.5 °F (36.9 °C) (!) 102 20 130/71 95 %       Vital Signs-Reviewed the patient's vital signs. Pulse Oximetry Analysis, Cardiac Monitor, 12 lead ekg: No hypoxia on room air  Interpreted by the EP. Records Reviewed: Nursing notes reviewed (Time of Review: 1:30 PM)    Procedures:   Critical Care Time:   Aspirin: (was aspirin given for stroke?)    Diagnosis     Clinical Impression:   1. Urinary tract infection without hematuria, site unspecified        Disposition: Discharged      Follow-up Information     Follow up With Specialties Details Why Contact Info    Sadie Valentin MD Internal Medicine In 1 day  [phone]  [phone]             Patient's Medications   Start Taking    NITROFURANTOIN, MACROCRYSTAL-MONOHYDRATE, (MACROBID) 100 MG CAPSULE    Take 1 Capsule by mouth two (2) times a day for 3 days. Continue Taking    ATORVASTATIN (LIPITOR) 10 MG TABLET    Take 1 Tablet by mouth daily. CHOLECALCIFEROL (VITAMIN D3) (1000 UNITS /25 MCG) TABLET    Take 1 Tablet by mouth daily for 30 days. Indications: low vitamin D levels    FOLIC ACID (FOLVITE) 1 MG TABLET    Take 1 Tablet by mouth daily for 30 days. LIDOCAINE 4 % PATCH    Back pain    POLYETHYLENE GLYCOL (MIRALAX) 17 GRAM PACKET    Take 1 Packet by mouth daily as needed for Constipation. THERAPEUTIC MULTIVITAMIN (THERAGRAN) TABLET    Take 1 Tablet by mouth daily for 30 days. Indications: vitamin deficiency    THIAMINE HCL (B-1) 100 MG TABLET    Take 1 Tablet by mouth daily for 30 days. Indications: deficiency in thiamine or vitamin B1   These Medications have changed    No medications on file   Stop Taking    No medications on file     _______________________________    Notes:    Abner Tian MD using Jackieon dictation      _______________________________

## 2021-07-24 NOTE — ED TRIAGE NOTES
Pt arrived EMS for bilateral lower extremity weakness. Pt reports \"It's my equalibrium. \"   Hx of [facility]

## 2021-07-25 ENCOUNTER — APPOINTMENT (OUTPATIENT)
Dept: GENERAL RADIOLOGY | Age: 61
End: 2021-07-25
Attending: EMERGENCY MEDICINE
Payer: MEDICARE

## 2021-07-25 ENCOUNTER — HOSPITAL ENCOUNTER (EMERGENCY)
Age: 61
Discharge: HOME | End: 2021-07-25
Attending: EMERGENCY MEDICINE
Payer: MEDICARE

## 2021-07-25 VITALS
HEART RATE: 99 BPM | SYSTOLIC BLOOD PRESSURE: 129 MMHG | RESPIRATION RATE: 20 BRPM | DIASTOLIC BLOOD PRESSURE: 72 MMHG | TEMPERATURE: 98.4 F | OXYGEN SATURATION: 94 % | WEIGHT: 158 LBS | HEIGHT: 74 IN | BODY MASS INDEX: 20.28 KG/M2

## 2021-07-25 DIAGNOSIS — M54.50 CHRONIC MIDLINE LOW BACK PAIN WITHOUT SCIATICA: ICD-10-CM

## 2021-07-25 DIAGNOSIS — G89.29 CHRONIC MIDLINE LOW BACK PAIN WITHOUT SCIATICA: ICD-10-CM
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
## 2021-07-25 PROCEDURE — 72100 X-RAY EXAM L-S SPINE 2/3 VWS: CPT

## 2021-07-25 PROCEDURE — 74011250637 HC RX REV CODE- 250/637: Performed by: EMERGENCY MEDICINE

## 2021-07-25 PROCEDURE — 99283 EMERGENCY DEPT VISIT LOW MDM: CPT

## 2021-07-25 RX ORDER — LIDOCAINE 4 G/100G
PATCH TOPICAL
Qty: 1 PACKAGE | Refills: 0 | Status: SHIPPED | OUTPATIENT
Start: 2021-07-25

## 2021-07-25 RX ORDER — OXYCODONE AND ACETAMINOPHEN 5; 325 MG/1; MG/1
1 TABLET ORAL
Status: COMPLETED | OUTPATIENT
Start: 2021-07-25 | End: 2021-07-25

## 2021-07-25 RX ADMIN — OXYCODONE HYDROCHLORIDE AND ACETAMINOPHEN 1 TABLET: 5; 325 TABLET ORAL at 11:10

## 2021-07-25 NOTE — ED PROVIDER NOTES
EMERGENCY DEPARTMENT HISTORY AND PHYSICAL EXAM    Date: 7/25/2021  Patient Name: Franny Head    History of Presenting Illness     Chief Complaint   Patient presents with   Wilhelminia Blazing Fall    Arm Injury    Leg Pain    Back Pain         History Provided By: Patient      Additional History (Context): Franny Head is a 64 y.o. male with chronic low back pain with known L3 fx who presents with mechanical trip and fall today. He was walking misstepped his knees went down he scraped his elbows and knees having a jolt in his chronic low back pain. Denies saddle anesthesia bowel incontinence urinary retention unintentional weight loss in the past 6 months. Denies sciatica. Denies fever rash IVDU. PCP: Paty Carcamo MD    Current Facility-Administered Medications   Medication Dose Route Frequency Provider Last Rate Last Admin    oxyCODONE-acetaminophen (PERCOCET) 5-325 mg per tablet 1 Tablet  1 Tablet Oral NOW Kerry Sin PA         Current Outpatient Medications   Medication Sig Dispense Refill    lidocaine (Salonpas, lidocaine,) 4 % patch Use once daily. 1 Package 0       Past History     Past Medical History:  History reviewed. No pertinent past medical history. Past Surgical History:  History reviewed. No pertinent surgical history. Family History:  History reviewed. No pertinent family history. Social History:  Social History     Tobacco Use    Smoking status: Light Tobacco Smoker    Smokeless tobacco: Never Used   Substance Use Topics    Alcohol use: Yes    Drug use: Not Currently       Allergies:  No Known Allergies      Review of Systems   Review of Systems   Musculoskeletal: Positive for back pain. Negative for arthralgias. Skin: Positive for wound.      All Other Systems Negative  Physical Exam     Vitals:    07/25/21 0841 07/25/21 0902 07/25/21 1043   BP: (!) 86/61 110/71 129/72   Pulse: (!) 118 (!) 116 99   Resp: 18 18    Temp: 98.2 °F (36.8 °C)     SpO2: 98% 96% 94%   Weight: 71.7 kg (158 lb)     Height: 6' 2\" (1.88 m)       Physical Exam  Vitals and nursing note reviewed. Constitutional:       General: He is not in acute distress. Appearance: He is well-developed. He is not ill-appearing, toxic-appearing or diaphoretic. HENT:      Head: Normocephalic and atraumatic. Neck:      Thyroid: No thyromegaly. Vascular: No carotid bruit. Trachea: No tracheal deviation. Cardiovascular:      Rate and Rhythm: Normal rate and regular rhythm. Heart sounds: Normal heart sounds. No murmur heard. No friction rub. No gallop. Pulmonary:      Effort: Pulmonary effort is normal. No respiratory distress. Breath sounds: Normal breath sounds. No stridor. No wheezing or rales. Chest:      Chest wall: No tenderness. Abdominal:      General: There is no distension. Palpations: Abdomen is soft. There is no mass. Tenderness: There is no abdominal tenderness. There is no guarding or rebound. Musculoskeletal:         General: Normal range of motion. Cervical back: Normal range of motion and neck supple. Skin:     General: Skin is warm and dry. Coloration: Skin is not pale. Findings: Lesion present. Comments: Abrasions left elbow nontender. Full range of motion of both elbows with radial pulses intact. Elbows are nontender. Nontender knee joints with easy range of motion as well and DPPT pulses palpable. Neurological:      Mental Status: He is alert. Psychiatric:         Speech: Speech normal.         Behavior: Behavior normal.         Thought Content: Thought content normal.         Judgment: Judgment normal.          Diagnostic Study Results     Labs -   No results found for this or any previous visit (from the past 12 hour(s)).     Radiologic Studies -   XR SPINE LUMB 2 OR 3 V    (Results Pending)     CT Results  (Last 48 hours)    None        CXR Results  (Last 48 hours)    None            Medical Decision Making   I am the first provider for this patient. I reviewed the vital signs, available nursing notes, past medical history, past surgical history, family history and social history. Vital Signs-Reviewed the patient's vital signs. Records Reviewed: Nursing Notes    Procedures:  Procedures    Provider Notes (Medical Decision Making): Known L3 fracture. Denies any radiculopathy or weakness. Treat his pain he is asking for refill on his lido patches and will discharge. Provide Ortho follow-up. MED RECONCILIATION:  Current Facility-Administered Medications   Medication Dose Route Frequency    oxyCODONE-acetaminophen (PERCOCET) 5-325 mg per tablet 1 Tablet  1 Tablet Oral NOW     Current Outpatient Medications   Medication Sig    lidocaine (Salonpas, lidocaine,) 4 % patch Use once daily. Disposition:  home    DISCHARGE NOTE:   10:56 AM    Pt has been reexamined. Patient has no new complaints, changes, or physical findings. Care plan outlined and precautions discussed. Results of x-rays were reviewed with the patient. All medications were reviewed with the patient; will d/c home with lido. All of pt's questions and concerns were addressed. Patient was instructed and agrees to follow up with ortho, as well as to return to the ED upon further deterioration. Patient is ready to go home.     Follow-up Information     Follow up With Specialties Details Why Contact Info    Juan Francisco Bejarano MD Orthopedic Surgery Schedule an appointment as soon as possible for a visit in 2 days  [address]      Juan Manuel Draper MD Internal Medicine Schedule an appointment as soon as possible for a visit in 1 day  [phone]      [facility] EMERGENCY DEPT Emergency Medicine  If symptoms worsen return immediately [address]  [phone]          Current Discharge Medication List      START taking these medications    Details   lidocaine (Salonpas, lidocaine,) 4 % patch Use once daily. Qty: 1 Package, Refills: 0  Start date: 7/25/2021               Diagnosis     Clinical Impression:   1. Fall, initial encounter    2.  Chronic midline low back pain without sciatica

## 2021-07-25 NOTE — ED TRIAGE NOTES
Patient was walking outside and tripped and fell, no LOC, patient with abrasion to right elbow, patient with back pain and hip pain

## 2021-09-12 NOTE — MED STUDENT NOTES
*ATTENTION:  This note has been created by a medical student for educational purposes only. Please do not refer to the content of this note for clinical decision-making, billing, or other purposes. Please see attending physicians note to obtain clinical information on this patient. *     This is a medical student note for learning purposes only. Please refer to the attending physician note for final recommendations. Contact our [address] [place] [address] team with any questions or concerns. [facility]  Progress Note    Patient: Yas Aranda MRN: [id number]   SSN: [id number]  YOB: 1960   Age: 64 y.o. Sex: male      Admit Date: 6/25/2021    LOS: 4 days   Chief Complaint   Patient presents with    Extremity Weakness       Subjective:     Mr. Maria Robertson is a 64 y.o male with a PMH significant for - T2DM, HTN, HLD, L3 compression fracture and chronic alcohol use disorder who is presenting with sustained weakness in the lower extremity 2/2 a fall. Overnight - patient states he slept fine. No significant events. Patient was oriented to person, place and time. Patient states he feels fine and has no current shoulder or back pain. He stated he was concerned about his feet being dirty but they did not hurt. He complained he still felt weak and that he was unable to use the restroom because he couldn't stand. Patient denied a CT, but will consider it tomorrow. Review of Systems   Constitutional: Negative for chills and fever. Respiratory: Positive for cough. Cardiovascular: Negative for chest pain and leg swelling. Gastrointestinal: Negative for abdominal pain. Genitourinary: Positive for hematuria. Musculoskeletal: Negative for back pain. Neurological: Positive for weakness. Negative for headaches.        Objective:     Visit Vitals  BP (!) 149/75   Pulse 96   Temp 98.3 °F (36.8 °C)   Resp 16   Ht 6' 2\" (1.88 m)   Wt 86.2 kg (190 lb 0.6 oz)   SpO2 98%   BMI 24.40 kg/m²       Physical Exam:       General appearance:   -alert, well appearing, and in no distress and oriented to person, place, and time    Cardiovascular:  -Tachycardic - Regular rhythm  -No rubs or gallops  -No murmurs   -Normal radial and dorsalis pedis pulses bilaterally    Pulmonary:  - Clear to auscultation bilaterally   - Not in respiratory distress - normal effort    Abdominal:  - Abdomen is flat and not distended  -Bowel sounds are normoactive  -There is no pain to deep or superficial palpation - there is no guarding or rebound tenderness    MSK:  -No pain to palpation around L3 in lower back, no pain in lower back when patient leaned forward  -1+ edema noted in left lower extremity, none noted in right lower extremity    Skin:  -ecchymosis diffusely   -skin is flaky and dry  -Feet bilaterally have onychomycosis     Neurological:  -oriented to person, place and time    Intake and Output:  Current Shift: No intake/output data recorded. Last three shifts: 06/27 1901 - 06/29 0700  In: 1978.8 [P.O.:480; I.V.:1498.8]  Out: 2100 [Urine:2100]    Lab/Data Review:  Recent Results (from the past 12 hour(s))   GLUCOSE, POC    Collection Time: 06/28/21  9:15 PM   Result Value Ref Range    Glucose (POC) 96 70 - 046 mg/dL   METABOLIC PANEL, COMPREHENSIVE    Collection Time: 06/29/21 12:27 AM   Result Value Ref Range    Sodium 141 136 - 145 mmol/L    Potassium 4.0 3.5 - 5.5 mmol/L    Chloride 109 100 - 111 mmol/L    CO2 25 21 - 32 mmol/L    Anion gap 7 3.0 - 18 mmol/L    Glucose 95 74 - 99 mg/dL    BUN 10 7.0 - 18 MG/DL    Creatinine 0.70 0.6 - 1.3 MG/DL    BUN/Creatinine ratio 14 12 - 20      GFR est AA >60 >60 ml/min/1.73m2    GFR est non-AA >60 >60 ml/min/1.73m2    Calcium 7.9 (L) 8.5 - 10.1 MG/DL    Bilirubin, total 2.5 (H) 0.2 - 1.0 MG/DL    ALT (SGPT) 42 16 - 61 U/L    AST (SGOT) 148 (H) 10 - 38 U/L    Alk.  phosphatase 176 (H) 45 - 117 U/L    Protein, total 5.7 (L) 6.4 - 8.2 g/dL    Albumin 1.9 (L) 3.4 - 5.0 g/dL Globulin 3.8 2.0 - 4.0 g/dL    A-G Ratio 0.5 (L) 0.8 - 1.7     CBC WITH AUTOMATED DIFF    Collection Time: 06/29/21 12:27 AM   Result Value Ref Range    WBC 10.4 4.6 - 13.2 K/uL    RBC 2.37 (L) 4.35 - 5.65 M/uL    HGB 8.4 (L) 13.0 - 16.0 g/dL    HCT 27.2 (L) 36.0 - 48.0 %    .8 (H) 74.0 - 97.0 FL    MCH 35.4 (H) 24.0 - 34.0 PG    MCHC 30.9 (L) 31.0 - 37.0 g/dL    RDW 17.0 (H) 11.6 - 14.5 %    PLATELET 729 (L) 934 - 420 K/uL    MPV 9.8 9.2 - 11.8 FL    NEUTROPHILS 78 (H) 40 - 73 %    LYMPHOCYTES 11 (L) 21 - 52 %    MONOCYTES 11 (H) 3 - 10 %    EOSINOPHILS 0 0 - 5 %    BASOPHILS 0 0 - 2 %    ABS. NEUTROPHILS 8.2 (H) 1.8 - 8.0 K/UL    ABS. LYMPHOCYTES 1.1 0.9 - 3.6 K/UL    ABS. MONOCYTES 1.1 0.05 - 1.2 K/UL    ABS. EOSINOPHILS 0.0 0.0 - 0.4 K/UL    ABS. BASOPHILS 0.0 0.0 - 0.1 K/UL    DF MANUAL      PLATELET COMMENTS DECREASED PLATELETS      RBC COMMENTS MICROCYTOSIS  2+       MAGNESIUM    Collection Time: 06/29/21 12:27 AM   Result Value Ref Range    Magnesium 2.2 1.6 - 2.6 mg/dL   PHOSPHORUS    Collection Time: 06/29/21 12:27 AM   Result Value Ref Range    Phosphorus 2.0 (L) 2.5 - 4.9 MG/DL   AMMONIA    Collection Time: 06/29/21 12:27 AM   Result Value Ref Range    Ammonia 29 11 - 32 UMOL/L   GLUCOSE, POC    Collection Time: 06/29/21  7:02 AM   Result Value Ref Range    Glucose (POC) 148 (H) 70 - 110 mg/dL       RECENT RESULTS  MODALITY IMPRESSION   XR Results from [place] encounter on 06/25/21    XR TIB/FIB LT    Narrative  EXAM: XR TIB/FIB LT, XR ANKLE LT  1 V    INDICATION: 64 years Male. Hx of fall. ADDITIONAL HISTORY: None. TECHNIQUE: 2 views of the left tibia-fibula. AP and lateral views of the left  ankle. COMPARISON: No comparison study is available at the time of this dictation. FINDINGS:    Left tibia-fibula:  Mild generalized osteopenia. No acute fracture or dislocation is detected. Alignment is anatomic. No aggressive osseous lesion identified.  The knee joint  appears essentially preserved. Mild patellar enthesopathy. Mild nonspecific  mature periosteal reaction along the medial aspect of the distal tibial  metadiaphysis and diaphysis. Arterial vascular calcifications are noted. Lucent  centered calcification noted in the medial aspect of the mid calf, probable  phlebolith. Left ankle:  Evaluation is limited due to lack of a third projection. No acute fracture or  dislocation is detected. Alignment appears grossly anatomic. The talar dome  appears smooth. No aggressive osseous lesion identified. Mild talonavicular  joint degenerative change. Moderate circumferential soft tissue swelling around  the ankle. Moderate plantar calcaneal enthesopathy. Impression  Left tibia-fibula:  No evidence of acute fracture or dislocation. Left ankle:  1. No definite evidence of acute fracture, allowing for lack of a third  projection. 2.  Moderate circumferential soft tissue swelling around the ankle. CT Results from [place] encounter on 02/29/20    CT LOW EXT LT W CONT    Narrative  CT of left lower extremity with contrast    HISTORY: Left foreleg edema. Clinical concern of abscess. COMPARISON: None. TECHNIQUE: Contiguous thin axial images to the left foreleg are performed from  the level of above the knee to the level of heel after IV contrast  administration. Computer coronal and sagittal reconstruction images are  performed for better evaluation of the bony structures in relation to the  adjacent vasculature and soft tissue structures and reduce the radiation dose. 3D postprocessed images, including surface shaded display, also produce for this  exam, permanently archived, and interpreted.     All CT scans at this facility performed using dose optimization techniques as  appreciated to a performed exam, to include automated exposure control,  adjustment of the mA and or KU according to patient size (including appropriate  matching for site specific examination), or use of iterative reconstruction  technique. Contrast:100 Isovue 300. FINDINGS:    There is diffuse and significant edema identified in imaged left foreleg  including subcutaneous compartment and also mildly involving the muscular  compartments with mild skin thickening, more pronounced toward to the ankle. Although, no focal fluid collection or abscess identified. No definite open  wound or radiopaque foreign body seen. There are moderate atherosclerotic calcified plaques noted in the popliteal and  pretibial arteries. The tibia and fibula appear intact with no definite osseous  lesion seen. No fracture or dislocation. Mild knee joint space narrowing noted  but no significant osteophytic changes. No joint effusion. .    Impression  IMPRESSION:    1.. Extensive edema of left the foreleg in subcutaneous and muscular  compartments but no focal fluid collection or abscess seen. 2. Mild DJD of right knee. Thank you for your referral.     MRI Results from [place] encounter on 06/25/21    MRI LUMB SPINE W WO CONT    Narrative  MR lumbar spine with and without contrast    HISTORY: , H/o L3 burst fracture, lost to follow up, bowel incontinence x3    COMPARISON: MRI 3/2/2020. TECHNIQUE: Lumbar spine scanned with axial and sagittal T1W scans, axial and  sagittal T2W scans, and with post gadolinium axial and sagittal T1W scans. Patient received 17 mL Dotarem  IV contrast.    FINDINGS:    Normal lumbar lordosis. Chronic L3 superior endplate compression fracture with  extension to the posterior endplate and mild retropulsion. There is interval  worsening of the central vertebral body height (about 50% height loss) with  increased central enhancing bone marrow edema. Slightly worsening retropulsion 5  mm with mild compromise of the central canal. No epidural collection. No  discrete underlying bone marrow lesion. Otherwise vertebral body heights are obtained.  No additional bone marrow signal  abnormality. Disc space heights are relatively maintained and hydrated. Conus terminates at the L1-2 level with normal signal.    Mild bilateral psoas edema bilaterally at multiple levels centered at the L3  fracture, likely due to strain or related to fracture. Also mild posterior  paraspinal muscular edema and mild atrophy similar to prior. Correlation of sagittal and axial images demonstrates the following:    T11-12 and T12-L1: Sagittal images only. Preserved disc heights. No central  canal or foraminal stenosis. [employer] L1-L2: No significant disc pathology. Patent canal and foramina. L2-L3: Mild disc bulge related to 3 fracture but otherwise preserved disc  height. Mild compromise of the central canal due to retropulsion at superior L3  level but otherwise patent canal and foramina    L3-L4: No significant disc pathology. . Patent canal and foramina    L4-L5: No significant disc pathology. Patent canal and foramina. L5-S1: Central annular fissure and small protrusion. Patent canal and foramina. .    Impression  1. Acute/subacute on chronic L3 superior endplate burst type fracture with  extension to the posterior body wall, worsening vertebral body height (about 50%  height loss) and slightly worsening retropulsion. Mild compromise of the central  canal at this level. No epidural hematoma.  -Central L3 vertebral body enhancing bone marrow edema signal but no discrete  underlying bone marrow lesion to suggest pathologic fracture. 2. Multilevel mild degenerative changes without high-grade spinal canal or  foraminal stenosis. 3. Chronic annular fissure and small protrusion at L5-S1.  4. Chronic posterior paraspinal mild muscular atrophy and edema. ULTRASOUND Results from [place] encounter on 02/29/20    US EXT NONVAS LT LTD    Impression  IMPRESSION:  1.   Negative for abscess at the 2 separately evaluated sites within the left  lower extremity.  -Profound subcutaneous tissue edema at each location, as can be seen with severe  cellulitis      US EXT NONVAS LT LTD    Impression  IMPRESSION:  1. Negative for abscess at the 2 separately evaluated sites within the left  lower extremity.  -Profound subcutaneous tissue edema at each location, as can be seen with severe  cellulitis       Cardiology Procedures/Testing:  MODALITY RESULTS   EKG Results for orders placed or performed during the hospital encounter of 06/25/21   EKG, 12 LEAD, INITIAL   Result Value Ref Range    Ventricular Rate 94 BPM    Atrial Rate 94 BPM    P-R Interval 146 ms    QRS Duration 74 ms    Q-T Interval 388 ms    QTC Calculation (Bezet) 485 ms    Calculated P Axis 65 degrees    Calculated R Axis 72 degrees    Calculated T Axis 74 degrees    Diagnosis       Normal sinus rhythm  Prolonged QT  Abnormal ECG  When compared with ECG of 29-FEB-2020 13:25,  No significant change was found  Confirmed by Abdulaziz Gann ([id number]) on 6/25/2021 10:13:30 PM         ECHO 02/29/20    ECHO ADULT FOLLOW-UP OR LIMITED 03/10/2020 3/10/2020    Interpretation Summary  · Technically difficult study due to lung interference and limited patient mobility. · Normal cavity size, wall thickness and systolic function (ejection fraction normal). Estimated left ventricular ejection fraction is 55 - 60%. Visually measured ejection fraction. No regional wall motion abnormality noted. · Tricuspid regurgitation is inadequate for estimation of right ventricular systolic pressure.     Signed by: Shannan Sherman DO on 3/10/2020  4:06 PM       Special Testing/Procedures:  MODALITY RESULTS   MICRO All Micro Results     Procedure Component Value Units Date/Time    CULTURE, BLOOD [[id number]] Collected: 06/28/21 1234    Order Status: Completed Specimen: Blood Updated: 06/29/21 0645     Special Requests: NO SPECIAL REQUESTS        Culture result: NO GROWTH AFTER 17 HOURS       CULTURE, BLOOD [[id number]] Collected: 06/25/21 2100    Order Status: Completed Specimen: Blood Updated: 06/29/21 0645     Special Requests: NO SPECIAL REQUESTS        Culture result: NO GROWTH 4 DAYS       CULTURE, URINE [[id number]] Collected: 06/26/21 0622    Order Status: Completed Specimen: Urine from Clean catch Updated: 06/27/21 1858     Special Requests: NO SPECIAL REQUESTS        Culture result: No growth (<1,000 CFU/ML)       CULTURE, BLOOD [[id number]]  (Abnormal) Collected: 06/25/21 2045    Order Status: Completed Specimen: Blood Updated: 06/27/21 1617     Special Requests: NO SPECIAL REQUESTS        GRAM STAIN       AEROBIC BOTTLE GRAM POSITIVE RODS                  CALLED TO AND CORRECTLY REPEATED BY: NANNETTE GREENWOOD. RN 4S AT 6537 TO S           Culture result:       BACILLUS SPECIES, NOT ANTHRACIS GROWING IN THE AEROBIC BOTTLE               UA No results found for this or any previous visit. PATH      Telemetry    Oxygen 2L Nasal Canula     Assessment and Plan:     Assessment:    Mr. Anai Soto is a 64 y.o. male with a - PMH significant for - T2DM, HTN, HLD, L3 compression fracture and chronic alcohol use disorder who is presenting with sustained weakness in the lower extremity 2/2 a fall. Plan:    Sepsis 2/2 Aspiration Pneumonia and UTI:  [ ] Course of Zosyn and Vancomycin finished (6/28), continue Levofloxacin(6/25-), continue to monitor WBC(decreased to low of 11.0 from 12) and Lactic Acid(down to 1.75 from 5.89) for continued improvement   [ ] Follow up chest xray today  [ ] Follow up blood cultures (previous(6/25) grew Bacillus)  [ ] order speech therapy to assist with swallowing  [ ] follow aspiration protocols    Chronic L3 Burst Fracture:  [ ] Pain control with tylenol PRN - currently not in pain  [ ] Ortho recommends management with progressive mobilization    Chronic Alcohol Use Disorder:    [ ] Continue to monitor CIWA scores via CIWA protocol.  Most recent have been 2-3  [ ] Continue Thiamine 407ZR, Folic Acid 1mg  [ ] Fall Precautions    Altered Mental Status:  [ ] CT Head Scan w/o contrast - patient refused, but will continue to attempt  [ ] check for capacity and contact  when it is established he has capacity    Severe Malnutrition:  [ ] Follow dialectician recs from 6/28  [ ] MVI daily    Macrocytic Anemia and Thrombocytopenia  [ ] Likely 2/2 alcohol use disorder, continue thiamine, folic acid supplementation  [ ] Order ferritin and iron studies  [ ] Order a heme stool study to check for bleeding    Chronic Hypertension:  [ ] Consider restarting lisinopril if  Blood pressure increase is noted    Type 2 DM:  [ ] Last A1C of 5.8 on 6/24   [ ] continue to monitor diet and use SSI  [ ] monitor for hypoglycemic events    Right Shoulder Pain:  [ ] X ray on 6/28 showed no evidence of fracture or osteoarthritis of the glenohumeral joint. Suspected right infraspinatus tendinitis.   [ ] tylenol PRN    Bilateral Onychomycosis:  [ ] Awaiting podiatry consult        Diet    DVT Prophylaxis    GI Prophylaxis    Code status    Disposition      Point of Contact   Relationship:  [phone]     Roman Busch, MS3   [facility]   June 29, 2021, 8:35 AM Right calf pain

## 2022-07-03 ENCOUNTER — HOSPITAL ENCOUNTER (INPATIENT)
Age: 62
LOS: 6 days | Discharge: HOME HEALTH | DRG: 897 | End: 2022-07-09
Attending: STUDENT IN AN ORGANIZED HEALTH CARE EDUCATION/TRAINING PROGRAM | Admitting: STUDENT IN AN ORGANIZED HEALTH CARE EDUCATION/TRAINING PROGRAM
Payer: MEDICARE

## 2022-07-03 ENCOUNTER — APPOINTMENT (OUTPATIENT)
Dept: GENERAL RADIOLOGY | Age: 62
DRG: 897 | End: 2022-07-03
Attending: NURSE PRACTITIONER
Payer: MEDICARE

## 2022-07-03 DIAGNOSIS — A41.9 SEVERE SEPSIS (HCC): Primary | ICD-10-CM

## 2022-07-03 DIAGNOSIS — N39.0 URINARY TRACT INFECTION WITHOUT HEMATURIA, SITE UNSPECIFIED: ICD-10-CM

## 2022-07-03 DIAGNOSIS — R65.20 SEVERE SEPSIS (HCC): Primary | ICD-10-CM

## 2022-07-03 DIAGNOSIS — L03.116 CELLULITIS OF LEFT LOWER EXTREMITY: ICD-10-CM

## 2022-07-03 PROBLEM — N18.30 CKD (CHRONIC KIDNEY DISEASE) STAGE 3, GFR 30-59 ML/MIN (HCC): Status: ACTIVE | Noted: 2022-07-03

## 2022-07-03 PROBLEM — I10 HYPERTENSION: Status: ACTIVE | Noted: 2022-07-03

## 2022-07-03 PROBLEM — F10.939 ALCOHOL WITHDRAWAL (HCC): Status: ACTIVE | Noted: 2022-07-03

## 2022-07-03 PROBLEM — D53.9 MACROCYTIC ANEMIA: Status: ACTIVE | Noted: 2021-06-25

## 2022-07-03 PROBLEM — R79.89 LACTATE BLOOD INCREASED: Status: ACTIVE | Noted: 2022-07-03

## 2022-07-03 PROBLEM — E88.09 HYPOALBUMINEMIA: Status: ACTIVE | Noted: 2022-07-03

## 2022-07-03 PROBLEM — M79.89 PAIN AND SWELLING OF LEFT LOWER LEG: Chronic | Status: ACTIVE | Noted: 2022-07-03

## 2022-07-03 PROBLEM — M79.662 PAIN AND SWELLING OF LEFT LOWER LEG: Chronic | Status: ACTIVE | Noted: 2022-07-03

## 2022-07-03 PROBLEM — R29.6 FREQUENT FALLS: Chronic | Status: ACTIVE | Noted: 2022-07-03

## 2022-07-03 LAB
ALBUMIN SERPL-MCNC: 2.9 G/DL (ref 3.4–5)
ALBUMIN/GLOB SERPL: 0.6 {RATIO} (ref 0.8–1.7)
ALP SERPL-CCNC: 150 U/L (ref 45–117)
ALT SERPL-CCNC: 27 U/L (ref 16–61)
AMPHET UR QL SCN: NEGATIVE
ANION GAP SERPL CALC-SCNC: 17 MMOL/L (ref 3–18)
APPEARANCE UR: ABNORMAL
AST SERPL-CCNC: 97 U/L (ref 10–38)
BACTERIA URNS QL MICRO: ABNORMAL /HPF
BARBITURATES UR QL SCN: NEGATIVE
BASOPHILS # BLD: 0.1 K/UL (ref 0–0.1)
BASOPHILS NFR BLD: 1 % (ref 0–2)
BENZODIAZ UR QL: NEGATIVE
BILIRUB SERPL-MCNC: 2.2 MG/DL (ref 0.2–1)
BILIRUB UR QL: ABNORMAL
BUN SERPL-MCNC: 15 MG/DL (ref 7–18)
BUN/CREAT SERPL: 12 (ref 12–20)
CALCIUM SERPL-MCNC: 8.6 MG/DL (ref 8.5–10.1)
CANNABINOIDS UR QL SCN: NEGATIVE
CHLORIDE SERPL-SCNC: 96 MMOL/L (ref 100–111)
CO2 SERPL-SCNC: 22 MMOL/L (ref 21–32)
COCAINE UR QL SCN: NEGATIVE
COLOR UR: ABNORMAL
CREAT SERPL-MCNC: 1.27 MG/DL (ref 0.6–1.3)
DIFFERENTIAL METHOD BLD: ABNORMAL
EOSINOPHIL # BLD: 0.1 K/UL (ref 0–0.4)
EOSINOPHIL NFR BLD: 1 % (ref 0–5)
EPITH CASTS URNS QL MICRO: ABNORMAL /LPF (ref 0–5)
ERYTHROCYTE [DISTWIDTH] IN BLOOD BY AUTOMATED COUNT: 14.9 % (ref 11.6–14.5)
ETHANOL SERPL-MCNC: 18 MG/DL (ref 0–3)
GLOBULIN SER CALC-MCNC: 4.8 G/DL (ref 2–4)
GLUCOSE SERPL-MCNC: 157 MG/DL (ref 74–99)
GLUCOSE UR STRIP.AUTO-MCNC: NEGATIVE MG/DL
HCT VFR BLD AUTO: 33 % (ref 36–48)
HDSCOM,HDSCOM: NORMAL
HGB BLD-MCNC: 11 G/DL (ref 13–16)
HGB UR QL STRIP: ABNORMAL
IMM GRANULOCYTES # BLD AUTO: 0 K/UL (ref 0–0.04)
IMM GRANULOCYTES NFR BLD AUTO: 0 % (ref 0–0.5)
INR PPP: 1 (ref 0.8–1.2)
KETONES UR QL STRIP.AUTO: 15 MG/DL
LACTATE BLD-SCNC: 5.08 MMOL/L (ref 0.4–2)
LEUKOCYTE ESTERASE UR QL STRIP.AUTO: ABNORMAL
LIPASE SERPL-CCNC: 93 U/L (ref 73–393)
LYMPHOCYTES # BLD: 1.2 K/UL (ref 0.9–3.6)
LYMPHOCYTES NFR BLD: 14 % (ref 21–52)
MAGNESIUM SERPL-MCNC: 1.5 MG/DL (ref 1.6–2.6)
MCH RBC QN AUTO: 34.1 PG (ref 24–34)
MCHC RBC AUTO-ENTMCNC: 33.3 G/DL (ref 31–37)
MCV RBC AUTO: 102.2 FL (ref 78–100)
METHADONE UR QL: NEGATIVE
MONOCYTES # BLD: 0.7 K/UL (ref 0.05–1.2)
MONOCYTES NFR BLD: 8 % (ref 3–10)
NEUTS SEG # BLD: 6.7 K/UL (ref 1.8–8)
NEUTS SEG NFR BLD: 76 % (ref 40–73)
NITRITE UR QL STRIP.AUTO: POSITIVE
NRBC # BLD: 0 K/UL (ref 0–0.01)
NRBC BLD-RTO: 0 PER 100 WBC
OPIATES UR QL: NEGATIVE
PCP UR QL: NEGATIVE
PH UR STRIP: 5.5 [PH] (ref 5–8)
PLATELET # BLD AUTO: 117 K/UL (ref 135–420)
PMV BLD AUTO: 9.4 FL (ref 9.2–11.8)
POTASSIUM SERPL-SCNC: 3.7 MMOL/L (ref 3.5–5.5)
PROT SERPL-MCNC: 7.7 G/DL (ref 6.4–8.2)
PROT UR STRIP-MCNC: 30 MG/DL
PROTHROMBIN TIME: 13.3 SEC (ref 11.5–15.2)
RBC # BLD AUTO: 3.23 M/UL (ref 4.35–5.65)
RBC #/AREA URNS HPF: ABNORMAL /HPF (ref 0–5)
SODIUM SERPL-SCNC: 135 MMOL/L (ref 136–145)
SP GR UR REFRACTOMETRY: 1.02 (ref 1–1.03)
TROPONIN-HIGH SENSITIVITY: 9 NG/L (ref 0–78)
UROBILINOGEN UR QL STRIP.AUTO: 1 EU/DL (ref 0.2–1)
WBC # BLD AUTO: 8.8 K/UL (ref 4.6–13.2)
WBC URNS QL MICRO: ABNORMAL /HPF (ref 0–4)

## 2022-07-03 PROCEDURE — 87086 URINE CULTURE/COLONY COUNT: CPT

## 2022-07-03 PROCEDURE — 82077 ASSAY SPEC XCP UR&BREATH IA: CPT

## 2022-07-03 PROCEDURE — 87040 BLOOD CULTURE FOR BACTERIA: CPT

## 2022-07-03 PROCEDURE — 96365 THER/PROPH/DIAG IV INF INIT: CPT

## 2022-07-03 PROCEDURE — 65270000046 HC RM TELEMETRY

## 2022-07-03 PROCEDURE — 83735 ASSAY OF MAGNESIUM: CPT

## 2022-07-03 PROCEDURE — 65270000029 HC RM PRIVATE

## 2022-07-03 PROCEDURE — 80307 DRUG TEST PRSMV CHEM ANLYZR: CPT

## 2022-07-03 PROCEDURE — 74011250636 HC RX REV CODE- 250/636: Performed by: NURSE PRACTITIONER

## 2022-07-03 PROCEDURE — 83605 ASSAY OF LACTIC ACID: CPT

## 2022-07-03 PROCEDURE — 85025 COMPLETE CBC W/AUTO DIFF WBC: CPT

## 2022-07-03 PROCEDURE — 74011000258 HC RX REV CODE- 258: Performed by: STUDENT IN AN ORGANIZED HEALTH CARE EDUCATION/TRAINING PROGRAM

## 2022-07-03 PROCEDURE — 81001 URINALYSIS AUTO W/SCOPE: CPT

## 2022-07-03 PROCEDURE — 74011250636 HC RX REV CODE- 250/636: Performed by: STUDENT IN AN ORGANIZED HEALTH CARE EDUCATION/TRAINING PROGRAM

## 2022-07-03 PROCEDURE — 85610 PROTHROMBIN TIME: CPT

## 2022-07-03 PROCEDURE — 74011000258 HC RX REV CODE- 258: Performed by: NURSE PRACTITIONER

## 2022-07-03 PROCEDURE — 80053 COMPREHEN METABOLIC PANEL: CPT

## 2022-07-03 PROCEDURE — 99285 EMERGENCY DEPT VISIT HI MDM: CPT

## 2022-07-03 PROCEDURE — 84484 ASSAY OF TROPONIN QUANT: CPT

## 2022-07-03 PROCEDURE — 93005 ELECTROCARDIOGRAM TRACING: CPT

## 2022-07-03 PROCEDURE — 83690 ASSAY OF LIPASE: CPT

## 2022-07-03 PROCEDURE — 71046 X-RAY EXAM CHEST 2 VIEWS: CPT

## 2022-07-03 RX ORDER — POLYETHYLENE GLYCOL 3350 17 G/17G
17 POWDER, FOR SOLUTION ORAL DAILY PRN
Status: DISCONTINUED | OUTPATIENT
Start: 2022-07-03 | End: 2022-07-09 | Stop reason: HOSPADM

## 2022-07-03 RX ORDER — LORAZEPAM 1 MG/1
1 TABLET ORAL
Status: DISCONTINUED | OUTPATIENT
Start: 2022-07-03 | End: 2022-07-09 | Stop reason: HOSPADM

## 2022-07-03 RX ORDER — SODIUM CHLORIDE 0.9 % (FLUSH) 0.9 %
5-40 SYRINGE (ML) INJECTION EVERY 8 HOURS
Status: DISCONTINUED | OUTPATIENT
Start: 2022-07-03 | End: 2022-07-09 | Stop reason: HOSPADM

## 2022-07-03 RX ORDER — LORAZEPAM 2 MG/ML
1 INJECTION, SOLUTION INTRAMUSCULAR; INTRAVENOUS
Status: DISCONTINUED | OUTPATIENT
Start: 2022-07-03 | End: 2022-07-09 | Stop reason: HOSPADM

## 2022-07-03 RX ORDER — LORAZEPAM 1 MG/1
2 TABLET ORAL
Status: DISCONTINUED | OUTPATIENT
Start: 2022-07-03 | End: 2022-07-09 | Stop reason: HOSPADM

## 2022-07-03 RX ORDER — INDAPAMIDE 1.25 MG/1
1.25 TABLET, FILM COATED ORAL DAILY
COMMUNITY
End: 2022-07-09

## 2022-07-03 RX ORDER — ENOXAPARIN SODIUM 100 MG/ML
40 INJECTION SUBCUTANEOUS DAILY
Status: DISCONTINUED | OUTPATIENT
Start: 2022-07-04 | End: 2022-07-09 | Stop reason: HOSPADM

## 2022-07-03 RX ORDER — TRIAMCINOLONE ACETONIDE 5 MG/G
OINTMENT TOPICAL 2 TIMES DAILY
COMMUNITY

## 2022-07-03 RX ORDER — CLOTRIMAZOLE AND BETAMETHASONE DIPROPIONATE 10; .64 MG/G; MG/G
CREAM TOPICAL 2 TIMES DAILY
COMMUNITY

## 2022-07-03 RX ORDER — SODIUM CHLORIDE 0.9 % (FLUSH) 0.9 %
5-40 SYRINGE (ML) INJECTION AS NEEDED
Status: DISCONTINUED | OUTPATIENT
Start: 2022-07-03 | End: 2022-07-07 | Stop reason: SDUPTHER

## 2022-07-03 RX ORDER — LORAZEPAM 2 MG/ML
3 INJECTION, SOLUTION INTRAMUSCULAR; INTRAVENOUS
Status: DISCONTINUED | OUTPATIENT
Start: 2022-07-03 | End: 2022-07-09 | Stop reason: HOSPADM

## 2022-07-03 RX ORDER — ATORVASTATIN CALCIUM 40 MG/1
40 TABLET, FILM COATED ORAL DAILY
Status: DISCONTINUED | OUTPATIENT
Start: 2022-07-04 | End: 2022-07-09 | Stop reason: HOSPADM

## 2022-07-03 RX ORDER — SODIUM CHLORIDE 0.9 % (FLUSH) 0.9 %
5-40 SYRINGE (ML) INJECTION EVERY 8 HOURS
Status: DISCONTINUED | OUTPATIENT
Start: 2022-07-03 | End: 2022-07-07 | Stop reason: SDUPTHER

## 2022-07-03 RX ORDER — VANCOMYCIN 1.75 GRAM/500 ML IN 0.9 % SODIUM CHLORIDE INTRAVENOUS
1750 ONCE
Status: COMPLETED | OUTPATIENT
Start: 2022-07-03 | End: 2022-07-03

## 2022-07-03 RX ORDER — ONDANSETRON 4 MG/1
4 TABLET, ORALLY DISINTEGRATING ORAL
Status: DISCONTINUED | OUTPATIENT
Start: 2022-07-03 | End: 2022-07-09 | Stop reason: HOSPADM

## 2022-07-03 RX ORDER — LORAZEPAM 2 MG/ML
2 INJECTION, SOLUTION INTRAMUSCULAR; INTRAVENOUS
Status: DISCONTINUED | OUTPATIENT
Start: 2022-07-03 | End: 2022-07-09 | Stop reason: HOSPADM

## 2022-07-03 RX ORDER — IBUPROFEN 200 MG
1 TABLET ORAL DAILY
Status: DISCONTINUED | OUTPATIENT
Start: 2022-07-04 | End: 2022-07-09 | Stop reason: HOSPADM

## 2022-07-03 RX ORDER — MAGNESIUM SULFATE HEPTAHYDRATE 40 MG/ML
2 INJECTION, SOLUTION INTRAVENOUS ONCE
Status: COMPLETED | OUTPATIENT
Start: 2022-07-03 | End: 2022-07-03

## 2022-07-03 RX ORDER — LISINOPRIL 10 MG/1
10 TABLET ORAL DAILY
Status: ON HOLD | COMMUNITY
End: 2022-07-09 | Stop reason: SDUPTHER

## 2022-07-03 RX ORDER — SODIUM CHLORIDE 0.9 % (FLUSH) 0.9 %
5-40 SYRINGE (ML) INJECTION AS NEEDED
Status: DISCONTINUED | OUTPATIENT
Start: 2022-07-03 | End: 2022-07-09 | Stop reason: HOSPADM

## 2022-07-03 RX ORDER — ATORVASTATIN CALCIUM 40 MG/1
40 TABLET, FILM COATED ORAL DAILY
Status: ON HOLD | COMMUNITY
End: 2022-07-09 | Stop reason: SDUPTHER

## 2022-07-03 RX ORDER — ONDANSETRON 2 MG/ML
4 INJECTION INTRAMUSCULAR; INTRAVENOUS
Status: DISCONTINUED | OUTPATIENT
Start: 2022-07-03 | End: 2022-07-09 | Stop reason: HOSPADM

## 2022-07-03 RX ORDER — LISINOPRIL 10 MG/1
10 TABLET ORAL DAILY
Status: DISCONTINUED | OUTPATIENT
Start: 2022-07-04 | End: 2022-07-09 | Stop reason: HOSPADM

## 2022-07-03 RX ORDER — INDAPAMIDE 2.5 MG/1
1.25 TABLET, FILM COATED ORAL DAILY
Status: DISCONTINUED | OUTPATIENT
Start: 2022-07-04 | End: 2022-07-09 | Stop reason: HOSPADM

## 2022-07-03 RX ORDER — ACETAMINOPHEN 325 MG/1
650 TABLET ORAL
Status: DISCONTINUED | OUTPATIENT
Start: 2022-07-03 | End: 2022-07-09 | Stop reason: HOSPADM

## 2022-07-03 RX ORDER — ACETAMINOPHEN 650 MG/1
650 SUPPOSITORY RECTAL
Status: DISCONTINUED | OUTPATIENT
Start: 2022-07-03 | End: 2022-07-09 | Stop reason: HOSPADM

## 2022-07-03 RX ADMIN — PIPERACILLIN AND TAZOBACTAM 4.5 G: 4; .5 INJECTION, POWDER, FOR SOLUTION INTRAVENOUS at 19:26

## 2022-07-03 RX ADMIN — THIAMINE HYDROCHLORIDE 300 MG: 100 INJECTION, SOLUTION INTRAMUSCULAR; INTRAVENOUS at 21:09

## 2022-07-03 RX ADMIN — MAGNESIUM SULFATE 2 G: 2 INJECTION INTRAVENOUS at 19:26

## 2022-07-03 RX ADMIN — VANCOMYCIN HYDROCHLORIDE 1750 MG: 10 INJECTION, POWDER, LYOPHILIZED, FOR SOLUTION INTRAVENOUS at 21:09

## 2022-07-03 RX ADMIN — SODIUM CHLORIDE 1000 ML: 9 INJECTION, SOLUTION INTRAVENOUS at 21:08

## 2022-07-03 RX ADMIN — SODIUM CHLORIDE 1000 ML: 9 INJECTION, SOLUTION INTRAVENOUS at 19:25

## 2022-07-03 NOTE — ED NOTES
I performed a brief evaluation, including history and physical, of the patient here in triage and I have determined that pt will need further treatment and evaluation from the main side ER physician. I have placed initial orders to help in expediting patients care. July 03, 2022 at 6:40 PM - Estuardo Valiente, NP        Visit Vitals  BP (!) 142/90   Pulse (!) 108   Temp 99 °F (37.2 °C)   Resp 18   SpO2 95%          Patient stopped drinking 2 days ago after 40 years of drinking. Suspect patient will go into alcohol withdrawal he needs to be observed for withdrawal.  Patient also complaining of lower leg swelling there is some weeping to the lower legs no tenderness is noted but duplex ultrasounds are ordered bilaterally. Labs cultures lactic acid troponin EKG chest x-ray ordered. Patient needs further evaluation in the main side of the ED.    1850 tech reported a lactic over 5 septic fluids and antibiotics ordered for sepsis of unknown source.     Noni FELIPEP-C, FNP-C

## 2022-07-03 NOTE — ED PROVIDER NOTES
Patient 58-year-old male with a history of daily alcohol use and diabetes (currently untreated). Patient present with primary complaint of \"feeling like crap\" patient reports general malaise and poor appetite over the last 24 to 48 hours. Patient last had any alcoholic drink by his report 48 hours prior. He expresses concern that he may go into alcohol withdrawals. Patient is also complaining of swelling and redness to his left lower extremity for an unknown period of time. Patient denies any cough, chest pain, fever/chills, abdominal pain, nausea/vomiting, diarrhea, headache, neck stiffness, or urinary complaints. No past medical history on file. No past surgical history on file. No family history on file. Social History     Socioeconomic History    Marital status:      Spouse name: Not on file    Number of children: Not on file    Years of education: Not on file    Highest education level: Not on file   Occupational History    Not on file   Tobacco Use    Smoking status: Light Tobacco Smoker    Smokeless tobacco: Never Used   Substance and Sexual Activity    Alcohol use: Yes    Drug use: Not Currently    Sexual activity: Not on file   Other Topics Concern    Not on file   Social History Narrative    Not on file     Social Determinants of Health     Financial Resource Strain:     Difficulty of Paying Living Expenses: Not on file   Food Insecurity:     Worried About Running Out of Food in the Last Year: Not on file    [place] of Food in the Last Year: Not on file   Transportation Needs:     Lack of Transportation (Medical): Not on file    Lack of Transportation (Non-Medical):  Not on file   Physical Activity:     Days of Exercise per Week: Not on file    Minutes of Exercise per Session: Not on file   Stress:     Feeling of Stress : Not on file   Social Connections:     Frequency of Communication with Friends and Family: Not on file    Frequency of Social Gatherings with Friends and Family: Not on file    Attends Evangelical Services: Not on file    Active Member of Clubs or Organizations: Not on file    Attends Club or Organization Meetings: Not on file    Marital Status: Not on file   Intimate Partner Violence:     Fear of Current or Ex-Partner: Not on file    Emotionally Abused: Not on file    Physically Abused: Not on file    Sexually Abused: Not on file   Housing Stability:     Unable to Pay for Housing in the Last Year: Not on file    Number of [place] in the Last Year: Not on file    Unstable Housing in the Last Year: Not on file         ALLERGIES: Patient has no known allergies. Review of Systems   Constitutional: Positive for appetite change and fatigue. Negative for chills and fever. HENT: Negative for rhinorrhea and sore throat. Eyes: Negative for discharge and redness. Respiratory: Negative for cough and shortness of breath. Cardiovascular: Positive for leg swelling. Negative for chest pain. Gastrointestinal: Negative for abdominal pain, diarrhea, nausea and vomiting. Genitourinary: Negative for difficulty urinating and dysuria. Musculoskeletal: Negative for back pain and neck pain. Skin: Negative for rash and wound. Neurological: Negative for syncope, light-headedness and headaches. Vitals:    07/03/22 1827   BP: (!) 142/90   Pulse: (!) 108   Resp: 18   Temp: 99 °F (37.2 °C)   SpO2: 95%            Physical Exam  Constitutional:       General: He is not in acute distress. Appearance: He is not ill-appearing, toxic-appearing or diaphoretic. HENT:      Head: Normocephalic and atraumatic. Right Ear: External ear normal.      Left Ear: External ear normal.      Nose: No congestion or rhinorrhea. Mouth/Throat:      Mouth: Mucous membranes are moist.      Pharynx: No oropharyngeal exudate or posterior oropharyngeal erythema. Eyes:      General: No scleral icterus. Right eye: No discharge. Left eye: No discharge. Pupils: Pupils are equal, round, and reactive to light. Cardiovascular:      Rate and Rhythm: Normal rate and regular rhythm. Heart sounds: No murmur heard. No friction rub. No gallop. Pulmonary:      Effort: Pulmonary effort is normal. No respiratory distress. Breath sounds: No stridor. No wheezing, rhonchi or rales. Abdominal:      General: Abdomen is flat. There is no distension. Tenderness: There is no abdominal tenderness. There is no right CVA tenderness, left CVA tenderness, guarding or rebound. Musculoskeletal:         General: Swelling present. No tenderness, deformity or signs of injury. Cervical back: No rigidity or tenderness. Right lower leg: No edema. Left lower leg: Edema present. Comments: Erythema, swelling, and warmth from the left ankle down to the foot with no obvious palpable abscess or wound   Lymphadenopathy:      Cervical: No cervical adenopathy. Skin:     General: Skin is warm. Capillary Refill: Capillary refill takes less than 2 seconds. Coloration: Skin is not jaundiced or pale. Findings: No bruising, erythema, lesion or rash. Neurological:      General: No focal deficit present. Mental Status: He is alert and oriented to person, place, and time. Sensory: No sensory deficit. Motor: No weakness. Psychiatric:         Mood and Affect: Mood normal.          MDM  Number of Diagnoses or Management Options  Diagnosis management comments: Patient presents with primary complaint of general malaise, work-up initiated in triage indicate patient has a lactate of greater than 5, this combined with patient's left lower extremity swelling is a likely source and patient does meet SIRS criteria therefore sepsis bundle was initiated to include fluid resuscitation and broad-spectrum antibiotics.   Will evaluate for patient for concomitant electrolyte abnormality, metabolic derangement as well as close monitoring for potential alcohol withdrawal given patient's time course. Anticipate admission. Amount and/or Complexity of Data Reviewed  Clinical lab tests: reviewed  Tests in the radiology section of CPT®: reviewed  Tests in the medicine section of CPT®: reviewed      ED Course as of 07/18/22 0631   Sun Jul 03, 2022 2044 Case was discussed with [facility] team, they will be excepting patient for admission.  [JK]      ED Course User Index  [JK] Shakila Zuluaga MD       Critical Care  Performed by: Shakila Zuluaga MD  Authorized by: Shakila Zuluaga MD     Critical care provider statement:     Critical care time (minutes):  35    Critical care time was exclusive of:  Separately billable procedures and treating other patients and teaching time    Critical care was necessary to treat or prevent imminent or life-threatening deterioration of the following conditions:  Circulatory failure, sepsis and shock    Critical care was time spent personally by me on the following activities:  Development of treatment plan with patient or surrogate, evaluation of patient's response to treatment, examination of patient, interpretation of cardiac output measurements, review of old charts, re-evaluation of patient's condition, pulse oximetry, ordering and review of radiographic studies, ordering and review of laboratory studies and ordering and performing treatments and interventions

## 2022-07-03 NOTE — Clinical Note
Status[de-identified] INPATIENT [101]   Type of Bed: Telemetry [19]   Cardiac Monitoring Required?: Yes   Inpatient Hospitalization Certified Necessary for the Following Reasons: 3.  Patient receiving treatment that can only be provided in an inpatient setting (further clarification in H&P documentation)   Admitting Diagnosis: Severe sepsis [facility]) [[id number]]   Admitting Physician: Pito Fernandez [[id number]]   Attending Physician: Pito Fernandez [[id number]]   Estimated Length of Stay: 3-4 Midnights   Discharge Plan[de-identified] Home with Office Follow-up

## 2022-07-03 NOTE — ED TRIAGE NOTES
Pt states he stopped drinking 2 days ago and has had multiple falls since and having left rib pain. Pt also reports bilateral leg swelling with the left  worse than that right. Pt also reports suicidal ideation without a plan ans states he feels like he is seeing his lights moving in his room.

## 2022-07-04 ENCOUNTER — APPOINTMENT (OUTPATIENT)
Dept: VASCULAR SURGERY | Age: 62
DRG: 897 | End: 2022-07-04
Payer: MEDICARE

## 2022-07-04 PROBLEM — N39.0 UTI (URINARY TRACT INFECTION): Status: ACTIVE | Noted: 2022-07-04

## 2022-07-04 PROBLEM — E46 CHRONIC MALNUTRITION (HCC): Chronic | Status: ACTIVE | Noted: 2021-06-25

## 2022-07-04 PROBLEM — F10.20 ALCOHOLISM (HCC): Chronic | Status: ACTIVE | Noted: 2021-06-25

## 2022-07-04 PROBLEM — F32.A DEPRESSION: Status: ACTIVE | Noted: 2022-07-04

## 2022-07-04 LAB
25(OH)D3 SERPL-MCNC: 17.8 NG/ML (ref 30–100)
AMMONIA PLAS-SCNC: 40 UMOL/L (ref 11–32)
ANION GAP SERPL CALC-SCNC: 8 MMOL/L (ref 3–18)
ATRIAL RATE: 104 BPM
BASOPHILS # BLD: 0.1 K/UL (ref 0–0.1)
BASOPHILS NFR BLD: 1 % (ref 0–2)
BUN SERPL-MCNC: 14 MG/DL (ref 7–18)
BUN/CREAT SERPL: 14 (ref 12–20)
CALCIUM SERPL-MCNC: 8.1 MG/DL (ref 8.5–10.1)
CALCULATED P AXIS, ECG09: 73 DEGREES
CALCULATED R AXIS, ECG10: 75 DEGREES
CALCULATED T AXIS, ECG11: 80 DEGREES
CHLORIDE SERPL-SCNC: 102 MMOL/L (ref 100–111)
CO2 SERPL-SCNC: 27 MMOL/L (ref 21–32)
CREAT SERPL-MCNC: 1.03 MG/DL (ref 0.6–1.3)
DIAGNOSIS, 93000: NORMAL
DIFFERENTIAL METHOD BLD: ABNORMAL
EOSINOPHIL # BLD: 0.1 K/UL (ref 0–0.4)
EOSINOPHIL NFR BLD: 1 % (ref 0–5)
ERYTHROCYTE [DISTWIDTH] IN BLOOD BY AUTOMATED COUNT: 15.1 % (ref 11.6–14.5)
EST. AVERAGE GLUCOSE BLD GHB EST-MCNC: 85 MG/DL
FOLATE SERPL-MCNC: 5.9 NG/ML (ref 3.1–17.5)
GLUCOSE BLD STRIP.AUTO-MCNC: 101 MG/DL (ref 70–110)
GLUCOSE BLD STRIP.AUTO-MCNC: 120 MG/DL (ref 70–110)
GLUCOSE BLD STRIP.AUTO-MCNC: 127 MG/DL (ref 70–110)
GLUCOSE BLD STRIP.AUTO-MCNC: 93 MG/DL (ref 70–110)
GLUCOSE SERPL-MCNC: 180 MG/DL (ref 74–99)
HBA1C MFR BLD: 4.6 % (ref 4.2–5.6)
HCT VFR BLD AUTO: 28.9 % (ref 36–48)
HGB BLD-MCNC: 9.6 G/DL (ref 13–16)
IMM GRANULOCYTES # BLD AUTO: 0 K/UL (ref 0–0.04)
IMM GRANULOCYTES NFR BLD AUTO: 0 % (ref 0–0.5)
LACTATE BLD-SCNC: 1.21 MMOL/L (ref 0.4–2)
LACTATE SERPL-SCNC: 1.9 MMOL/L (ref 0.4–2)
LYMPHOCYTES # BLD: 1.3 K/UL (ref 0.9–3.6)
LYMPHOCYTES NFR BLD: 16 % (ref 21–52)
MCH RBC QN AUTO: 35.2 PG (ref 24–34)
MCHC RBC AUTO-ENTMCNC: 33.2 G/DL (ref 31–37)
MCV RBC AUTO: 105.9 FL (ref 78–100)
MONOCYTES # BLD: 0.8 K/UL (ref 0.05–1.2)
MONOCYTES NFR BLD: 10 % (ref 3–10)
NEUTS SEG # BLD: 5.6 K/UL (ref 1.8–8)
NEUTS SEG NFR BLD: 72 % (ref 40–73)
NRBC # BLD: 0 K/UL (ref 0–0.01)
NRBC BLD-RTO: 0 PER 100 WBC
P-R INTERVAL, ECG05: 138 MS
PLATELET # BLD AUTO: 89 K/UL (ref 135–420)
PMV BLD AUTO: 9.9 FL (ref 9.2–11.8)
POTASSIUM SERPL-SCNC: 3.8 MMOL/L (ref 3.5–5.5)
PSA SERPL-MCNC: 0.4 NG/ML (ref 0–4)
Q-T INTERVAL, ECG07: 346 MS
QRS DURATION, ECG06: 72 MS
QTC CALCULATION (BEZET), ECG08: 454 MS
RBC # BLD AUTO: 2.73 M/UL (ref 4.35–5.65)
SODIUM SERPL-SCNC: 137 MMOL/L (ref 136–145)
VENTRICULAR RATE, ECG03: 104 BPM
VIT B12 SERPL-MCNC: 1118 PG/ML (ref 211–911)
WBC # BLD AUTO: 7.8 K/UL (ref 4.6–13.2)

## 2022-07-04 PROCEDURE — 2709999900 HC NON-CHARGEABLE SUPPLY

## 2022-07-04 PROCEDURE — 84153 ASSAY OF PSA TOTAL: CPT

## 2022-07-04 PROCEDURE — 65270000029 HC RM PRIVATE

## 2022-07-04 PROCEDURE — 74011000250 HC RX REV CODE- 250: Performed by: STUDENT IN AN ORGANIZED HEALTH CARE EDUCATION/TRAINING PROGRAM

## 2022-07-04 PROCEDURE — 82962 GLUCOSE BLOOD TEST: CPT

## 2022-07-04 PROCEDURE — 83036 HEMOGLOBIN GLYCOSYLATED A1C: CPT

## 2022-07-04 PROCEDURE — 82306 VITAMIN D 25 HYDROXY: CPT

## 2022-07-04 PROCEDURE — 74011250636 HC RX REV CODE- 250/636

## 2022-07-04 PROCEDURE — G0378 HOSPITAL OBSERVATION PER HR: HCPCS

## 2022-07-04 PROCEDURE — 74011250637 HC RX REV CODE- 250/637

## 2022-07-04 PROCEDURE — 74011000250 HC RX REV CODE- 250

## 2022-07-04 PROCEDURE — 93970 EXTREMITY STUDY: CPT

## 2022-07-04 PROCEDURE — 74011250636 HC RX REV CODE- 250/636: Performed by: STUDENT IN AN ORGANIZED HEALTH CARE EDUCATION/TRAINING PROGRAM

## 2022-07-04 PROCEDURE — 36415 COLL VENOUS BLD VENIPUNCTURE: CPT

## 2022-07-04 PROCEDURE — 83605 ASSAY OF LACTIC ACID: CPT

## 2022-07-04 PROCEDURE — 82140 ASSAY OF AMMONIA: CPT

## 2022-07-04 PROCEDURE — 85025 COMPLETE CBC W/AUTO DIFF WBC: CPT

## 2022-07-04 PROCEDURE — 82607 VITAMIN B-12: CPT

## 2022-07-04 PROCEDURE — 80048 BASIC METABOLIC PNL TOTAL CA: CPT

## 2022-07-04 RX ORDER — DEXTROSE MONOHYDRATE 100 MG/ML
250 INJECTION, SOLUTION INTRAVENOUS ONCE
Status: DISCONTINUED | OUTPATIENT
Start: 2022-07-04 | End: 2022-07-04

## 2022-07-04 RX ORDER — MELATONIN
1000 DAILY
Status: DISCONTINUED | OUTPATIENT
Start: 2022-07-05 | End: 2022-07-09 | Stop reason: HOSPADM

## 2022-07-04 RX ORDER — INSULIN LISPRO 100 [IU]/ML
INJECTION, SOLUTION INTRAVENOUS; SUBCUTANEOUS
Status: DISCONTINUED | OUTPATIENT
Start: 2022-07-04 | End: 2022-07-09 | Stop reason: HOSPADM

## 2022-07-04 RX ORDER — FLUOXETINE HYDROCHLORIDE 20 MG/1
20 CAPSULE ORAL DAILY
Status: DISCONTINUED | OUTPATIENT
Start: 2022-07-04 | End: 2022-07-09 | Stop reason: HOSPADM

## 2022-07-04 RX ORDER — MAGNESIUM SULFATE 100 %
4 CRYSTALS MISCELLANEOUS AS NEEDED
Status: DISCONTINUED | OUTPATIENT
Start: 2022-07-04 | End: 2022-07-09 | Stop reason: HOSPADM

## 2022-07-04 RX ADMIN — SODIUM CHLORIDE, PRESERVATIVE FREE 10 ML: 5 INJECTION INTRAVENOUS at 22:25

## 2022-07-04 RX ADMIN — SODIUM CHLORIDE, PRESERVATIVE FREE 10 ML: 5 INJECTION INTRAVENOUS at 06:35

## 2022-07-04 RX ADMIN — SODIUM CHLORIDE, PRESERVATIVE FREE 10 ML: 5 INJECTION INTRAVENOUS at 14:00

## 2022-07-04 RX ADMIN — FLUOXETINE 20 MG: 20 CAPSULE ORAL at 10:03

## 2022-07-04 RX ADMIN — SODIUM CHLORIDE, PRESERVATIVE FREE 10 ML: 5 INJECTION INTRAVENOUS at 01:56

## 2022-07-04 RX ADMIN — ATORVASTATIN CALCIUM 40 MG: 40 TABLET, FILM COATED ORAL at 10:02

## 2022-07-04 RX ADMIN — SODIUM CHLORIDE, PRESERVATIVE FREE 10 ML: 5 INJECTION INTRAVENOUS at 22:00

## 2022-07-04 RX ADMIN — LISINOPRIL 10 MG: 10 TABLET ORAL at 10:02

## 2022-07-04 RX ADMIN — SODIUM CHLORIDE, PRESERVATIVE FREE 10 ML: 5 INJECTION INTRAVENOUS at 01:57

## 2022-07-04 RX ADMIN — WATER 1 G: 1 INJECTION INTRAMUSCULAR; INTRAVENOUS; SUBCUTANEOUS at 14:00

## 2022-07-04 RX ADMIN — ENOXAPARIN SODIUM 40 MG: 100 INJECTION SUBCUTANEOUS at 10:03

## 2022-07-04 NOTE — ROUTINE PROCESS
TRANSFER - IN REPORT:    Verbal report received from Corrine Mora RN (name) on Evita Session  being received from ED (unit) for routine progression of care      Report consisted of patients Situation, Background, Assessment and   Recommendations(SBAR). Information from the following report(s) ED Summary was reviewed with the receiving nurse. Opportunity for questions and clarification was provided. Assessment completed upon patients arrival to unit and care assumed.

## 2022-07-04 NOTE — PROGRESS NOTES
Problem: Pressure Injury - Risk of  Goal: *Prevention of pressure injury  Description: Document Kahlil Scale and appropriate interventions in the flowsheet. Outcome: Progressing Towards Goal  Note: Pressure Injury Interventions:  Sensory Interventions: Assess changes in LOC,Keep linens dry and wrinkle-free,Minimize linen layers,Pressure redistribution bed/mattress (bed type)    Moisture Interventions: Apply protective barrier, creams and emollients    Activity Interventions: Pressure redistribution bed/mattress(bed type)    Mobility Interventions: Pressure redistribution bed/mattress (bed type),Turn and reposition approx.  every two hours(pillow and wedges)    Nutrition Interventions: Offer support with meals,snacks and hydration    Friction and Shear Interventions: Apply protective barrier, creams and emollients,Lift sheet,Minimize layers

## 2022-07-04 NOTE — PROGRESS NOTES
-- Bedside, Verbal and Written shift change report given to  GUNNER Sanchez) by - GUNNER Palmer ). Report included the following information SBAR, Kardex, Intake/Output, MAR and Recent Results. -- PM medications administered, pt tolerated with ease, will continue to monitor. -- Shift reassessment, pt condition unchanged, will continue to monitor. --  Shift reassessment, pt sleeping between care, will continue to monitor. -- Bedside, Verbal and Written shift change report given to -Deb MARTINO (oncoming nurse) by Antoinette Reddy (offgoing nurse). Report included the following information SBAR, Kardex, Intake/Output, MAR and Recent Results. Skin assessment completed.

## 2022-07-04 NOTE — PROGRESS NOTES
Problem: Patient Education: Go to Patient Education Activity  Goal: Patient/Family Education  Outcome: Progressing Towards Goal     Problem: Sepsis: Day of Diagnosis  Goal: Off Pathway (Use only if patient is Off Pathway)  Outcome: Progressing Towards Goal  Goal: *Fluid resuscitation  Outcome: Progressing Towards Goal  Goal: *Paired blood cultures prior to first dose of antibiotic  Outcome: Progressing Towards Goal  Goal: *First dose of  appropriate antibiotic within 3 hours of arrival to ED, within 1 hour of arrival to ICU  Outcome: Progressing Towards Goal  Goal: *Lactic acid with first set of blood cultures  Outcome: Progressing Towards Goal  Goal: *Pneumococcal immunization (if eligible)  Outcome: Progressing Towards Goal  Goal: *Influenza immunization (if eligible)  Outcome: Progressing Towards Goal  Goal: Activity/Safety  Outcome: Progressing Towards Goal  Goal: Consults, if ordered  Outcome: Progressing Towards Goal  Goal: Diagnostic Test/Procedures  Outcome: Progressing Towards Goal  Goal: Nutrition/Diet  Outcome: Progressing Towards Goal  Goal: Discharge Planning  Outcome: Progressing Towards Goal  Goal: Medications  Outcome: Progressing Towards Goal  Goal: Respiratory  Outcome: Progressing Towards Goal  Goal: Treatments/Interventions/Procedures  Outcome: Progressing Towards Goal  Goal: Psychosocial  Outcome: Progressing Towards Goal

## 2022-07-04 NOTE — ROUTINE PROCESS
0446 Primary Nurse Ramon Palacios RN and Eloina Boyle RN performed a dual skin assessment on this patient No impairment noted. Bilateral lower extremities very dry and flaky. Kahlil score is 16    0710 Bedside and Verbal shift change report given to Keya Benz RN (oncoming nurse) by Ramon Palacios RN  (offgoing nurse).  Report included the following information SBAR, Intake/Output, MAR, Recent Results and Cardiac Rhythm SR .

## 2022-07-04 NOTE — H&P
[facility].  Admission History and Physical      Patient:    Cheli Ashley      58 y.o. male            MRN:       [id number]                                                                                    Admission Date:         7/3/2022  Code status:                DNR    Cheli Ashley is a 58 y.o. male with a past medical history significant for chronic alcoholism, falls, T2DM, HTN, hypercholesterolemia, chronic malnutrition, and tobacco use being admitted for alcohol withdrawal and a complicated UTI. ASSESSMENT AND PLAN     Acute Cystitis  -Pt presented to ED complaining of feeling overall unwell with weakness. UA in ED significant for moderate blood, positive nitrites, large LE, \"too numerous\" WBCs, and 4+ bacteria. Urine was sent for culture. -SIRS criteria were not met:  WBC normal (8.8), RR wnl, temp 98.6F, HR initially 108 at presentation; resolved in mid-90s.  -Lactate initially elevated at 5.08; decreased to 1.12 after 6 hours.   -Pt denied any fevers, chills, dysuria, hematuria, trouble voiding, abd pain, suprapubic pain.   -Two 1 L NS boluses were administerd in ED with initial suspicion for sepsis (unlikely). -Blood cultures collected and empiric antibiotic therapy was begun in ED with one 1750 mg IV Vancomycin infusion and 4.5 g IV Zosyn. -UTI may be contributing to his weakness. Plan:  -Admit to telemetry  -FU Urine culture  -Abx - s/p Van+Zosyn in ED. Consider changing abx regimen change to ceftriaxone 1 g IV once daily while inpatient; awaiting culture results; narrow abx as indicated by culture.   -labs: daily CBC, BMP  -monitor for urinary retention  -Vitals as per unit protocol     Chronic Alcohol Use Disorder w/ Alcohol Withdrawl  -CXR showed no evidence of aspiration PNA  -Pt endorses a lengthy history of drinking multiple beers and a variety of liquor each day. Pt reports his last drink was two days ago and feels as if he is having withdrawals.  Serum ethanol 18 upon admission. UDS negative. -LFTs:  ALT 27, AST 97, Alk Phos 150  -Chronic malnutrition and hypoalbuminemia (2.9) d/t chronic alcohol use. -Mild macrocytic anemia (Hbg 11; .2) and thrombocytopenia (117) likely secondary to chronic malnutrition and alcoholism. Plan:  -Initiate CIWA protocol. Current scores 1 and 1 d/t tremors (unsure if acute or chronic) , Has not required any Ativan thus far. -Thiamine 300 mg was administered in ED. Continue thiamine at 202 mg with folic acid 1 mg.  -Consult nutrition services,  appreciate their recommendations.   -Check vitamin D, B12, ammonia levels  -Begin vitamin d 1000 international units daily. Left Lower Extremity Swelling and Pain  -Due to asymmetry of swelling and localized tenderness, DVT cannot be ruled out. Pt denies warmth, redness, dyspnea, or chest pain. However, pt has presented with similar problem in the past and was negative for DVT. DDx for his LLE edema include: DVT, chronic venous stasis (consistent with physical exam and skin changes), hypoalbuminemia, or cellulitis.   -Wells' score of 3 (+only for \"clinical signs and symptoms of dvt\")    Plan:  -Duplex US of bilateral LE ordered  -PRN acetaminophen for pain. CKD Stage 3A   -GFR at presentation 57, Baseline GFR >60. Cr 1.27 at admission, baseline Cr variable (range from 0.49 - 1.13). Plan:  -Pt received two 1 L NS fluid boluses in ED. Pt may switch to PO fluids as tolerated. -Monitor I/O. -Monitor daily BMP. Frequent Falls and Generalized Weakness  -Likely multifactorial in the setting of left lower extremity swelling, chronic alcohol abuse, complicated UTI, and chronic malnutrition.   -Monitor clinically for any signs of acute deterioration or focal neurologic deficits. No overt deficits or indication for head imaging at this time. Plan:  -Fall precautions  -Activity with assistance  -Consult PT/OT -- appreciate your assistance and recommendations.      Hypertension  -Pt was hypertensive upon admission (142/90). He denied taking any medications at home.   -Lisinopril 10 mg daily from [employer] rec resumed. Indapamide 1.25 mg held for now, as pt has been of antihypertensives for a long period of time. Plan:  -restart Lisinopril 10mg followed by indapamide 1.25mg as tolerated    Type 2 Diabetes   -Last A1c June 2021 was 4.9  -Blood glucose at presentation 157.   -Pt not taking any medications. Plan:  -POC glucose before meals and at bedtime  -SSI   -Hypoglycemic precautions   -Resume atorvastatin 40 mg daily; ASCVD 10-year risk 35.6% based on lipid panel from 2019 and current state    Depression   Plan:  -Begin Prozac 20 mg every day; follow up with PCP outpatient to discuss changes.  -Consult social work -- appreciate assessment and recommendations. Tobacco Use Disorder  -Pt smokes about 8 cigarettes per day    Plan:  -Nicoderm patches to prevent nicotine withdrawal  -smoking cessation counselling    Global:  Code: DNR  IVF/Drips: Received two fluid boluses in ED. If tolerating PO intake well, may discontinue IVF. I/O / Wt: Monitor I/O and daily weights  Diet: Adult cardiac  DVT/AC: Lovenox 40 mg SQ  Mobility: per protocol; up with assistance  Disposition: Inpatient  Anticipated LOS:<2 days     Point of Contact:  Dennis Porter (brother):  [phone]    SUBJECTIVE:  History of Present Illness:    Quita Perdue is a 58 y.o. white  male with a past medical history significant for chronic alcoholism, falls, T2DM, HTN, hypercholesterolemia, chronic malnutrition, and tobacco use who presented to [facility] ED on 07/03/2022 for alcohol withdrawal, frequent falls, and a complicated UTI. Patient reports calling EMS today (07/03/22) d/t overall feeling unwell, stating his left lower leg was swollen and that he was withdrawing from alcohol. LLE edema has been a chronic condition for this pt, and to which he attributes his overall weakness and frequent falls.  His leg is painful only with deep touch, and he denies any redness, warmth, or loss of sensation. No shortness of breath, chest pain, racing heart, or palpitations. Additionally, pt admits to decades of alcohol abuse with periods of abstinence. He reports drinking several beers and liquor daily, but stopped two days ago (7/1/22). Pt reports that he is withdrawing, and stated that he was feeling \"weird\" and thought he saw lights shifting. Additionally, he endorsed profound depression and passive suicidal ideation (without plan or intent). Of note, Mr. Kunal Doherty is quite immobile at home and often incontinent d/t his inability to walk to the restroom-- again, attributed to his LLE edema and falls. Pt has not been taking any medications at home and is a poor historian. No other acute concerns or complaints. ED Course:  -Afebrile, hypertensive (ranging from 130s - 150s SBP), HR overall WNL, O2 sats stable on room air  -Labs:    Lactic Acid 5.08 --> 1.21   EtOH serum: 18   UA: Moderate blood, small bili, +nitrites, large LE, few epithelial cells, WBC TNTC, 4+ bacteria,  ketones 15, protein 30    WBC normal (8.8), Hbg 11, plt 117  -Imaging: CXR normal; B/L LE duplex US not yet performed  -EKG: Sinus tachycardia; otherwise normal ECG; unchanged compared to June 2021  -Meds: vancomycin 1750 mg IV x 1, zosyn 4.5 g IV x 1, thiamin 300 mg IV, mg sulfate 2 g  -IVF: two 1 L NS boluses  -Procedures: none  -Consults: none     Did non-adherence with patient's outpatient treatment plan contribute to this admission? Yes  If yes, please explain   Pt was to trial indapamide per PCP for LLE edema in Dec. 2020 but not compliant with medical management. PMHx, PSHx, SHx, FHx, MEDS, ALLERGIES:   No past medical history on file. No past surgical history on file. Social History     Tobacco Use    Smoking status: Light Tobacco Smoker    Smokeless tobacco: Never Used   Substance Use Topics    Alcohol use:  Yes    Drug use: Not Currently     No family history on file. No Known Allergies    Prior to Admission Medications   Prescriptions Last Dose Informant Patient Reported? Taking?   atorvastatin (LIPITOR) 40 mg tablet Not Taking at Unknown time  Yes No   Sig: Take 40 mg by mouth daily. Patient not taking: Reported on 7/3/2022   clotrimazole-betamethasone (LOTRISONE) topical cream Not Taking at Unknown time  Yes No   Sig: Apply  to affected area two (2) times a day. Patient not taking: Reported on 7/3/2022   indapamide (LOZOL) 1.25 mg tablet Not Taking at Unknown time  Yes No   Sig: Take 1.25 mg by mouth daily. Patient not taking: Reported on 7/3/2022   lidocaine (Salonpas, lidocaine,) 4 % patch Not Taking at Unknown time  No No   Sig: Use once daily. Patient not taking: Reported on 7/3/2022   lisinopriL (PRINIVIL, ZESTRIL) 10 mg tablet Not Taking at Unknown time  Yes No   Sig: Take 10 mg by mouth daily. Patient not taking: Reported on 7/3/2022   triamcinolone acetonide (KENALOG) 0.5 % ointment Not Taking at Unknown time  Yes No   Sig: Apply  to affected area two (2) times a day.  use thin layer   Patient not taking: Reported on 7/3/2022      Facility-Administered Medications: None        ROS     (positive findings are in BOLD; negative findings are in regular font)  Constitutional: No fevers, chills   Cardiovascular: +Left lower extremity edema, no chest pain, palpitations  Pulmonary: +occasional cough, +occasional sputum, no SOB or chest tightness  Gastrointestinal: No abdominal pain, nausea/vomiting, diarrhea, constipation  Genitourinary: No dysuria, hesitation, hematuria  Musculoskeletal: +Left lower extremity tenderness; no myalgias  Neurological: +frequent falls, +generalized weakness  Psychiatric: +depression, +passive suicidal ideation, +alcohol abuse, no suicidal/self-injurious plans/intent  Heme: +easy bruising     OBJECTIVE:  Patient Vitals for the past 24 hrs:   BP Temp Pulse Resp SpO2 Height Weight   07/04/22 0128 -- -- -- -- -- 6' 2\" (1.88 m) 68.1 kg (150 lb 1.6 oz)   07/04/22 0121 (!) 158/76 98.4 °F (36.9 °C) 89 18 96 % -- --   07/04/22 0015 138/76 98.6 °F (37 °C) 86 16 94 % -- --   07/04/22 0001 139/80 -- 86 -- 94 % -- --   07/03/22 2346 (!) 143/74 -- 85 -- -- -- --   07/03/22 2331 138/78 -- 89 -- -- -- --   07/03/22 2316 (!) 144/94 -- 95 -- -- -- --   07/03/22 2301 (!) 148/77 -- 90 -- 94 % -- --   07/03/22 2246 137/77 -- 95 -- -- -- --   07/03/22 2231 (!) 146/80 -- 91 -- 94 % -- --   07/03/22 2200 (!) 145/76 98.9 °F (37.2 °C) 85 16 95 % -- --   07/03/22 2145 (!) 149/78 -- 95 -- 100 % -- 71.7 kg (158 lb)   07/03/22 2130 (!) 145/79 -- 82 -- 97 % -- --   07/03/22 2115 (!) 142/74 -- 91 -- 98 % -- --   07/03/22 2059 138/78 -- 93 19 96 % -- --   07/03/22 2044 (!) 141/74 -- 93 20 96 % -- --   07/03/22 2029 (!) 142/75 -- 96 16 95 % -- --   07/03/22 2014 (!) 142/81 -- 94 17 94 % -- --   07/03/22 1959 (!) 146/81 -- 90 15 96 % -- --   07/03/22 1944 (!) 155/86 -- 98 20 96 % -- --   07/03/22 1929 (!) 141/83 -- 92 22 94 % -- --   07/03/22 1827 (!) 142/90 99 °F (37.2 °C) (!) 108 18 95 % -- --     Body mass index is 19.27 kg/m². PHYSICAL EXAM:    General: The patient appears in no acute distress. Neck: no bruit, no tenderness to palpation  CVS: regular rate and rhythm, S1, S2 normal, no murmur, click, rub or gallop  Lungs: chest clear, no wheezing, rales, normal symmetric air entry, Heart exam - S1, S2 normal, no murmur, no gallop, rate regular   Abdomen: Soft, non-distended, non-TTP, BS+, no organomegaly, no masses  Ext: +Left lower extremity edema with 3+ pitting edema on dorsum of L foot and 2+ pitting edema extending mid calf. +Tenderness to palpation of LLE. Negative Reba's sign on L. No edema of RLE. BLE warm and well-profused. 1+ dorsal pedal pulse on R foot. LLE pulse not palpable d/t edema. Skin: +Skin changes consistent with chronic venous stasis noted BLE, more severe in LLE (including flaking, crusting, and dusky coloration.) Warm, Dry, Intact. No significant rashes/petechia/ecchymosis  Neuro: No focal neurologic deficits or gross abnormalities  Psych: markedly dysphoric mood and passive suicidal ideation; no suicidal/self-injurious plans or intent     RECENT LABS AND IMAGING ON ADMISSION   Recent Results (from the past 24 hour(s))   CBC WITH AUTOMATED DIFF    Collection Time: 07/03/22  6:20 PM   Result Value Ref Range    WBC 8.8 4.6 - 13.2 K/uL    RBC 3.23 (L) 4.35 - 5.65 M/uL    HGB 11.0 (L) 13.0 - 16.0 g/dL    HCT 33.0 (L) 36.0 - 48.0 %    .2 (H) 78.0 - 100.0 FL    MCH 34.1 (H) 24.0 - 34.0 PG    MCHC 33.3 31.0 - 37.0 g/dL    RDW 14.9 (H) 11.6 - 14.5 %    PLATELET 336 (L) 478 - 420 K/uL    MPV 9.4 9.2 - 11.8 FL    NRBC 0.0 0  WBC    ABSOLUTE NRBC 0.00 0.00 - 0.01 K/uL    NEUTROPHILS 76 (H) 40 - 73 %    LYMPHOCYTES 14 (L) 21 - 52 %    MONOCYTES 8 3 - 10 %    EOSINOPHILS 1 0 - 5 %    BASOPHILS 1 0 - 2 %    IMMATURE GRANULOCYTES 0 0.0 - 0.5 %    ABS. NEUTROPHILS 6.7 1.8 - 8.0 K/UL    ABS. LYMPHOCYTES 1.2 0.9 - 3.6 K/UL    ABS. MONOCYTES 0.7 0.05 - 1.2 K/UL    ABS. EOSINOPHILS 0.1 0.0 - 0.4 K/UL    ABS. BASOPHILS 0.1 0.0 - 0.1 K/UL    ABS. IMM. GRANS. 0.0 0.00 - 0.04 K/UL    DF AUTOMATED     PROTHROMBIN TIME + INR    Collection Time: 07/03/22  6:20 PM   Result Value Ref Range    Prothrombin time 13.3 11.5 - 15.2 sec    INR 1.0 0.8 - 1.2     METABOLIC PANEL, COMPREHENSIVE    Collection Time: 07/03/22  6:20 PM   Result Value Ref Range    Sodium 135 (L) 136 - 145 mmol/L    Potassium 3.7 3.5 - 5.5 mmol/L    Chloride 96 (L) 100 - 111 mmol/L    CO2 22 21 - 32 mmol/L    Anion gap 17 3.0 - 18 mmol/L    Glucose 157 (H) 74 - 99 mg/dL    BUN 15 7.0 - 18 MG/DL    Creatinine 1.27 0.6 - 1.3 MG/DL    BUN/Creatinine ratio 12 12 - 20      GFR est AA >60 >60 ml/min/1.73m2    GFR est non-AA 57 (L) >60 ml/min/1.73m2    Calcium 8.6 8.5 - 10.1 MG/DL    Bilirubin, total 2.2 (H) 0.2 - 1.0 MG/DL    ALT (SGPT) 27 16 - 61 U/L    AST (SGOT) 97 (H) 10 - 38 U/L    Alk. phosphatase 150 (H) 45 - 117 U/L    Protein, total 7.7 6.4 - 8.2 g/dL    Albumin 2.9 (L) 3.4 - 5.0 g/dL    Globulin 4.8 (H) 2.0 - 4.0 g/dL    A-G Ratio 0.6 (L) 0.8 - 1.7     LIPASE    Collection Time: 07/03/22  6:20 PM   Result Value Ref Range    Lipase 93 73 - 393 U/L   MAGNESIUM    Collection Time: 07/03/22  6:20 PM   Result Value Ref Range    Magnesium 1.5 (L) 1.6 - 2.6 mg/dL   TROPONIN-HIGH SENSITIVITY    Collection Time: 07/03/22  6:20 PM   Result Value Ref Range    Troponin-High Sensitivity 9 0 - 78 ng/L   ETHYL ALCOHOL    Collection Time: 07/03/22  6:20 PM   Result Value Ref Range    ALCOHOL(ETHYL),SERUM 18 (H) 0 - 3 MG/DL   URINALYSIS W/ RFLX MICROSCOPIC    Collection Time: 07/03/22  6:33 PM   Result Value Ref Range    Color DARK YELLOW      Appearance TURBID      Specific gravity 1.019 1.005 - 1.030      pH (UA) 5.5 5.0 - 8.0      Protein 30 (A) NEG mg/dL    Glucose Negative NEG mg/dL    Ketone 15 (A) NEG mg/dL    Bilirubin SMALL (A) NEG      Blood MODERATE (A) NEG      Urobilinogen 1.0 0.2 - 1.0 EU/dL    Nitrites Positive (A) NEG      Leukocyte Esterase LARGE (A) NEG     DRUG SCREEN, URINE    Collection Time: 07/03/22  6:33 PM   Result Value Ref Range    BENZODIAZEPINES Negative NEG      BARBITURATES Negative NEG      THC (TH-CANNABINOL) Negative NEG      OPIATES Negative NEG      PCP(PHENCYCLIDINE) Negative NEG      COCAINE Negative NEG      AMPHETAMINES Negative NEG      METHADONE Negative NEG      HDSCOM (NOTE)    URINE MICROSCOPIC ONLY    Collection Time: 07/03/22  6:33 PM   Result Value Ref Range    WBC TOO NUMEROUS TO COUNT 0 - 4 /hpf    RBC 0 to 1 0 - 5 /hpf    Epithelial cells FEW 0 - 5 /lpf    Bacteria 4+ (A) NEG /hpf   POC LACTIC ACID    Collection Time: 07/03/22  6:41 PM   Result Value Ref Range    Lactic Acid (POC) 5.08 (HH) 0.40 - 2.00 mmol/L   EKG, 12 LEAD, INITIAL    Collection Time: 07/03/22  6:46 PM   Result Value Ref Range    Ventricular Rate 104 BPM    Atrial Rate 104 BPM    P-R Interval 138 ms    QRS Duration 72 ms    Q-T Interval 346 ms    QTC Calculation (Bezet) 454 ms    Calculated P Axis 73 degrees    Calculated R Axis 75 degrees    Calculated T Axis 80 degrees    Diagnosis       Sinus tachycardia  Otherwise normal ECG  When compared with ECG of 25-JUN-2021 20:49,  No significant change was found     POC LACTIC ACID    Collection Time: 07/04/22 12:06 AM   Result Value Ref Range    Lactic Acid (POC) 1.21 0.40 - 2.00 mmol/L        XR (Most Recent). Results from [place] encounter on 07/03/22    XR CHEST PA LAT    Narrative  Two view chest    CPT code: 64565    CLINICAL HISTORY: Leg swelling. Patient stopped drinking 2 days ago after 40  years of drinking. ? Suspect patient will go into alcohol withdrawal he needs to  be observed for withdrawal. Suicidal ideation. TECHNIQUE: 2 views of the chest.    COMPARISON:   7/4/2021    FINDINGS:   The lungs are clear. The cardiomediastinal silhouette is  unremarkable. There are no effusions. Pulmonary vascularity is normal.    Impression  Normal chest.       CT (Most Recent) Results from Hospital Encounter encounter on 06/25/21    CT MAXILLOFACIAL WO CONT    Narrative  CT SINUSES, NON CONTRAST    CPT CODE: 07807    INDICATION: Clinical concern for sinusitis. Sepsis. COMPARISON: 12/6/2011. Head CT 6/30/2021. TECHNIQUE: Serial axial CT images through the paranasal sinuses were obtained  without administration of IV contrast.  Coronal and sagittal reformation images  were also performed. All CT scans at this facility are performed using dose optimization technique as  appropriate to the performed exam, to include automated exposure control,  adjustment of the mA and/or kV according to patient's size (Including  appropriate matching for site-specific examinations), or use of iterative  reconstruction technique. FINDINGS:    Small 1 cm mucous retention cyst versus polyp lateral right frontal sinus.  Small  9 mm mucous retention cyst versus polyp posterior left sphenoid sinus. Minor  mucosal thickening left maxillary sinus inferiorly. The fronto-ethmoidal complexes, maxillary and sphenoid sinuses otherwise  demonstrate no significant mucosal pathology. No air fluid levels. The  osteomeatal complexes appear patent. There is mild rightward nasal septal  deviation. No acute fractures or destructive bone lesions are seen. Impression  Small right frontal and left sphenoidal mucous retention cysts versus polyps. Mild left maxillary sinus mucosal thickening. No evidence of acute sinusitis. ECHO No results found for this or any previous visit. EKG No results found for this or any previous visit. I have discussed Mr. Ana Paula Miller case with my attending who agrees with the plan of care. Higinio Gerard DO , PGY-1   [facility]   July 4, 2022, 10:45 PM       [facility]  Senior Addendum to History and Physical    I have also seen and independently evaluated the patient. I agree with the plan as noted above. Additional HPI, A/P: 63yo M PMHx chronic alcoholism, falls, T2DM, HTN, hypercholesterolemia, chronic malnutrition, and tobacco use admitted for acute cystitis and alcohol withdrawal. In the ED, patient tachycardic with LA 5.08. Sepsis protocol initiated in the ED with fluids and broad spectrum antibiotics (vanc & zosyn) due to concerns of urosepsis. UA significant for Nitrates, large Leukocyte Esterase, 4+ bacteria and numerous WBCs. Patient has been afebrile, no leukocytosis. LA downtrended to 1.21 following fluids, LA most likely elevated 2/2 to alcohol and dehydration. Given patient's Hx of chronic alcoholism and recent alcohol consumption, concern for alcohol withdrawal. CIWA scores low (1,1) due to tremors which may be chronic; no indication for Ativan thus far. Patient also presented with bilateral lower extremity swelling with erythema, pain and more pronounced edema in the left leg.  Changes may be 2/2 to chronic stasis dermatitis, low suspicion for cellulitis given no well demarcated induration/erythema on exam. PVLs to evaluate for DVTs with Hx of immobility reported by patient.     Vitals, labs and imaging reviewed     For additional problem list, assessment, and plan see intern note    Flaco Dacosta MD , PGY-3   [facility]   July 4, 2022, 3:35 AM

## 2022-07-04 NOTE — PROGRESS NOTES
conducted an initial consultation and Spiritual Assessment for Quita Perdue, who is a 58 y. o.,male. Patient's Primary Language is: [place]. According to the patient's EMR Jain Affiliation is: Hoahaoism. The reason the Patient came to the hospital is:   Patient Active Problem List    Diagnosis Date Noted    Complicated UTI (urinary tract infection) 07/03/2022    Depression 07/04/2022    UTI (urinary tract infection) 07/04/2022    Pain and swelling of left lower leg 07/03/2022    Frequent falls 07/03/2022    Alcohol withdrawal ([address] 07/03/2022    Hypoalbuminemia 07/03/2022    CKD (chronic kidney disease) stage 3, GFR 30-59 ml/min ([address] 07/03/2022    Hypertension 07/03/2022    Alcoholism ([address] 06/25/2021    Chronic malnutrition ([address] 06/25/2021    Self-care deficit for bathing and hygiene 06/25/2021    Macrocytic anemia 06/25/2021    Hypokalemia 06/25/2021    Alcohol abuse 06/25/2021    Leukocytosis 02/29/2020    Hypotension 02/29/2020    Severe sepsis ([address] 02/29/2020    GAIL (acute kidney injury) ([address] 02/29/2020    Cellulitis of left lower extremity 02/29/2020    Acute leg pain, left 02/29/2020    Cellulitis 02/29/2020        The  provided the following Interventions:  Initiated a relationship of care and support through this introductory visit. Explored for spiritual needs while hospitalized. Provided information about Spiritual Care Services. Invited him to request a  later in his hospitalization if desired for support. Chart reviewed. The following outcomes where achieved:  Patient expressed gratitude for 's visit. Assessment:  Patient does not have any Episcopalian/cultural needs that will affect patient's preferences in health care. There are no spiritual or Episcopalian issues which require intervention at this time. Plan:   will provide pastoral care on an as needed/requested basis.    recommends bedside caregivers page  on duty if patient shows signs of acute spiritual or emotional distress.     [address] [place]   [phone]

## 2022-07-05 PROBLEM — E55.9 HYPOVITAMINOSIS D: Status: ACTIVE | Noted: 2022-07-05

## 2022-07-05 PROBLEM — D69.6 PLATELET COUNT LESS 100,000 PER CUBIC MILLIMETER (HCC): Status: ACTIVE | Noted: 2022-07-05

## 2022-07-05 LAB
ANION GAP SERPL CALC-SCNC: 5 MMOL/L (ref 3–18)
BACTERIA SPEC CULT: NORMAL
BASOPHILS # BLD: 0.1 K/UL (ref 0–0.1)
BASOPHILS NFR BLD: 1 % (ref 0–2)
BUN SERPL-MCNC: 8 MG/DL (ref 7–18)
BUN/CREAT SERPL: 14 (ref 12–20)
CALCIUM SERPL-MCNC: 8.2 MG/DL (ref 8.5–10.1)
CC UR VC: NORMAL
CHLORIDE SERPL-SCNC: 98 MMOL/L (ref 100–111)
CO2 SERPL-SCNC: 29 MMOL/L (ref 21–32)
CREAT SERPL-MCNC: 0.58 MG/DL (ref 0.6–1.3)
DIFFERENTIAL METHOD BLD: ABNORMAL
EOSINOPHIL # BLD: 0.1 K/UL (ref 0–0.4)
EOSINOPHIL NFR BLD: 2 % (ref 0–5)
ERYTHROCYTE [DISTWIDTH] IN BLOOD BY AUTOMATED COUNT: 14.5 % (ref 11.6–14.5)
GLUCOSE BLD STRIP.AUTO-MCNC: 109 MG/DL (ref 70–110)
GLUCOSE BLD STRIP.AUTO-MCNC: 132 MG/DL (ref 70–110)
GLUCOSE BLD STRIP.AUTO-MCNC: 186 MG/DL (ref 70–110)
GLUCOSE SERPL-MCNC: 103 MG/DL (ref 74–99)
HCT VFR BLD AUTO: 29.7 % (ref 36–48)
HGB BLD-MCNC: 9.9 G/DL (ref 13–16)
IMM GRANULOCYTES # BLD AUTO: 0 K/UL (ref 0–0.04)
IMM GRANULOCYTES NFR BLD AUTO: 1 % (ref 0–0.5)
LYMPHOCYTES # BLD: 1.7 K/UL (ref 0.9–3.6)
LYMPHOCYTES NFR BLD: 22 % (ref 21–52)
MCH RBC QN AUTO: 35 PG (ref 24–34)
MCHC RBC AUTO-ENTMCNC: 33.3 G/DL (ref 31–37)
MCV RBC AUTO: 104.9 FL (ref 78–100)
MONOCYTES # BLD: 0.8 K/UL (ref 0.05–1.2)
MONOCYTES NFR BLD: 10 % (ref 3–10)
NEUTS SEG # BLD: 5.1 K/UL (ref 1.8–8)
NEUTS SEG NFR BLD: 65 % (ref 40–73)
NRBC # BLD: 0 K/UL (ref 0–0.01)
NRBC BLD-RTO: 0 PER 100 WBC
PLATELET # BLD AUTO: 97 K/UL (ref 135–420)
PMV BLD AUTO: 9.9 FL (ref 9.2–11.8)
POTASSIUM SERPL-SCNC: 3.5 MMOL/L (ref 3.5–5.5)
RBC # BLD AUTO: 2.83 M/UL (ref 4.35–5.65)
SERVICE CMNT-IMP: NORMAL
SODIUM SERPL-SCNC: 132 MMOL/L (ref 136–145)
WBC # BLD AUTO: 7.8 K/UL (ref 4.6–13.2)

## 2022-07-05 PROCEDURE — 85025 COMPLETE CBC W/AUTO DIFF WBC: CPT

## 2022-07-05 PROCEDURE — 82962 GLUCOSE BLOOD TEST: CPT

## 2022-07-05 PROCEDURE — 97161 PT EVAL LOW COMPLEX 20 MIN: CPT

## 2022-07-05 PROCEDURE — 74011250636 HC RX REV CODE- 250/636: Performed by: STUDENT IN AN ORGANIZED HEALTH CARE EDUCATION/TRAINING PROGRAM

## 2022-07-05 PROCEDURE — 65270000029 HC RM PRIVATE

## 2022-07-05 PROCEDURE — 36415 COLL VENOUS BLD VENIPUNCTURE: CPT

## 2022-07-05 PROCEDURE — G0378 HOSPITAL OBSERVATION PER HR: HCPCS

## 2022-07-05 PROCEDURE — 74011000250 HC RX REV CODE- 250: Performed by: STUDENT IN AN ORGANIZED HEALTH CARE EDUCATION/TRAINING PROGRAM

## 2022-07-05 PROCEDURE — 97116 GAIT TRAINING THERAPY: CPT

## 2022-07-05 PROCEDURE — 74011636637 HC RX REV CODE- 636/637: Performed by: FAMILY MEDICINE

## 2022-07-05 PROCEDURE — 74011000250 HC RX REV CODE- 250

## 2022-07-05 PROCEDURE — 80048 BASIC METABOLIC PNL TOTAL CA: CPT

## 2022-07-05 PROCEDURE — 74011250636 HC RX REV CODE- 250/636

## 2022-07-05 PROCEDURE — 74011250637 HC RX REV CODE- 250/637

## 2022-07-05 RX ORDER — LANOLIN ALCOHOL/MO/W.PET/CERES
100 CREAM (GRAM) TOPICAL DAILY
Status: DISCONTINUED | OUTPATIENT
Start: 2022-07-06 | End: 2022-07-09 | Stop reason: HOSPADM

## 2022-07-05 RX ORDER — IBUPROFEN 200 MG
1 TABLET ORAL DAILY
Qty: 30 PATCH | Refills: 0 | Status: SHIPPED | OUTPATIENT
Start: 2022-07-06 | End: 2022-07-09

## 2022-07-05 RX ORDER — THERA TABS 400 MCG
1 TAB ORAL DAILY
Status: DISCONTINUED | OUTPATIENT
Start: 2022-07-06 | End: 2022-07-09 | Stop reason: HOSPADM

## 2022-07-05 RX ORDER — FLUOXETINE HYDROCHLORIDE 20 MG/1
20 CAPSULE ORAL DAILY
Qty: 30 CAPSULE | Refills: 0 | Status: SHIPPED | OUTPATIENT
Start: 2022-07-06 | End: 2022-07-09

## 2022-07-05 RX ORDER — MELATONIN
1000 DAILY
Qty: 30 TABLET | Refills: 0 | Status: SHIPPED | OUTPATIENT
Start: 2022-07-06 | End: 2022-07-09

## 2022-07-05 RX ORDER — ASPIRIN 81 MG
1 TABLET,CHEWABLE ORAL DAILY
Qty: 100 TABLET | Refills: 0 | Status: SHIPPED | OUTPATIENT
Start: 2022-07-05 | End: 2022-07-09

## 2022-07-05 RX ORDER — CIPROFLOXACIN 500 MG/1
500 TABLET ORAL 2 TIMES DAILY
Qty: 10 TABLET | Refills: 0 | Status: SHIPPED | OUTPATIENT
Start: 2022-07-06 | End: 2022-07-08

## 2022-07-05 RX ORDER — THIAMINE HCL 250 MG
250 TABLET ORAL DAILY
Qty: 30 TABLET | Refills: 0 | Status: SHIPPED | OUTPATIENT
Start: 2022-07-05 | End: 2022-07-09

## 2022-07-05 RX ADMIN — ENOXAPARIN SODIUM 40 MG: 100 INJECTION SUBCUTANEOUS at 08:36

## 2022-07-05 RX ADMIN — SODIUM CHLORIDE, PRESERVATIVE FREE 10 ML: 5 INJECTION INTRAVENOUS at 06:06

## 2022-07-05 RX ADMIN — SODIUM CHLORIDE, PRESERVATIVE FREE 10 ML: 5 INJECTION INTRAVENOUS at 13:26

## 2022-07-05 RX ADMIN — FLUOXETINE 20 MG: 20 CAPSULE ORAL at 08:35

## 2022-07-05 RX ADMIN — Medication 1000 UNITS: at 08:34

## 2022-07-05 RX ADMIN — ATORVASTATIN CALCIUM 40 MG: 40 TABLET, FILM COATED ORAL at 08:55

## 2022-07-05 RX ADMIN — SODIUM CHLORIDE, PRESERVATIVE FREE 10 ML: 5 INJECTION INTRAVENOUS at 13:27

## 2022-07-05 RX ADMIN — SODIUM CHLORIDE, PRESERVATIVE FREE 10 ML: 5 INJECTION INTRAVENOUS at 22:58

## 2022-07-05 RX ADMIN — WATER 1 G: 1 INJECTION INTRAMUSCULAR; INTRAVENOUS; SUBCUTANEOUS at 13:25

## 2022-07-05 RX ADMIN — LISINOPRIL 10 MG: 10 TABLET ORAL at 08:35

## 2022-07-05 RX ADMIN — SODIUM CHLORIDE, PRESERVATIVE FREE 10 ML: 5 INJECTION INTRAVENOUS at 06:00

## 2022-07-05 NOTE — PROGRESS NOTES
This nurse tried to talk with the patient about being discharged home , but pt started cursing and stating that no body had told him about going home. He further stated that he is missing his credit cards, and some of his personal belongings but would not state which belongings in particular. This patient also said that he had no one to give him a ride home and that he needed rehabilitation. The  was called to talk with patient but patient still insisted that no one has told him of being d/c. The [facility] family residents were paged to talk with patient at this time. Will continue to monitor.

## 2022-07-05 NOTE — DISCHARGE SUMMARY
Molly Montano SUMMARY      Name:   Marissa Goyal 58 y.o. male  MRN:   [id number]  CSN:   [id number]  Admission Date:  7/3/2022  Discharge Date:  7/5/2022  Attending:             Fatoumata Murcia MD   PCP:              Dk Hamilton MD   ================================================================  Reason for Admission:   Alcohol withdrawal [facility]) [F10.239]  UTI (urinary tract infection) [N39.0]    Discharge Diagnosis:    Acute Cystitis  Chronic Alcohol Use Disorder w/ Alcohol Withdrawal  Left Lower Extremity Swelling  CKD Stage 3A  Frequent Falls  Hypertension  T2DM  Depression  Tobacco Use Disorder  Hypovitaminosis D  Macrocytic Anemia  Low platelets    Follow-up studies/evaluations for PCP/Important Notes to PCP:  · F/u low platelets   · F/u Hypovitaminosis D  · 1000 units Vitamin D daily  · Repeat 25(OH) Vitamin D level in 3 months  · F/u elevated Alk Phos  · GGT level  · 5' nucleosidase level  · Pending labs/investigations to follow up as below  · Urine Culture  · Alcohol Use Disorder/Nicotine Use  · F/u potential desire for cessation assistance  · Thiamine supplementation 30 mg daily for 1 month  · Medication reconciliation:  · Discontinued Medications: none  · New Medications: Prozac 20 mg, Lisinopril 10 mg, Atorvastatin 40 mg, Vitamin D 1000 units daily,  Thiamine 30 mg daily, daily multivitamin     LANRE Follow Up Appointment:   Follow-up Information    None         Recommended follow-up after LANRE visit: Patient needs follow-up visit 4 weeks after LANRE appointment, then at provider discretion thereafter.       Readmission prevention plan:    Alcohol cessation counseling   Vitamin supplementation    GOALS OF CARE (including Code Status, Advanced Care Plan):    Full code    Pending labs/ investigations at discharge to follow up:    Urine culture    Operative Procedures:   none    Consultants:    none    Condition at discharge: Afebrile  Ambulating  Eating, Drinking, Voiding  Stable    Disposition at Discharge:  Home    Functional Status at Discharge: Ambulates independently    Diet: Regular diet     Discharge Medications:  Current Discharge Medication List      START taking these medications    Details   cholecalciferol (VITAMIN D3) (1000 Units /25 mcg) tablet Take 1 Tablet by mouth daily. Qty: 30 Tablet, Refills: 0  Start date: 7/6/2022      FLUoxetine (PROzac) 20 mg capsule Take 1 Capsule by mouth daily. Qty: 30 Capsule, Refills: 0  Start date: 7/6/2022      nicotine (NICODERM CQ) 14 mg/24 hr patch 1 Patch by TransDERmal route daily for 30 days. Qty: 30 Patch, Refills: 0  Start date: 7/6/2022, End date: 8/5/2022      ciprofloxacin HCl (Cipro) 500 mg tablet Take 1 Tablet by mouth two (2) times a day for 5 days. Qty: 10 Tablet, Refills: 0  Start date: 7/6/2022, End date: 8/09/9351      mv-mn-folic-Q10-lycopen-lutein (Theragran-M Premier 50 Plus) 400-250-375 mcg tab Take 1 Tablet by mouth daily. Qty: 100 Tablet, Refills: 0  Start date: 7/5/2022      thiamine hcl 250 mg tablet Take 250 mg by mouth daily. Qty: 30 Tablet, Refills: 0  Start date: 7/5/2022         CONTINUE these medications which have NOT CHANGED    Details   atorvastatin (LIPITOR) 40 mg tablet Take 40 mg by mouth daily. lisinopriL (PRINIVIL, ZESTRIL) 10 mg tablet Take 10 mg by mouth daily. indapamide (LOZOL) 1.25 mg tablet Take 1.25 mg by mouth daily. triamcinolone acetonide (KENALOG) 0.5 % ointment Apply  to affected area two (2) times a day. use thin layer      clotrimazole-betamethasone (LOTRISONE) topical cream Apply  to affected area two (2) times a day. lidocaine (Salonpas, lidocaine,) 4 % patch Use once daily.   Qty: 1 Package, Refills: 0               Hospital Course:   Swapnil Colon is a 58 y.o. white  male with a past medical history significant for chronic alcoholism, falls, T2DM, HTN, hyperlipidemia, chronic malnutrition, and tobacco use who presented to [facility] ED on 07/03/2022 for alcohol withdrawal, frequent falls, and a complicated UTI. Patient reports calling EMS on 07/03/22 d/t overall feeling unwell, stating his left lower leg was swollen and that he was withdrawing from alcohol. LLE edema has been a chronic condition for this pt, to which he attributes his overall weakness and frequent falls. Additionally, pt relayed profound depression and passive suicidal ideation without intent. Of note, pt is a poor historian and not adherent to any medication regimens outpatient. VSS on admission. UA performed in ED was indicative of UTI. Lactate was initially elevated at 5.08, which decreased to 1.12 after 6 hours. IVF were administered and empiric antibiotic coverage was started w/ IV Vancomycin and IV Zosyn. Blood and urine cultures sent. Bilateral duplex US of lower ext. was negative for DVT Jelena Reason' score 3 before scan). Pt was admitted to telemetry for management of UTI and alcohol withdrawal. Home meds were started with the exception of indapamide. He was started on thiamine and folic acid. Scores consistently low (1-2 d/t tremors). Patient's problem list was managed in hospital as stated below:     Acute Cystitis  -UA positive for UTI. -Urine Cultureis pending.   -Pt received 2 days of IV antibiotics coverage in hospital. He was discharged home with a 5 day course of Ciprofloxacin 500 mg BID (through 7/10)    Chronic Alcohol Use Disorder w/ Alcohol Withdrawl  -Noted to have a macrocytic anemia, elevated alk phos thrombocytopenia, low vitamin D, and low platelets. - He was started on supplementation for vitamin D, thiamine, and multivitamin.  - Follow-up on elevated alk phos outpatient      Left Lower Extremity Swelling and Pain  -Duplex US bilateral LE unremarkable, likely venous stasis changes. CKD Stage 3A   -GFR at presentation 57, Baseline GFR >60. Cr 1.27 at admission, baseline Cr variable (range from 0.49 - 1.13).      Frequent Falls and Generalized Weakness  -Likely multifactorial in the setting of left lower extremity swelling, chronic alcohol abuse, complicated UTI, and chronic malnutrition.   - No overt deficits or indication for head imaging      Hypertension  -Pt was hypertensive upon admission (142/90).  He denied taking any medications at home.   -Continued home Lisinopril 10 mg daily from [facility] med rec   - Restart indapamide 1.25mg on discharge     Type 2 Diabetes   -Last A1c June 2021 was 4.9, Blood glucose at presentation 157.   -Started atorvastatin 40 mg daily; ASCVD 10-year risk 35.6% based on lipid panel from 2019 and current state     Depression   -Started Prozac 20 mg every day; follow up with PCP outpatient to discuss changes.     Tobacco Use Disorder  -Nicoderm patches used to prevent nicotine withdrawal      Pertinent Results:      CURRENT ADMISSION IMAGING RESULTS   XR CHEST PA LAT    Result Date: 7/3/2022  Normal chest.          Cardiology Procedures/Testing:  MODALITY RESULTS   EKG Results for orders placed or performed during the hospital encounter of 07/03/22   EKG, 12 LEAD, INITIAL   Result Value Ref Range    Ventricular Rate 104 BPM    Atrial Rate 104 BPM    P-R Interval 138 ms    QRS Duration 72 ms    Q-T Interval 346 ms    QTC Calculation (Bezet) 454 ms    Calculated P Axis 73 degrees    Calculated R Axis 75 degrees    Calculated T Axis 80 degrees    Diagnosis       Sinus tachycardia  Otherwise normal ECG  When compared with ECG of 25-JUN-2021 20:49,  No significant change was found  Confirmed by Alba Gary MD, Vikki Russ ([id number]) on 7/4/2022 12:51:55 PM                        Special Testing/Procedures:  MODALITY RESULTS   MICRO All Micro Results     Procedure Component Value Units Date/Time    CULTURE, BLOOD [[id number]] Collected: 07/03/22 1820    Order Status: Completed Specimen: Blood Updated: 07/05/22 0649     Special Requests: NO SPECIAL REQUESTS        Culture result: NO GROWTH 2 DAYS       CULTURE, URINE [[id number]] Collected: 07/03/22 1833 Order Status: Completed Updated: 07/04/22 1437    CULTURE, URINE [[id number]] Collected: 07/03/22 2345    Order Status: Canceled Specimen: Urine from Clean catch            Laboratory Results:  LABORATORY RESULTS   HEMATOLOGY Lab Results   Component Value Date/Time    WBC 7.8 07/05/2022 02:32 AM    HGB 9.9 (L) 07/05/2022 02:32 AM    HCT 29.7 (L) 07/05/2022 02:32 AM    PLATELET 97 (L) 79/14/7205 02:32 AM    .9 (H) 07/05/2022 02:32 AM       CHEMISTRIES Lab Results   Component Value Date/Time    Sodium 132 (L) 07/05/2022 02:32 AM    Potassium 3.5 07/05/2022 02:32 AM    Chloride 98 (L) 07/05/2022 02:32 AM    CO2 29 07/05/2022 02:32 AM    Anion gap 5 07/05/2022 02:32 AM    Glucose 103 (H) 07/05/2022 02:32 AM    BUN 8 07/05/2022 02:32 AM    Creatinine 0.58 (L) 07/05/2022 02:32 AM    BUN/Creatinine ratio 14 07/05/2022 02:32 AM    GFR est AA >60 07/05/2022 02:32 AM    GFR est non-AA >60 07/05/2022 02:32 AM    Calcium 8.2 (L) 07/05/2022 02:32 AM      HEPATIC FUNCTION Lab Results   Component Value Date/Time    Albumin 2.9 (L) 07/03/2022 06:20 PM    Bilirubin, direct 0.6 (H) 02/29/2020 11:11 AM    Bilirubin, total 2.2 (H) 07/03/2022 06:20 PM    Protein, total 7.7 07/03/2022 06:20 PM    Globulin 4.8 (H) 07/03/2022 06:20 PM    A-G Ratio 0.6 (L) 07/03/2022 06:20 PM    ALT (SGPT) 27 07/03/2022 06:20 PM    Alk. phosphatase 150 (H) 07/03/2022 06:20 PM       LACTIC ACID Lab Results   Component Value Date/Time    Lactic acid 1.9 07/04/2022 12:35 AM    Lactic acid 1.7 02/29/2020 11:11 AM                        Readmission Risk Score: Low Risk            9 Total Score    4 IP Visits Last 12 Months (1-3=4, 4=9, >4=11)    5 Pt. Coverage (Medicare=5 , Medicaid, or Self-Pay=4)        Criteria that do not apply:    Has Seen PCP in Last 6 Months (Yes=3, No=0)    . Living with Significant Other. Assisted Living. LTAC. SNF.  or   Rehab    Patient Length of Stay (>5 days = 3)    Charlson Comorbidity Score (Age + Comorbid Conditions) Judy Raza MD, PGY-1  [facility]  7/5/2022 4:44 AM

## 2022-07-05 NOTE — PROGRESS NOTES
OT order received and chart reviewed. OT screen completed on pt with pt adamantly denying the need for occupational therapy services and declining participation in OT eval. Pt states he is independent in self care and will be able to dc home without AD. Will sign off.  Thank you, Sebas Blount, OTR

## 2022-07-05 NOTE — PROGRESS NOTES
D/C order noted for today. Orders reviewed. No needs identified at this time. CM remains available if needed.       CHANO SahaN, RN  Pager # [phone]  Care Manager

## 2022-07-05 NOTE — PROGRESS NOTES
Comprehensive Nutrition Assessment    Type and Reason for Visit: Initial,Wound    Nutrition Recommendations/Plan:   1. Plan to add Magic Cup BID to promote PO intake   2. Plan to continue thiamine daily and MVI   3. Monitor PO diet, labs, weight and plan of care during admission. Malnutrition Assessment:  Malnutrition Status: At risk for malnutrition (specify) (r/t poor PO intake PTA and currently) (07/05/22 1621)      Nutrition History and Allergies:   Past medical history: chronic alcoholism, falls, T2DM, HTN, hypercholesterolemia, chronic malnutrition, and tobacco use being admitted for alcohol withdrawal and a complicated UTI. NKFA. Weight history per chart review:   CBW: 68 kg    Nutrition Assessment:    Visited pt in room, laying in bed, alert and able to communicate. Pt reported fair appetite currently and PTA; discussed food preference but pt declined d/t discharging soon (according to pt). Per MD note, pt admitted with acute cystitis, LLE swelling and concern for ETOH withdrawal. Consumes approximately 2-3 sixteen ounce beer bottles daily in addition to approximately 8 ounces of hard liquor daily. Per chart review, pt has thiamine supplement pending discharge. Pt declined including Ensure as oral supplement but receptive to try Magic Cup BID. Nutrition Related Findings:    Last BM 7/4. Output: 200mL(urine). Pertinent Medications:  Lipitor, vitamin D3, Humalog, Zofran, miralax, Wound Type: Multiple    Current Nutrition Intake & Therapies:  Average Meal Intake: 1-25%  Average Supplement Intake: None ordered  ADULT DIET Regular; Low Fat/Low Chol/High Fiber/TROY    Anthropometric Measures:  Height: 6' 2\" (188 cm)  Ideal Body Weight (IBW): 190 lbs (86 kg)     Current Body Wt:  68.1 kg (150 lb 2.1 oz), 79 % IBW. Bed scale  Current BMI (kg/m2): 19.3  BMI Category: Normal weight (BMI 18.5-24. 9)    Estimated Daily Nutrient Needs:  Energy Requirements Based On: Kcal/kg (25-30)  Weight Used for Energy Requirements: Current  Energy (kcal/day): 8933-2015  Weight Used for Protein Requirements: Current (0.8-1.1)  Protein (g/day): 54-75  Method Used for Fluid Requirements: 1 ml/kcal  Fluid (ml/day): 0794-1622    Nutrition Diagnosis:   · Inadequate protein-energy intake related to psychological cause or life stress as evidenced by poor intake prior to admission,intake 26-50%      Nutrition Interventions:   Food and/or Nutrient Delivery: Continue current diet,Start oral nutrition supplement,Vitamin supplement,Mineral supplement  Nutrition Education/Counseling: Education not indicated,No recommendations at this time  Coordination of Nutrition Care: Continue to monitor while inpatient  Plan of Care discussed with: patient    Goals:     Goals: Meet at least 75% of estimated needs,by next RD assessment       Nutrition Monitoring and Evaluation:   Behavioral-Environmental Outcomes: None identified  Food/Nutrient Intake Outcomes: Diet advancement/tolerance,Food and nutrient intake,Supplement intake  Physical Signs/Symptoms Outcomes: Meal time behavior,Nutrition focused physical findings,Weight    Discharge Planning:    Continue current diet,Continue oral nutrition supplement    Javier Balbuena MA, RDN, LD   Contact: [phone]

## 2022-07-05 NOTE — PROGRESS NOTES
Problem: Pressure Injury - Risk of  Goal: *Prevention of pressure injury  Description: Document Kahlil Scale and appropriate interventions in the flowsheet. Outcome: Progressing Towards Goal  Note: Pressure Injury Interventions:  Sensory Interventions: Assess changes in LOC    Moisture Interventions: Absorbent underpads,Maintain skin hydration (lotion/cream)    Activity Interventions: Assess need for specialty bed,Increase time out of bed,Pressure redistribution bed/mattress(bed type)    Mobility Interventions: Assess need for specialty bed,HOB 30 degrees or less,Pressure redistribution bed/mattress (bed type)    Nutrition Interventions: Document food/fluid/supplement intake    Friction and Shear Interventions: Apply protective barrier, creams and emollients,HOB 30 degrees or less                Problem: Patient Education: Go to Patient Education Activity  Goal: Patient/Family Education  Outcome: Progressing Towards Goal     Problem: Falls - Risk of  Goal: *Absence of Falls  Description: Document Samantha Fall Risk and appropriate interventions in the flowsheet.   Outcome: Progressing Towards Goal  Note: Fall Risk Interventions:  Mobility Interventions: Assess mobility with egress test,Patient to call before getting OOB    Mentation Interventions: Adequate sleep, hydration, pain control    Medication Interventions: Patient to call before getting OOB,Teach patient to arise slowly    Elimination Interventions: Bed/chair exit alarm,Call light in reach,Patient to call for help with toileting needs    History of Falls Interventions: Bed/chair exit alarm,Consult care management for discharge planning         Problem: Patient Education: Go to Patient Education Activity  Goal: Patient/Family Education  Outcome: Progressing Towards Goal     Problem: Patient Education: Go to Patient Education Activity  Goal: Patient/Family Education  Outcome: Progressing Towards Goal     Problem: Sepsis: Day of Diagnosis  Goal: Off Pathway (Use only if patient is Off Pathway)  Outcome: Progressing Towards Goal  Goal: *Fluid resuscitation  Outcome: Progressing Towards Goal  Goal: *Paired blood cultures prior to first dose of antibiotic  Outcome: Progressing Towards Goal  Goal: *First dose of  appropriate antibiotic within 3 hours of arrival to ED, within 1 hour of arrival to ICU  Outcome: Progressing Towards Goal  Goal: *Lactic acid with first set of blood cultures  Outcome: Progressing Towards Goal  Goal: *Pneumococcal immunization (if eligible)  Outcome: Progressing Towards Goal  Goal: *Influenza immunization (if eligible)  Outcome: Progressing Towards Goal  Goal: Activity/Safety  Outcome: Progressing Towards Goal  Goal: Consults, if ordered  Outcome: Progressing Towards Goal  Goal: Diagnostic Test/Procedures  Outcome: Progressing Towards Goal  Goal: Nutrition/Diet  Outcome: Progressing Towards Goal  Goal: Discharge Planning  Outcome: Progressing Towards Goal  Goal: Medications  Outcome: Progressing Towards Goal  Goal: Respiratory  Outcome: Progressing Towards Goal  Goal: Treatments/Interventions/Procedures  Outcome: Progressing Towards Goal  Goal: Psychosocial  Outcome: Progressing Towards Goal     Problem: Sepsis: Day 2  Goal: Off Pathway (Use only if patient is Off Pathway)  Outcome: Progressing Towards Goal  Goal: *Central Venous Pressure maintained at 8-12 mm Hg  Outcome: Progressing Towards Goal  Goal: *Hemodynamically stable  Outcome: Progressing Towards Goal  Goal: *Tolerating diet  Outcome: Progressing Towards Goal  Goal: Activity/Safety  Outcome: Progressing Towards Goal  Goal: Consults, if ordered  Outcome: Progressing Towards Goal  Goal: Diagnostic Test/Procedures  Outcome: Progressing Towards Goal  Goal: Nutrition/Diet  Outcome: Progressing Towards Goal  Goal: Discharge Planning  Outcome: Progressing Towards Goal  Goal: Medications  Outcome: Progressing Towards Goal  Goal: Respiratory  Outcome: Progressing Towards Goal  Goal: Treatments/Interventions/Procedures  Outcome: Progressing Towards Goal  Goal: Psychosocial  Outcome: Progressing Towards Goal     Problem: Sepsis: Day 2  Goal: Off Pathway (Use only if patient is Off Pathway)  Outcome: Progressing Towards Goal  Goal: *Central Venous Pressure maintained at 8-12 mm Hg  Outcome: Progressing Towards Goal  Goal: *Hemodynamically stable  Outcome: Progressing Towards Goal  Goal: *Tolerating diet  Outcome: Progressing Towards Goal  Goal: Activity/Safety  Outcome: Progressing Towards Goal  Goal: Consults, if ordered  Outcome: Progressing Towards Goal  Goal: Diagnostic Test/Procedures  Outcome: Progressing Towards Goal  Goal: Nutrition/Diet  Outcome: Progressing Towards Goal  Goal: Discharge Planning  Outcome: Progressing Towards Goal  Goal: Medications  Outcome: Progressing Towards Goal  Goal: Respiratory  Outcome: Progressing Towards Goal  Goal: Treatments/Interventions/Procedures  Outcome: Progressing Towards Goal  Goal: Psychosocial  Outcome: Progressing Towards Goal     Problem: Sepsis: Day 3  Goal: Off Pathway (Use only if patient is Off Pathway)  Outcome: Progressing Towards Goal  Goal: *Central Venous Pressure maintained at 8-12 mm Hg  Outcome: Progressing Towards Goal  Goal: *Oxygen saturation within defined limits  Outcome: Progressing Towards Goal  Goal: *Vital sign stability  Outcome: Progressing Towards Goal  Goal: *Tolerating diet  Outcome: Progressing Towards Goal  Goal: *Demonstrates progressive activity  Outcome: Progressing Towards Goal  Goal: Activity/Safety  Outcome: Progressing Towards Goal  Goal: Consults, if ordered  Outcome: Progressing Towards Goal  Goal: Diagnostic Test/Procedures  Outcome: Progressing Towards Goal  Goal: Nutrition/Diet  Outcome: Progressing Towards Goal  Goal: Discharge Planning  Outcome: Progressing Towards Goal  Goal: Medications  Outcome: Progressing Towards Goal  Goal: Respiratory  Outcome: Progressing Towards Goal  Goal: Treatments/Interventions/Procedures  Outcome: Progressing Towards Goal  Goal: Psychosocial  Outcome: Progressing Towards Goal     Problem: Sepsis: Day 4  Goal: Off Pathway (Use only if patient is Off Pathway)  Outcome: Progressing Towards Goal  Goal: Activity/Safety  Outcome: Progressing Towards Goal  Goal: Consults, if ordered  Outcome: Progressing Towards Goal  Goal: Diagnostic Test/Procedures  Outcome: Progressing Towards Goal  Goal: Nutrition/Diet  Outcome: Progressing Towards Goal  Goal: Discharge Planning  Outcome: Progressing Towards Goal  Goal: Medications  Outcome: Progressing Towards Goal  Goal: Respiratory  Outcome: Progressing Towards Goal  Goal: Treatments/Interventions/Procedures  Outcome: Progressing Towards Goal  Goal: Psychosocial  Outcome: Progressing Towards Goal  Goal: *Oxygen saturation within defined limits  Outcome: Progressing Towards Goal  Goal: *Hemodynamically stable  Outcome: Progressing Towards Goal  Goal: *Vital signs within defined limits  Outcome: Progressing Towards Goal  Goal: *Tolerating diet  Outcome: Progressing Towards Goal  Goal: *Demonstrates progressive activity  Outcome: Progressing Towards Goal  Goal: *Fluid volume maintenance  Outcome: Progressing Towards Goal     Problem: Sepsis: Day 5  Goal: Off Pathway (Use only if patient is Off Pathway)  Outcome: Progressing Towards Goal  Goal: *Oxygen saturation within defined limits  Outcome: Progressing Towards Goal  Goal: *Vital signs within defined limits  Outcome: Progressing Towards Goal  Goal: *Tolerating diet  Outcome: Progressing Towards Goal  Goal: *Demonstrates progressive activity  Outcome: Progressing Towards Goal  Goal: *Discharge plan identified  Outcome: Progressing Towards Goal  Goal: Activity/Safety  Outcome: Progressing Towards Goal  Goal: Consults, if ordered  Outcome: Progressing Towards Goal  Goal: Diagnostic Test/Procedures  Outcome: Progressing Towards Goal  Goal: Nutrition/Diet  Outcome: Progressing Towards Goal  Goal: Discharge Planning  Outcome: Progressing Towards Goal  Goal: Medications  Outcome: Progressing Towards Goal  Goal: Respiratory  Outcome: Progressing Towards Goal  Goal: Treatments/Interventions/Procedures  Outcome: Progressing Towards Goal  Goal: Psychosocial  Outcome: Progressing Towards Goal     Problem: Sepsis: Day 6  Goal: Off Pathway (Use only if patient is Off Pathway)  Outcome: Progressing Towards Goal  Goal: *Oxygen saturation within defined limits  Outcome: Progressing Towards Goal  Goal: *Vital signs within defined limits  Outcome: Progressing Towards Goal  Goal: *Tolerating diet  Outcome: Progressing Towards Goal  Goal: *Demonstrates progressive activity  Outcome: Progressing Towards Goal  Goal: Influenza immunization  Outcome: Progressing Towards Goal  Goal: *Pneumococcal immunization  Outcome: Progressing Towards Goal  Goal: Activity/Safety  Outcome: Progressing Towards Goal  Goal: Diagnostic Test/Procedures  Outcome: Progressing Towards Goal  Goal: Nutrition/Diet  Outcome: Progressing Towards Goal  Goal: Discharge Planning  Outcome: Progressing Towards Goal  Goal: Medications  Outcome: Progressing Towards Goal  Goal: Respiratory  Outcome: Progressing Towards Goal  Goal: Treatments/Interventions/Procedures  Outcome: Progressing Towards Goal  Goal: Psychosocial  Outcome: Progressing Towards Goal     Problem: Sepsis: Discharge Outcomes  Goal: *Vital signs within defined limits  Outcome: Progressing Towards Goal  Goal: *Tolerating diet  Outcome: Progressing Towards Goal  Goal: *Verbalizes understanding and describes prescribed diet  Outcome: Progressing Towards Goal  Goal: *Demonstrates progressive activity  Outcome: Progressing Towards Goal  Goal: *Describes follow-up/return visits to physicians  Outcome: Progressing Towards Goal  Goal: *Verbalizes name, dosage, time, side effects, and number of days to continue medications  Outcome: Progressing Towards Goal  Goal: *Influenza immunization (Oct-Mar only)  Outcome: Progressing Towards Goal  Goal: *Pneumococcal immunization  Outcome: Progressing Towards Goal  Goal: *Lungs clear or at baseline  Outcome: Progressing Towards Goal  Goal: *Oxygen saturation returns to baseline or 90% or better on room air  Outcome: Progressing Towards Goal  Goal: *Glycemic control  Outcome: Progressing Towards Goal  Goal: *Absence of deep venous thrombosis signs and symptoms(Stroke Metric)  Outcome: Progressing Towards Goal  Goal: *Describes available resources and support systems  Outcome: Progressing Towards Goal  Goal: *Optimal pain control at patient's stated goal  Outcome: Progressing Towards Goal

## 2022-07-05 NOTE — PROGRESS NOTES
[facility]  DAILY PROGRESS NOTE      Patient:    Marcia Salguero , 58 y.o. male   MRN:  [id number]  Room/Bed:  452/  Admission Date:   7/3/2022  LOS:                       LOS: 1 day   Code status:  DNR         Admission Chief Complaint:   Chief Complaint   Patient presents with    Fall    Alcohol Problem    Mental Health Problem       ASSESSMENT AND PLAN:   Marcia Salguero is a 58y.o. year old with a PMH of chronic alcoholism, falls, T2DM, HTN, chronic malnutrition, and tobacco use male admitted for Severe sepsis ([facility] [A41.9, R65.20]  Alcohol withdrawal ([facility] [F10.239]  UTI (urinary tract infection) [N39.0].    ASSESSMENT AND PLAN     Acute Cystitis  -Pt presented to ED complaining of feeling overall unwell with weakness. UA in ED significant for moderate blood, positive nitrites, large LE, \"too numerous\" WBCs, and 4+ bacteria. Urine was sent for culture. -SIRS criteria were not met:  WBC normal (8.8), RR wnl, temp 98.6F, HR initially 108 at presentation; resolved in mid-90s.  -Lactate initially elevated at 5.08; decreased to 1.12 after 6 hours.   -Pt denied any fevers, chills, dysuria, hematuria, trouble voiding, abd pain, suprapubic pain.   -Two 1 L NS boluses were administerd in ED with initial suspicion for sepsis (unlikely). -Blood cultures collected and empiric antibiotic therapy was begun in ED with one 1750 mg IV Vancomycin infusion and 4.5 g IV Zosyn.    -UTI may be contributing to his weakness.      Plan:  -Admit to telemetry  -FU Urine culture  -Abx - received 2 doses ceftriaxone 1 g IV once daily while inpatient; awaiting culture results; will switch to PO ciprofloxacin 500 mg BID through 7/9 for a 7 day total course  -labs: daily CBC, BMP  -monitor for urinary retention  -Vitals as per unit protocol     Chronic Alcohol Use Disorder w/ Alcohol Withdrawl  -CXR showed no evidence of aspiration PNA  -Pt endorses a lengthy history of drinking multiple beers and a variety of liquor each day. Pt reports his last drink was two days ago and feels as if he is having withdrawals. Serum ethanol 18 upon admission. UDS negative. -LFTs:  ALT 27, AST 97, Alk Phos 150 on admission  -Chronic malnutrition and hypoalbuminemia (2.9) d/t chronic alcohol use. -Mild macrocytic anemia (Hbg 11; .2) and thrombocytopenia (117) likely secondary to chronic malnutrition and alcoholism. -[id number], Folate: 5.9, VitD: 17.8     Plan:  -Continue CIWA protocol. Current scores 0 , Has not required any Ativan thus far. -Thiamine 300 mg was administered in ED. Continue thiamine at 205 mg with folic acid 1 mg.  -Consult nutrition services,  appreciate their recommendations.   -Check vitamin D, B12, ammonia levels  -Continue vitamin d 1000 international units daily.     Left Lower Extremity Swelling and Pain  -Etiology likely chronic venous stasis given unremarkable duplex and venous stasis skin changes. Pt denies warmth, redness, dyspnea, or chest pain. However, pt has presented with similar problem in the past and was negative for DVT. DDx for his LLE edema include: DVT, chronic venous stasis (consistent with physical exam and skin changes), hypoalbuminemia, or cellulitis.   -Wells' score of 3 (+only for \"clinical signs and symptoms of dvt\")  -Duplex US bilateral LE unremarkable    Plan:  -PRN acetaminophen for pain.     CKD Stage 3A   -GFR at presentation 57, Baseline GFR >60. Cr 1.27 at admission, baseline Cr variable (range from 0.49 - 1.13). Plan:  -PO fluids as tolerated  -Monitor I/O. -Monitor daily BMP.     Frequent Falls and Generalized Weakness  -Likely multifactorial in the setting of left lower extremity swelling, chronic alcohol abuse, complicated UTI, and chronic malnutrition.   -Monitor clinically for any signs of acute deterioration or focal neurologic deficits.  No overt deficits or indication for head imaging at this time.     Plan:  -Fall precautions  -Activity with assistance  -Consult PT/OT -- appreciate your assistance and recommendations.     Hypertension  -Pt was hypertensive upon admission (142/90). He denied taking any medications at home.   -Lisinopril 10 mg daily from [facility] rec resumed. Indapamide 1.25 mg held for now, as pt has been of antihypertensives for a long period of time.      Plan:  -Continue Lisinopril 10mg  - Restart indapamide 1.25mg as tolerated on discharge     Type 2 Diabetes   -Last A1c June 2021 was 4.9  -Blood glucose at presentation 157.   -Pt not taking any medications.      Plan:  -POC glucose before meals and at bedtime  -SSI   -Hypoglycemic precautions   -Resume atorvastatin 40 mg daily; ASCVD 10-year risk 35.6% based on lipid panel from 2019 and current state     Depression   Plan:  -Begin Prozac 20 mg every day; follow up with PCP outpatient to discuss changes.  -Consult social work -- appreciate assessment and recommendations.      Tobacco Use Disorder  -Pt smokes about 8 cigarettes per day     Plan:  -Nicoderm patches to prevent nicotine withdrawal  -smoking cessation counselling     Global:  Code: DNR  IVF/Drips: Received two fluid boluses in ED. If tolerating PO intake well, may discontinue IVF. I/O / Wt: Monitor I/O and daily weights  Diet: Adult cardiac  DVT/AC: Lovenox 40 mg SQ  Mobility: per protocol; up with assistance  Disposition: Inpatient  Anticipated LOS:<2 days     Point of Contact:  Peyman Sun (brother):  [phone]  SUBJECTIVE:   Events of the last 24 hours:  No acute events overnight. PVL negative  Patient seen at beside. Has some loose stools. Otherwise endorses okay PO intake, doesn't like the hospital food. Denies pain, palpitations, shaking, chest pain, dyspnea.      ROS (positive findings are in BOLD; negative findings are in regular font)  Constitutional: fevers, chills, appetite changes, weight changes, fatigue  HEENT: changes in vision, changes in hearing, sore throat, dysphagia  Cardiovascular: chest pain, palpitations, PND, orthopnea, edema  Pulmonary: SOB, cough, sputum production, wheezing, chest tightness  Gastrointestinal: abdominal pain, nausea/vomiting, diarrhea, constipation, melena, hematochezia  Genitourinary: dysuria, hesitation, dribbling, urgency, hematuria  Musculoskeletal: arthralgias, myalgias  Skin: rash, itching  Neurological: sensory changes, motor changes, headache  Psychiatric: mood changes  Endocrine: heat/cold intolerance  Heme: easy bruising/easy bleeding, LAD    CURRENT INPATIENT MEDICATIONS:    Scheduled Medications  Current Facility-Administered Medications   Medication Dose Route Frequency Provider Last Rate Last Admin    cholecalciferol (VITAMIN D3) (1000 Units /25 mcg) tablet 1,000 Units  1,000 Units Oral DAILY Cricket Gain, DO        FLUoxetine (PROzac) capsule 20 mg  20 mg Oral DAILY Tere Davis P, DO   20 mg at 07/04/22 1003    insulin lispro (HUMALOG) injection   SubCUTAneous Primo Bauer MD        glucose chewable tablet 16 g  4 Tablet Oral PRN Jake Michaud MD        glucagon (GLUCAGEN) injection 1 mg  1 mg IntraMUSCular PRN Jake Michaud MD        cefTRIAXone (ROCEPHIN) 1 g in sterile water (preservative free) 10 mL IV syringe  1 g IntraVENous Q24H Rosey Kaplan MD   1 g at 07/04/22 1400    sodium chloride (NS) flush 5-40 mL  5-40 mL IntraVENous Q8H Jess Davisecca P, DO   10 mL at 07/05/22 0606    sodium chloride (NS) flush 5-40 mL  5-40 mL IntraVENous PRN Maurisio Potash P, DO        LORazepam (ATIVAN) tablet 1 mg  1 mg Oral Q1H PRN Maurisio Potash P, DO        Or    LORazepam (ATIVAN) 2 mg/mL injection 1 mg  1 mg IntraVENous Q1H PRN Bohsammi Tere P, DO        LORazepam (ATIVAN) tablet 2 mg  2 mg Oral Q1H PRN Maurisio Potash P, DO        Or    LORazepam (ATIVAN) 2 mg/mL injection 2 mg  2 mg IntraVENous Q1H PRN Bohlmann Tere P, DO        LORazepam (ATIVAN) 2 mg/mL injection 3 mg  3 mg IntraVENous Q15MIN PRN Cricket Gain, DO        atorvastatin (LIPITOR) tablet 40 mg  40 mg Oral DAILY Jimmye Maria Del Rosario, DO   40 mg at 07/04/22 1002    lisinopriL (PRINIVIL, ZESTRIL) tablet 10 mg  10 mg Oral DAILY Vane Isidro P, DO   10 mg at 07/04/22 1002    sodium chloride (NS) flush 5-40 mL  5-40 mL IntraVENous Q8H Ryan Tere P, DO   10 mL at 07/05/22 0600    sodium chloride (NS) flush 5-40 mL  5-40 mL IntraVENous PRN Vane Isidro P, DO        acetaminophen (TYLENOL) tablet 650 mg  650 mg Oral Q6H PRN Gloria Avitiaer, DO        Or    acetaminophen (TYLENOL) suppository 650 mg  650 mg Rectal Q6H PRN Ava Last Tere P, DO        polyethylene glycol (MIRALAX) packet 17 g  17 g Oral DAILY PRN Vane Acunae P, DO        ondansetron (ZOFRAN ODT) tablet 4 mg  4 mg Oral Q8H PRN Vane Isidro P, DO        Or    ondansetron (ZOFRAN) injection 4 mg  4 mg IntraVENous Q6H PRN Ryan Tere P, DO        enoxaparin (LOVENOX) injection 40 mg  40 mg SubCUTAneous DAILY Vane Isidro P, DO   40 mg at 07/04/22 1003    [Held by provider] indapamide (LOZOL) tablet 1.25 mg  1.25 mg Oral DAILY Ryan Tere P, DO        nicotine (NICODERM CQ) 14 mg/24 hr patch 1 Patch  1 Patch TransDERmal DAILY Nova Davisca P, DO           OBJECTIVE:  Visit Vitals  /73   Pulse 83   Temp 98.9 °F (37.2 °C)   Resp 18   Ht 6' 2\" (1.88 m)   Wt 68.1 kg (150 lb 1.6 oz)   SpO2 96%   BMI 19.27 kg/m²       PHYSICAL EXAM  Gen: NAD, comfortable, in no acute distress  HEENT: normocephalic, atraumatic, MMM,  CV: RRR, S1/S2 present without M/R/G, +2 radial pulses, well-perfused, PMI not displaced  Pulm: CTAB, no wheezes, no crackles  Abd: S/NT/ND, no rebound, no guarding, no hepatosplenomegaly   MSK: no clubbing, no edema  Skin: warm, dry, intact, no rash  Neuro: CN II-XII grossly intact, no focal deficits appreciated   Psych: appropriate, alert, oriented x3      Intake and Output:    Current Shift: No intake/output data recorded.     Last three shifts: 07/03 1901 - 07/05 0700  In: 1070 [P.O.:820; I.V.:250]  Out: 1650 [Urine:1650]       Lab/Data Review:       Recent Results (from the past 12 hour(s))   CBC WITH AUTOMATED DIFF    Collection Time: 07/05/22  2:32 AM   Result Value Ref Range    WBC 7.8 4.6 - 13.2 K/uL    RBC 2.83 (L) 4.35 - 5.65 M/uL    HGB 9.9 (L) 13.0 - 16.0 g/dL    HCT 29.7 (L) 36.0 - 48.0 %    .9 (H) 78.0 - 100.0 FL    MCH 35.0 (H) 24.0 - 34.0 PG    MCHC 33.3 31.0 - 37.0 g/dL    RDW 14.5 11.6 - 14.5 %    PLATELET 97 (L) 269 - 420 K/uL    MPV 9.9 9.2 - 11.8 FL    NRBC 0.0 0  WBC    ABSOLUTE NRBC 0.00 0.00 - 0.01 K/uL    NEUTROPHILS 65 40 - 73 %    LYMPHOCYTES 22 21 - 52 %    MONOCYTES 10 3 - 10 %    EOSINOPHILS 2 0 - 5 %    BASOPHILS 1 0 - 2 %    IMMATURE GRANULOCYTES 1 (H) 0.0 - 0.5 %    ABS. NEUTROPHILS 5.1 1.8 - 8.0 K/UL    ABS. LYMPHOCYTES 1.7 0.9 - 3.6 K/UL    ABS. MONOCYTES 0.8 0.05 - 1.2 K/UL    ABS. EOSINOPHILS 0.1 0.0 - 0.4 K/UL    ABS. BASOPHILS 0.1 0.0 - 0.1 K/UL    ABS. IMM. GRANS. 0.0 0.00 - 0.04 K/UL    DF AUTOMATED     METABOLIC PANEL, BASIC    Collection Time: 07/05/22  2:32 AM   Result Value Ref Range    Sodium 132 (L) 136 - 145 mmol/L    Potassium 3.5 3.5 - 5.5 mmol/L    Chloride 98 (L) 100 - 111 mmol/L    CO2 29 21 - 32 mmol/L    Anion gap 5 3.0 - 18 mmol/L    Glucose 103 (H) 74 - 99 mg/dL    BUN 8 7.0 - 18 MG/DL    Creatinine 0.58 (L) 0.6 - 1.3 MG/DL    BUN/Creatinine ratio 14 12 - 20      GFR est AA >60 >60 ml/min/1.73m2    GFR est non-AA >60 >60 ml/min/1.73m2    Calcium 8.2 (L) 8.5 - 10.1 MG/DL   GLUCOSE, POC    Collection Time: 07/05/22  7:50 AM   Result Value Ref Range    Glucose (POC) 109 70 - 110 mg/dL     Intake and Output:  Current Shift: No intake/output data recorded. Last three shifts: 07/03 1901 - 07/05 0700  In: 1070 [P.O.:820;  I.V.:250]  Out: 1650 [Urine:1650]    Lab Review:  Recent Results (from the past 12 hour(s))   CBC WITH AUTOMATED DIFF    Collection Time: 07/05/22  2:32 AM   Result Value Ref Range    WBC 7.8 4.6 - 13.2 K/uL    RBC 2.83 (L) 4.35 - 5.65 M/uL    HGB 9.9 (L) 13.0 - 16.0 g/dL    HCT 29.7 (L) 36.0 - 48.0 %    .9 (H) 78.0 - 100.0 FL    MCH 35.0 (H) 24.0 - 34.0 PG    MCHC 33.3 31.0 - 37.0 g/dL    RDW 14.5 11.6 - 14.5 %    PLATELET 97 (L) 682 - 420 K/uL    MPV 9.9 9.2 - 11.8 FL    NRBC 0.0 0  WBC    ABSOLUTE NRBC 0.00 0.00 - 0.01 K/uL    NEUTROPHILS 65 40 - 73 %    LYMPHOCYTES 22 21 - 52 %    MONOCYTES 10 3 - 10 %    EOSINOPHILS 2 0 - 5 %    BASOPHILS 1 0 - 2 %    IMMATURE GRANULOCYTES 1 (H) 0.0 - 0.5 %    ABS. NEUTROPHILS 5.1 1.8 - 8.0 K/UL    ABS. LYMPHOCYTES 1.7 0.9 - 3.6 K/UL    ABS. MONOCYTES 0.8 0.05 - 1.2 K/UL    ABS. EOSINOPHILS 0.1 0.0 - 0.4 K/UL    ABS. BASOPHILS 0.1 0.0 - 0.1 K/UL    ABS. IMM. GRANS. 0.0 0.00 - 0.04 K/UL    DF AUTOMATED     METABOLIC PANEL, BASIC    Collection Time: 07/05/22  2:32 AM   Result Value Ref Range    Sodium 132 (L) 136 - 145 mmol/L    Potassium 3.5 3.5 - 5.5 mmol/L    Chloride 98 (L) 100 - 111 mmol/L    CO2 29 21 - 32 mmol/L    Anion gap 5 3.0 - 18 mmol/L    Glucose 103 (H) 74 - 99 mg/dL    BUN 8 7.0 - 18 MG/DL    Creatinine 0.58 (L) 0.6 - 1.3 MG/DL    BUN/Creatinine ratio 14 12 - 20      GFR est AA >60 >60 ml/min/1.73m2    GFR est non-AA >60 >60 ml/min/1.73m2    Calcium 8.2 (L) 8.5 - 10.1 MG/DL   GLUCOSE, POC    Collection Time: 07/05/22  7:50 AM   Result Value Ref Range    Glucose (POC) 109 70 - 110 mg/dL       Radiologic Studies -   DUPLEX LOWER EXT VENOUS BILAT   Final Result      XR CHEST PA LAT   Final Result      Normal chest.                      ================================================================  Further management for . Yesica Martinez will be discussed on rounds with my attending.       Vianca Cunningham MD, PGY-1  [facility]  July 5, 2022 8:33 AM 17-Jul-2019 10:20 17-Jul-2019 11:36

## 2022-07-05 NOTE — PROGRESS NOTES
Reason for Admission:  [facility] [A41.9, R65.20]  Al[facility] withdrawal ([facility] [F10.239]  UTI (urinary tract infection) [N39.0]                 RUR Score:   15%           Plan for utilizing home health:   TBD                    Likelihood of Readmission:   Moderate                         Do you (patient/family) have any concerns for transition/discharge?  no    Transition of Care Plan:       Initial assessment completed with patient. Cognitive status of patient: oriented to time, place, person and situation. Face sheet information confirmed:  yes. The patient designates Falguni Weiss brother ([phone]) to participate in his discharge plan and to receive any needed information. This patient lives in a apartment with roommates. Patient was able to navigate steps as needed. Prior to hospitalization, patient was considered to be independent with ADLs/IADLS : yes     Patient has a current ACP document on file: no Has DNR      The patient's friend will be available to transport patient home upon discharge. The patient reported no medical equipment available in the home. Patient is not currently active with home health. Patient has stayed in a skilled nursing facility or rehab last year. This patient is on dialysis :no      Freedom of choice signed: no.     Currently, the discharge plan is Home. CM will continue to monitor and assist with transition of care needs. The patient states that he can obtain his medications from the pharmacy, and take his medications as directed. Patient's current insurance is [employer] Management Interventions  Support Systems: No Support Systems  Discharge Location  Patient Expects to be Discharged to[de-identified] Home      NAYA Melgoza, RN  Pager # [phone]  Care Manager

## 2022-07-05 NOTE — PROGRESS NOTES
Problem: Mobility Impaired (Adult and Pediatric)  Goal: *Acute Goals and Plan of Care (Insert Text)  Description: Physical Therapy Goals  Initiated 7/5/2022 and to be accomplished within 7 day(s)  1. Patient will move from supine to sit and sit to supine , scoot up and down, and roll side to side in bed with modified independence. 2.  Patient will transfer from bed to chair and chair to bed with modified independence using the least restrictive device. 3.  Patient will perform sit to stand with modified independence. 4.  Patient will ambulate with modified independence for 100 feet with the least restrictive device. 5.  Patient will ascend/descend 13 stairs with unilateral handrail(s) with modified independence. PLOF: Pt reports he lives with roommates in 2n floor apartment with 13 steps to enter. Pt independent without AD PTA but does report falls at home. Outcome: Progressing Towards Goal  PHYSICAL THERAPY EVALUATION    Patient: Miquel Florence (83 y.o. male)  Date: 7/5/2022  Primary Diagnosis: Severe sepsis ([facility] [address] [A41.9, R65.20]  Alcohol withdrawal ([facility] [address] [F10.239]  UTI (urinary tract infection) [N39.0]        Precautions: Fall      ASSESSMENT :  Pt cleared to participate in PT session, pt received semi-reclined in bed and agreeable to therapy session with max encouragement. Based on the objective data described below, the patient presents with decreased endurance, decreased strength, decreased balance reactions, gait deviations, and decreased independence in functional mobility. Pt completing bed mobility with CGA. Sitting on EOB with good balance, demonstrating 4+/5 LE strength during gross MMT. Pt then standing with Madelin from bed surface to RW, pt returning to sitting with poor control, declining mobility in room. Seated rest break at EOB. Pt declined need for toileting but agreeable to bout of ambulation after rest break.  Pt needing bed raised, becoming upset that he was unable to stand from bed surface at lowest height. Pt then ambulating x60 feet with Madelin, returning to bed. Pt positioned for comfort and educated to call for assist before getting up, pt verbalized understanding. Pt left with all needs met and call bell in reach. RN notified of position and participation. Bed alarm activated. Pt does not have support system, reporting his roommates are unable to assist him with mobility and self care. Patient will benefit from skilled intervention to address the above impairments. Patient's rehabilitation potential is considered to be Fair  Factors which may influence rehabilitation potential include:   []         None noted  []         Mental ability/status  [x]         Medical condition  []         Home/family situation and support systems  []         Safety awareness  []         Pain tolerance/management  []         Other:      PLAN :  Recommendations and Planned Interventions:   [x]           Bed Mobility Training             []    Neuromuscular Re-Education  [x]           Transfer Training                   []    Orthotic/Prosthetic Training  [x]           Gait Training                          []    Modalities  [x]           Therapeutic Exercises           []    Edema Management/Control  [x]           Therapeutic Activities            [x]    Family Training/Education  [x]           Patient Education  []           Other (comment):    Frequency/Duration: Patient will be followed by physical therapy 1-2 times per day/4-7 days per week to address goals. Discharge Recommendations: Rehab  Further Equipment Recommendations for Discharge: rolling walker    [facility]: Karla Lawson scored a 17/24 on the AM-PAC short mobility form. Current research shows that an AM-PAC score of 17 (13 if omitting stairs) or less is typically not associated with a discharge to the patient's home setting.  Based on the patient's AM-PAC score and their current functional mobility deficits, it is recommended that the patient have 3-5 sessions per week of Physical Therapy at d/c to increase the patient's independence. Please see assessment section for further patient specific details. SUBJECTIVE:   Patient stated Kailyn Mendez are they doing to that roof! Nahid Mehta    OBJECTIVE DATA SUMMARY:   No past medical history on file. No past surgical history on file. Barriers to Learning/Limitations: yes;  altered mental status (i.e.Sedation, Confusion)  Compensate with: Visual Cues, Verbal Cues, and Tactile Cues  Home Situation:  Home Situation  Home Environment: Apartment  # Steps to Enter: 13 (2nd floor apartment )  Rails to Enter: Yes  Wheelchair Ramp: No  One/Two Story Residence: One story  Living Alone: No  Support Systems:  (has roommates but unable to assist him )  Patient Expects to be Discharged to[de-identified] Home  Current DME Used/Available at Home: None  Critical Behavior:  Neurologic State: Alert  Orientation Level: Oriented to person;Oriented to place  Cognition: Follows commands  Safety/Judgement: Decreased awareness of environment; Fall prevention  Psychosocial  Patient Behaviors: Calm  Purposeful Interaction: Yes  Pt Identified Daily Priority: Clinical issues (comment)  Jean Marieitas Process: Nurture loving kindness;Establish trust;Nurture spiritual self;Teaching/learning; Attend basic human needs;Create healing environment;Open life/death unknowns; Supportive expression  Caring Interventions: Reassure; Therapeutic modalities  Reassure: Therapeutic listening; Informing;Caring rounds  Therapeutic Modalities: Intentional therapeutic touch  Skin Condition/Temp: Dry;Flaky     Skin Integrity: Other (comment) (bruises)  Skin Integumentary  Skin Condition/Temp: Dry;Flaky  Skin Integrity: Other (comment) (bruises)     Strength:    Strength: Generally decreased, functional    Tone & Sensation:   Tone: Normal    Sensation: Intact    Range Of Motion:  AROM: Generally decreased, functional    Posture:  Posture (WDL): Exceptions to WDL  Posture Assessment:  Forward head;Trunk flexion  Functional Mobility:  Bed Mobility:     Supine to Sit: Contact guard assistance  Sit to Supine: Contact guard assistance  Scooting: Contact guard assistance  Transfers:  Sit to Stand: Minimum assistance;Contact guard assistance  Stand to Sit: Contact guard assistance (poor eccentric control )    Balance:   Sitting: Intact  Standing: Impaired; With support  Standing - Static: Fair  Standing - Dynamic : Fair    Ambulation/Gait Training:  Distance (ft): 60 Feet (ft)  Assistive Device: Walker, rolling  Ambulation - Level of Assistance: Minimal assistance     Gait Description (WDL): Exceptions to WDL  Gait Abnormalities: Decreased step clearance    Base of Support: Center of gravity altered;Narrowed     Speed/Sujata: Slow;Shuffled  Step Length: Right shortened;Left shortened    Pain:  Pain level pre-treatment: 0/10   Pain level post-treatment: 0/10       Activity Tolerance:   Fair tolerance     Please refer to the flowsheet for vital signs taken during this treatment. After treatment:   []         Patient left in no apparent distress sitting up in chair  [x]         Patient left in no apparent distress in bed  [x]         Call bell left within reach  [x]         Nursing notified  []         Caregiver present  []         Bed alarm activated  []         SCDs applied    COMMUNICATION/EDUCATION:   [x]         Role of Physical Therapy in the acute care setting. [x]         Fall prevention education was provided and the patient/caregiver indicated understanding. [x]         Patient/family have participated as able in goal setting and plan of care. [x]         Patient/family agree to work toward stated goals and plan of care. []         Patient understands intent and goals of therapy, but is neutral about his/her participation. []         Patient is unable to participate in goal setting/plan of care: ongoing with therapy staff.  []         Other:     Thank you for this referral.  Cary Khan, PT   Time Calculation: 23 mins      Eval Complexity: History: MEDIUM  Complexity : 1-2 comorbidities / personal factors will impact the outcome/ POC Exam:LOW Complexity : 1-2 Standardized tests and measures addressing body structure, function, activity limitation and / or participation in recreation  Presentation: MEDIUM Complexity : Evolving with changing characteristics  Clinical Decision Making:Low Complexity low  Overall Complexity:LOW     [employer] AM-PAC® Basic Mobility Inpatient Short Form (6-Clicks) Version 2    How much HELP from another person does the patient currently need    (If the patient hasn't done an activity recently, how much help from another person do you think he/she would need if he/she tried?)   Total (Total A or Dep)   A Lot  (Mod to Max A)   A Little (Sup or Min A)   None (Mod I to I)   Turning from your back to your side while in a flat bed without using bedrails? [] 1 [] 2 [x] 3 [] 4   2. Moving from lying on your back to sitting on the side of a flat bed without using bedrails? [] 1 [] 2 [x] 3 [] 4   3. Moving to and from a bed to a chair (including a wheelchair)? [] 1 [] 2 [x] 3 [] 4   4. Standing up from a chair using your arms (e.g., wheelchair, or bedside chair)? [] 1 [] 2 [x] 3 [] 4   5. Walking in hospital room? [] 1 [] 2 [x] 3 [] 4   6. Climbing 3-5 steps with a railing?+   [] 1 [x] 2 [] 3 [] 4   +If stair climbing cannot be assessed, skip item #6. Sum responses from items 1-5.

## 2022-07-05 NOTE — PROGRESS NOTES
Problem: Pressure Injury - Risk of  Goal: *Prevention of pressure injury  Description: Document Kahlil Scale and appropriate interventions in the flowsheet. Outcome: Progressing Towards Goal  Note: Pressure Injury Interventions:  Sensory Interventions: Assess changes in LOC    Moisture Interventions: Minimize layers,Check for incontinence Q2 hours and as needed    Activity Interventions: Pressure redistribution bed/mattress(bed type)    Mobility Interventions: HOB 30 degrees or less    Nutrition Interventions: Document food/fluid/supplement intake    Friction and Shear Interventions: HOB 30 degrees or less                Problem: Patient Education: Go to Patient Education Activity  Goal: Patient/Family Education  Outcome: Progressing Towards Goal     Problem: Falls - Risk of  Goal: *Absence of Falls  Description: Document Samantha Fall Risk and appropriate interventions in the flowsheet.   Outcome: Progressing Towards Goal  Note: Fall Risk Interventions:  Mobility Interventions: Bed/chair exit alarm    Mentation Interventions: Adequate sleep, hydration, pain control    Medication Interventions: Patient to call before getting OOB    Elimination Interventions: Bed/chair exit alarm    History of Falls Interventions: Bed/chair exit alarm         Problem: Sepsis: Day of Diagnosis  Goal: Off Pathway (Use only if patient is Off Pathway)  Outcome: Progressing Towards Goal  Goal: *Fluid resuscitation  Outcome: Progressing Towards Goal  Goal: *Paired blood cultures prior to first dose of antibiotic  Outcome: Progressing Towards Goal  Goal: *First dose of  appropriate antibiotic within 3 hours of arrival to ED, within 1 hour of arrival to ICU  Outcome: Progressing Towards Goal  Goal: *Lactic acid with first set of blood cultures  Outcome: Progressing Towards Goal  Goal: *Pneumococcal immunization (if eligible)  Outcome: Progressing Towards Goal  Goal: *Influenza immunization (if eligible)  Outcome: Progressing Towards Goal  Goal: Activity/Safety  Outcome: Progressing Towards Goal  Goal: Consults, if ordered  Outcome: Progressing Towards Goal  Goal: Diagnostic Test/Procedures  Outcome: Progressing Towards Goal  Goal: Nutrition/Diet  Outcome: Progressing Towards Goal  Goal: Discharge Planning  Outcome: Progressing Towards Goal  Goal: Medications  Outcome: Progressing Towards Goal  Goal: Respiratory  Outcome: Progressing Towards Goal  Goal: Treatments/Interventions/Procedures  Outcome: Progressing Towards Goal  Goal: Psychosocial  Outcome: Progressing Towards Goal     Problem: Sepsis: Day 2  Goal: Off Pathway (Use only if patient is Off Pathway)  Outcome: Progressing Towards Goal  Goal: *Central Venous Pressure maintained at 8-12 mm Hg  Outcome: Progressing Towards Goal  Goal: *Hemodynamically stable  Outcome: Progressing Towards Goal  Goal: *Tolerating diet  Outcome: Progressing Towards Goal  Goal: Activity/Safety  Outcome: Progressing Towards Goal  Goal: Consults, if ordered  Outcome: Progressing Towards Goal  Goal: Diagnostic Test/Procedures  Outcome: Progressing Towards Goal  Goal: Nutrition/Diet  Outcome: Progressing Towards Goal  Goal: Discharge Planning  Outcome: Progressing Towards Goal  Goal: Medications  Outcome: Progressing Towards Goal  Goal: Respiratory  Outcome: Progressing Towards Goal  Goal: Treatments/Interventions/Procedures  Outcome: Progressing Towards Goal  Goal: Psychosocial  Outcome: Progressing Towards Goal     Problem: Sepsis: Day 5  Goal: Off Pathway (Use only if patient is Off Pathway)  Outcome: Progressing Towards Goal  Goal: *Oxygen saturation within defined limits  Outcome: Progressing Towards Goal  Goal: *Vital signs within defined limits  Outcome: Progressing Towards Goal  Goal: *Tolerating diet  Outcome: Progressing Towards Goal  Goal: *Demonstrates progressive activity  Outcome: Progressing Towards Goal  Goal: *Discharge plan identified  Outcome: Progressing Towards Goal  Goal: Activity/Safety  Outcome: Progressing Towards Goal  Goal: Consults, if ordered  Outcome: Progressing Towards Goal  Goal: Diagnostic Test/Procedures  Outcome: Progressing Towards Goal  Goal: Nutrition/Diet  Outcome: Progressing Towards Goal  Goal: Discharge Planning  Outcome: Progressing Towards Goal  Goal: Medications  Outcome: Progressing Towards Goal  Goal: Respiratory  Outcome: Progressing Towards Goal  Goal: Treatments/Interventions/Procedures  Outcome: Progressing Towards Goal  Goal: Psychosocial  Outcome: Progressing Towards Goal     Problem: Sepsis: Day 6  Goal: Off Pathway (Use only if patient is Off Pathway)  Outcome: Progressing Towards Goal  Goal: *Oxygen saturation within defined limits  Outcome: Progressing Towards Goal  Goal: *Vital signs within defined limits  Outcome: Progressing Towards Goal  Goal: *Tolerating diet  Outcome: Progressing Towards Goal  Goal: *Demonstrates progressive activity  Outcome: Progressing Towards Goal  Goal: Influenza immunization  Outcome: Progressing Towards Goal  Goal: *Pneumococcal immunization  Outcome: Progressing Towards Goal  Goal: Activity/Safety  Outcome: Progressing Towards Goal  Goal: Diagnostic Test/Procedures  Outcome: Progressing Towards Goal  Goal: Nutrition/Diet  Outcome: Progressing Towards Goal  Goal: Discharge Planning  Outcome: Progressing Towards Goal  Goal: Medications  Outcome: Progressing Towards Goal  Goal: Respiratory  Outcome: Progressing Towards Goal  Goal: Treatments/Interventions/Procedures  Outcome: Progressing Towards Goal  Goal: Psychosocial  Outcome: Progressing Towards Goal

## 2022-07-05 NOTE — PROGRESS NOTES
Wound Prevention Checklist    Patient: Tressa  (87 y.o. male)  Date: 7/4/2022  Diagnosis: Severe sepsis ([facility] [address] [A41.9, R65.20]  [facility] [F10.239]  UTI (urinary tract infection) [Z69.7] Complicated UTI (urinary tract infection)    Precautions:         []  Heel prevention boots placed on patient    []  Patient turned q2h during shift    []  Lift team ordered    []  Patient on [place] bed/Specialty bed    []  Each Wound is documented during shift (Stage, Color, drainage, odor, measurements, and dressings)    [x]  Dual skin checks done at bedside during shift report with GUNNER Yepez

## 2022-07-05 NOTE — PROGRESS NOTES
Bedside shift change report given to GUNNER Yepez (oncoming nurse) by [facility], RN (offgoing nurse). Report included the following information SBAR, Kardex, Intake/Output, MAR and Recent Results.

## 2022-07-06 ENCOUNTER — APPOINTMENT (OUTPATIENT)
Dept: CT IMAGING | Age: 62
DRG: 897 | End: 2022-07-06
Attending: STUDENT IN AN ORGANIZED HEALTH CARE EDUCATION/TRAINING PROGRAM
Payer: MEDICARE

## 2022-07-06 LAB
ANION GAP SERPL CALC-SCNC: 8 MMOL/L (ref 3–18)
BASOPHILS # BLD: 0 K/UL (ref 0–0.1)
BASOPHILS NFR BLD: 1 % (ref 0–2)
BUN SERPL-MCNC: 4 MG/DL (ref 7–18)
BUN/CREAT SERPL: 6 (ref 12–20)
CALCIUM SERPL-MCNC: 8.5 MG/DL (ref 8.5–10.1)
CHLORIDE SERPL-SCNC: 99 MMOL/L (ref 100–111)
CO2 SERPL-SCNC: 28 MMOL/L (ref 21–32)
CREAT SERPL-MCNC: 0.69 MG/DL (ref 0.6–1.3)
DIFFERENTIAL METHOD BLD: ABNORMAL
EOSINOPHIL # BLD: 0.1 K/UL (ref 0–0.4)
EOSINOPHIL NFR BLD: 1 % (ref 0–5)
ERYTHROCYTE [DISTWIDTH] IN BLOOD BY AUTOMATED COUNT: 14.1 % (ref 11.6–14.5)
GLUCOSE BLD STRIP.AUTO-MCNC: 114 MG/DL (ref 70–110)
GLUCOSE BLD STRIP.AUTO-MCNC: 117 MG/DL (ref 70–110)
GLUCOSE BLD STRIP.AUTO-MCNC: 136 MG/DL (ref 70–110)
GLUCOSE BLD STRIP.AUTO-MCNC: 89 MG/DL (ref 70–110)
GLUCOSE SERPL-MCNC: 108 MG/DL (ref 74–99)
HCT VFR BLD AUTO: 29.8 % (ref 36–48)
HGB BLD-MCNC: 9.8 G/DL (ref 13–16)
IMM GRANULOCYTES # BLD AUTO: 0 K/UL (ref 0–0.04)
IMM GRANULOCYTES NFR BLD AUTO: 0 % (ref 0–0.5)
LYMPHOCYTES # BLD: 1.7 K/UL (ref 0.9–3.6)
LYMPHOCYTES NFR BLD: 21 % (ref 21–52)
MAGNESIUM SERPL-MCNC: 1.4 MG/DL (ref 1.6–2.6)
MCH RBC QN AUTO: 34.5 PG (ref 24–34)
MCHC RBC AUTO-ENTMCNC: 32.9 G/DL (ref 31–37)
MCV RBC AUTO: 104.9 FL (ref 78–100)
MONOCYTES # BLD: 0.9 K/UL (ref 0.05–1.2)
MONOCYTES NFR BLD: 11 % (ref 3–10)
NEUTS SEG # BLD: 5.5 K/UL (ref 1.8–8)
NEUTS SEG NFR BLD: 67 % (ref 40–73)
NRBC # BLD: 0 K/UL (ref 0–0.01)
NRBC BLD-RTO: 0 PER 100 WBC
PHOSPHATE SERPL-MCNC: 0.9 MG/DL (ref 2.5–4.9)
PLATELET # BLD AUTO: 96 K/UL (ref 135–420)
PMV BLD AUTO: 10.3 FL (ref 9.2–11.8)
POTASSIUM SERPL-SCNC: 3.2 MMOL/L (ref 3.5–5.5)
RBC # BLD AUTO: 2.84 M/UL (ref 4.35–5.65)
SODIUM SERPL-SCNC: 135 MMOL/L (ref 136–145)
WBC # BLD AUTO: 8.2 K/UL (ref 4.6–13.2)

## 2022-07-06 PROCEDURE — 36415 COLL VENOUS BLD VENIPUNCTURE: CPT

## 2022-07-06 PROCEDURE — 74011250636 HC RX REV CODE- 250/636

## 2022-07-06 PROCEDURE — 74011250637 HC RX REV CODE- 250/637: Performed by: STUDENT IN AN ORGANIZED HEALTH CARE EDUCATION/TRAINING PROGRAM

## 2022-07-06 PROCEDURE — 97166 OT EVAL MOD COMPLEX 45 MIN: CPT

## 2022-07-06 PROCEDURE — 74011250637 HC RX REV CODE- 250/637

## 2022-07-06 PROCEDURE — 74011000250 HC RX REV CODE- 250

## 2022-07-06 PROCEDURE — 74011250636 HC RX REV CODE- 250/636: Performed by: STUDENT IN AN ORGANIZED HEALTH CARE EDUCATION/TRAINING PROGRAM

## 2022-07-06 PROCEDURE — 84100 ASSAY OF PHOSPHORUS: CPT

## 2022-07-06 PROCEDURE — 82962 GLUCOSE BLOOD TEST: CPT

## 2022-07-06 PROCEDURE — 83735 ASSAY OF MAGNESIUM: CPT

## 2022-07-06 PROCEDURE — 74011000258 HC RX REV CODE- 258: Performed by: STUDENT IN AN ORGANIZED HEALTH CARE EDUCATION/TRAINING PROGRAM

## 2022-07-06 PROCEDURE — 97535 SELF CARE MNGMENT TRAINING: CPT

## 2022-07-06 PROCEDURE — 65270000029 HC RM PRIVATE

## 2022-07-06 PROCEDURE — 74011000250 HC RX REV CODE- 250: Performed by: STUDENT IN AN ORGANIZED HEALTH CARE EDUCATION/TRAINING PROGRAM

## 2022-07-06 PROCEDURE — 85025 COMPLETE CBC W/AUTO DIFF WBC: CPT

## 2022-07-06 PROCEDURE — 70450 CT HEAD/BRAIN W/O DYE: CPT

## 2022-07-06 PROCEDURE — 80048 BASIC METABOLIC PNL TOTAL CA: CPT

## 2022-07-06 PROCEDURE — 2709999900 HC NON-CHARGEABLE SUPPLY

## 2022-07-06 RX ORDER — TRIAMCINOLONE ACETONIDE 1 MG/G
CREAM TOPICAL
Status: DISCONTINUED | OUTPATIENT
Start: 2022-07-06 | End: 2022-07-09 | Stop reason: HOSPADM

## 2022-07-06 RX ORDER — POTASSIUM CHLORIDE 20 MEQ/1
20 TABLET, EXTENDED RELEASE ORAL EVERY 4 HOURS
Status: COMPLETED | OUTPATIENT
Start: 2022-07-06 | End: 2022-07-06

## 2022-07-06 RX ORDER — MAGNESIUM SULFATE HEPTAHYDRATE 40 MG/ML
2 INJECTION, SOLUTION INTRAVENOUS ONCE
Status: COMPLETED | OUTPATIENT
Start: 2022-07-06 | End: 2022-07-06

## 2022-07-06 RX ADMIN — SODIUM PHOSPHATE, MONOBASIC, MONOHYDRATE AND SODIUM PHOSPHATE, DIBASIC, ANHYDROUS 40 MMOL: 276; 142 INJECTION, SOLUTION INTRAVENOUS at 11:28

## 2022-07-06 RX ADMIN — POTASSIUM CHLORIDE 20 MEQ: 1500 TABLET, EXTENDED RELEASE ORAL at 09:52

## 2022-07-06 RX ADMIN — ATORVASTATIN CALCIUM 40 MG: 40 TABLET, FILM COATED ORAL at 09:57

## 2022-07-06 RX ADMIN — SODIUM CHLORIDE, PRESERVATIVE FREE 10 ML: 5 INJECTION INTRAVENOUS at 13:13

## 2022-07-06 RX ADMIN — LORAZEPAM 2 MG: 2 INJECTION, SOLUTION INTRAMUSCULAR; INTRAVENOUS at 20:16

## 2022-07-06 RX ADMIN — SODIUM CHLORIDE, PRESERVATIVE FREE 10 ML: 5 INJECTION INTRAVENOUS at 06:13

## 2022-07-06 RX ADMIN — POTASSIUM CHLORIDE 20 MEQ: 1500 TABLET, EXTENDED RELEASE ORAL at 16:39

## 2022-07-06 RX ADMIN — POTASSIUM CHLORIDE 20 MEQ: 1500 TABLET, EXTENDED RELEASE ORAL at 13:13

## 2022-07-06 RX ADMIN — FLUOXETINE 20 MG: 20 CAPSULE ORAL at 09:52

## 2022-07-06 RX ADMIN — LISINOPRIL 10 MG: 10 TABLET ORAL at 09:53

## 2022-07-06 RX ADMIN — MAGNESIUM SULFATE 2 G: 2 INJECTION INTRAVENOUS at 09:53

## 2022-07-06 RX ADMIN — SODIUM CHLORIDE, PRESERVATIVE FREE 10 ML: 5 INJECTION INTRAVENOUS at 06:12

## 2022-07-06 RX ADMIN — WATER 1 G: 1 INJECTION INTRAMUSCULAR; INTRAVENOUS; SUBCUTANEOUS at 13:13

## 2022-07-06 RX ADMIN — THIAMINE HYDROCHLORIDE 500 MG: 100 INJECTION, SOLUTION INTRAMUSCULAR; INTRAVENOUS at 16:23

## 2022-07-06 RX ADMIN — LORAZEPAM 2 MG: 2 INJECTION, SOLUTION INTRAMUSCULAR; INTRAVENOUS at 18:29

## 2022-07-06 RX ADMIN — THERA TABS 1 TABLET: TAB at 09:52

## 2022-07-06 RX ADMIN — Medication 1000 UNITS: at 09:53

## 2022-07-06 RX ADMIN — THIAMINE HYDROCHLORIDE 500 MG: 100 INJECTION, SOLUTION INTRAMUSCULAR; INTRAVENOUS at 11:18

## 2022-07-06 RX ADMIN — ENOXAPARIN SODIUM 40 MG: 100 INJECTION SUBCUTANEOUS at 09:56

## 2022-07-06 NOTE — PROGRESS NOTES
[facility]   BRIEF PROGRESS NOTE    Per day team, patient had lower extremity weakness this afternoon with difficulty dressing and ambulating. At this time, discharge to home not suitable for patient. Discharge orders cancelled. Plan to re-evaluate patient tomorrow for consideration of discharge to Acute Rehab. See daily progress note for full assessment/plan.       Bakari Holloway MD, PGY-3  [facility]  7/5/2022, 11:23 PM

## 2022-07-06 NOTE — PROGRESS NOTES
[facility]  DAILY PROGRESS NOTE      Patient:    Yayo Lofton , 58 y.o. male   MRN:  [id number]  Room/Bed:  467/01  Admission Date:   7/3/2022  LOS:                       LOS: 3 days   Code status:  DNR         Admission Chief Complaint:   Chief Complaint   Patient presents with    Fall    Alcohol Problem    Mental Health Problem       ASSESSMENT AND PLAN:   Yayo Lofton is a 58y.o. year old with a PMH of chronic alcoholism, falls, T2DM, HTN, chronic malnutrition, and tobacco use male admitted for Severe sepsis ([facility] [address] [A41.9, R65.20]  Alcohol withdrawal ([facility] [address] [F10.239]  UTI (urinary tract infection) [N39.0].    ASSESSMENT AND PLAN     Acute Cystitis  -Pt presented to ED complaining of feeling overall unwell with weakness. UA in ED significant for moderate blood, positive nitrites, large LE, \"too numerous\" WBCs, and 4+ bacteria. Urine was sent for culture. -SIRS criteria were not met:  WBC normal (8.8), RR wnl, temp 98.6F, HR initially 108 at presentation; resolved in mid-90s.  -Lactate initially elevated at 5.08; decreased to 1.12 after 6 hours.   -Pt denied any fevers, chills, dysuria, hematuria, trouble voiding, abd pain, suprapubic pain.   -Two 1 L NS boluses were administerd in ED with initial suspicion for sepsis (unlikely). -Blood cultures collected and empiric antibiotic therapy was begun in ED with one 1750 mg IV Vancomycin infusion and 4.5 g IV Zosyn. -UTI may be contributing to his weakness.      Plan:  -Admit to telemetry  -FU Urine culture  -Abx - received 2 doses ceftriaxone 1 g IV once daily while inpatient; awaiting culture results;   -continue ceftriaxone 1 g IV once daily while inpatient for 7 days; awaiting culture results (start 7/4);  -monitor for urinary retention  -Vitals as per unit protocol     Chronic Alcohol Use Disorder w/ Alcohol Withdrawal  AMS  Pt was confused this AM and oriented only to person and place. Concern for alcohol withdrawal vs Wernicke-Korsikoff. He has been hospitalized for 72 hours and CIWA scores are max of 2, so less likely alcohol withdrawal. Will treat for WK syndrome given low risk and high benefit if present. -CXR showed no evidence of aspiration PNA  -Pt endorses a lengthy history of drinking multiple beers and a variety of liquor each day. Pt reports his last drink was two days prior to admission and feels as if he is having withdrawals. Serum ethanol 18 upon admission. UDS negative. -LFTs:  ALT 27, AST 97, Alk Phos 150 on admission  -Chronic malnutrition and hypoalbuminemia (2.9) d/t chronic alcohol use. -Mild macrocytic anemia (Hbg 11; .2) and thrombocytopenia (117) likely secondary to chronic malnutrition and alcoholism. -[id number], Folate: 5.9, VitD: 17.8     Plan:  -Continue CIWA protocol. Current scores 0 , Has not required any Ativan thus far. -Thiamine 300 mg was administered in ED. With concern for WK syndrome, will aggressively replete with 500 mg thiamine IV 5x daily (7/6-7/7) followed by 250 mg thiamine IV (7-8-7/12)  -Consult nutrition services,  appreciate their recommendations.   -Check vitamin D, B12, ammonia levels  -Continue vitamin d 1000 international units daily. Hypophospatemia  Hypomagnesiemia  Hypokalemia  Concern for refeeding syndrome given history of alcoholism and concern over current living situation. Will aggressively replete  -Magnesium: 1.4  -Phos: 0.9  -K: 3.2  Plan:  -Start potassium chloride 20 meq PO TID  -Start sodium phosphate 40 mmol IV daily  -Start magnesium 2g IV daily     Left Lower Extremity Swelling and Pain, improving  -Swelling decreased, mild L ankle swelling today, no R leg swelling.  -Etiology likely chronic venous stasis given unremarkable duplex and venous stasis skin changes. Pt denies warmth, redness, dyspnea, or chest pain. However, pt has presented with similar problem in the past and was negative for DVT.  DDx for his LLE edema include: DVT, chronic venous stasis (consistent with physical exam and skin changes), hypoalbuminemia, or cellulitis.   -Wells' score of 3 (+only for \"clinical signs and symptoms of dvt\")  -Duplex US bilateral LE unremarkable  Plan:  -PRN acetaminophen for pain.     CKD Stage 3A   -GFR at presentation 57, Baseline GFR >60. Cr 1.27 at admission, baseline Cr variable (range from 0.49 - 1.13). Plan:  -PO fluids as tolerated  -Monitor I/O. -Monitor daily BMP.     Frequent Falls and Generalized Weakness  -Likely multifactorial in the setting of left lower extremity swelling, chronic alcohol abuse, complicated UTI, and chronic malnutrition.   -Monitor clinically for any signs of acute deterioration or focal neurologic deficits. No overt deficits or indication for head imaging at this time.     Plan:  -Fall precautions  -Activity with assistance  -Consult PT/OT -- appreciate your assistance and recommendations.     Hypertension  -Pt was hypertensive upon admission (142/90). He denied taking any medications at home.   -Lisinopril 10 mg daily from [employer] rec resumed. Indapamide 1.25 mg held for now, as pt has been of antihypertensives for a long period of time.      Plan:  -Continue Lisinopril 10mg  - Restart indapamide 1.25mg as tolerated on discharge     Type 2 Diabetes   -Last A1c June 2021 was 4.9  -Blood glucose at presentation 157.   -Pt not taking any medications.      Plan:  -POC glucose before meals and at bedtime  -SSI   -Hypoglycemic precautions   -Resume atorvastatin 40 mg daily; ASCVD 10-year risk 35.6% based on lipid panel from 2019 and current state     Depression   Plan:  -Begin Prozac 20 mg every day; follow up with PCP outpatient to discuss changes.  -Consult social work -- appreciate assessment and recommendations.      Tobacco Use Disorder  -Pt smokes about 8 cigarettes per day     Plan:  -Nicoderm patches to prevent nicotine withdrawal  -smoking cessation counselling     Global:  Code: DNR  IVF/Drips: Received two fluid boluses in ED.  If tolerating PO intake well, may discontinue IVF. I/O / Wt: Monitor I/O and daily weights  Diet: Adult cardiac  DVT/AC: Lovenox 40 mg SQ  Mobility: per protocol; up with assistance  Disposition: Inpatient  Anticipated LOS:<2 days     Point of Contact:  Ivan Conner (brother):  [phone]  SUBJECTIVE:   Events of the last 24 hours:    Pt was unable to be discharged yesterday as he was too weak to dress himself and walk. He was noted to have bed bugs yesterday, moved rooms, and was placed on contact precautions. This AM, he was moderately confused, had difficulty answering questions, and was focused on finding a \"pair of scissors\" he thought he was given by nursing to \"cut his gown. \" He denies chest pain, vision changes, weakness.      ROS (positive findings are in BOLD; negative findings are in regular font)  Constitutional: fevers, chills, appetite changes, weight changes, fatigue  HEENT: changes in vision, changes in hearing, sore throat, dysphagia  Cardiovascular: chest pain, palpitations, PND, orthopnea, edema  Pulmonary: SOB, cough, sputum production, wheezing, chest tightness  Gastrointestinal: abdominal pain, nausea/vomiting, diarrhea, constipation, melena, hematochezia  Genitourinary: dysuria, hesitation, dribbling, urgency, hematuria  Musculoskeletal: arthralgias, myalgias  Skin: rash, itching  Neurological: sensory changes, motor changes, headache  Psychiatric: mood changes  Endocrine: heat/cold intolerance  Heme: easy bruising/easy bleeding, LAD    CURRENT INPATIENT MEDICATIONS:    Scheduled Medications  Current Facility-Administered Medications   Medication Dose Route Frequency Provider Last Rate Last Admin    triamcinolone acetonide (KENALOG) 0.1 % cream   Topical TID PRN Leonila Mandujano P, DO        potassium chloride (K-DUR, KLOR-CON M20) SR tablet 20 mEq  20 mEq Oral Q4H Tiarra Carlisle MD        thiamine HCL (B-1) tablet 100 mg  100 mg Oral DAILY Curly Lin MD        therapeutic multivitamin (THERAGRAN) tablet 1 Tablet  1 Tablet Oral DAILY Mason Miller MD        cholecalciferol (VITAMIN D3) (1000 Units /25 mcg) tablet 1,000 Units  1,000 Units Oral DAILY Carlton Perez,    1,000 Units at 07/05/22 4428    FLUoxetine (PROzac) capsule 20 mg  20 mg Oral DAILY Maryln Crowe P, DO   20 mg at 07/05/22 6863    insulin lispro (HUMALOG) injection   SubCUTAneous Darlene Chauhan MD        glucose chewable tablet 16 g  4 Tablet Oral PRN Dionna Godinez MD        glucagon (GLUCAGEN) injection 1 mg  1 mg IntraMUSCular PRN Dionna Godinez MD        cefTRIAXone (ROCEPHIN) 1 g in sterile water (preservative free) 10 mL IV syringe  1 g IntraVENous Q24H Fer Forman MD   1 g at 07/05/22 1325    sodium chloride (NS) flush 5-40 mL  5-40 mL IntraVENous Q8H Bohsammi Tere P, DO   10 mL at 07/06/22 7244    sodium chloride (NS) flush 5-40 mL  5-40 mL IntraVENous PRN Deirdre Crowe P, DO        LORazepam (ATIVAN) tablet 1 mg  1 mg Oral Q1H PRN Deirdre Crowe P, DO        Or    LORazepam (ATIVAN) 2 mg/mL injection 1 mg  1 mg IntraVENous Q1H PRN Ryan Tere P, DO        LORazepam (ATIVAN) tablet 2 mg  2 mg Oral Q1H PRN Deirdre Crowe P, DO        Or    LORazepam (ATIVAN) 2 mg/mL injection 2 mg  2 mg IntraVENous Q1H PRN Ryan Tere P, DO        LORazepam (ATIVAN) 2 mg/mL injection 3 mg  3 mg IntraVENous Q15MIN PRN Deirdre Crowe P, DO        atorvastatin (LIPITOR) tablet 40 mg  40 mg Oral DAILY Maryln Crowe P, DO   40 mg at 07/05/22 0855    lisinopriL (PRINIVIL, ZESTRIL) tablet 10 mg  10 mg Oral DAILY Maryln Crowe P, DO   10 mg at 07/05/22 0835    sodium chloride (NS) flush 5-40 mL  5-40 mL IntraVENous Q8H Bohsammi Tere P, DO   10 mL at 07/06/22 0612    sodium chloride (NS) flush 5-40 mL  5-40 mL IntraVENous PRN Deirdre LAMB DO        acetaminophen (TYLENOL) tablet 650 mg  650 mg Oral Q6H PRN DO Victorino Rodriguez acetaminophen (TYLENOL) suppository 650 mg  650 mg Rectal Q6H PRN [place] Carmen P, DO        polyethylene glycol (MIRALAX) packet 17 g  17 g Oral DAILY PRN [place] Carmen P, DO        ondansetron (ZOFRAN ODT) tablet 4 mg  4 mg Oral Q8H PRN Maddie Carmen P, DO        Or    ondansetron (ZOFRAN) injection 4 mg  4 mg IntraVENous Q6H PRN Ryan Tere P, DO        enoxaparin (LOVENOX) injection 40 mg  40 mg SubCUTAneous DAILY Maddie Ruffilling P, DO   40 mg at 07/05/22 0836    [Held by provider] indapamide (LOZOL) tablet 1.25 mg  1.25 mg Oral DAILY Ryan Tere P, DO        nicotine (NICODERM CQ) 14 mg/24 hr patch 1 Patch  1 Patch TransDERmal DAILY Artie Anderson, DO   1 Patch at 07/05/22 0834       OBJECTIVE:  Visit Vitals  BP (!) 154/84   Pulse 94   Temp 97.4 °F (36.3 °C)   Resp 20   Ht 6' 2\" (1.88 m)   Wt 68.1 kg (150 lb 1.6 oz)   SpO2 97%   BMI 19.27 kg/m²       PHYSICAL EXAM  Gen: NAD, comfortable, in no acute distress  HEENT: normocephalic, atraumatic, MMM,  CV: RRR, S1/S2 present without M/R/G, +2 radial pulses, well-perfused, PMI not displaced  Pulm: CTAB, no wheezes, no crackles  Abd: S/NT/ND, no rebound, no guarding, no hepatosplenomegaly   MSK: no clubbing, no edema  Skin: warm, dry, intact, no rash  Neuro: CN II-XII grossly intact, no focal deficits appreciated   Psych: appropriate, alert, oriented x3      Intake and Output:    Current Shift: No intake/output data recorded.     Last three shifts: 07/04 1901 - 07/06 0700  In: 250 [I.V.:250]  Out: 1270 [Urine:1270]       Lab/Data Review:       Recent Results (from the past 12 hour(s))   CBC WITH AUTOMATED DIFF    Collection Time: 07/06/22  5:14 AM   Result Value Ref Range    WBC 8.2 4.6 - 13.2 K/uL    RBC 2.84 (L) 4.35 - 5.65 M/uL    HGB 9.8 (L) 13.0 - 16.0 g/dL    HCT 29.8 (L) 36.0 - 48.0 %    .9 (H) 78.0 - 100.0 FL    MCH 34.5 (H) 24.0 - 34.0 PG    MCHC 32.9 31.0 - 37.0 g/dL    RDW 14.1 11.6 - 14.5 %    PLATELET 96 (L) 493 - 420 K/uL    MPV 10.3 9.2 - 11.8 FL    NRBC 0.0 0  WBC    ABSOLUTE NRBC 0.00 0.00 - 0.01 K/uL    NEUTROPHILS 67 40 - 73 %    LYMPHOCYTES 21 21 - 52 %    MONOCYTES 11 (H) 3 - 10 %    EOSINOPHILS 1 0 - 5 %    BASOPHILS 1 0 - 2 %    IMMATURE GRANULOCYTES 0 0.0 - 0.5 %    ABS. NEUTROPHILS 5.5 1.8 - 8.0 K/UL    ABS. LYMPHOCYTES 1.7 0.9 - 3.6 K/UL    ABS. MONOCYTES 0.9 0.05 - 1.2 K/UL    ABS. EOSINOPHILS 0.1 0.0 - 0.4 K/UL    ABS. BASOPHILS 0.0 0.0 - 0.1 K/UL    ABS. IMM. GRANS. 0.0 0.00 - 0.04 K/UL    DF AUTOMATED     METABOLIC PANEL, BASIC    Collection Time: 07/06/22  5:14 AM   Result Value Ref Range    Sodium 135 (L) 136 - 145 mmol/L    Potassium 3.2 (L) 3.5 - 5.5 mmol/L    Chloride 99 (L) 100 - 111 mmol/L    CO2 28 21 - 32 mmol/L    Anion gap 8 3.0 - 18 mmol/L    Glucose 108 (H) 74 - 99 mg/dL    BUN 4 (L) 7.0 - 18 MG/DL    Creatinine 0.69 0.6 - 1.3 MG/DL    BUN/Creatinine ratio 6 (L) 12 - 20      GFR est AA >60 >60 ml/min/1.73m2    GFR est non-AA >60 >60 ml/min/1.73m2    Calcium 8.5 8.5 - 10.1 MG/DL     Intake and Output:  Current Shift: No intake/output data recorded. Last three shifts: 07/04 1901 - 07/06 0700  In: 250 [I.V.:250]  Out: 1270 [Urine:1270]    Lab Review:  Recent Results (from the past 12 hour(s))   CBC WITH AUTOMATED DIFF    Collection Time: 07/06/22  5:14 AM   Result Value Ref Range    WBC 8.2 4.6 - 13.2 K/uL    RBC 2.84 (L) 4.35 - 5.65 M/uL    HGB 9.8 (L) 13.0 - 16.0 g/dL    HCT 29.8 (L) 36.0 - 48.0 %    .9 (H) 78.0 - 100.0 FL    MCH 34.5 (H) 24.0 - 34.0 PG    MCHC 32.9 31.0 - 37.0 g/dL    RDW 14.1 11.6 - 14.5 %    PLATELET 96 (L) 407 - 420 K/uL    MPV 10.3 9.2 - 11.8 FL    NRBC 0.0 0  WBC    ABSOLUTE NRBC 0.00 0.00 - 0.01 K/uL    NEUTROPHILS 67 40 - 73 %    LYMPHOCYTES 21 21 - 52 %    MONOCYTES 11 (H) 3 - 10 %    EOSINOPHILS 1 0 - 5 %    BASOPHILS 1 0 - 2 %    IMMATURE GRANULOCYTES 0 0.0 - 0.5 %    ABS. NEUTROPHILS 5.5 1.8 - 8.0 K/UL    ABS.  LYMPHOCYTES 1.7 0.9 - 3.6 K/UL ABS. MONOCYTES 0.9 0.05 - 1.2 K/UL    ABS. EOSINOPHILS 0.1 0.0 - 0.4 K/UL    ABS. BASOPHILS 0.0 0.0 - 0.1 K/UL    ABS. IMM. GRANS. 0.0 0.00 - 0.04 K/UL    DF AUTOMATED     METABOLIC PANEL, BASIC    Collection Time: 07/06/22  5:14 AM   Result Value Ref Range    Sodium 135 (L) 136 - 145 mmol/L    Potassium 3.2 (L) 3.5 - 5.5 mmol/L    Chloride 99 (L) 100 - 111 mmol/L    CO2 28 21 - 32 mmol/L    Anion gap 8 3.0 - 18 mmol/L    Glucose 108 (H) 74 - 99 mg/dL    BUN 4 (L) 7.0 - 18 MG/DL    Creatinine 0.69 0.6 - 1.3 MG/DL    BUN/Creatinine ratio 6 (L) 12 - 20      GFR est AA >60 >60 ml/min/1.73m2    GFR est non-AA >60 >60 ml/min/1.73m2    Calcium 8.5 8.5 - 10.1 MG/DL       Radiologic Studies -   DUPLEX LOWER EXT VENOUS BILAT   Final Result      XR CHEST PA LAT   Final Result      Normal chest.                      ================================================================  Further management for Mr. Carla Wilson will be discussed on rounds with my attending.       Eleanor Rivera MD, PGY-1  [facility]  July 6, 2022 8:33 AM

## 2022-07-06 NOTE — ROUTINE PROCESS
Bedside and Verbal shift change report given to GUNNER Curtis (oncoming nurse) by GUNNER Yepez (offgoing nurse). Report included the following information SBAR, Kardex, Intake/Output and MAR.

## 2022-07-06 NOTE — PROGRESS NOTES
Wound Prevention Checklist    Patient: Lyndsay Kilgore (67 y.o. male)  Date: 7/6/2022  Diagnosis: Severe sepsis ([facility] [address] [A41.9, R65.20]  [facility] [F10.239]  UTI (urinary tract infection) [F11.5] Complicated UTI (urinary tract infection)    Precautions: Fall       []  Heel prevention boots placed on patient    []  Patient turned q2h during shift    []  Lift team ordered    [x]  Patient on Advance bed/Specialty bed    []  Each Wound is documented during shift (Stage, Color, drainage, odor, measurements, and dressings)    [x]  Dual skin checks done at bedside during shift report with Belgica Cortez RN

## 2022-07-06 NOTE — PROGRESS NOTES
Wound Prevention Checklist    Patient: Swapnil Colon (89 y.o. male)  Date: 7/5/2022  Diagnosis: Severe sepsis ([facility] [address] [A41.9, R65.20]  Alcohol withdrawal ([facility] [address] [F10.239]  UTI (urinary tract infection) [I68.9] Complicated UTI (urinary tract infection)    Precautions: Fall       []  Heel prevention boots placed on patient    []  Patient turned q2h during shift    []  Lift team ordered    []  Patient on [facility] bed/Specialty bed    [x]  Each Wound is documented during shift (Stage, Color, drainage, odor, measurements, and dressings)    [x]  Dual skin checks done at bedside during shift report with Mariela Light RN

## 2022-07-06 NOTE — PROGRESS NOTES
Pt transferred to Rm 467 due to beg bugs. Completed bath given; clothes double bagged. Pt denies complaints at this time. Will continue to monitor.

## 2022-07-06 NOTE — PROGRESS NOTES
[facility]   BRIEF UPDATE NOTE    Assessment & Plan:     Alcohol Withdrawal   Hallucinations are mild, no other concerning sx. Pt comfortable and content. Plan:  -Continue monitoring CIWA scores and monitor clinically.  -Continue monitoring electrolytes  -Continue vitamin D and B vitamins     Weakness   Pt rejected OT. He is instructed to only be up with assistance. Sx are unchanged. Plan:   -Continue fall precautions  -Monitor daily labs. Bed Bugs   Multiple bedbugs identified in pt's bed. Nursing supervisor and environmental health notified. Plan:   -Strict contact isolation precautions   -Pt to be changed and bathed with all belongings and linens packaged according to protocol.   -Pt will be moved to clean room. -Topical kenalog cream PRN for bug bites or pruritis. Subjective:    Mr. Mitch Juan was visited at bedside for an evening check. Pt reported he is seeing the doors turn sideways and objects going up the wall. \"It's like I'm hallucinating,\" he stated multiple times. Pt endorsed some nervousness. When asked if he wanted to harm himself, he said he didn't know. Pt denied feeling sad. No nausea or vomiting. Pt denied any pain. He said he doesn't feel weak except when he gets up to walk; he said he gets around well with walker. When pt went to restroom w/ nurse assistance, one large adult bedbug was noted crawling in bed. Others were later identified by nursing staff. Nursing supervisor was notified. Specimen was collect in secure container. No other concerns or complaints. Objective:  Visit Vitals  /77   Pulse 96   Temp 98.8 °F (37.1 °C)   Resp 18   Ht 6' 2\" (1.88 m)   Wt 68.1 kg (150 lb 1.6 oz)   SpO2 97%   BMI 19.27 kg/m²       Physical Exam:   General: comfortable, NAD. CV:  RRR, no M/G/R.  RESP: Unlabored breathing. Lungs CTAB, no wheezes, rales or rhonchi appreciated. Equal expansion bilaterally. ABD:  Soft, nontender, nondistended. No hepatosplenomegaly. MS:  No joint deformity or instability. No atrophy. Neuro:  5/5 strength bilateral upper extremities and lower extremities. A+Ox3. Ext:   +Left lower extremity edema with 3+ pitting edema on dorsum of L foot and 2+ pitting edema extending mid calf. No edema of RLE. BLE warm and well-profused. 1+ dorsal pedal pulse on R foot. LLE pulse not palpable d/t edema. Skin: +Skin changes consistent with chronic venous stasis noted BLE, more severe in LLE (including flaking, crusting, and dusky coloration.) Warm, Dry, Intact. Psych: anxious mood; visual disturbances/hallucinations    The above patient and plan were discussed with my supervising physician. See daily progress note for full assessment/plan.       Jackelin Mosley DO, PGY-1  [facility]  7/5/2022, 11:56 PM

## 2022-07-06 NOTE — PROGRESS NOTES
Discharge planning    Discharge cancelled yesterday. Will need PT/OT to re-evaluate. Per Charge Nurse, patient was ambulating in hallway with walker. CM will continue to monitor and assist with transition of care needs.      NAYA Geller, RN  Pager # [phone]  Care Manager

## 2022-07-06 NOTE — PROGRESS NOTES
Problem: Self Care Deficits Care Plan (Adult)  Goal: *Acute Goals and Plan of Care (Insert Text)  Description: Occupational Therapy Goals  Initiated 7/6/2022 within 7 day(s). 1.  Patient will perform bed mobility in preparation for ADLs with modified independence. 2.  Patient will perform upper body dressing with modified independence. 3.  Patient will perform lower body dressing with modified independence. 4.  Patient will perform toilet transfers with supervision/set-up. 5.  Patient will perform all aspects of toileting with supervision/set-up. 6.  Patient will participate in upper extremity therapeutic exercise/activities with modified independence for 8 minutes. 7.  Patient will utilize energy conservation techniques during functional activities with verbal cues. Prior Level of Function: Patient reports he was independent with self-care and functional mobility PTA. Outcome: Progressing Towards Goal       OCCUPATIONAL THERAPY EVALUATION    Patient: Tisha Melvin (27 y.o. male)  Date: 7/6/2022  Primary Diagnosis: Severe sepsis ([facility] [address] [A41.9, R65.20]  Alcohol withdrawal ([facility] [address] [F10.239]  UTI (urinary tract infection) [N39.0]  Precautions:Fall,Skin,Contact      ASSESSMENT :  Upon entering the room, the patient was seated edge of bed with nursing staff as pt trying to get OOB on his own. Patient pleasantly confused, follows directions- required moderate redirection to task this session. Patient moderate assist for stand, minimal assist for lateral steps using rolling walker and minimal assistance for sitting. Patient able to perform lower body dressing seated this session with minimal assistance however would require additional assistance for standing ADLs d/t balance.  Based on the objective data described below, the patient presents with decreased strength, decreased independence, decreased activity tolerance, decreased safety awareness, decreased cognitio, decreased functional balance, and decreased functional mobility, which impedes pt performance in basic self-care/ADL tasks. Patient would benefit from skilled OT to restore PLOF and maximize function. Patient will benefit from skilled intervention to address the above impairments. Patient's rehabilitation potential is considered to be Fair  Factors which may influence rehabilitation potential include:   []             None noted  [x]             Mental ability/status  [x]             Medical condition  [x]             Home/family situation and support systems  [x]             Safety awareness  []             Pain tolerance/management  []             Other:      PLAN :  Recommendations and Planned Interventions:   [x]               Self Care Training                  [x]      Therapeutic Activities  [x]               Functional Mobility Training   [x]      Cognitive Retraining  [x]               Therapeutic Exercises           [x]      Endurance Activities  [x]               Balance Training                    [x]      Neuromuscular Re-Education  []               Visual/Perceptual Training     [x]      Home Safety Training  [x]               Patient Education                   [x]      Family Training/Education  []               Other (comment):    Frequency/Duration: Patient will be followed by occupational therapy 3 times a week to address goals. Discharge Recommendations: Rehab  Further Equipment Recommendations for Discharge: transfer bench and rolling walker          [facility]: Keyonna Hurst scored a 15/24 on the AM-PAC ADL Inpatient form. Current research shows that an AM-PAC score of 17 or less is not associated with a discharge to the patient's home setting. Based on the patient's AM-PAC score and their current ADL deficits, it is recommended that the patient have 5-7 sessions per week of Occupational Therapy at d/c to increase the patient's independence.   At this time, this patient demonstrates the potential endurance, and/or tolerance for 3 hours of therapy each day at d/c.       SUBJECTIVE:   Patient stated that officer doesn't know what he's talking about, ill knock him out    OBJECTIVE DATA SUMMARY:   No past medical history on file. No past surgical history on file. Barriers to Learning/Limitations: yes;  altered mental status (i.e.Sedation, Confusion)  Compensate with: visual, verbal, tactile, kinesthetic cues/model    Home Situation:   Home Situation  Home Environment: Apartment  # Steps to Enter: 13 (2nd floor apartment )  Rails to Enter: Yes  Wheelchair Ramp: No  One/Two Story Residence: One story  Living Alone: No  Support Systems:  (has roommates but unable to assist him )  Patient Expects to be Discharged to[de-identified] Other: ([facility] vs SNF)  Current DME Used/Available at Home: None  []  Right hand dominant   []  Left hand dominant    Cognitive/Behavioral Status:  Neurologic State: Alert;Confused  Orientation Level: Oriented to person;Disoriented to place; Disoriented to situation;Disoriented to time  Cognition: Follows commands;Decreased attention/concentration;Poor safety awareness  Safety/Judgement: Fall prevention;Decreased awareness of environment;Decreased awareness of need for assistance;Decreased awareness of need for safety    Skin: Intact  Edema: None noted    Vision/Perceptual:    Tracking: Able to track stimulus in all quadrants w/o difficulty           Coordination: BUE     Fine Motor Skills-Upper: Left Intact; Right Intact    Gross Motor Skills-Upper: Left Intact; Right Intact    Balance:  Sitting: Impaired  Sitting - Static: Good (unsupported)  Sitting - Dynamic: Fair (occasional)  Standing: Impaired; With support  Standing - Static: Fair  Standing - Dynamic : Fair;Occasional    Strength: BUE  Strength: Generally decreased, functional       Tone & Sensation: BUE  Tone: Normal  Sensation: Intact         Range of Motion: BUE  AROM: Within functional limits         Functional Mobility and Transfers for ADLs:  Bed Mobility:  Supine to Sit:  (pt sitting EOB w nursing staff)  Sit to Supine: Contact guard assistance  Scooting: Contact guard assistance      Transfers:  Sit to Stand: Moderate assistance  Stand to Sit: Minimum assistance    ADL Assessment:   Feeding: Setup    Oral Facial Hygiene/Grooming: Setup;Contact guard assistance (CGA standing)    Bathing: Moderate assistance    Upper Body Dressing: Contact guard assistance    Lower Body Dressing: Moderate assistance    Toileting: Moderate assistance       ADL Intervention:  Lower Body Dressing Assistance  Dressing Assistance: Minimum assistance  Socks: Minimum assistance (donning; vcs for balance)  Leg Crossed Method Used: Yes  Position Performed: Seated edge of bed      Cognitive Retraining  Safety/Judgement: Fall prevention;Decreased awareness of environment;Decreased awareness of need for assistance;Decreased awareness of need for safety      Pain:  Pain level pre-treatment: 0/10   Pain level post-treatment: 0/10   Pain Intervention(s): Medication (see MAR); Rest, Ice, Repositioning   Response to intervention: Nurse notified, See doc flow    Activity Tolerance:   Patient demonstrated decreased activity tolerance during OT session. Please refer to the flowsheet for vital signs taken during this treatment. After treatment:   [] Patient left in no apparent distress sitting up in chair  [x] Patient left in no apparent distress in bed  [x] Call bell left within reach  [x] Nursing notified  [] Caregiver present  [x] Bed alarm activated    COMMUNICATION/EDUCATION:   [x] Role of Occupational Therapy in the acute care setting  [x] Home safety education was provided and the patient/caregiver indicated understanding. [x] Patient/family have participated as able in goal setting and plan of care. [x] Patient/family agree to work toward stated goals and plan of care. [] Patient understands intent and goals of therapy, but is neutral about his/her participation.   [] Patient is unable to participate in goal setting and plan of care. Thank you for this referral.  Ifeanyi Deluna OTR/L  Time Calculation: 16 mins    Eval Complexity: History: MEDIUM Complexity : Expanded review of history including physical, cognitive and psychosocial  history ; Examination: MEDIUM Complexity : 3-5 performance deficits relating to physical, cognitive , or psychosocial skils that result in activity limitations and / or participation restrictions; Decision Making:MEDIUM Complexity : Patient may present with comorbidities that affect occupational performnce. Miniml to moderate modification of tasks or assistance (eg, physical or verbal ) with assesment(s) is necessary to enable patient to complete evaluation     Mateo Bear AM-PAC® Daily Activity Inpatient Short Form (6-Clicks)*    How much HELP from another person does the patient currently need    (If the patient hasn't done an activity recently, how much help from another person do you think he/she would need if he/she tried?)   Total (Total A or Dep)   A Lot  (Mod to Max A)   A Little (Sup or Min A)   None (Mod I to I)   Putting on and taking off regular lower body clothing? [] 1 [x] 2 [] 3 [] 4   2. Bathing (including washing, rinsing,      drying)? [] 1 [x] 2 [] 3 [] 4   3. Toileting, which includes using toilet, bedpan or urinal?   [] 1 [x] 2 [] 3 [] 4   4. Putting on and taking off regular upper body clothing? [] 1 [] 2 [x] 3 [] 4   5. Taking care of personal grooming such as brushing teeth? [] 1 [] 2 [x] 3 [] 4   6. Eating meals?    [] 1 [] 2 [x] 3 [] 4

## 2022-07-06 NOTE — PROGRESS NOTES
conducted a Follow up consultation and Spiritual Assessment for Yesica Martinez, who is a 58 y. o.,male. The  provided the following Interventions:  Patient showed hesitation to engage in any conversation though he was mumbling words of aggression as I was coming into the room. Continued the relationship of care and support. Offered  words of compassion as I noticed bleeding on his right arm at the IV site and foamy liquid spilt on the left side of the bed. . Called the RN's attention to check patient  On room 467 and GUNNER salcedo responded immediately. Deferred any conversation with patient for the meantime but offered assurance of continued prayer on patient's behalf. Chart reviewed. The following outcomes were achieved:      Assessment:  There are no further spiritual or Faith issues which require Spiritual Care Services interventions at this time. Plan:  Chaplains will continue to follow and will provide pastoral care on an as needed/requested basis.  recommends bedside caregivers page  on duty if patient shows signs of acute spiritual or emotional distress.      Allan Harrell5 [phone]

## 2022-07-06 NOTE — ROUTINE PROCESS
Bedside shift change report given to GUNNER Mendez (oncoming nurse) by Aarti Rubio RN (offgoing nurse). Report included the following information SBAR, Kardex, Intake/Output and MAR.

## 2022-07-06 NOTE — PROGRESS NOTES
Discharge planning    Spoke with Hilario Amaral, to discuss discharge planning. Per MrVirginie Garg Argue, patient at baseline cognitively and should return to boarding house. States that he cannot handle patient living in his home due to drinking and smoking. CM will continue to monitor and assist with transition of care needs.      NAYA Trammell, RN  Pager # [phone]  Care Manager

## 2022-07-06 NOTE — DISCHARGE SUMMARY
[facility] SUMMARY      Name:   Kaylee Burnette 58 y.o. male  MRN:   [id number]  CSN:   [id number]  Admission Date:  7/3/2022  Discharge Date:  7/9/2022  Attending:             Justina Moore MD   PCP:              Mehdrad Gore MD   ================================================================  Reason for Admission:   Alcohol withdrawal [facility]) [F10.239]  UTI (urinary tract infection) [N39.0]    Discharge Diagnosis:    Acute Cystitis  Chronic Alcohol Use Disorder w/ Alcohol Withdrawal  Left Lower Extremity Swelling  CKD Stage 3A  Frequent Falls  Hypertension  T2DM  Depression  Tobacco Use Disorder  Hypovitaminosis D  Macrocytic Anemia  Low platelets    Follow-up studies/evaluations for PCP/Important Notes to PCP:  · F/u low platelets   · F/u Hypovitaminosis D  · 1000 units Vitamin D daily  · Repeat 25(OH) Vitamin D level in 3 months  · F/u elevated Alk Phos  · GGT level  · 5' nucleosidase level  · Pending labs/investigations to follow up as below  · Alcohol Use Disorder/Nicotine Use  · F/u potential desire for cessation assistance  · Thiamine supplementation 100 mg daily for 1 month  · Medication reconciliation:  · Discontinued Medications: Indapamide - reconsider restarting if hypertensive at discharge  · New Medications: Prozac 20 mg, Lisinopril 10 mg, Atorvastatin 40 mg, Vitamin D 1000 units daily,  Thiamine 100 mg daily, daily multivitamin     [facility] Follow Up Appointment:   Follow-up Information     Follow up With Specialties Details Why Contact [facility] MEDICINE  On 7/15/2022 2:00 pm with Dr. Messi Kirkland [id number]  [phone]          Recommended follow-up after [facility] visit: Patient needs follow-up visit 4 weeks after [facility] appointment, then at provider discretion thereafter.       Readmission prevention plan:    Alcohol cessation counseling   Vitamin supplementation    GOALS OF CARE (including Code Status, Advanced Care Plan):    Full code    Pending labs/ investigations at discharge to follow up:    none    Operative Procedures:   none    Consultants:    none    Condition at discharge: Afebrile  Ambulating  Eating, Drinking, Voiding  Stable    Disposition at Discharge:  Home    Functional Status at Discharge: Ambulates independently    Diet: Regular diet     Discharge Medications:  Current Discharge Medication List      START taking these medications    Details   thiamine HCL (B-1) 100 mg tablet Take 1 Tablet by mouth daily. Indications: deficiency in thiamine or vitamin B1  Qty: 30 Tablet, Refills: 0  Start date: 7/9/2022      therapeutic multivitamin [facility]) tablet Take 1 Tablet by mouth daily. Qty: 30 Tablet, Refills: 0  Start date: 7/9/2022      cholecalciferol (VITAMIN D3) (1000 Units /25 mcg) tablet Take 1 Tablet by mouth daily. Qty: 30 Tablet, Refills: 0  Start date: 7/9/2022      FLUoxetine (PROzac) 20 mg capsule Take 1 Capsule by mouth daily. Qty: 30 Capsule, Refills: 0  Start date: 7/9/2022         CONTINUE these medications which have CHANGED    Details   lisinopriL (PRINIVIL, ZESTRIL) 10 mg tablet Take 1 Tablet by mouth daily. Qty: 30 Tablet, Refills: 0  Start date: 7/9/2022      atorvastatin (LIPITOR) 40 mg tablet Take 1 Tablet by mouth daily. Qty: 30 Tablet, Refills: 0  Start date: 7/9/2022         CONTINUE these medications which have NOT CHANGED    Details   triamcinolone acetonide (KENALOG) 0.5 % ointment Apply  to affected area two (2) times a day. use thin layer      clotrimazole-betamethasone (LOTRISONE) topical cream Apply  to affected area two (2) times a day. lidocaine (Salonpas, lidocaine,) 4 % patch Use once daily.   Qty: 1 Package, Refills: 0         STOP taking these medications       indapamide (LOZOL) 1.25 mg tablet Comments:   Reason for Stopping: well managed blood pressure during stay                 Hospital Course:   Ivan Dick is a 58 y.o. white  male with a past medical history significant for chronic alcoholism, falls, T2DM, HTN, hyperlipidemia, chronic malnutrition, and tobacco use who presented to  [facility] ED on 07/03/2022 for alcohol withdrawal, frequent falls, and a complicated UTI. Patient reports calling EMS on 07/03/22 d/t overall feeling unwell, stating his left lower leg was swollen and that he was withdrawing from alcohol. LLE edema has been a chronic condition for this pt, to which he attributes his overall weakness and frequent falls. Additionally, pt relayed profound depression and passive suicidal ideation without intent. Of note, pt is a poor historian and not adherent to any medication regimens outpatient. VSS on admission. UA performed in ED was indicative of UTI. Lactate was initially elevated at 5.08, which decreased to 1.12 after 6 hours. IVF were administered and empiric antibiotic coverage was started w/ IV Vancomycin and IV Zosyn. Blood and urine cultures sent. Bilateral duplex US of lower ext. was negative for DVT Adrianne Michelle' score 3 before scan). Pt was admitted to telemetry for management of UTI and alcohol withdrawal. Home meds were started with the exception of indapamide. He was started on thiamine and folic acid. His CIWA scores peaked to 25 after 72 hours of hospitalization, for which he received Ativan per protocol. He was monitored until his scores stabilized and he was discharged home with PT    Patient's problem list was managed in hospital as stated below:     Acute Cystitis  -Culture positive for >100,000 colonies, received 5 days abx coverage with CTX    Chronic Alcohol Use Disorder w/ Alcohol Withdrawl  - Noted to have a macrocytic anemia, elevated alk phos thrombocytopenia, low vitamin D, and low platelets. - He was started on supplementation for vitamin D, thiamine, and multivitamin      Left Lower Extremity Swelling and Pain  -Duplex US bilateral LE unremarkable, likely venous stasis changes. CKD Stage 3A   -GFR at presentation 57, Baseline GFR >60.  Cr 1.27 at admission, baseline Cr variable (range from 0.49 - 1.13).      Frequent Falls and Generalized Weakness  -Likely multifactorial in the setting of left lower extremity swelling, chronic alcohol abuse, complicated UTI, and chronic malnutrition.   - No overt deficits or indication for head imaging      Hypertension  -Continued home Lisinopril 10 mg daily  - Indapamide discontinued, restart if pt hypertensive     Type 2 Diabetes   -Last A1c June 2021 was 4.9, Blood glucose at presentation 157.   -Started atorvastatin 40 mg daily; ASCVD 10-year risk 35.6% based on lipid panel from 2019 and current state     Depression   -Started Prozac 20 mg every day     Tobacco Use Disorder  -Nicoderm patches used to prevent nicotine withdrawal      Pertinent Results:      CURRENT ADMISSION IMAGING RESULTS   XR CHEST PA LAT    Result Date: 7/3/2022  Normal chest.     CT HEAD WO CONT    Result Date: 7/6/2022  No acute abnormalities. Small vessel disease such as is seen in patients with diabetes or hypertension. Mild burden chronic sinusitis.          Cardiology Procedures/Testing:  MODALITY RESULTS   EKG Results for orders placed or performed during the hospital encounter of 07/03/22   EKG, 12 LEAD, INITIAL   Result Value Ref Range    Ventricular Rate 104 BPM    Atrial Rate 104 BPM    P-R Interval 138 ms    QRS Duration 72 ms    Q-T Interval 346 ms    QTC Calculation (Bezet) 454 ms    Calculated P Axis 73 degrees    Calculated R Axis 75 degrees    Calculated T Axis 80 degrees    Diagnosis       Sinus tachycardia  Otherwise normal ECG  When compared with ECG of 25-JUN-2021 20:49,  No significant change was found  Confirmed by Saleem Hassan MD, Noe Haynes ([id number]) on 7/4/2022 12:51:55 PM            Special Testing/Procedures:  MODALITY RESULTS   MICRO All Micro Results     Procedure Component Value Units Date/Time    CULTURE, BLOOD [[id number]] Collected: 07/03/22 1820    Order Status: Completed Specimen: Blood Updated: 07/09/22 0612     Special Requests: NO SPECIAL REQUESTS        Culture result: NO GROWTH 6 DAYS       CULTURE, URINE [[id number]] Collected: 07/03/22 1833    Order Status: Completed Specimen: Urine Updated: 07/05/22 1141     Special Requests: ORDER PER FAMILY PHYSICIAN     [place] Count --        >100,000  COLONIES/mL       Culture result:       MIXED UROGENITAL TOM ISOLATED          CULTURE, URINE [[id number]] Collected: 07/03/22 2345    Order Status: Canceled Specimen: Urine from Clean catch            Laboratory Results:  LABORATORY RESULTS   HEMATOLOGY Lab Results   Component Value Date/Time    WBC 9.0 07/08/2022 03:40 AM    HGB 9.8 (L) 07/08/2022 03:40 AM    HCT 30.3 (L) 07/08/2022 03:40 AM    PLATELET 122 (L) 13/97/9263 03:40 AM    .4 (H) 07/08/2022 03:40 AM       CHEMISTRIES Lab Results   Component Value Date/Time    Sodium 137 07/08/2022 03:40 AM    Potassium 3.6 07/08/2022 03:40 AM    Chloride 102 07/08/2022 03:40 AM    CO2 28 07/08/2022 03:40 AM    Anion gap 7 07/08/2022 03:40 AM    Glucose 97 07/08/2022 03:40 AM    BUN 4 (L) 07/08/2022 03:40 AM    Creatinine 0.75 07/08/2022 03:40 AM    BUN/Creatinine ratio 5 (L) 07/08/2022 03:40 AM    GFR est AA >60 07/08/2022 03:40 AM    GFR est non-AA >60 07/08/2022 03:40 AM    Calcium 8.4 (L) 07/08/2022 03:40 AM      HEPATIC FUNCTION Lab Results   Component Value Date/Time    Albumin 2.9 (L) 07/03/2022 06:20 PM    Bilirubin, direct 0.6 (H) 02/29/2020 11:11 AM    Bilirubin, total 2.2 (H) 07/03/2022 06:20 PM    Protein, total 7.7 07/03/2022 06:20 PM    Globulin 4.8 (H) 07/03/2022 06:20 PM    A-G Ratio 0.6 (L) 07/03/2022 06:20 PM    ALT (SGPT) 27 07/03/2022 06:20 PM    Alk. phosphatase 150 (H) 07/03/2022 06:20 PM       LACTIC ACID Lab Results   Component Value Date/Time    Lactic acid 1.9 07/04/2022 12:35 AM    Lactic acid 1.7 02/29/2020 11:11 AM            Readmission Risk Score: Low Risk            11 Total Score    3 Patient Length of Stay (>5 days = 3)    5 Pt.  Coverage (Medicare=5 , Medicaid, or Self-Pay=4)    3 Charlson Comorbidity Score (Age + Comorbid Conditions)        Criteria that do not apply:    Has Seen PCP in Last 6 Months (Yes=3, No=0)    . Living with Significant Other. Assisted Living. LTAC. SNF.  or   Rehab    IP Visits Last 12 Months (1-3=4, 4=9, >4=11)        Jake Soto MD, PGY-1  [facility]  7/9/2022 11:00 AM

## 2022-07-06 NOTE — PROGRESS NOTES
Problem: Pressure Injury - Risk of  Goal: *Prevention of pressure injury  Description: Document Kahlil Scale and appropriate interventions in the flowsheet. Outcome: Progressing Towards Goal  Note: Pressure Injury Interventions:  Sensory Interventions: Assess changes in LOC    Moisture Interventions: Absorbent underpads,Maintain skin hydration (lotion/cream)    Activity Interventions: Assess need for specialty bed,Pressure redistribution bed/mattress(bed type),Increase time out of bed    Mobility Interventions: Assess need for specialty bed,HOB 30 degrees or less,Pressure redistribution bed/mattress (bed type)    Nutrition Interventions: Document food/fluid/supplement intake    Friction and Shear Interventions: Apply protective barrier, creams and emollients                Problem: Patient Education: Go to Patient Education Activity  Goal: Patient/Family Education  Outcome: Progressing Towards Goal     Problem: Falls - Risk of  Goal: *Absence of Falls  Description: Document Samantha Fall Risk and appropriate interventions in the flowsheet.   Outcome: Progressing Towards Goal  Note: Fall Risk Interventions:  Mobility Interventions: Assess mobility with egress test    Mentation Interventions: Adequate sleep, hydration, pain control    Medication Interventions: Assess postural VS orthostatic hypotension,Evaluate medications/consider consulting pharmacy,Patient to call before getting OOB,Teach patient to arise slowly    Elimination Interventions: Bed/chair exit alarm,Call light in reach,Patient to call for help with toileting needs,Toileting schedule/hourly rounds    History of Falls Interventions: Bed/chair exit alarm,Consult care management for discharge planning         Problem: Patient Education: Go to Patient Education Activity  Goal: Patient/Family Education  Outcome: Progressing Towards Goal     Problem: Patient Education: Go to Patient Education Activity  Goal: Patient/Family Education  Outcome: Progressing Towards Goal     Problem: Sepsis: Day of Diagnosis  Goal: Off Pathway (Use only if patient is Off Pathway)  Outcome: Progressing Towards Goal  Goal: *Fluid resuscitation  Outcome: Progressing Towards Goal  Goal: *Paired blood cultures prior to first dose of antibiotic  Outcome: Progressing Towards Goal  Goal: *First dose of  appropriate antibiotic within 3 hours of arrival to ED, within 1 hour of arrival to ICU  Outcome: Progressing Towards Goal  Goal: *Lactic acid with first set of blood cultures  Outcome: Progressing Towards Goal  Goal: *Pneumococcal immunization (if eligible)  Outcome: Progressing Towards Goal  Goal: *Influenza immunization (if eligible)  Outcome: Progressing Towards Goal  Goal: Activity/Safety  Outcome: Progressing Towards Goal  Goal: Consults, if ordered  Outcome: Progressing Towards Goal  Goal: Diagnostic Test/Procedures  Outcome: Progressing Towards Goal  Goal: Nutrition/Diet  Outcome: Progressing Towards Goal  Goal: Discharge Planning  Outcome: Progressing Towards Goal  Goal: Medications  Outcome: Progressing Towards Goal  Goal: Respiratory  Outcome: Progressing Towards Goal  Goal: Treatments/Interventions/Procedures  Outcome: Progressing Towards Goal  Goal: Psychosocial  Outcome: Progressing Towards Goal     Problem: Sepsis: Day 2  Goal: Off Pathway (Use only if patient is Off Pathway)  Outcome: Progressing Towards Goal  Goal: *Central Venous Pressure maintained at 8-12 mm Hg  Outcome: Progressing Towards Goal  Goal: *Hemodynamically stable  Outcome: Progressing Towards Goal  Goal: *Tolerating diet  Outcome: Progressing Towards Goal  Goal: Activity/Safety  Outcome: Progressing Towards Goal  Goal: Consults, if ordered  Outcome: Progressing Towards Goal  Goal: Diagnostic Test/Procedures  Outcome: Progressing Towards Goal  Goal: Nutrition/Diet  Outcome: Progressing Towards Goal  Goal: Discharge Planning  Outcome: Progressing Towards Goal  Goal: Medications  Outcome: Progressing Towards Goal  Goal: Respiratory  Outcome: Progressing Towards Goal  Goal: Treatments/Interventions/Procedures  Outcome: Progressing Towards Goal  Goal: Psychosocial  Outcome: Progressing Towards Goal     Problem: Sepsis: Day 3  Goal: Off Pathway (Use only if patient is Off Pathway)  Outcome: Progressing Towards Goal  Goal: *Central Venous Pressure maintained at 8-12 mm Hg  Outcome: Progressing Towards Goal  Goal: *Oxygen saturation within defined limits  Outcome: Progressing Towards Goal  Goal: *Vital sign stability  Outcome: Progressing Towards Goal  Goal: *Tolerating diet  Outcome: Progressing Towards Goal  Goal: *Demonstrates progressive activity  Outcome: Progressing Towards Goal  Goal: Activity/Safety  Outcome: Progressing Towards Goal  Goal: Consults, if ordered  Outcome: Progressing Towards Goal  Goal: Diagnostic Test/Procedures  Outcome: Progressing Towards Goal  Goal: Nutrition/Diet  Outcome: Progressing Towards Goal  Goal: Discharge Planning  Outcome: Progressing Towards Goal  Goal: Medications  Outcome: Progressing Towards Goal  Goal: Respiratory  Outcome: Progressing Towards Goal  Goal: Treatments/Interventions/Procedures  Outcome: Progressing Towards Goal  Goal: Psychosocial  Outcome: Progressing Towards Goal     Problem: Sepsis: Day 4  Goal: Off Pathway (Use only if patient is Off Pathway)  Outcome: Progressing Towards Goal  Goal: Activity/Safety  Outcome: Progressing Towards Goal  Goal: Consults, if ordered  Outcome: Progressing Towards Goal  Goal: Diagnostic Test/Procedures  Outcome: Progressing Towards Goal  Goal: Nutrition/Diet  Outcome: Progressing Towards Goal  Goal: Discharge Planning  Outcome: Progressing Towards Goal  Goal: Medications  Outcome: Progressing Towards Goal  Goal: Respiratory  Outcome: Progressing Towards Goal  Goal: Treatments/Interventions/Procedures  Outcome: Progressing Towards Goal  Goal: Psychosocial  Outcome: Progressing Towards Goal  Goal: *Oxygen saturation within defined limits  Outcome: Progressing Towards Goal  Goal: *Hemodynamically stable  Outcome: Progressing Towards Goal  Goal: *Vital signs within defined limits  Outcome: Progressing Towards Goal  Goal: *Tolerating diet  Outcome: Progressing Towards Goal  Goal: *Demonstrates progressive activity  Outcome: Progressing Towards Goal  Goal: *Fluid volume maintenance  Outcome: Progressing Towards Goal     Problem: Sepsis: Day 5  Goal: Off Pathway (Use only if patient is Off Pathway)  Outcome: Progressing Towards Goal  Goal: *Oxygen saturation within defined limits  Outcome: Progressing Towards Goal  Goal: *Vital signs within defined limits  Outcome: Progressing Towards Goal  Goal: *Tolerating diet  Outcome: Progressing Towards Goal  Goal: *Demonstrates progressive activity  Outcome: Progressing Towards Goal  Goal: *Discharge plan identified  Outcome: Progressing Towards Goal  Goal: Activity/Safety  Outcome: Progressing Towards Goal  Goal: Consults, if ordered  Outcome: Progressing Towards Goal  Goal: Diagnostic Test/Procedures  Outcome: Progressing Towards Goal  Goal: Nutrition/Diet  Outcome: Progressing Towards Goal  Goal: Discharge Planning  Outcome: Progressing Towards Goal  Goal: Medications  Outcome: Progressing Towards Goal  Goal: Respiratory  Outcome: Progressing Towards Goal  Goal: Treatments/Interventions/Procedures  Outcome: Progressing Towards Goal  Goal: Psychosocial  Outcome: Progressing Towards Goal     Problem: Sepsis: Discharge Outcomes  Goal: *Vital signs within defined limits  Outcome: Progressing Towards Goal  Goal: *Tolerating diet  Outcome: Progressing Towards Goal  Goal: *Verbalizes understanding and describes prescribed diet  Outcome: Progressing Towards Goal  Goal: *Demonstrates progressive activity  Outcome: Progressing Towards Goal  Goal: *Describes follow-up/return visits to physicians  Outcome: Progressing Towards Goal  Goal: *Verbalizes name, dosage, time, side effects, and number of days to continue medications  Outcome: Progressing Towards Goal  Goal: *Influenza immunization (Oct-Mar only)  Outcome: Progressing Towards Goal  Goal: *Pneumococcal immunization  Outcome: Progressing Towards Goal  Goal: *Lungs clear or at baseline  Outcome: Progressing Towards Goal  Goal: *Oxygen saturation returns to baseline or 90% or better on room air  Outcome: Progressing Towards Goal  Goal: *Glycemic control  Outcome: Progressing Towards Goal  Goal: *Absence of deep venous thrombosis signs and symptoms(Stroke Metric)  Outcome: Progressing Towards Goal  Goal: *Describes available resources and support systems  Outcome: Progressing Towards Goal  Goal: *Optimal pain control at patient's stated goal  Outcome: Progressing Towards Goal

## 2022-07-06 NOTE — PROGRESS NOTES
*ATTENTION:  This note has been created by a medical student for educational purposes only. Please do not refer to the content of this note for clinical decision-making, billing, or other purposes. Please see attending physicians note to obtain clinical information on this patient. [facility]  Medical Student Progress Note    Patient: Reji Cox MRN: [id number]   SSN: [id number]  YOB: 1960   Age: 58 y.o. Sex: male      Admit Date: 7/3/2022    LOS: 3 days   Chief Complaint   Patient presents with   [facility]    Alcohol Problem    Mental Health Problem       Subjective:     Mr. Amy Lawson is a 59 y/o M with a hx of HTN, Depression, CKD, DM, alcoholism, and malnutrition, who presented to the ED 7/3 with c/o alcohol withdrawal and leg pain. Today, he had no complaints but was asking for scissors to cut his gown. He stated that he has been peeing more than before. Upon further questioning he states he feels weak and needs to build his strength before going home. He denied any hallucinations upon questioning. Review of Systems   Constitutional: Negative for diaphoresis. Neurological: Negative for dizziness, tremors, sensory change and headaches. Psychiatric/Behavioral: The patient is not nervous/anxious. Objective:     Visit Vitals  /71   Pulse 99   Temp 98.4 °F (36.9 °C)   Resp 20   Ht 6' 2\" (1.88 m)   Wt 150 lb 1.6 oz (68.1 kg)   SpO2 95%   BMI 19.27 kg/m²       Physical Exam:   General: Mildly agitated, poor hygiene, otherwise sitting comfortably  Skin: Good turgor, no rash, redness on L leg/ankle  Heart: No cardiomegaly or thrills; regular rate and rhythm, no murmur or gallop  Lungs: Clear to auscultation and percussion  Abdomen: No tenderness, organomegaly, masses, or hernia  Extremities: No edema, moderate discoloration of lower extremeties  Psychiatric: Alert, disoriented to time, but oriented to place and person.  CIWA score 2       Intake and Output:  Current Shift: No intake/output data recorded. Last three shifts: 07/04 1901 - 07/06 0700  In: 250 [I.V.:250]  Out: 1270 [Urine:1270]    Lab/Data Review:  Recent Results (from the past 12 hour(s))   CBC WITH AUTOMATED DIFF    Collection Time: 07/06/22  5:14 AM   Result Value Ref Range    WBC 8.2 4.6 - 13.2 K/uL    RBC 2.84 (L) 4.35 - 5.65 M/uL    HGB 9.8 (L) 13.0 - 16.0 g/dL    HCT 29.8 (L) 36.0 - 48.0 %    .9 (H) 78.0 - 100.0 FL    MCH 34.5 (H) 24.0 - 34.0 PG    MCHC 32.9 31.0 - 37.0 g/dL    RDW 14.1 11.6 - 14.5 %    PLATELET 96 (L) 447 - 420 K/uL    MPV 10.3 9.2 - 11.8 FL    NRBC 0.0 0  WBC    ABSOLUTE NRBC 0.00 0.00 - 0.01 K/uL    NEUTROPHILS 67 40 - 73 %    LYMPHOCYTES 21 21 - 52 %    MONOCYTES 11 (H) 3 - 10 %    EOSINOPHILS 1 0 - 5 %    BASOPHILS 1 0 - 2 %    IMMATURE GRANULOCYTES 0 0.0 - 0.5 %    ABS. NEUTROPHILS 5.5 1.8 - 8.0 K/UL    ABS. LYMPHOCYTES 1.7 0.9 - 3.6 K/UL    ABS. MONOCYTES 0.9 0.05 - 1.2 K/UL    ABS. EOSINOPHILS 0.1 0.0 - 0.4 K/UL    ABS. BASOPHILS 0.0 0.0 - 0.1 K/UL    ABS. IMM.  GRANS. 0.0 0.00 - 0.04 K/UL    DF AUTOMATED     METABOLIC PANEL, BASIC    Collection Time: 07/06/22  5:14 AM   Result Value Ref Range    Sodium 135 (L) 136 - 145 mmol/L    Potassium 3.2 (L) 3.5 - 5.5 mmol/L    Chloride 99 (L) 100 - 111 mmol/L    CO2 28 21 - 32 mmol/L    Anion gap 8 3.0 - 18 mmol/L    Glucose 108 (H) 74 - 99 mg/dL    BUN 4 (L) 7.0 - 18 MG/DL    Creatinine 0.69 0.6 - 1.3 MG/DL    BUN/Creatinine ratio 6 (L) 12 - 20      GFR est AA >60 >60 ml/min/1.73m2    GFR est non-AA >60 >60 ml/min/1.73m2    Calcium 8.5 8.5 - 10.1 MG/DL   PHOSPHORUS    Collection Time: 07/06/22  5:14 AM   Result Value Ref Range    Phosphorus 0.9 (L) 2.5 - 4.9 MG/DL   MAGNESIUM    Collection Time: 07/06/22  5:14 AM   Result Value Ref Range    Magnesium 1.4 (L) 1.6 - 2.6 mg/dL   GLUCOSE, POC    Collection Time: 07/06/22  9:00 AM   Result Value Ref Range    Glucose (POC) 117 (H) 70 - 110 mg/dL       Key Findings or tests: DATE TEST RESULT OR IMPRESSION                              Telemetry NONE   Oxygen NONE     Assessment and Plan:         1) Alcohol Withdrawal/Wernicke's encephalopathy    Mild hallucinations, unable to determine baseline from nursing staff or patient. Continue to monitor CIWA scores. Continue to monitor electrolytes/Start potassium 20 mEq q 4 hrs  Magnesium sulfate 2 g IV once  Sodium phosphate 40 mmol IV once  Continue multivitamin therapy (D and B vitamins)    2) Weakness   Consult Case management at 8    3) Bed Bugs   Continue isolation precautions. Kenalog cream PRN.       Diet Full   DVT Prophylaxis Lovenox   GI Prophylaxis None   Code status Full   Disposition ---     Point of Contact   Relationship:  ([phone]        Maurice Lau M3   July 6, 2022, 7:40 AM

## 2022-07-07 LAB
ANION GAP SERPL CALC-SCNC: 5 MMOL/L (ref 3–18)
BASOPHILS # BLD: 0.1 K/UL (ref 0–0.1)
BASOPHILS NFR BLD: 1 % (ref 0–2)
BUN SERPL-MCNC: 3 MG/DL (ref 7–18)
BUN/CREAT SERPL: 5 (ref 12–20)
CALCIUM SERPL-MCNC: 8.4 MG/DL (ref 8.5–10.1)
CHLORIDE SERPL-SCNC: 104 MMOL/L (ref 100–111)
CO2 SERPL-SCNC: 29 MMOL/L (ref 21–32)
CREAT SERPL-MCNC: 0.6 MG/DL (ref 0.6–1.3)
DIFFERENTIAL METHOD BLD: ABNORMAL
EOSINOPHIL # BLD: 0.2 K/UL (ref 0–0.4)
EOSINOPHIL NFR BLD: 3 % (ref 0–5)
ERYTHROCYTE [DISTWIDTH] IN BLOOD BY AUTOMATED COUNT: 14.4 % (ref 11.6–14.5)
GLUCOSE BLD STRIP.AUTO-MCNC: 85 MG/DL (ref 70–110)
GLUCOSE BLD STRIP.AUTO-MCNC: 95 MG/DL (ref 70–110)
GLUCOSE SERPL-MCNC: 88 MG/DL (ref 74–99)
HCT VFR BLD AUTO: 30.4 % (ref 36–48)
HGB BLD-MCNC: 9.9 G/DL (ref 13–16)
IMM GRANULOCYTES # BLD AUTO: 0 K/UL (ref 0–0.04)
IMM GRANULOCYTES NFR BLD AUTO: 0 % (ref 0–0.5)
LYMPHOCYTES # BLD: 1.8 K/UL (ref 0.9–3.6)
LYMPHOCYTES NFR BLD: 23 % (ref 21–52)
MAGNESIUM SERPL-MCNC: 1.7 MG/DL (ref 1.6–2.6)
MCH RBC QN AUTO: 34.5 PG (ref 24–34)
MCHC RBC AUTO-ENTMCNC: 32.6 G/DL (ref 31–37)
MCV RBC AUTO: 105.9 FL (ref 78–100)
MONOCYTES # BLD: 0.9 K/UL (ref 0.05–1.2)
MONOCYTES NFR BLD: 11 % (ref 3–10)
NEUTS SEG # BLD: 4.9 K/UL (ref 1.8–8)
NEUTS SEG NFR BLD: 62 % (ref 40–73)
NRBC # BLD: 0 K/UL (ref 0–0.01)
NRBC BLD-RTO: 0 PER 100 WBC
PHOSPHATE SERPL-MCNC: 3 MG/DL (ref 2.5–4.9)
PLATELET # BLD AUTO: 102 K/UL (ref 135–420)
PMV BLD AUTO: 10.3 FL (ref 9.2–11.8)
POTASSIUM SERPL-SCNC: 3.8 MMOL/L (ref 3.5–5.5)
RBC # BLD AUTO: 2.87 M/UL (ref 4.35–5.65)
SODIUM SERPL-SCNC: 138 MMOL/L (ref 136–145)
WBC # BLD AUTO: 7.8 K/UL (ref 4.6–13.2)

## 2022-07-07 PROCEDURE — 84100 ASSAY OF PHOSPHORUS: CPT

## 2022-07-07 PROCEDURE — 82962 GLUCOSE BLOOD TEST: CPT

## 2022-07-07 PROCEDURE — 74011000250 HC RX REV CODE- 250

## 2022-07-07 PROCEDURE — 74011250637 HC RX REV CODE- 250/637: Performed by: STUDENT IN AN ORGANIZED HEALTH CARE EDUCATION/TRAINING PROGRAM

## 2022-07-07 PROCEDURE — 74011000258 HC RX REV CODE- 258: Performed by: STUDENT IN AN ORGANIZED HEALTH CARE EDUCATION/TRAINING PROGRAM

## 2022-07-07 PROCEDURE — 83735 ASSAY OF MAGNESIUM: CPT

## 2022-07-07 PROCEDURE — 74011250636 HC RX REV CODE- 250/636: Performed by: STUDENT IN AN ORGANIZED HEALTH CARE EDUCATION/TRAINING PROGRAM

## 2022-07-07 PROCEDURE — 74011250636 HC RX REV CODE- 250/636

## 2022-07-07 PROCEDURE — 2709999900 HC NON-CHARGEABLE SUPPLY

## 2022-07-07 PROCEDURE — 80048 BASIC METABOLIC PNL TOTAL CA: CPT

## 2022-07-07 PROCEDURE — 36415 COLL VENOUS BLD VENIPUNCTURE: CPT

## 2022-07-07 PROCEDURE — 85025 COMPLETE CBC W/AUTO DIFF WBC: CPT

## 2022-07-07 PROCEDURE — 65270000029 HC RM PRIVATE

## 2022-07-07 PROCEDURE — 74011250637 HC RX REV CODE- 250/637

## 2022-07-07 RX ADMIN — Medication 1000 UNITS: at 09:01

## 2022-07-07 RX ADMIN — FLUOXETINE 20 MG: 20 CAPSULE ORAL at 09:02

## 2022-07-07 RX ADMIN — THIAMINE HYDROCHLORIDE 500 MG: 100 INJECTION, SOLUTION INTRAMUSCULAR; INTRAVENOUS at 00:04

## 2022-07-07 RX ADMIN — LORAZEPAM 2 MG: 2 INJECTION, SOLUTION INTRAMUSCULAR; INTRAVENOUS at 04:18

## 2022-07-07 RX ADMIN — SODIUM CHLORIDE, PRESERVATIVE FREE 10 ML: 5 INJECTION INTRAVENOUS at 05:57

## 2022-07-07 RX ADMIN — THERA TABS 1 TABLET: TAB at 09:00

## 2022-07-07 RX ADMIN — SODIUM CHLORIDE, PRESERVATIVE FREE 10 ML: 5 INJECTION INTRAVENOUS at 00:05

## 2022-07-07 RX ADMIN — ATORVASTATIN CALCIUM 40 MG: 40 TABLET, FILM COATED ORAL at 09:02

## 2022-07-07 RX ADMIN — ENOXAPARIN SODIUM 40 MG: 100 INJECTION SUBCUTANEOUS at 09:01

## 2022-07-07 RX ADMIN — THIAMINE HYDROCHLORIDE 500 MG: 100 INJECTION, SOLUTION INTRAMUSCULAR; INTRAVENOUS at 21:40

## 2022-07-07 RX ADMIN — THIAMINE HYDROCHLORIDE 500 MG: 100 INJECTION, SOLUTION INTRAMUSCULAR; INTRAVENOUS at 09:12

## 2022-07-07 RX ADMIN — THIAMINE HYDROCHLORIDE 500 MG: 100 INJECTION, SOLUTION INTRAMUSCULAR; INTRAVENOUS at 16:58

## 2022-07-07 RX ADMIN — SODIUM CHLORIDE, PRESERVATIVE FREE 10 ML: 5 INJECTION INTRAVENOUS at 13:40

## 2022-07-07 RX ADMIN — LISINOPRIL 10 MG: 10 TABLET ORAL at 09:01

## 2022-07-07 RX ADMIN — SODIUM CHLORIDE, PRESERVATIVE FREE 10 ML: 5 INJECTION INTRAVENOUS at 21:40

## 2022-07-07 RX ADMIN — WATER 1 G: 1 INJECTION INTRAMUSCULAR; INTRAVENOUS; SUBCUTANEOUS at 13:40

## 2022-07-07 NOTE — PROGRESS NOTES
Wound Prevention Checklist    Patient: Jeff Avitia (28 y.o. male)  Date: 7/6/2022  Diagnosis: Severe sepsis ([facility] [address] [A41.9, R65.20]  [facility] [F10.239]  UTI (urinary tract infection) [I15.0] Complicated UTI (urinary tract infection)    Precautions: Fall,Skin,Contact       []  Heel prevention boots placed on patient    []  Patient turned q2h during shift    []  Lift team ordered    [x]  Patient on Phil bed/Specialty bed    [x]  Each Wound is documented during shift (Stage, Color, drainage, odor, measurements, and dressings)    [x]  Dual skin checks done at bedside during shift report with Arnulfo Rosado RN

## 2022-07-07 NOTE — PROGRESS NOTES
[facility]   POST-ROUND NOTE       -Patient CIWA scores consistent with alcohol withdrawal continue CIWA per protocol and Ativan as needed  - Continue IV ceftriaxone while on CIWA protocol     The above patient and plan were discussed with my supervising physician. See daily progress note for full assessment/plan.       Jessica Benitez MD, PGY-1  [facility]  7/7/2022, 12:11 PM

## 2022-07-07 NOTE — PROGRESS NOTES
Problem: Pressure Injury - Risk of  Goal: *Prevention of pressure injury  Description: Document Kahlil Scale and appropriate interventions in the flowsheet.   Outcome: Progressing Towards Goal  Note: Pressure Injury Interventions:  Sensory Interventions: Assess changes in LOC    Moisture Interventions: Absorbent underpads,Maintain skin hydration (lotion/cream)    Activity Interventions: Assess need for specialty bed,Pressure redistribution bed/mattress(bed type),Increase time out of bed    Mobility Interventions: Assess need for specialty bed,HOB 30 degrees or less,Pressure redistribution bed/mattress (bed type)    Nutrition Interventions: Document food/fluid/supplement intake    Friction and Shear Interventions: Apply protective barrier, creams and emollients

## 2022-07-07 NOTE — ROUTINE PROCESS
Bedside shift change report given to Letitia No RN (oncoming nurse) by Marichuy Arita RN (offgoing nurse). Report included the following information SBAR, Kardex, Intake/Output and MAR.

## 2022-07-07 NOTE — PROGRESS NOTES
1324: Pt asleep, unable to awaken to verbal and/or tactile stimuli, nursing reports pt awake previous night and administered Ativan this date resulting in drowsiness. OT to follow.

## 2022-07-07 NOTE — PROGRESS NOTES
Problem: Pressure Injury - Risk of  Goal: *Prevention of pressure injury  Description: Document Kahlil Scale and appropriate interventions in the flowsheet. Outcome: Progressing Towards Goal  Note: Pressure Injury Interventions:  Sensory Interventions: Assess changes in LOC    Moisture Interventions: Absorbent underpads,Maintain skin hydration (lotion/cream)    Activity Interventions: Assess need for specialty bed,Pressure redistribution bed/mattress(bed type),Increase time out of bed    Mobility Interventions: Assess need for specialty bed,HOB 30 degrees or less,Pressure redistribution bed/mattress (bed type)    Nutrition Interventions: Document food/fluid/supplement intake    Friction and Shear Interventions: Apply protective barrier, creams and emollients                Problem: Patient Education: Go to Patient Education Activity  Goal: Patient/Family Education  Outcome: Progressing Towards Goal     Problem: Falls - Risk of  Goal: *Absence of Falls  Description: Document Samantha Fall Risk and appropriate interventions in the flowsheet.   Outcome: Progressing Towards Goal  Note: Fall Risk Interventions:  Mobility Interventions: Assess mobility with egress test    Mentation Interventions: Adequate sleep, hydration, pain control    Medication Interventions: Assess postural VS orthostatic hypotension    Elimination Interventions: Bed/chair exit alarm,Call light in reach    History of Falls Interventions: Bed/chair exit alarm         Problem: Patient Education: Go to Patient Education Activity  Goal: Patient/Family Education  Outcome: Progressing Towards Goal     Problem: Patient Education: Go to Patient Education Activity  Goal: Patient/Family Education  Outcome: Progressing Towards Goal     Problem: Sepsis: Day of Diagnosis  Goal: Off Pathway (Use only if patient is Off Pathway)  Outcome: Progressing Towards Goal  Goal: *Fluid resuscitation  Outcome: Progressing Towards Goal  Goal: *Paired blood cultures prior to first dose of antibiotic  Outcome: Progressing Towards Goal  Goal: *First dose of  appropriate antibiotic within 3 hours of arrival to ED, within 1 hour of arrival to ICU  Outcome: Progressing Towards Goal  Goal: *Lactic acid with first set of blood cultures  Outcome: Progressing Towards Goal  Goal: *Pneumococcal immunization (if eligible)  Outcome: Progressing Towards Goal  Goal: *Influenza immunization (if eligible)  Outcome: Progressing Towards Goal  Goal: Activity/Safety  Outcome: Progressing Towards Goal  Goal: Consults, if ordered  Outcome: Progressing Towards Goal  Goal: Diagnostic Test/Procedures  Outcome: Progressing Towards Goal  Goal: Nutrition/Diet  Outcome: Progressing Towards Goal  Goal: Discharge Planning  Outcome: Progressing Towards Goal  Goal: Medications  Outcome: Progressing Towards Goal  Goal: Respiratory  Outcome: Progressing Towards Goal  Goal: Treatments/Interventions/Procedures  Outcome: Progressing Towards Goal  Goal: Psychosocial  Outcome: Progressing Towards Goal     Problem: Sepsis: Day 2  Goal: Off Pathway (Use only if patient is Off Pathway)  Outcome: Progressing Towards Goal  Goal: *Central Venous Pressure maintained at 8-12 mm Hg  Outcome: Progressing Towards Goal  Goal: *Hemodynamically stable  Outcome: Progressing Towards Goal  Goal: *Tolerating diet  Outcome: Progressing Towards Goal  Goal: Activity/Safety  Outcome: Progressing Towards Goal  Goal: Consults, if ordered  Outcome: Progressing Towards Goal  Goal: Diagnostic Test/Procedures  Outcome: Progressing Towards Goal  Goal: Nutrition/Diet  Outcome: Progressing Towards Goal  Goal: Discharge Planning  Outcome: Progressing Towards Goal  Goal: Medications  Outcome: Progressing Towards Goal  Goal: Respiratory  Outcome: Progressing Towards Goal  Goal: Treatments/Interventions/Procedures  Outcome: Progressing Towards Goal  Goal: Psychosocial  Outcome: Progressing Towards Goal     Problem: Sepsis: Day 3  Goal: Off Pathway (Use only if patient is Off Pathway)  Outcome: Progressing Towards Goal  Goal: *Central Venous Pressure maintained at 8-12 mm Hg  Outcome: Progressing Towards Goal  Goal: *Oxygen saturation within defined limits  Outcome: Progressing Towards Goal  Goal: *Vital sign stability  Outcome: Progressing Towards Goal  Goal: *Tolerating diet  Outcome: Progressing Towards Goal  Goal: *Demonstrates progressive activity  Outcome: Progressing Towards Goal  Goal: Activity/Safety  Outcome: Progressing Towards Goal  Goal: Consults, if ordered  Outcome: Progressing Towards Goal  Goal: Diagnostic Test/Procedures  Outcome: Progressing Towards Goal  Goal: Nutrition/Diet  Outcome: Progressing Towards Goal  Goal: Discharge Planning  Outcome: Progressing Towards Goal  Goal: Medications  Outcome: Progressing Towards Goal  Goal: Respiratory  Outcome: Progressing Towards Goal  Goal: Treatments/Interventions/Procedures  Outcome: Progressing Towards Goal  Goal: Psychosocial  Outcome: Progressing Towards Goal     Problem: Sepsis: Day 4  Goal: Off Pathway (Use only if patient is Off Pathway)  Outcome: Progressing Towards Goal  Goal: Activity/Safety  Outcome: Progressing Towards Goal  Goal: Consults, if ordered  Outcome: Progressing Towards Goal  Goal: Diagnostic Test/Procedures  Outcome: Progressing Towards Goal  Goal: Nutrition/Diet  Outcome: Progressing Towards Goal  Goal: Discharge Planning  Outcome: Progressing Towards Goal  Goal: Medications  Outcome: Progressing Towards Goal  Goal: Respiratory  Outcome: Progressing Towards Goal  Goal: Treatments/Interventions/Procedures  Outcome: Progressing Towards Goal  Goal: Psychosocial  Outcome: Progressing Towards Goal  Goal: *Oxygen saturation within defined limits  Outcome: Progressing Towards Goal  Goal: *Hemodynamically stable  Outcome: Progressing Towards Goal  Goal: *Vital signs within defined limits  Outcome: Progressing Towards Goal  Goal: *Tolerating diet  Outcome: Progressing Towards Goal  Goal: *Demonstrates progressive activity  Outcome: Progressing Towards Goal  Goal: *Fluid volume maintenance  Outcome: Progressing Towards Goal     Problem: Sepsis: Day 5  Goal: Off Pathway (Use only if patient is Off Pathway)  Outcome: Progressing Towards Goal  Goal: *Oxygen saturation within defined limits  Outcome: Progressing Towards Goal  Goal: *Vital signs within defined limits  Outcome: Progressing Towards Goal  Goal: *Tolerating diet  Outcome: Progressing Towards Goal  Goal: *Demonstrates progressive activity  Outcome: Progressing Towards Goal  Goal: *Discharge plan identified  Outcome: Progressing Towards Goal  Goal: Activity/Safety  Outcome: Progressing Towards Goal  Goal: Consults, if ordered  Outcome: Progressing Towards Goal  Goal: Diagnostic Test/Procedures  Outcome: Progressing Towards Goal  Goal: Nutrition/Diet  Outcome: Progressing Towards Goal  Goal: Discharge Planning  Outcome: Progressing Towards Goal  Goal: Medications  Outcome: Progressing Towards Goal  Goal: Respiratory  Outcome: Progressing Towards Goal  Goal: Treatments/Interventions/Procedures  Outcome: Progressing Towards Goal  Goal: Psychosocial  Outcome: Progressing Towards Goal     Problem: Sepsis: Day 6  Goal: Off Pathway (Use only if patient is Off Pathway)  Outcome: Progressing Towards Goal  Goal: *Oxygen saturation within defined limits  Outcome: Progressing Towards Goal  Goal: *Vital signs within defined limits  Outcome: Progressing Towards Goal  Goal: *Tolerating diet  Outcome: Progressing Towards Goal  Goal: *Demonstrates progressive activity  Outcome: Progressing Towards Goal  Goal: Influenza immunization  Outcome: Progressing Towards Goal  Goal: *Pneumococcal immunization  Outcome: Progressing Towards Goal  Goal: Activity/Safety  Outcome: Progressing Towards Goal  Goal: Diagnostic Test/Procedures  Outcome: Progressing Towards Goal  Goal: Nutrition/Diet  Outcome: Progressing Towards Goal  Goal: Discharge Planning  Outcome: Progressing Towards Goal  Goal: Medications  Outcome: Progressing Towards Goal  Goal: Respiratory  Outcome: Progressing Towards Goal  Goal: Treatments/Interventions/Procedures  Outcome: Progressing Towards Goal  Goal: Psychosocial  Outcome: Progressing Towards Goal     Problem: Sepsis: Discharge Outcomes  Goal: *Vital signs within defined limits  Outcome: Progressing Towards Goal  Goal: *Tolerating diet  Outcome: Progressing Towards Goal  Goal: *Verbalizes understanding and describes prescribed diet  Outcome: Progressing Towards Goal  Goal: *Demonstrates progressive activity  Outcome: Progressing Towards Goal  Goal: *Describes follow-up/return visits to physicians  Outcome: Progressing Towards Goal  Goal: *Verbalizes name, dosage, time, side effects, and number of days to continue medications  Outcome: Progressing Towards Goal  Goal: *Influenza immunization (Oct-Mar only)  Outcome: Progressing Towards Goal  Goal: *Pneumococcal immunization  Outcome: Progressing Towards Goal  Goal: *Lungs clear or at baseline  Outcome: Progressing Towards Goal  Goal: *Oxygen saturation returns to baseline or 90% or better on room air  Outcome: Progressing Towards Goal  Goal: *Glycemic control  Outcome: Progressing Towards Goal  Goal: *Absence of deep venous thrombosis signs and symptoms(Stroke Metric)  Outcome: Progressing Towards Goal  Goal: *Describes available resources and support systems  Outcome: Progressing Towards Goal  Goal: *Optimal pain control at patient's stated goal  Outcome: Progressing Towards Goal

## 2022-07-07 NOTE — PROGRESS NOTES
[facility]  DAILY PROGRESS NOTE      Patient:    Cheli Ashley , 58 y.o. male   MRN:  [id number]  Room/Bed:  467/  Admission Date:   7/3/2022  LOS:                       LOS: 4 days   Code status:  DNR         Admission Chief Complaint:   Chief Complaint   Patient presents with    Fall    Alcohol Problem    Mental Health Problem       ASSESSMENT AND PLAN:   Cheli Ashley is a 58y.o. year old with a PMH of chronic alcoholism, falls, T2DM, HTN, chronic malnutrition, and tobacco use male admitted for Severe sepsis ([facility] [address] [A41.9, R65.20]  Alcohol withdrawal ([facility] [address] [F10.239]  UTI (urinary tract infection) [N39.0].    ASSESSMENT AND PLAN     Acute Cystitis  -Pt presented to ED complaining of feeling overall unwell with weakness. UA in ED significant for moderate blood, positive nitrites, large LE, \"too numerous\" WBCs, and 4+ bacteria. Urine was sent for culture. -SIRS criteria were not met:  WBC normal (8.8), RR wnl, temp 98.6F, HR initially 108 at presentation; resolved in mid-90s.  -Lactate initially elevated at 5.08; decreased to 1.12 after 6 hours.   -Pt denied any fevers, chills, dysuria, hematuria, trouble voiding, abd pain, suprapubic pain.   -Two 1 L NS boluses were administerd in ED with initial suspicion for sepsis (unlikely). -Blood cultures collected and empiric antibiotic therapy was begun in ED with one 1750 mg IV Vancomycin infusion and 4.5 g IV Zosyn.   -Blood cultures NGTD  -Urine culture shows >100,000 colonies of mixed urogenital dale. Urine sensitivities pending.    -UTI may be contributing to his weakness.      Plan:  -Admit to telemetry  -FU Urine culture  -Abx - received 2 doses ceftriaxone 1 g IV once daily while inpatient; awaiting sensitivities   -continue ceftriaxone 1 g IV once daily while inpatient for 7 days; (start 7/4);  -monitor for urinary retention  -Vitals as per unit protocol     Chronic Alcohol Use Disorder w/ Alcohol Withdrawal  AMS  Pt was confused this AM and oriented only to person and place. Concern for alcohol withdrawal vs Wernicke-Korsikoff. Overnight his CIWA scores increased to 15 (at 1800) and 25 (at 2000) for which he received 2 mg ativan x2. He is currently withdrawing. Will continue CIWA protocol and monitor. Will treat for WK syndrome given low risk and high benefit if present. -CXR showed no evidence of aspiration PNA  -Pt endorses a lengthy history of drinking multiple beers and a variety of liquor each day. Pt reports his last drink was two days prior to admission and feels as if he is having withdrawals. Serum ethanol 18 upon admission. UDS negative. -LFTs:  ALT 27, AST 97, Alk Phos 150 on admission  -Chronic malnutrition and hypoalbuminemia (2.9) d/t chronic alcohol use. -Mild macrocytic anemia (Hbg 11; .2) and thrombocytopenia (117) likely secondary to chronic malnutrition and alcoholism. -[id number], Folate: 5.9, VitD: 17.8     Plan:  -Continue CIWA protocol. Has required Ativan x2.   -Thiamine 300 mg was administered in ED. With concern for WK syndrome, will aggressively replete with 500 mg thiamine IV 5x daily (7/6-7/7) followed by 250 mg thiamine IV (7-8-7/12)  -Consult nutrition services,  appreciate their recommendations.   -Check vitamin D, B12, ammonia levels  -Continue vitamin d 1000 international units daily. Hypophospatemia  Hypomagnesiemia  Hypokalemia  Concern for refeeding syndrome given history of alcoholism and concern over current living situation. Will aggressively replete if necessary   -K+, magnesium, and Phos wnl   Plan:  -Continue to monitor  -daily BMP, Magnesium, phosphorus     Left Lower Extremity Swelling and Pain, improving  -Swelling decreased, mild L ankle swelling today, no R leg swelling.  -Etiology likely chronic venous stasis given unremarkable duplex and venous stasis skin changes. Pt denies warmth, redness, dyspnea, or chest pain.  However, pt has presented with similar problem in the past and was negative for DVT. DDx for his LLE edema include: DVT, chronic venous stasis (consistent with physical exam and skin changes), hypoalbuminemia, or cellulitis.   -Wells' score of 3 (+only for \"clinical signs and symptoms of dvt\")  -Duplex US bilateral LE unremarkable  Plan:  -PRN acetaminophen for pain.     CKD Stage 3A   -GFR at presentation 57, Baseline GFR >60. Cr 1.27 at admission, baseline Cr variable (range from 0.49 - 1.13). Plan:  -PO fluids as tolerated  -Monitor I/O. -Monitor daily BMP.     Frequent Falls and Generalized Weakness  -Likely multifactorial in the setting of left lower extremity swelling, chronic alcohol abuse, complicated UTI, and chronic malnutrition.   -Monitor clinically for any signs of acute deterioration or focal neurologic deficits. No overt deficits or indication for head imaging at this time.     Plan:  -Fall precautions  -Activity with assistance  -Consult PT/OT -- appreciate your assistance and recommendations.     Hypertension  -Pt was hypertensive upon admission (142/90). He denied taking any medications at home.   -Lisinopril 10 mg daily from [facility] rec resumed.  Indapamide 1.25 mg held for now, as pt has been of antihypertensives for a long period of time.      Plan:  -Continue Lisinopril 10mg  - Restart indapamide 1.25mg as tolerated on discharge     Type 2 Diabetes   -Last A1c June 2021 was 4.9  -Blood glucose at presentation 157.   -Pt not taking any medications.      Plan:  -POC glucose before meals and at bedtime  -SSI   -Hypoglycemic precautions   -Resume atorvastatin 40 mg daily; ASCVD 10-year risk 35.6% based on lipid panel from 2019 and current state     Depression   Plan:  -Begin Prozac 20 mg every day; follow up with PCP outpatient to discuss changes.  -Consult social work -- appreciate assessment and recommendations.      Tobacco Use Disorder  -Pt smokes about 8 cigarettes per day     Plan:  -Nicoderm patches to prevent nicotine withdrawal  -smoking cessation counselling     Global:  Code: DNR  IVF/Drips: Received two fluid boluses in ED. If tolerating PO intake well, may discontinue IVF. I/O / Wt: Monitor I/O and daily weights  Diet: Adult cardiac  DVT/AC: Lovenox 40 mg SQ  Mobility: per protocol; up with assistance  Disposition: Inpatient  Anticipated LOS:<2 days     Point of Contact:  Lia Grider (brother):  [phone]  SUBJECTIVE:   Events of the last 24 hours:    Patient was combative overnight to nursing staff pulling out his IV and condom catheter, and trying to pull his IV pole into bed with him. CIWA scores overnight were 15 and 25. He received a total of 4 mg of Ativan overnight. This morning he was mildly agitated, sleepy, and did not want to be touched. He is refusing that answer questions, and \"wanted to go back to sleep\". Review of systems unable to be obtained due to patient's somnolence and refusal to answer questions.     CURRENT INPATIENT MEDICATIONS:    Scheduled Medications  Current Facility-Administered Medications   Medication Dose Route Frequency Provider Last Rate Last Admin    triamcinolone acetonide (KENALOG) 0.1 % cream   Topical TID PRN [place] Hatchet P, DO        [START ON 7/8/2022] thiamine (B-1) 250 mg in 0.9% sodium chloride 50 mL IVPB  250 mg IntraVENous DAILY Jessica Patricio MD        thiamine (B-1) 500 mg in 0.9% sodium chloride 50 mL IVPB  500 mg IntraVENous TID Fatoumata Murcia  mL/hr at 07/07/22 0912 500 mg at 07/07/22 0912    [Held by provider] thiamine HCL (B-1) tablet 100 mg  100 mg Oral DAILY Fatoumata Murcia MD        therapeutic multivitamin [facility]) tablet 1 Tablet  1 Tablet Oral DAILY Fatoumata Murcia MD   1 Tablet at 07/07/22 0900    cholecalciferol (VITAMIN D3) (1000 Units /25 mcg) tablet 1,000 Units  1,000 Units Oral DAILY Gissel Riojas, DO   1,000 Units at 07/07/22 0901    FLUoxetine (PROzac) capsule 20 mg  20 mg Oral DAILY [place] Hatchet P, DO   20 mg at 07/07/22 0902    insulin lispro (HUMALOG) injection   SubCUTAneous Marianne Laughlin MD        glucose chewable tablet 16 g  4 Tablet Oral PRN Amarilys Grewal MD        glucagon (GLUCAGEN) injection 1 mg  1 mg IntraMUSCular PRN Amarilys Grewal MD        cefTRIAXone (ROCEPHIN) 1 g in sterile water (preservative free) 10 mL IV syringe  1 g IntraVENous Q24H Elvin De Leon MD   1 g at 07/06/22 1313    LORazepam (ATIVAN) tablet 1 mg  1 mg Oral Q1H PRN Laren Radish P, DO        Or    LORazepam (ATIVAN) 2 mg/mL injection 1 mg  1 mg IntraVENous Q1H PRN Ryan Tere P, DO        LORazepam (ATIVAN) tablet 2 mg  2 mg Oral Q1H PRN Valeria Fair, DO        Or    LORazepam (ATIVAN) 2 mg/mL injection 2 mg  2 mg IntraVENous Q1H PRN Laren Radish P, DO   2 mg at 07/07/22 0418    LORazepam (ATIVAN) 2 mg/mL injection 3 mg  3 mg IntraVENous Q15MIN PRN Laren Radish P, DO        atorvastatin (LIPITOR) tablet 40 mg  40 mg Oral DAILY Valeria Fair, DO   40 mg at 07/07/22 0902    lisinopriL (PRINIVIL, ZESTRIL) tablet 10 mg  10 mg Oral DAILY Valeria Fair, DO   10 mg at 07/07/22 0901    sodium chloride (NS) flush 5-40 mL  5-40 mL IntraVENous Q8H Jess Davisecca P, DO   10 mL at 07/07/22 0557    sodium chloride (NS) flush 5-40 mL  5-40 mL IntraVENous PRN Laren Radish P, DO        acetaminophen (TYLENOL) tablet 650 mg  650 mg Oral Q6H PRN Valeria Fair, DO        Or    acetaminophen (TYLENOL) suppository 650 mg  650 mg Rectal Q6H PRN Laren Radish P, DO        polyethylene glycol (MIRALAX) packet 17 g  17 g Oral DAILY PRN Laren Radish P, DO        ondansetron (ZOFRAN ODT) tablet 4 mg  4 mg Oral Q8H PRN Laren Radish P, DO        Or    ondansetron (ZOFRAN) injection 4 mg  4 mg IntraVENous Q6H PRN Nova Forbesca P, DO        enoxaparin (LOVENOX) injection 40 mg  40 mg SubCUTAneous DAILY Angel Boswell P, DO   40 mg at 07/07/22 0901    [Held by provider] indapamide (LOZOL) tablet 1.25 mg  1.25 mg Oral DAILY Tere Davis DO        nicotine (NICODERM CQ) 14 mg/24 hr patch 1 Patch  1 Patch TransDERmal DAILY Carlton DO Chris   1 Patch at 07/07/22 0901       OBJECTIVE:  Visit Vitals  BP (!) 149/81 (BP 1 Location: Right upper arm, BP Patient Position: At rest)   Pulse 100   Temp 98.2 °F (36.8 °C)   Resp 16   Ht 6' 2\" (1.88 m)   Wt 68.1 kg (150 lb 1.6 oz)   SpO2 96%   BMI 19.27 kg/m²       PHYSICAL EXAM  Gen: NAD, sleeping in bed, reluctant to be awoken  HEENT: normocephalic, atraumatic, MMM,  CV: +2 radial pulses, well-perfused,   Pulm: symmetric inspirations, no audible wheeze or cough  Abd: S/NT/ND, no guarding,  MSK: no clubbing, no edema  Skin: warm, dry, intact, no rash  Neuro: CN II-XII grossly intact, no focal deficits appreciated   Psych: Combative, pushing physician hands away, irritable      Intake and Output:    Current Shift: No intake/output data recorded. Last three shifts: 07/05 1901 - 07/07 0700  In: 50 [I.V.:50]  Out: 36 [Urine:820]       Lab/Data Review:       Recent Results (from the past 12 hour(s))   CBC WITH AUTOMATED DIFF    Collection Time: 07/07/22  3:12 AM   Result Value Ref Range    WBC 7.8 4.6 - 13.2 K/uL    RBC 2.87 (L) 4.35 - 5.65 M/uL    HGB 9.9 (L) 13.0 - 16.0 g/dL    HCT 30.4 (L) 36.0 - 48.0 %    .9 (H) 78.0 - 100.0 FL    MCH 34.5 (H) 24.0 - 34.0 PG    MCHC 32.6 31.0 - 37.0 g/dL    RDW 14.4 11.6 - 14.5 %    PLATELET 900 (L) 237 - 420 K/uL    MPV 10.3 9.2 - 11.8 FL    NRBC 0.0 0  WBC    ABSOLUTE NRBC 0.00 0.00 - 0.01 K/uL    NEUTROPHILS 62 40 - 73 %    LYMPHOCYTES 23 21 - 52 %    MONOCYTES 11 (H) 3 - 10 %    EOSINOPHILS 3 0 - 5 %    BASOPHILS 1 0 - 2 %    IMMATURE GRANULOCYTES 0 0.0 - 0.5 %    ABS. NEUTROPHILS 4.9 1.8 - 8.0 K/UL    ABS. LYMPHOCYTES 1.8 0.9 - 3.6 K/UL    ABS. MONOCYTES 0.9 0.05 - 1.2 K/UL    ABS. EOSINOPHILS 0.2 0.0 - 0.4 K/UL    ABS. BASOPHILS 0.1 0.0 - 0.1 K/UL    ABS. IMM.  GRANS. 0.0 0.00 - 0.04 K/UL    DF AUTOMATED     METABOLIC PANEL, BASIC    Collection Time: 07/07/22  3:12 AM   Result Value Ref Range    Sodium 138 136 - 145 mmol/L    Potassium 3.8 3.5 - 5.5 mmol/L    Chloride 104 100 - 111 mmol/L    CO2 29 21 - 32 mmol/L    Anion gap 5 3.0 - 18 mmol/L    Glucose 88 74 - 99 mg/dL    BUN 3 (L) 7.0 - 18 MG/DL    Creatinine 0.60 0.6 - 1.3 MG/DL    BUN/Creatinine ratio 5 (L) 12 - 20      GFR est AA >60 >60 ml/min/1.73m2    GFR est non-AA >60 >60 ml/min/1.73m2    Calcium 8.4 (L) 8.5 - 10.1 MG/DL   MAGNESIUM    Collection Time: 07/07/22  3:12 AM   Result Value Ref Range    Magnesium 1.7 1.6 - 2.6 mg/dL   PHOSPHORUS    Collection Time: 07/07/22  3:12 AM   Result Value Ref Range    Phosphorus 3.0 2.5 - 4.9 MG/DL   GLUCOSE, POC    Collection Time: 07/07/22  8:06 AM   Result Value Ref Range    Glucose (POC) 95 70 - 110 mg/dL     Intake and Output:  Current Shift: No intake/output data recorded. Last three shifts: 07/05 1901 - 07/07 0700  In: 50 [I.V.:50]  Out: 36 [Urine:820]    Lab Review:  Recent Results (from the past 12 hour(s))   CBC WITH AUTOMATED DIFF    Collection Time: 07/07/22  3:12 AM   Result Value Ref Range    WBC 7.8 4.6 - 13.2 K/uL    RBC 2.87 (L) 4.35 - 5.65 M/uL    HGB 9.9 (L) 13.0 - 16.0 g/dL    HCT 30.4 (L) 36.0 - 48.0 %    .9 (H) 78.0 - 100.0 FL    MCH 34.5 (H) 24.0 - 34.0 PG    MCHC 32.6 31.0 - 37.0 g/dL    RDW 14.4 11.6 - 14.5 %    PLATELET 232 (L) 930 - 420 K/uL    MPV 10.3 9.2 - 11.8 FL    NRBC 0.0 0  WBC    ABSOLUTE NRBC 0.00 0.00 - 0.01 K/uL    NEUTROPHILS 62 40 - 73 %    LYMPHOCYTES 23 21 - 52 %    MONOCYTES 11 (H) 3 - 10 %    EOSINOPHILS 3 0 - 5 %    BASOPHILS 1 0 - 2 %    IMMATURE GRANULOCYTES 0 0.0 - 0.5 %    ABS. NEUTROPHILS 4.9 1.8 - 8.0 K/UL    ABS. LYMPHOCYTES 1.8 0.9 - 3.6 K/UL    ABS. MONOCYTES 0.9 0.05 - 1.2 K/UL    ABS. EOSINOPHILS 0.2 0.0 - 0.4 K/UL    ABS. BASOPHILS 0.1 0.0 - 0.1 K/UL    ABS. IMM.  GRANS. 0.0 0.00 - 0.04 K/UL    DF AUTOMATED     METABOLIC PANEL, BASIC    Collection Time: 07/07/22  3:12 AM   Result Value Ref Range    Sodium 138 136 - 145 mmol/L    Potassium 3.8 3.5 - 5.5 mmol/L    Chloride 104 100 - 111 mmol/L    CO2 29 21 - 32 mmol/L    Anion gap 5 3.0 - 18 mmol/L    Glucose 88 74 - 99 mg/dL    BUN 3 (L) 7.0 - 18 MG/DL    Creatinine 0.60 0.6 - 1.3 MG/DL    BUN/Creatinine ratio 5 (L) 12 - 20      GFR est AA >60 >60 ml/min/1.73m2    GFR est non-AA >60 >60 ml/min/1.73m2    Calcium 8.4 (L) 8.5 - 10.1 MG/DL   MAGNESIUM    Collection Time: 07/07/22  3:12 AM   Result Value Ref Range    Magnesium 1.7 1.6 - 2.6 mg/dL   PHOSPHORUS    Collection Time: 07/07/22  3:12 AM   Result Value Ref Range    Phosphorus 3.0 2.5 - 4.9 MG/DL   GLUCOSE, POC    Collection Time: 07/07/22  8:06 AM   Result Value Ref Range    Glucose (POC) 95 70 - 110 mg/dL       Radiologic Studies -   CT HEAD WO CONT   Final Result      No acute abnormalities. Small vessel disease such as is seen in patients with   diabetes or hypertension. Mild burden chronic sinusitis. DUPLEX LOWER EXT VENOUS BILAT   Final Result      XR CHEST PA LAT   Final Result      Normal chest.                      ================================================================  Further management for Mr. Kaylee Burnette will be discussed on rounds with my attending.       Elsie Rodriguez MD, PGY-1  [facility]  July 7, 2022 8:33 AM

## 2022-07-08 LAB
ANION GAP SERPL CALC-SCNC: 7 MMOL/L (ref 3–18)
BASOPHILS # BLD: 0.1 K/UL (ref 0–0.1)
BASOPHILS NFR BLD: 1 % (ref 0–2)
BUN SERPL-MCNC: 4 MG/DL (ref 7–18)
BUN/CREAT SERPL: 5 (ref 12–20)
CALCIUM SERPL-MCNC: 8.4 MG/DL (ref 8.5–10.1)
CHLORIDE SERPL-SCNC: 102 MMOL/L (ref 100–111)
CO2 SERPL-SCNC: 28 MMOL/L (ref 21–32)
CREAT SERPL-MCNC: 0.75 MG/DL (ref 0.6–1.3)
DIFFERENTIAL METHOD BLD: ABNORMAL
EOSINOPHIL # BLD: 0.2 K/UL (ref 0–0.4)
EOSINOPHIL NFR BLD: 2 % (ref 0–5)
ERYTHROCYTE [DISTWIDTH] IN BLOOD BY AUTOMATED COUNT: 14.4 % (ref 11.6–14.5)
GLUCOSE BLD STRIP.AUTO-MCNC: 106 MG/DL (ref 70–110)
GLUCOSE BLD STRIP.AUTO-MCNC: 123 MG/DL (ref 70–110)
GLUCOSE BLD STRIP.AUTO-MCNC: 124 MG/DL (ref 70–110)
GLUCOSE SERPL-MCNC: 97 MG/DL (ref 74–99)
HCT VFR BLD AUTO: 30.3 % (ref 36–48)
HGB BLD-MCNC: 9.8 G/DL (ref 13–16)
IMM GRANULOCYTES # BLD AUTO: 0 K/UL (ref 0–0.04)
IMM GRANULOCYTES NFR BLD AUTO: 0 % (ref 0–0.5)
LYMPHOCYTES # BLD: 1.7 K/UL (ref 0.9–3.6)
LYMPHOCYTES NFR BLD: 19 % (ref 21–52)
MAGNESIUM SERPL-MCNC: 1.6 MG/DL (ref 1.6–2.6)
MCH RBC QN AUTO: 34.8 PG (ref 24–34)
MCHC RBC AUTO-ENTMCNC: 32.3 G/DL (ref 31–37)
MCV RBC AUTO: 107.4 FL (ref 78–100)
MONOCYTES # BLD: 0.9 K/UL (ref 0.05–1.2)
MONOCYTES NFR BLD: 10 % (ref 3–10)
NEUTS SEG # BLD: 6.2 K/UL (ref 1.8–8)
NEUTS SEG NFR BLD: 69 % (ref 40–73)
NRBC # BLD: 0 K/UL (ref 0–0.01)
NRBC BLD-RTO: 0 PER 100 WBC
PHOSPHATE SERPL-MCNC: 2.7 MG/DL (ref 2.5–4.9)
PLATELET # BLD AUTO: 115 K/UL (ref 135–420)
PMV BLD AUTO: 10.5 FL (ref 9.2–11.8)
POTASSIUM SERPL-SCNC: 3.6 MMOL/L (ref 3.5–5.5)
RBC # BLD AUTO: 2.82 M/UL (ref 4.35–5.65)
SODIUM SERPL-SCNC: 137 MMOL/L (ref 136–145)
WBC # BLD AUTO: 9 K/UL (ref 4.6–13.2)

## 2022-07-08 PROCEDURE — 74011000250 HC RX REV CODE- 250: Performed by: STUDENT IN AN ORGANIZED HEALTH CARE EDUCATION/TRAINING PROGRAM

## 2022-07-08 PROCEDURE — 74011250636 HC RX REV CODE- 250/636

## 2022-07-08 PROCEDURE — 74011250637 HC RX REV CODE- 250/637: Performed by: STUDENT IN AN ORGANIZED HEALTH CARE EDUCATION/TRAINING PROGRAM

## 2022-07-08 PROCEDURE — 84100 ASSAY OF PHOSPHORUS: CPT

## 2022-07-08 PROCEDURE — 80048 BASIC METABOLIC PNL TOTAL CA: CPT

## 2022-07-08 PROCEDURE — 85025 COMPLETE CBC W/AUTO DIFF WBC: CPT

## 2022-07-08 PROCEDURE — 74011000250 HC RX REV CODE- 250

## 2022-07-08 PROCEDURE — 36415 COLL VENOUS BLD VENIPUNCTURE: CPT

## 2022-07-08 PROCEDURE — 2709999900 HC NON-CHARGEABLE SUPPLY

## 2022-07-08 PROCEDURE — 74011250637 HC RX REV CODE- 250/637

## 2022-07-08 PROCEDURE — 65270000029 HC RM PRIVATE

## 2022-07-08 PROCEDURE — 83735 ASSAY OF MAGNESIUM: CPT

## 2022-07-08 PROCEDURE — 74011000258 HC RX REV CODE- 258: Performed by: STUDENT IN AN ORGANIZED HEALTH CARE EDUCATION/TRAINING PROGRAM

## 2022-07-08 PROCEDURE — 82962 GLUCOSE BLOOD TEST: CPT

## 2022-07-08 PROCEDURE — 74011250636 HC RX REV CODE- 250/636: Performed by: STUDENT IN AN ORGANIZED HEALTH CARE EDUCATION/TRAINING PROGRAM

## 2022-07-08 RX ADMIN — WATER 1 G: 1 INJECTION INTRAMUSCULAR; INTRAVENOUS; SUBCUTANEOUS at 14:00

## 2022-07-08 RX ADMIN — SODIUM CHLORIDE, PRESERVATIVE FREE 10 ML: 5 INJECTION INTRAVENOUS at 13:54

## 2022-07-08 RX ADMIN — LISINOPRIL 10 MG: 10 TABLET ORAL at 08:36

## 2022-07-08 RX ADMIN — SODIUM CHLORIDE, PRESERVATIVE FREE 10 ML: 5 INJECTION INTRAVENOUS at 05:07

## 2022-07-08 RX ADMIN — ATORVASTATIN CALCIUM 40 MG: 40 TABLET, FILM COATED ORAL at 08:36

## 2022-07-08 RX ADMIN — Medication 1000 UNITS: at 08:36

## 2022-07-08 RX ADMIN — FLUOXETINE 20 MG: 20 CAPSULE ORAL at 08:36

## 2022-07-08 RX ADMIN — ENOXAPARIN SODIUM 40 MG: 100 INJECTION SUBCUTANEOUS at 08:36

## 2022-07-08 RX ADMIN — THERA TABS 1 TABLET: TAB at 08:36

## 2022-07-08 RX ADMIN — THIAMINE HYDROCHLORIDE 200 MG: 100 INJECTION, SOLUTION INTRAMUSCULAR; INTRAVENOUS at 12:24

## 2022-07-08 NOTE — DISCHARGE INSTRUCTIONS
Patient Education   You have been hospitalized for a urinary tract infection and alcohol withdrawal.  Your infection was treated with antibiotics and you are no longer having symptoms. You have also been provided vitamins due to low vitamin levels while you are at the hospital. Please attend your follow-up appointment at [facility]. Please return to the hospital or your primary care doctor if you develop fevers, chills, or continued pain with urination. DISCHARGE SUMMARY from Nurse    PATIENT INSTRUCTIONS:    After general anesthesia or intravenous sedation, for 24 hours or while taking prescription Narcotics:  · Limit your activities  · Do not drive and operate hazardous machinery  · Do not make important personal or business decisions  · Do  not drink alcoholic beverages  · If you have not urinated within 8 hours after discharge, please contact your surgeon on call. Report the following to your surgeon:  · Excessive pain, swelling, redness or odor of or around the surgical area  · Temperature over 100.5  · Nausea and vomiting lasting longer than 4 hours or if unable to take medications  · Any signs of decreased circulation or nerve impairment to extremity: change in color, persistent  numbness, tingling, coldness or increase pain  · Any questions    What to do at Home:  Recommended activity: Activity as tolerated    If you experience any of the following symptoms chest pain, shortness of breath, nausea/vomiting, fever, or pain unrelieved by medication, please follow up with Dr. Cynthia Gates. *  Please give a list of your current medications to your Primary Care Provider. *  Please update this list whenever your medications are discontinued, doses are      changed, or new medications (including over-the-counter products) are added. *  Please carry medication information at all times in case of emergency situations.     These are general instructions for a healthy lifestyle:    No smoking/ No tobacco products/ Avoid exposure to second hand smoke  Surgeon General's Warning:  Quitting smoking now greatly reduces serious risk to your health. Obesity, smoking, and sedentary lifestyle greatly increases your risk for illness    A healthy diet, regular physical exercise & weight monitoring are important for maintaining a healthy lifestyle    You may be retaining fluid if you have a history of heart failure or if you experience any of the following symptoms:  Weight gain of 3 pounds or more overnight or 5 pounds in a week, increased swelling in our hands or feet or shortness of breath while lying flat in bed. Please call your doctor as soon as you notice any of these symptoms; do not wait until your next office visit. Patient armband removed and shredded  MyChart Activation    Thank you for requesting access to [employer]. Please follow the instructions below to securely access and download your online medical record. [employer] allows you to send messages to your doctor, view your test results, renew your prescriptions, schedule appointments, and more. How Do I Sign Up? 1. In your internet browser, go to [email or web address]  2. Click on the First Time User? Click Here link in the Sign In box. You will be redirect to the New Member Sign Up page. 3. Enter your [employer] Access Code exactly as it appears below. You will not need to use this code after youve completed the sign-up process. If you do not sign up before the expiration date, you must request a new code. [employer] Access Code: [email or web address]  Expires: 2022  6:27 PM (This is the date your [employer] access code will )    4. Enter the last four digits of your Social Security Number (xxxx) and Date of Birth (mm/dd/yyyy) as indicated and click Submit. You will be taken to the next sign-up page. 5. Create a [employer] ID.  This will be your [employer] login ID and cannot be changed, so think of one that is secure and easy to remember. 6. Create a [employer] password. You can change your password at any time. 7. Enter your Password Reset Question and Answer. This can be used at a later time if you forget your password. 8. Enter your e-mail address. You will receive e-mail notification when new information is available in [address] 9. Click Sign Up. You can now view and download portions of your medical record. 10. Click the Download Summary menu link to download a portable copy of your medical information. Additional Information    If you have questions, please visit the Frequently Asked Questions section of the [employer] website at [email or web address] Remember, [employer] is NOT to be used for urgent needs. For medical emergencies, dial 911. The discharge information has been reviewed with the patient. The patient verbalized understanding. Discharge medications reviewed with the patient and appropriate educational materials and side effects teaching were provided. ___________________________________________________________________________________________________________________________________    Alcohol Detoxification and Withdrawal: Care Instructions  Your Care Instructions     If you drink alcohol regularly and then suddenly stop, you may go through some physical and emotional problems while the alcohol clears out of your system. Clearing the alcohol from your body is called detoxification, or detox. Physical and emotional problems that may happen during detox are called withdrawal.  Symptoms of withdrawal can be scary and dangerous. Mild symptoms include nausea and vomiting, sweating, shakiness, and intense worry. Severe symptoms include being confused and irritable, feeling things on your body that are not there, seeing or hearing things that are not there, and trembling. You may even have seizures. If your symptoms become severe you must see a doctor.  People who drink large amounts of alcohol should not try to detox at home. A person can die of severe alcohol withdrawal.  Symptoms of alcohol withdrawal may begin from 4 to 12 hours after you stop drinking. But they may not start for several days after the last drink. They can last a few days. It is hard to stop drinking. But when you have cleared the alcohol from your system, you will be able to start the next part of your life, free from the burden of being dependent. Follow-up care is a key part of your treatment and safety. Be sure to make and go to all appointments, and call your doctor if you are having problems. It's also a good idea to know your test results and keep a list of the medicines you take. How can you care for yourself at home? · Before you stop drinking, talk to your doctor about how you plan to stop. Be sure to be completely honest with him or her about how much you have been drinking. Your doctor will figure out whether you need to detox in a supervised medical center. · Take your medicines exactly as prescribed. Call your doctor if you think you are having a problem with your medicine. · Make sure someone you trust is with you the whole time. Have friends and family members take turns staying with you until you are done with detox. · Put a list of emergency numbers near the phone. This should include your doctor, the police, the nearest hospital and emergency room, and neighbors who can help if needed. · Make sure all alcohol is removed from the house before you start. This includes beverages as well as medicines, rubbing alcohol, and certain flavorings like vanilla extract. · Keep \"drinking buddies\" away during the time you are going through detox. · Make your surroundings calm. Soft lights, soft music, and a comfortable place to sit or lie down can help make the process easier. · Drink lots of fluids and eat snacks such as fruit, cheese and crackers, and pretzels.  Foods high in carbohydrate may help reduce the craving for alcohol. · Understand that detox is going to be hard. · Keep in mind that the people watching over you during detox are there to help. Explain to them before you start that you may not act like yourself until detox is finished. · Consider joining a support group such as Alcoholics Anonymous. Sharing your experiences with other people who face similar challenges may help you feel less overwhelmed. · Keep the numbers for these national suicide hotlines: 7-077-046-TALK (1-306.173.3579) and 7-136-CVKXMBY (5-384.901.8513). If you or someone you know talks about suicide or feeling hopeless, get help right away. When should you call for help? Call 911 anytime you think you may need emergency care. For example, call if:    · You feel you cannot stop from hurting yourself or someone else.     · You vomit many times and cannot stop.     · You vomit blood or what looks like coffee grounds.     · You have trouble breathing or are breathing very fast.     · Your heart beats more than 120 times a minute and will not slow down.     · You have chest pain.     · You have a seizure.     · You see or feel things that are not there (hallucinate). If you are caring for someone who is going through detox, call if:    · The person passes out (loses consciousness).     · The person sees or feels things that are not there and sees or hears the same things many times.     · The person is very agitated and will not calm down.     · The person becomes violent or threatens to be violent.     · The person has a seizure. Call your doctor now or seek immediate medical care if:    · You have a high fever.     · You have severe belly pain.     · You are very shaky. Watch closely for changes in your health, and be sure to contact your doctor if:    · You do not get better as expected. Where can you learn more?   Go to http://www.gray.com/  Enter D051 in the search box to learn more about \"Alcohol Detoxification and Withdrawal: Care Instructions. \"  Current as of: November 8, 2021               Content Version: 13.2  © 5334-9357 [employer], [employer]. Care instructions adapted under license by [employer] (which disclaims liability or warranty for this information). If you have questions about a medical condition or this instruction, always ask your healthcare professional. [place] [address] any warranty or liability for your use of this information.

## 2022-07-08 NOTE — ROUTINE PROCESS
Bedside shift change report given to Whit Watson RN (oncoming nurse) by Patricia Liu RN (offgoing nurse). Report included the following information SBAR, Kardex and MAR.

## 2022-07-08 NOTE — PROGRESS NOTES
[facility]  DAILY PROGRESS NOTE      Patient:    Reji Cox , 58 y.o. male   MRN:  [id number]  Room/Bed:  467/  Admission Date:   7/3/2022  LOS:                       LOS: 5 days   Code status:  DNR         Admission Chief Complaint:   Chief Complaint   Patient presents with    Fall    Alcohol Problem    Mental Health Problem       ASSESSMENT AND PLAN:   Reji Cox is a 58y.o. year old with a PMH of chronic alcoholism, falls, T2DM, HTN, chronic malnutrition, and tobacco use male admitted for Severe sepsis ([facility] [A41.9, R65.20]  Alcohol withdrawal ([facility] [F10.239]  UTI (urinary tract infection) [N39.0].    ASSESSMENT AND PLAN   Chronic Alcohol Use Disorder w/ Alcohol Withdrawal  AMS - improving  Pt seems back to baseline per reports from family. Has not needed Ativan for 24 hours. CIWA Still concerned for Katie Needle and will continue to treat. Overnight his CIWA scores 1. Will continue CIWA protocol and monitor. Will treat for WK syndrome given low risk and high benefit if present. -CXR showed no evidence of aspiration PNA  -Pt endorses a lengthy history of drinking multiple beers and a variety of liquor each day. Pt reports his last drink was two days prior to admission and feels as if he is having withdrawals. Serum ethanol 18 upon admission. UDS negative. -LFTs:  ALT 27, AST 97, Alk Phos 150 on admission  -Chronic malnutrition and hypoalbuminemia (2.9) d/t chronic alcohol use. -Mild macrocytic anemia (Hbg 11; .2) and thrombocytopenia (117) likely secondary to chronic malnutrition and alcoholism. -B12:1118, Folate: 5.9, VitD: 17.8  -Has not received glucose containing IV fluids during hospital stay     Plan:  -Continue CIWA protocol.   -Thiamine 300 mg was administered in ED.  With concern for WK syndrome, will aggressively replete with 500 mg thiamine IV 5x daily (7/6-7/7) followed by 250 mg thiamine IV (7-8-7/12)  -Consult nutrition services,  appreciate their recommendations.   -Check vitamin D, B12, ammonia levels  -Continue vitamin d 1000 international units daily. Acute Cystitis  -Pt presented to ED complaining of feeling overall unwell with weakness. UA in ED significant for moderate blood, positive nitrites, large LE, \"too numerous\" WBCs, and 4+ bacteria. Urine was sent for culture. -SIRS criteria were not met:  WBC normal (8.8), RR wnl, temp 98.6F, HR initially 108 at presentation; resolved in mid-90s.  -Lactate initially elevated at 5.08; decreased to 1.12 after 6 hours.   -Pt denied any fevers, chills, dysuria, hematuria, trouble voiding, abd pain, suprapubic pain.   -Two 1 L NS boluses were administerd in ED with initial suspicion for sepsis (unlikely). -Blood cultures collected and empiric antibiotic therapy was begun in ED with one 1750 mg IV Vancomycin infusion and 4.5 g IV Zosyn.   -Blood cultures NGTD  -Urine culture shows >100,000 colonies of mixed urogenital dale. Urine sensitivities pending. -UTI may be contributing to his weakness.      Plan:  -Admit to telemetry  -FU Urine culture  -Abx - received Vanc/Zosyn 7/3, Ceftriaxone 1 g IV once daily (7/4 start) ; awaiting sensitivities   -continue ceftriaxone 1 g IV once daily while inpatient for 5 days (7/4 - 7/8). -monitor for urinary retention  -Vitals as per unit protocol       Hypophospatemia - resolved  Hypomagnesiemia - resolved  Hypokalemia - resolved  Concern for refeeding syndrome given history of alcoholism and concern over current living situation. Will aggressively replete if necessary   -K+, magnesium, and Phos wnl   Plan:  -Continue to monitor  -daily BMP, Magnesium, phosphorus     Left Lower Extremity Swelling and Pain, improving  -Swelling decreased, mild L ankle swelling today, no R leg swelling.  -Etiology likely chronic venous stasis given unremarkable duplex and venous stasis skin changes. Pt denies warmth, redness, dyspnea, or chest pain.  However, pt has presented with similar problem in the past and was negative for DVT. DDx for his LLE edema include: DVT, chronic venous stasis (consistent with physical exam and skin changes), hypoalbuminemia, or cellulitis.   -Wells' score of 3 (+only for \"clinical signs and symptoms of dvt\")  -Duplex US bilateral LE unremarkable  Plan:  -PRN acetaminophen for pain.     CKD Stage 3A   -GFR at presentation 57, Baseline GFR >60. Cr 1.27 at admission, baseline Cr variable (range from 0.49 - 1.13). Plan:  -PO fluids as tolerated  -Monitor I/O. -Monitor daily BMP.     Frequent Falls and Generalized Weakness  -Likely multifactorial in the setting of left lower extremity swelling, chronic alcohol abuse, complicated UTI, and chronic malnutrition.   -Monitor clinically for any signs of acute deterioration or focal neurologic deficits. No overt deficits or indication for head imaging at this time.     Plan:  -Fall precautions  -Activity with assistance  -Consult PT/OT -- appreciate your assistance and recommendations.     Hypertension  -Pt was hypertensive upon admission (142/90). He denied taking any medications at home.   -Lisinopril 10 mg daily from [employer] rec resumed.  Indapamide 1.25 mg held for now, as pt has been of antihypertensives for a long period of time.      Plan:  -Continue Lisinopril 10mg  - Restart indapamide 1.25mg as tolerated on discharge     Type 2 Diabetes   -Last A1c June 2021 was 4.9  -Blood glucose at presentation 157.   -Pt not taking any medications.      Plan:  -POC glucose before meals and at bedtime  -SSI   -Hypoglycemic precautions   -Resume atorvastatin 40 mg daily; ASCVD 10-year risk 35.6% based on lipid panel from 2019 and current state     Depression   Plan:  -Begin Prozac 20 mg every day; follow up with PCP outpatient to discuss changes.  -Consult social work -- appreciate assessment and recommendations.      Tobacco Use Disorder  -Pt smokes about 8 cigarettes per day     Plan:  -Nicoderm patches to prevent nicotine withdrawal  -smoking cessation counselling     Global:  Code: DNR  IVF/Drips: Received two fluid boluses in ED. If tolerating PO intake well, may discontinue IVF. I/O / Wt: Monitor I/O and daily weights  Diet: Adult cardiac  DVT/AC: Lovenox 40 mg SQ  Mobility: per protocol; up with assistance  Disposition: Inpatient  Anticipated LOS:<2 days     Point of Contact:  Maira Mccormick (brother):  [phone]  SUBJECTIVE:   Events of the last 24 hours:  No acute events overnight. CIWA scores 1. States feeling well, eating. Not ambulating.  No concerns this AM. Denies pain, lightheadedness, anxiety.      ROS (positive findings are in BOLD; negative findings are in regular font)  Constitutional: fevers, chills, appetite changes, weight changes, fatigue  HEENT: changes in vision, changes in hearing,   Cardiovascular: chest pain, palpitations,   Pulmonary: SOB, cough  Gastrointestinal: abdominal pain, nausea/vomiting, diarrhea,   Genitourinary: dysuria, hesitation,  Musculoskeletal: arthralgias, myalgias  Skin: rash, itching  Neurological: sensory changes, motor changes, headache  Psychiatric: mood changes, agitation  Heme: easy bruising/easy bleeding,     CURRENT INPATIENT MEDICATIONS:    Scheduled Medications  Current Facility-Administered Medications   Medication Dose Route Frequency Provider Last Rate Last Admin    triamcinolone acetonide (KENALOG) 0.1 % cream   Topical TID PRN Alphonse Cardonaer P, DO        thiamine (B-1) 250 mg in 0.9% sodium chloride 50 mL IVPB  250 mg IntraVENous DAILY Sin Todd MD        [Held by provider] thiamine HCL (B-1) tablet 100 mg  100 mg Oral DAILY Wild Tucker MD        therapeutic multivitamin [facility]) tablet 1 Tablet  1 Tablet Oral DAILY Wild Tucker MD   1 Tablet at 07/08/22 0836    cholecalciferol (VITAMIN D3) (1000 Units /25 mcg) tablet 1,000 Units  1,000 Units Oral DAILY Ericka Guy DO   1,000 Units at 07/08/22 0836    FLUoxetine (PROzac) capsule 20 mg  20 mg Oral DAILY Terrell Acron, DO   20 mg at 07/08/22 9001    insulin lispro (HUMALOG) injection   SubCUTAneous Vida Lee MD        glucose chewable tablet 16 g  4 Tablet Oral PRN Joaquina Peres MD        glucagon (GLUCAGEN) injection 1 mg  1 mg IntraMUSCular PRN Joaquina Peres MD        cefTRIAXone (ROCEPHIN) 1 g in sterile water (preservative free) 10 mL IV syringe  1 g IntraVENous Q24H Tereso Sever B, MD   1 g at 07/07/22 1340    LORazepam (ATIVAN) tablet 1 mg  1 mg Oral Q1H PRN Euna Ye P, DO        Or    LORazepam (ATIVAN) 2 mg/mL injection 1 mg  1 mg IntraVENous Q1H PRN Tere Davis P, DO        LORazepam (ATIVAN) tablet 2 mg  2 mg Oral Q1H PRN Terrell Acron, DO        Or    LORazepam (ATIVAN) 2 mg/mL injection 2 mg  2 mg IntraVENous Q1H PRN Euna Ye P, DO   2 mg at 07/07/22 0418    LORazepam (ATIVAN) 2 mg/mL injection 3 mg  3 mg IntraVENous Q15MIN PRN Euna Ye P, DO        atorvastatin (LIPITOR) tablet 40 mg  40 mg Oral DAILY Euna Ye P, DO   40 mg at 07/08/22 0836    lisinopriL (PRINIVIL, ZESTRIL) tablet 10 mg  10 mg Oral DAILY Terrell Acron, DO   10 mg at 07/08/22 0836    sodium chloride (NS) flush 5-40 mL  5-40 mL IntraVENous Q8H Tere Davis P, DO   10 mL at 07/08/22 0507    sodium chloride (NS) flush 5-40 mL  5-40 mL IntraVENous PRN Euna Ye P, DO        acetaminophen (TYLENOL) tablet 650 mg  650 mg Oral Q6H PRN Terrell Acron, DO        Or    acetaminophen (TYLENOL) suppository 650 mg  650 mg Rectal Q6H PRN Euna Ye P, DO        polyethylene glycol (MIRALAX) packet 17 g  17 g Oral DAILY PRN Euna Ye P, DO        ondansetron (ZOFRAN ODT) tablet 4 mg  4 mg Oral Q8H PRN Euna Ye P, DO        Or    ondansetron (ZOFRAN) injection 4 mg  4 mg IntraVENous Q6H PRN Tere Sanchez P, DO        enoxaparin (LOVENOX) injection 40 mg  40 mg SubCUTAneous DAILY Terrell La, DO   40 mg at 07/08/22 0836    [Held by provider] indapamide (LOZOL) tablet 1.25 mg  1.25 mg Oral DAILY Tere Davis DO        nicotine (NICODERM CQ) 14 mg/24 hr patch 1 Patch  1 Patch TransDERmal DAILY Cricket Leahy    1 Patch at 07/08/22 0836       OBJECTIVE:  Visit Vitals  /75 (BP 1 Location: Right upper arm, BP Patient Position: At rest)   Pulse 81   Temp 98.4 °F (36.9 °C)   Resp 20   Ht 6' 2\" (1.88 m)   Wt 68.1 kg (150 lb 1.6 oz)   SpO2 96%   BMI 19.27 kg/m²       PHYSICAL EXAM  Gen: NAD, comfortable  HEENT: normocephalic, atraumatic, MMM,  CV: RRR, S1/S2 present without M/R/G, +2 radial pulses, well-perfused, PMI not displaced  Pulm: CTAB, no wheezes, no crackles  Abd: S/NT/ND, no rebound, no guarding,   MSK: no clubbing, no edema  Skin: warm, dry, intact, no rash  Neuro: CN II-XII grossly intact, no focal deficits appreciated   Psych: appropriate, alert, oriented x3      Intake and Output:    Current Shift: No intake/output data recorded. Last three shifts: 07/06 1901 - 07/08 0700  In: 1010 [P.O.:960; I.V.:50]  Out: 100 [Urine:100]       Lab/Data Review:       Recent Results (from the past 12 hour(s))   CBC WITH AUTOMATED DIFF    Collection Time: 07/08/22  3:40 AM   Result Value Ref Range    WBC 9.0 4.6 - 13.2 K/uL    RBC 2.82 (L) 4.35 - 5.65 M/uL    HGB 9.8 (L) 13.0 - 16.0 g/dL    HCT 30.3 (L) 36.0 - 48.0 %    .4 (H) 78.0 - 100.0 FL    MCH 34.8 (H) 24.0 - 34.0 PG    MCHC 32.3 31.0 - 37.0 g/dL    RDW 14.4 11.6 - 14.5 %    PLATELET 614 (L) 467 - 420 K/uL    MPV 10.5 9.2 - 11.8 FL    NRBC 0.0 0  WBC    ABSOLUTE NRBC 0.00 0.00 - 0.01 K/uL    NEUTROPHILS 69 40 - 73 %    LYMPHOCYTES 19 (L) 21 - 52 %    MONOCYTES 10 3 - 10 %    EOSINOPHILS 2 0 - 5 %    BASOPHILS 1 0 - 2 %    IMMATURE GRANULOCYTES 0 0.0 - 0.5 %    ABS. NEUTROPHILS 6.2 1.8 - 8.0 K/UL    ABS. LYMPHOCYTES 1.7 0.9 - 3.6 K/UL    ABS. MONOCYTES 0.9 0.05 - 1.2 K/UL    ABS. EOSINOPHILS 0.2 0.0 - 0.4 K/UL    ABS. BASOPHILS 0.1 0.0 - 0.1 K/UL    ABS. IMM. GRANS. 0.0 0.00 - 0.04 K/UL    DF AUTOMATED     METABOLIC PANEL, BASIC    Collection Time: 07/08/22  3:40 AM   Result Value Ref Range    Sodium 137 136 - 145 mmol/L    Potassium 3.6 3.5 - 5.5 mmol/L    Chloride 102 100 - 111 mmol/L    CO2 28 21 - 32 mmol/L    Anion gap 7 3.0 - 18 mmol/L    Glucose 97 74 - 99 mg/dL    BUN 4 (L) 7.0 - 18 MG/DL    Creatinine 0.75 0.6 - 1.3 MG/DL    BUN/Creatinine ratio 5 (L) 12 - 20      GFR est AA >60 >60 ml/min/1.73m2    GFR est non-AA >60 >60 ml/min/1.73m2    Calcium 8.4 (L) 8.5 - 10.1 MG/DL   MAGNESIUM    Collection Time: 07/08/22  3:40 AM   Result Value Ref Range    Magnesium 1.6 1.6 - 2.6 mg/dL   PHOSPHORUS    Collection Time: 07/08/22  3:40 AM   Result Value Ref Range    Phosphorus 2.7 2.5 - 4.9 MG/DL   GLUCOSE, POC    Collection Time: 07/08/22  7:52 AM   Result Value Ref Range    Glucose (POC) 123 (H) 70 - 110 mg/dL     Intake and Output:  Current Shift: No intake/output data recorded. Last three shifts: 07/06 1901 - 07/08 0700  In: 1010 [P.O.:960; I.V.:50]  Out: 100 [Urine:100]    Lab Review:  Recent Results (from the past 12 hour(s))   CBC WITH AUTOMATED DIFF    Collection Time: 07/08/22  3:40 AM   Result Value Ref Range    WBC 9.0 4.6 - 13.2 K/uL    RBC 2.82 (L) 4.35 - 5.65 M/uL    HGB 9.8 (L) 13.0 - 16.0 g/dL    HCT 30.3 (L) 36.0 - 48.0 %    .4 (H) 78.0 - 100.0 FL    MCH 34.8 (H) 24.0 - 34.0 PG    MCHC 32.3 31.0 - 37.0 g/dL    RDW 14.4 11.6 - 14.5 %    PLATELET 176 (L) 084 - 420 K/uL    MPV 10.5 9.2 - 11.8 FL    NRBC 0.0 0  WBC    ABSOLUTE NRBC 0.00 0.00 - 0.01 K/uL    NEUTROPHILS 69 40 - 73 %    LYMPHOCYTES 19 (L) 21 - 52 %    MONOCYTES 10 3 - 10 %    EOSINOPHILS 2 0 - 5 %    BASOPHILS 1 0 - 2 %    IMMATURE GRANULOCYTES 0 0.0 - 0.5 %    ABS. NEUTROPHILS 6.2 1.8 - 8.0 K/UL    ABS. LYMPHOCYTES 1.7 0.9 - 3.6 K/UL    ABS. MONOCYTES 0.9 0.05 - 1.2 K/UL    ABS. EOSINOPHILS 0.2 0.0 - 0.4 K/UL    ABS.  BASOPHILS 0.1 0.0 - 0.1 K/UL    ABS. IMM. GRANS. 0.0 0.00 - 0.04 K/UL    DF AUTOMATED     METABOLIC PANEL, BASIC    Collection Time: 07/08/22  3:40 AM   Result Value Ref Range    Sodium 137 136 - 145 mmol/L    Potassium 3.6 3.5 - 5.5 mmol/L    Chloride 102 100 - 111 mmol/L    CO2 28 21 - 32 mmol/L    Anion gap 7 3.0 - 18 mmol/L    Glucose 97 74 - 99 mg/dL    BUN 4 (L) 7.0 - 18 MG/DL    Creatinine 0.75 0.6 - 1.3 MG/DL    BUN/Creatinine ratio 5 (L) 12 - 20      GFR est AA >60 >60 ml/min/1.73m2    GFR est non-AA >60 >60 ml/min/1.73m2    Calcium 8.4 (L) 8.5 - 10.1 MG/DL   MAGNESIUM    Collection Time: 07/08/22  3:40 AM   Result Value Ref Range    Magnesium 1.6 1.6 - 2.6 mg/dL   PHOSPHORUS    Collection Time: 07/08/22  3:40 AM   Result Value Ref Range    Phosphorus 2.7 2.5 - 4.9 MG/DL   GLUCOSE, POC    Collection Time: 07/08/22  7:52 AM   Result Value Ref Range    Glucose (POC) 123 (H) 70 - 110 mg/dL       Radiologic Studies -   CT HEAD WO CONT   Final Result      No acute abnormalities. Small vessel disease such as is seen in patients with   diabetes or hypertension. Mild burden chronic sinusitis. DUPLEX LOWER EXT VENOUS BILAT   Final Result      XR CHEST PA LAT   Final Result      Normal chest.                      ================================================================  Further management for Mr. Kathi Slater will be discussed on rounds with my attending.       Maurisio Henson MD, PGY-1  [facility]  July 8, 2022 8:33 AM

## 2022-07-08 NOTE — PROGRESS NOTES
Bedside and Verbal shift change report given to Neelima Dumont RN (oncoming nurse) by Brannon Anderson RN (offgoing nurse).  Report included the following information SBAR, Kardex, Intake/Output and MAR Statement Selected

## 2022-07-08 NOTE — PROGRESS NOTES
07/08/22 1206   Output (mL)   Urine Voided 200 ml   t has 200cc of lauren color urine in the urinal. He also spilled it on the bed all over his gown. Unable to accurately measure the urine.

## 2022-07-08 NOTE — PROGRESS NOTES
Attempted x2 to see patient for PT treatment. At 1140 am pt perseverating on cell phone and difficulty to redirect. At 60-74-66-62, pt refuses PT and is eating lunch. Will follow up as schedule permit. Thank you.        Nicholas Hi PT, DPT

## 2022-07-08 NOTE — PROGRESS NOTES
OCCUPATIONAL THERAPY NOTE    Patient: Nae Cedillo (30 y.o. male)  Date: 7/8/2022  Diagnosis: Severe sepsis ([facility] [A41.9, R65.20]  Alcohol withdrawal ([facility] [F10.239]  UTI (urinary tract infection) [G02.6] Complicated UTI (urinary tract infection)      Precautions: Fall,Skin,Contact  Chart, occupational therapy assessment, plan of care, and goals were reviewed. Occupational Therapy treatment attempted. Patient is unable to participate due to:  []  Nausea/vomiting  []  Eating  []  Pain  []  Pt lethargic  []  Off Unit  [x] Other:  Attempt x1 pt is occupied with staff ([id number])  Attempt x2 - attempted to see pt with PT. Pt is with lunch tray in front of him, reports he finished eating. Pt educated on role of OT in acute setting and on the importance of participating in OT sessions to improve strength and endurance and increase independence with ADLs. Pt verbalized understanding, however, once his tray table moved to side of the bed in preparation for bed mobility pt becomes agitated, stating he wants to finish his lunch and will not participate in OT at this time (12:41). Will f/u later as schedule allows. Thank you.     Brennan Nicholas MS, OTR/L

## 2022-07-08 NOTE — PROGRESS NOTES
Pt is alert and oriented x3. Pt is eating breakfast. No pain reported. No s/s of any stress. Tolerated meds fine. Will continue to monitor.

## 2022-07-08 NOTE — PROGRESS NOTES
Telephone call to the pharmacy to check on the status of the IV Thiamine. The medication will be delivered on the 12pm run. Pharmacy aware that it was due at [address] [facility] it is not here to give. Awiating the arrival of the medication.

## 2022-07-08 NOTE — PROGRESS NOTES
Pt is very anxious. Redirected several times. Pt is unstable on feet and is still trying to climb out of bed. ''Pt states someone is waiting foe Florecita have to go'' Reassure pt no one is waiting and he is in the hospital for treatment. Will continue to monitor.

## 2022-07-08 NOTE — PROGRESS NOTES
*ATTENTION:  This note has been created by a medical student for educational purposes only. Please do not refer to the content of this note for clinical decision-making, billing, or other purposes. Please see attending physicians note to obtain clinical information on this patient. [facility] Medicine  Medical Student Progress Note    Patient: Evita Easton MRN: [id number]   SSN: [id number]  YOB: 1960   Age: 58 y.o. Sex: male      Admit Date: 7/3/2022    LOS: 5 days   Chief Complaint   Patient presents with    Fall    Alcohol Problem    Mental Health Problem       Subjective:     Myriam Berg is a 59 y/o M with a PMH of chronic alcoholism, T2DM, HTN, and malnutrition, who was admitted for alcohol withdrawal and UTI. Pt was more conversational than in previous days, however believed he was taken to a party by his nurse last night. He denied any auditory or visual hallucinations. He stated he was unable to eat much yesterday, but requested his breakfast. He was alert and oriented to person, place, and time. Pt denied any pain or discomfort. CIWA - 0    Review of Systems   Constitutional: Negative for diaphoresis and malaise/fatigue. Eyes: Negative for blurred vision and photophobia. Gastrointestinal: Negative for nausea and vomiting. Skin: Negative for itching and rash. Neurological: Negative for dizziness, tingling, tremors, sensory change and headaches. Psychiatric/Behavioral: The patient is not nervous/anxious. Objective:     Visit Vitals  /75 (BP 1 Location: Right upper arm, BP Patient Position: At rest)   Pulse 81   Temp 98.4 °F (36.9 °C)   Resp 20   Ht 6' 2\" (1.88 m)   Wt 150 lb 1.6 oz (68.1 kg)   SpO2 96%   BMI 19.27 kg/m²       Physical Exam:   General: Well appearing, well nourished, in no distress. Oriented x 3, normal mood and affect .  Difficulty to sit-up/stand/walk   Skin: Good turgor, no rash, unusual bruising or prominent lesions  Head: Normocephalic, atraumatic, no visible or palpable masses, depressions, or scaring. Eyes:conjunctiva clear, sclera non-icteric, EOM intact, PERRL,  Heart: No cardiomegaly or thrills; regular rate and rhythm, no murmur or gallop  Lungs: Clear to auscultation and percussion  Abdomen: Bowel sounds normal, no tenderness, organomegaly, masses, or hernia  Rectal: Normal sphincter tone, no hemorrhoids or masses palpable  Extremities: No amputations or deformities, cyanosis, edema or varicosities, peripheral pulses  intact  Musculoskeletal: Strength 5/5 bilaterally, upper and lower extremeties  Neurologic: CN 2-12 normal.  Psychiatric: Oriented X3, intact recent and remote memory, judgment and insight, normal mood  and affect. Intake and Output:  Current Shift: No intake/output data recorded. Last three shifts: 07/06 1901 - 07/08 0700  In: 1010 [P.O.:960; I.V.:50]  Out: 100 [Urine:100]    Lab/Data Review:  Recent Results (from the past 12 hour(s))   CBC WITH AUTOMATED DIFF    Collection Time: 07/08/22  3:40 AM   Result Value Ref Range    WBC 9.0 4.6 - 13.2 K/uL    RBC 2.82 (L) 4.35 - 5.65 M/uL    HGB 9.8 (L) 13.0 - 16.0 g/dL    HCT 30.3 (L) 36.0 - 48.0 %    .4 (H) 78.0 - 100.0 FL    MCH 34.8 (H) 24.0 - 34.0 PG    MCHC 32.3 31.0 - 37.0 g/dL    RDW 14.4 11.6 - 14.5 %    PLATELET 379 (L) 978 - 420 K/uL    MPV 10.5 9.2 - 11.8 FL    NRBC 0.0 0  WBC    ABSOLUTE NRBC 0.00 0.00 - 0.01 K/uL    NEUTROPHILS 69 40 - 73 %    LYMPHOCYTES 19 (L) 21 - 52 %    MONOCYTES 10 3 - 10 %    EOSINOPHILS 2 0 - 5 %    BASOPHILS 1 0 - 2 %    IMMATURE GRANULOCYTES 0 0.0 - 0.5 %    ABS. NEUTROPHILS 6.2 1.8 - 8.0 K/UL    ABS. LYMPHOCYTES 1.7 0.9 - 3.6 K/UL    ABS. MONOCYTES 0.9 0.05 - 1.2 K/UL    ABS. EOSINOPHILS 0.2 0.0 - 0.4 K/UL    ABS. BASOPHILS 0.1 0.0 - 0.1 K/UL    ABS. IMM.  GRANS. 0.0 0.00 - 0.04 K/UL    DF AUTOMATED     METABOLIC PANEL, BASIC    Collection Time: 07/08/22  3:40 AM   Result Value Ref Range    Sodium 137 136 - 145 mmol/L    Potassium 3.6 3.5 - 5.5 mmol/L    Chloride 102 100 - 111 mmol/L    CO2 28 21 - 32 mmol/L    Anion gap 7 3.0 - 18 mmol/L    Glucose 97 74 - 99 mg/dL    BUN 4 (L) 7.0 - 18 MG/DL    Creatinine 0.75 0.6 - 1.3 MG/DL    BUN/Creatinine ratio 5 (L) 12 - 20      GFR est AA >60 >60 ml/min/1.73m2    GFR est non-AA >60 >60 ml/min/1.73m2    Calcium 8.4 (L) 8.5 - 10.1 MG/DL   MAGNESIUM    Collection Time: 07/08/22  3:40 AM   Result Value Ref Range    Magnesium 1.6 1.6 - 2.6 mg/dL   PHOSPHORUS    Collection Time: 07/08/22  3:40 AM   Result Value Ref Range    Phosphorus 2.7 2.5 - 4.9 MG/DL   GLUCOSE, POC    Collection Time: 07/08/22  7:52 AM   Result Value Ref Range    Glucose (POC) 123 (H) 70 - 110 mg/dL       Key Findings or tests:   DATE TEST RESULT OR IMPRESSION                              Telemetry NONE   Oxygen NONE     Assessment and Plan:       1) Chronic Alcohol Use Disorder/Withdrawl  Continue CIWA scores every 6 hours, Ativan PRN  Continue Thiamine 250 mg IV until 7/12  Continue Multivitamin Supplementation   Consult Nutrition Services  Continue monitoring BMP, Mag, Phos    2) Acute Cystitis  Continue ceftriaxone 1 g IV daily    3) Left Lower Extremity Pain  Acetaminophen PRN    4a) Generalized Weakness  Continue Fall precautions  Continue PT/OT recommendations    4b) CKD 3a  PO fluids as tolerated  Monitor I/O, BMP    5) HTN  Continue Lisinopril 10mg  Restart Indapamide 1.25 mg upon discharge    6) T2DM  POC Glucose before meals and at bedtime  Sliding Scale Insulin  Hypoglycemic precautions  Atorvastatin 40 mg daily    7) Depression  Prozac 20 mg dialy; PCP followup  Consult Social Work    8) Tobacco Use Disorder  Nicoderm patch during inpatient  Smoking cessation counselling      Diet Cardiac   DVT Prophylaxis Lovenox 40 mg SQ   GI Prophylaxis /   Code status DNR   Disposition Inpatient     Point of Contact [place]  Relationship:  [phone]        Fide Monterroso M3   July 8, 2022, 7:28 AM

## 2022-07-08 NOTE — PROGRESS NOTES
Wound Prevention Checklist    Patient: Marcia Salguero (17 y.o. male)  Date: 7/8/2022  Diagnosis: Severe sepsis ([facility] [address] [A41.9, R65.20]  Alcohol withdrawal ([facility] [address] [F10.239]  UTI (urinary tract infection) [D59.4] Complicated UTI (urinary tract infection)    Precautions: Fall,Skin,Contact       []  Heel prevention boots placed on patient    [x]  Patient turned q2h during shift    []  Lift team ordered    []  Patient on Phil bed/Specialty bed    []  Each Wound is documented during shift (Stage, Color, drainage, odor, measurements, and dressings)    [x]  Dual skin checks done at bedside during shift report with GUNNER Julio RN

## 2022-07-08 NOTE — PROGRESS NOTES
Problem: Pressure Injury - Risk of  Goal: *Prevention of pressure injury  Description: Document Kahlil Scale and appropriate interventions in the flowsheet. Outcome: Progressing Towards Goal  Note: Pressure Injury Interventions:  Sensory Interventions: Assess need for specialty bed,Assess changes in LOC,Avoid rigorous massage over bony prominences,Keep linens dry and wrinkle-free,Pressure redistribution bed/mattress (bed type),Turn and reposition approx.  every two hours (pillows and wedges if needed)    Moisture Interventions: Absorbent underpads    Activity Interventions: Pressure redistribution bed/mattress(bed type)    Mobility Interventions: Pressure redistribution bed/mattress (bed type)    Nutrition Interventions: Document food/fluid/supplement intake    Friction and Shear Interventions: Lift sheet                Problem: Patient Education: Go to Patient Education Activity  Goal: Patient/Family Education  Outcome: Progressing Towards Goal

## 2022-07-09 ENCOUNTER — HOME HEALTH ADMISSION (OUTPATIENT)
Dept: HOME HEALTH SERVICES | Facility: HOME HEALTH | Age: 62
End: 2022-07-09

## 2022-07-09 VITALS
RESPIRATION RATE: 20 BRPM | HEART RATE: 80 BPM | WEIGHT: 150.1 LBS | OXYGEN SATURATION: 97 % | BODY MASS INDEX: 19.26 KG/M2 | SYSTOLIC BLOOD PRESSURE: 120 MMHG | TEMPERATURE: 97.8 F | HEIGHT: 74 IN | DIASTOLIC BLOOD PRESSURE: 77 MMHG

## 2022-07-09 LAB
BACTERIA SPEC CULT: NORMAL
GLUCOSE BLD STRIP.AUTO-MCNC: 121 MG/DL (ref 70–110)
GLUCOSE BLD STRIP.AUTO-MCNC: 98 MG/DL (ref 70–110)
SERVICE CMNT-IMP: NORMAL

## 2022-07-09 PROCEDURE — 74011000258 HC RX REV CODE- 258: Performed by: STUDENT IN AN ORGANIZED HEALTH CARE EDUCATION/TRAINING PROGRAM

## 2022-07-09 PROCEDURE — 74011250637 HC RX REV CODE- 250/637: Performed by: STUDENT IN AN ORGANIZED HEALTH CARE EDUCATION/TRAINING PROGRAM

## 2022-07-09 PROCEDURE — 97116 GAIT TRAINING THERAPY: CPT

## 2022-07-09 PROCEDURE — 97535 SELF CARE MNGMENT TRAINING: CPT

## 2022-07-09 PROCEDURE — 74011250636 HC RX REV CODE- 250/636

## 2022-07-09 PROCEDURE — 82962 GLUCOSE BLOOD TEST: CPT

## 2022-07-09 PROCEDURE — 74011250637 HC RX REV CODE- 250/637

## 2022-07-09 PROCEDURE — 74011000250 HC RX REV CODE- 250

## 2022-07-09 PROCEDURE — 2709999900 HC NON-CHARGEABLE SUPPLY

## 2022-07-09 PROCEDURE — 74011250636 HC RX REV CODE- 250/636: Performed by: STUDENT IN AN ORGANIZED HEALTH CARE EDUCATION/TRAINING PROGRAM

## 2022-07-09 PROCEDURE — 77030037878 HC DRSG MEPILEX >48IN BORD MOLN -B

## 2022-07-09 PROCEDURE — 97530 THERAPEUTIC ACTIVITIES: CPT

## 2022-07-09 RX ORDER — THERA TABS 400 MCG
1 TAB ORAL DAILY
Qty: 30 TABLET | Refills: 0 | Status: SHIPPED | OUTPATIENT
Start: 2022-07-09

## 2022-07-09 RX ORDER — ATORVASTATIN CALCIUM 40 MG/1
40 TABLET, FILM COATED ORAL DAILY
Qty: 30 TABLET | Refills: 0 | Status: SHIPPED | OUTPATIENT
Start: 2022-07-09

## 2022-07-09 RX ORDER — LISINOPRIL 10 MG/1
10 TABLET ORAL DAILY
Qty: 30 TABLET | Refills: 0 | Status: SHIPPED | OUTPATIENT
Start: 2022-07-09

## 2022-07-09 RX ORDER — LANOLIN ALCOHOL/MO/W.PET/CERES
100 CREAM (GRAM) TOPICAL DAILY
Qty: 30 TABLET | Refills: 0 | Status: SHIPPED | OUTPATIENT
Start: 2022-07-09

## 2022-07-09 RX ORDER — MELATONIN
1000 DAILY
Qty: 30 TABLET | Refills: 0 | Status: SHIPPED | OUTPATIENT
Start: 2022-07-09

## 2022-07-09 RX ORDER — FLUOXETINE HYDROCHLORIDE 20 MG/1
20 CAPSULE ORAL DAILY
Qty: 30 CAPSULE | Refills: 0 | Status: SHIPPED | OUTPATIENT
Start: 2022-07-09

## 2022-07-09 RX ADMIN — THIAMINE HYDROCHLORIDE 200 MG: 100 INJECTION, SOLUTION INTRAMUSCULAR; INTRAVENOUS at 12:31

## 2022-07-09 RX ADMIN — LISINOPRIL 10 MG: 10 TABLET ORAL at 12:31

## 2022-07-09 RX ADMIN — ATORVASTATIN CALCIUM 40 MG: 40 TABLET, FILM COATED ORAL at 12:31

## 2022-07-09 RX ADMIN — Medication 1000 UNITS: at 12:31

## 2022-07-09 RX ADMIN — THERA TABS 1 TABLET: TAB at 12:31

## 2022-07-09 RX ADMIN — SODIUM CHLORIDE, PRESERVATIVE FREE 10 ML: 5 INJECTION INTRAVENOUS at 00:07

## 2022-07-09 RX ADMIN — ACETAMINOPHEN 650 MG: 325 TABLET ORAL at 00:06

## 2022-07-09 RX ADMIN — FLUOXETINE 20 MG: 20 CAPSULE ORAL at 12:31

## 2022-07-09 RX ADMIN — ENOXAPARIN SODIUM 40 MG: 100 INJECTION SUBCUTANEOUS at 12:34

## 2022-07-09 NOTE — PROGRESS NOTES
[facility]  DAILY PROGRESS NOTE      Patient:    Ivan Dick , 58 y.o. male   MRN:  [id number]  Room/Bed:  467/  Admission Date:   7/3/2022  LOS:                       LOS: 6 days   Code status:  DNR         Admission Chief Complaint:   Chief Complaint   Patient presents with    Fall    Alcohol Problem    Mental Health Problem       ASSESSMENT AND PLAN:   Ivan Dick is a 58y.o. year old with a PMH of chronic alcoholism, falls, T2DM, HTN, chronic malnutrition, and tobacco use male admitted for Severe sepsis ([facility] [address] [A41.9, R65.20]  Alcohol withdrawal ([facility] [address] [F10.239]  UTI (urinary tract infection) [N39.0].    ASSESSMENT AND PLAN   Chronic Alcohol Use Disorder w/ Alcohol Withdrawal  AMS - improving  -Pt seems back to baseline per reports from family. Has not needed Ativan for 48+ hours.  -Still concerned for Patricia Spells and will continue to treat. Overnight his CIWA scores not done due to sleeping but had been 0-1. Will continue CIWA protocol and monitor. Will treat for WK syndrome given low risk and high benefit if present. -CXR showed no evidence of aspiration PNA  -Pt endorses a lengthy history of drinking multiple beers and a variety of liquor each day. Pt reports his last drink was two days prior to admission and feels as if he is having withdrawals. Serum ethanol 18 upon admission. UDS negative. -LFTs:  ALT 27, AST 97, Alk Phos 150 on admission  -Chronic malnutrition and hypoalbuminemia (2.9) d/t chronic alcohol use. -Mild macrocytic anemia (Hbg 11; .2) and thrombocytopenia (117) likely secondary to chronic malnutrition and alcoholism. -B12:1118, Folate: 5.9, VitD: 17.8  -Has not received glucose containing IV fluids during hospital stay     Plan:  -Consider stopping CIWAs  -Thiamine 300 mg was administered in ED.  With concern for WK syndrome, will aggressively replete with 500 mg thiamine IV 5x daily (7/6-7/7) followed by 250 mg thiamine IV (7-8-7/12)  -Consult nutrition services,  appreciate their recommendations.   -Check vitamin D, B12, ammonia levels  -Continue vitamin d 1000 international units daily. Acute Cystitis  -Pt presented to ED complaining of feeling overall unwell with weakness. UA in ED significant for moderate blood, positive nitrites, large LE, \"too numerous\" WBCs, and 4+ bacteria. Urine was sent for culture. -SIRS criteria were not met:  WBC normal (8.8), RR wnl, temp 98.6F, HR initially 108 at presentation; resolved in mid-90s.  -Lactate initially elevated at 5.08; decreased to 1.12 after 6 hours.   -Pt denied any fevers, chills, dysuria, hematuria, trouble voiding, abd pain, suprapubic pain.   -Two 1 L NS boluses were administerd in ED with initial suspicion for sepsis (unlikely). -Blood cultures collected and empiric antibiotic therapy was begun in ED with one 1750 mg IV Vancomycin infusion and 4.5 g IV Zosyn.   -Blood cultures NGTD  -Urine culture shows >100,000 colonies of mixed urogenital dale. Urine sensitivities pending. -UTI may be contributing to his weakness, though has been treated and patient remains weak. -Urine culture with >100K mixed dale    Plan:  -Admit to telemetry  -Abx - received Vanc/Zosyn 7/3, Ceftriaxone 1 g IV once daily (7/4 start) ; awaiting sensitivities   -Ceftriaxone discontinued, completed 5 day course (7/4 - 7/8). -Monitor for urinary retention  -Vitals as per unit protocol       Hypophospatemia - resolved  Hypomagnesiemia - resolved  Hypokalemia - resolved  Concern for refeeding syndrome given history of alcoholism and concern over current living situation.  Will aggressively replete if necessary   -K+, magnesium, and Phos wnl yesterday    Plan:  -Continue to monitor  -Daily BMP, Magnesium, phosphorus; not yet back this morning     Left Lower Extremity Swelling and Pain, improving  -Swelling decreased, mild L ankle swelling today, no R leg swelling.  -Etiology likely chronic venous stasis given unremarkable duplex and venous stasis skin changes. Pt denies warmth, redness, dyspnea, or chest pain. However, pt has presented with similar problem in the past and was negative for DVT. DDx for his LLE edema include: DVT, chronic venous stasis (consistent with physical exam and skin changes), hypoalbuminemia, or cellulitis.   -Wells' score of 3 (+only for \"clinical signs and symptoms of dvt\")  -Duplex US bilateral LE unremarkable  Plan:  -PRN acetaminophen for pain  -Needs PT/OT for conditioning     CKD Stage 3A   -GFR at presentation 57, Baseline GFR >60. Cr 1.27 at admission, baseline Cr variable (range from 0.49 - 1.13). Plan:  -PO fluids as tolerated  -Monitor I/O. -Monitor daily BMP.     Frequent Falls and Generalized Weakness  -Likely multifactorial in the setting of left lower extremity swelling, chronic alcohol abuse, complicated UTI, and chronic malnutrition.   -Monitor clinically for any signs of acute deterioration or focal neurologic deficits. No overt deficits or indication for head imaging at this time.     Plan:  -Fall precautions  -Activity with assistance  -Have PT/OT attempt to see patient again - appreciate your assistance and recommendations.     Hypertension  -Pt was hypertensive upon admission (142/90). He denied taking any medications at home.   -Lisinopril 10 mg daily from [employer] rec resumed. Indapamide 1.25 mg held for now, as pt has been of antihypertensives for a long period of time.    - BP this morning 147/89, the highest it has been in 24 hours  Plan:  -Continue Lisinopril 10mg  - Restart indapamide 1.25mg as tolerated on discharge     Type 2 Diabetes   -Last A1c June 2021 was 4.9  -Blood glucose at presentation 157.   -Pt not taking any medications.      Plan:  -POC glucose before meals and at bedtime  -SSI   -Hypoglycemic precautions   -Continue atorvastatin 40 mg daily; ASCVD 10-year risk 35.6% based on lipid panel from 2019 and current state     Depression   -Prescribed Prozac on an outpatient basis, uncertain if taking but stated he was not  Plan:  -Continue Prozac 20 mg every day; follow up with PCP outpatient to discuss changes.  -Consult social work -- appreciate assessment and recommendations.      Tobacco Use Disorder  -Pt smokes about 8 cigarettes per day     Plan:  -Nicoderm patches to prevent nicotine withdrawal  -smoking cessation counselling     Global:  Code: DNR  IVF/Drips: None  I/O / Wt: Monitor I/O and daily weights  Diet: Adult cardiac  DVT/AC: Lovenox 40 mg SQ  Mobility: per protocol; up with assistance  Disposition: Likely home with home PT  Anticipated LOS:<2 days     Point of Contact:  Eleni Estrada (brother):  [phone]    SUBJECTIVE:   Events of the last 24 hours:  No acute events overnight. CIWA scores not completed due to sleeping. Patient was asleep on entrance to his room, though easily awakened. Denies any concern to include pain at this time. . States feeling well, eating.    ROS (positive findings are in BOLD; negative findings are in regular font)  Constitutional: fevers, chills, appetite changes, weight changes, fatigue  HEENT: changes in vision, changes in hearing,   Cardiovascular: chest pain, palpitations,   Pulmonary: SOB, cough  Gastrointestinal: abdominal pain, nausea/vomiting, diarrhea,   Genitourinary: dysuria, hesitation,  Musculoskeletal: arthralgias, myalgias  Skin: rash, itching  Neurological: sensory changes, motor changes, headache  Psychiatric: mood changes, agitation  Heme: easy bruising/easy bleeding,     CURRENT INPATIENT MEDICATIONS:    Scheduled Medications  Current Facility-Administered Medications   Medication Dose Route Frequency Provider Last Rate Last Admin    thiamine (B-1) 200 mg in 0.9% sodium chloride 50 mL IVPB  200 mg IntraVENous DAILY Darius Ji  mL/hr at 07/08/22 1224 200 mg at 07/08/22 1224    triamcinolone acetonide (KENALOG) 0.1 % cream   Topical TID PRN Valeria Johnson DO        [Held by provider] thiamine HCL (B-1) tablet 100 mg  100 mg Oral DAILY Augie Osorio MD        therapeutic multivitamin [facility]) tablet 1 Tablet  1 Tablet Oral DAILY Augie Osorio MD   1 Tablet at 07/08/22 5892    cholecalciferol (VITAMIN D3) (1000 Units /25 mcg) tablet 1,000 Units  1,000 Units Oral DAILY Rose Apodaca, DO   1,000 Units at 07/08/22 5681    FLUoxetine (PROzac) capsule 20 mg  20 mg Oral DAILY Cheri Chin P, DO   20 mg at 07/08/22 0885    insulin lispro (HUMALOG) injection   SubCUTAneous Zoya Nguyen MD        glucose chewable tablet 16 g  4 Tablet Oral PRN Katina Ashford MD        glucagon (GLUCAGEN) injection 1 mg  1 mg IntraMUSCular PRN Katina Ashford MD        LORazepam (ATIVAN) tablet 1 mg  1 mg Oral Q1H PRN Rose Apodaca, DO        Or    LORazepam (ATIVAN) 2 mg/mL injection 1 mg  1 mg IntraVENous Q1H PRN Tere Davis P, DO        LORazepam (ATIVAN) tablet 2 mg  2 mg Oral Q1H PRN Cheri Chin P, DO        Or    LORazepam (ATIVAN) 2 mg/mL injection 2 mg  2 mg IntraVENous Q1H PRN Cheri Chin P, DO   2 mg at 07/07/22 0418    LORazepam (ATIVAN) 2 mg/mL injection 3 mg  3 mg IntraVENous Q15MIN PRN Cheri Chin P, DO        atorvastatin (LIPITOR) tablet 40 mg  40 mg Oral DAILY Cheri Chin P, DO   40 mg at 07/08/22 0836    lisinopriL (PRINIVIL, ZESTRIL) tablet 10 mg  10 mg Oral DAILY Cheri Chin P, DO   10 mg at 07/08/22 0836    sodium chloride (NS) flush 5-40 mL  5-40 mL IntraVENous Q8H Tere Davis P, DO   10 mL at 07/09/22 0007    sodium chloride (NS) flush 5-40 mL  5-40 mL IntraVENous PRN Cheri Chin P, DO        acetaminophen (TYLENOL) tablet 650 mg  650 mg Oral Q6H PRN Rose [place], DO   650 mg at 07/09/22 0006    Or    acetaminophen (TYLENOL) suppository 650 mg  650 mg Rectal Q6H PRN Cheri Chin P, DO        polyethylene glycol (MIRALAX) packet 17 g  17 g Oral DAILY PRN Cheri Pravin LAMB DO        ondansetron [facility]) tablet 4 mg  4 mg Oral Q8H PRN Clydell Crumble, DO        Or    ondansetron (ZOFRAN) injection 4 mg  4 mg IntraVENous Q6H PRN Ryan, Tere P, DO        enoxaparin (LOVENOX) injection 40 mg  40 mg SubCUTAneous DAILY tAilio Richards P, DO   40 mg at 07/08/22 0836    [Held by provider] indapamide (LOZOL) tablet 1.25 mg  1.25 mg Oral DAILY Ryan, Tere P, DO        nicotine (NICODERM CQ) 14 mg/24 hr patch 1 Patch  1 Patch TransDERmal DAILY Clydell Crumble, DO   1 Patch at 07/08/22 0836       OBJECTIVE:  Visit Vitals  BP (!) 147/89   Pulse 89   Temp 97 °F (36.1 °C)   Resp 16   Ht 6' 2\" (1.88 m)   Wt 68.1 kg (150 lb 1.6 oz)   SpO2 98%   BMI 19.27 kg/m²       PHYSICAL EXAM  Gen: NAD, comfortable  HEENT: normocephalic, atraumatic, MMM,  CV: RRR, S1/S2 present without M/R/G, +2 radial pulses, well-perfused, PMI not displaced  Pulm: CTAB, no wheezes, no crackles  Abd: S/NT/ND, no rebound, no guarding,   MSK: no clubbing, LLE edema as prior 2+ pitting to shin  Skin: warm, dry, intact, no rash  Neuro: CN II-XII grossly intact, no focal deficits appreciated   Psych: appropriate, alert, oriented x3      Intake and Output:    Current Shift: 07/08 1901 - 07/09 0700  In: -   Out: 300 [Urine:300]    Last three shifts: 07/07 0701 - 07/08 1900  In: 1320 [P.O.:1320]  Out: 600 [Urine:600]       Lab/Data Review:       No results found for this or any previous visit (from the past 12 hour(s)). Intake and Output:  Current Shift: 07/08 1901 - 07/09 0700  In: -   Out: 300 [Urine:300]  Last three shifts: 07/07 0701 - 07/08 1900  In: 1320 [P.O.:1320]  Out: 600 [Urine:600]    Lab Review:  No results found for this or any previous visit (from the past 12 hour(s)). Radiologic Studies -   CT HEAD WO CONT   Final Result      No acute abnormalities. Small vessel disease such as is seen in patients with   diabetes or hypertension. Mild burden chronic sinusitis.       DUPLEX LOWER EXT VENOUS BILAT   Final Result XR CHEST PA LAT   Final Result      Normal chest.                      ================================================================  Further management for Mr. Dede Weir will be discussed on rounds with my attending.       Beryle Jaegers, PGY-2  [facility]  July 9, 2022 8:33 AM

## 2022-07-09 NOTE — PROGRESS NOTES
Discharge order noted for today. Pt has been accepted to [facility] agency. Met with patient and he is  agreeable to the transition plan today. Transport has been arranged through patient's brother. Patient's  home health  orders have been forwarded to [facility] home health  agency via [address] Updated bedside RN, Alex,  to the transition plan.   Discharge information has been documented on the AVS.             Katiuska Martines RN  Case Management [phone]

## 2022-07-09 NOTE — PROGRESS NOTES
CM spoke with patient, [facility] verbal consent given for [address], patient said he did not have a ride home. CM called and spoke with patient's brother Chris Bonner [phone], he said he can transport patient home today, and asked that his nurse call him, when patient is ready for discharge. CM called and spoke with Yomaira with [address], said they can accept patient. CM put patient in the queue for [address]. CM called  [facility] to speak with nurse about discharge plan, was told nurse was busy in a patient's room.              Carey Read, RN  Case Management [phone]

## 2022-07-09 NOTE — PROGRESS NOTES
Problem: Self Care Deficits Care Plan (Adult)  Goal: *Acute Goals and Plan of Care (Insert Text)  Description: Occupational Therapy Goals  Initiated 7/6/2022 within 7 day(s). 1.  Patient will perform bed mobility in preparation for ADLs with modified independence. 2.  Patient will perform upper body dressing with modified independence. 3.  Patient will perform lower body dressing with modified independence. 4.  Patient will perform toilet transfers with supervision/set-up. 5.  Patient will perform all aspects of toileting with supervision/set-up. 6.  Patient will participate in upper extremity therapeutic exercise/activities with modified independence for 8 minutes. 7.  Patient will utilize energy conservation techniques during functional activities with verbal cues. Prior Level of Function: Patient reports he was independent with self-care and functional mobility PTA. Outcome: Progressing Towards Goal  OCCUPATIONAL THERAPY TREATMENT    Patient: Chu Luo (24 y.o. male)  Date: 7/9/2022  Diagnosis: Severe sepsis ([facility] [address] [A41.9, R65.20]  Alcohol withdrawal ([facility] [address] [F10.239]  UTI (urinary tract infection) [U60.0] Complicated UTI (urinary tract infection)       Precautions: Fall,Skin,Contact    Chart, occupational therapy assessment, plan of care, and goals were reviewed. ASSESSMENT:  Pt pleasant and cooperative this am, agreeable to participate in OT. Pt is seen with PT to increase safety of the pt and staff during functional mobility and ADLs. Pt donned socks with supervision seated EOB utilizing crossed LE method, following which performed functional mobility to the BR with FWW for grooming tasks, which he performed with CGA due to decreased standing balance and VCs for safe sequencing due to pt's decreased situational and safety awareness. Pt returned to bed at the end of the session, all needs within reach.   Progression toward goals:  []          Improving appropriately and progressing toward goals  [x]          Improving slowly and progressing toward goals  []          Not making progress toward goals and plan of care will be adjusted     PLAN:  Patient continues to benefit from skilled intervention to address the above impairments. Continue treatment per established plan of care. Discharge Recommendations:  [facility] as pt does not have assistance and adequate supervision available at home  Further Equipment Recommendations for Discharge:  shower chair and rolling walker    [facility]: Henrietta Marcus scored a 19/24 on the AM-PAC ADL Inpatient form. Please note that AM-PAC score does not take into account pt's cognitive status and amount of support available at home. Please see \"Discharge recommendation\" section of assessment for details. Current research shows that an AM-PAC score of 18 or greater is associated with a discharge to the patient's home setting. Based on the patient's AM-PAC score and their current ADL deficits, it is recommended that the patient have 2-3 sessions per week of Occupational Therapy at d/c to increase the patient's independence. Please see assessment section for further patient specific details. SUBJECTIVE:   Patient stated I need to comb my hair.     OBJECTIVE DATA SUMMARY:   Cognitive/Behavioral Status:  Neurologic State: Alert  Orientation Level: Oriented to person,Oriented to place,Oriented to time  Cognition: Follows commands,Appropriate decision making  Safety/Judgement: Awareness of environment,Fall prevention    Functional Mobility and Transfers for ADLs:   Bed Mobility:     Supine to Sit: Stand-by assistance  Sit to Supine: Stand-by assistance      Transfers:  Sit to Stand: Minimum assistance (VCs for hand placement, elevated bed)  Stand to Sit: Minimum assistance   Toilet Transfer : Contact guard assistance (simulated)      Balance:  Sitting: Intact  Sitting - Static: Good (unsupported)  Sitting - Dynamic: Good (unsupported)  Standing: Impaired; With support  Standing - Static: Good  Standing - Dynamic : Fair    ADL Intervention:   Grooming  Grooming Assistance: Contact guard assistance  Position Performed: Standing  Washing Hands: Contact guard assistance  Brushing/Combing Hair: Set-up (at bed level)    Upper Body [address]: Supervision    Lower Body Dressing Assistance  Socks: Supervision  Leg Crossed Method Used: Yes  Cognitive Retraining  Safety/Judgement: Awareness of environment; Fall prevention    Pain:  Pain level pre-treatment: 0/10   Pain level post-treatment: 0/10    Activity Tolerance:    Fair  Please refer to the flowsheet for vital signs taken during this treatment. After treatment:   []  Patient left in no apparent distress sitting up in chair  [x]  Patient left in no apparent distress in bed  [x]  Call bell left within reach  [x]  Nursing notified  []  Caregiver present  [x]  Bed alarm activated    COMMUNICATION/EDUCATION:   [x] Role of Occupational Therapy in the acute care setting  [x] Home safety education was provided and the patient/caregiver indicated understanding. [x] Patient/family have participated as able in working towards goals and plan of care. [] Patient/family agree to work toward stated goals and plan of care. [] Patient understands intent and goals of therapy, but is neutral about his/her participation. [] Patient is unable to participate in goal setting and plan of care.       Thank you for this referral.  Rick Ortiz OTR/L  Time Calculation: 23 mins    [facility] AM-PAC® Daily Activity Inpatient Short Form (6-Clicks)*    How much HELP from another person does the patient currently need    (If the patient hasn't done an activity recently, how much help from another person do you think he/she would need if he/she tried?)   Total (Total A or Dep)   A Lot  (Mod to Max A)   A Little (Sup or Min A)   None (Mod I to I)   Putting on and taking off regular lower body clothing? [] 1 [] 2 [x] 3 [] 4   2. Bathing (including washing, rinsing,      drying)? [] 1 [] 2 [x] 3 [] 4   3. Toileting, which includes using toilet, bedpan or urinal?   [] 1 [] 2 [x] 3 [] 4   4. Putting on and taking off regular upper body clothing? [] 1 [] 2 [x] 3 [] 4   5. Taking care of personal grooming such as brushing teeth? [] 1 [] 2 [x] 3 [] 4   6. Eating meals? [] 1 [] 2 [] 3 [x] 4    19/24    Current research shows that an AM-PAC score of 18 or greater is associated with a discharge to the patient's home setting. Based on the patient's AM-PAC score and their current ADL deficits, it is recommended that the patient have 2-3 sessions per week of Occupational Therapy at d/c to increase the patient's independence. Please see assessment section for further patient specific details.

## 2022-07-09 NOTE — PROGRESS NOTES
Problem: Mobility Impaired (Adult and Pediatric)  Goal: *Acute Goals and Plan of Care (Insert Text)  Description: Physical Therapy Goals  Initiated 7/5/2022 and to be accomplished within 7 day(s)  1. Patient will move from supine to sit and sit to supine , scoot up and down, and roll side to side in bed with modified independence. 2.  Patient will transfer from bed to chair and chair to bed with modified independence using the least restrictive device. 3.  Patient will perform sit to stand with modified independence. 4.  Patient will ambulate with modified independence for 100 feet with the least restrictive device. 5.  Patient will ascend/descend 13 stairs with unilateral handrail(s) with modified independence. PLOF: Pt reports he lives with roommates in 2n floor apartment with 13 steps to enter. Pt independent without AD PTA but does report falls at home. Outcome: Progressing Towards Goal     PHYSICAL THERAPY TREATMENT    Patient: Tisha Melvin (86 y.o. male)  Date: 7/9/2022  Diagnosis: Severe sepsis ([facility]) [A41.9, R65.20]  Alcohol withdrawal ([facility] [address] [F10.239]  UTI (urinary tract infection) [Q70.9] Complicated UTI (urinary tract infection)       Precautions: Fall,Skin,Contact  PLOF: Independent    ASSESSMENT:  Pt continues to demonstrate impaired functional mobility, decreased activity tolerance, and generalized weakness; however, is progressing well, as evidenced by requiring min A for sit to/from stand transfers today as well as CGA with ambulation with RW. Pt demo no LOB with use of RW this session, reported no symptomatic complaints during ambulation of 50 ft within room. Pt returned supine at end of session.   Progression toward goals:   [x]      Improving appropriately and progressing toward goals  []      Improving slowly and progressing toward goals  []      Not making progress toward goals and plan of care will be adjusted     PLAN:  Patient continues to benefit from skilled intervention to address the above impairments. Continue treatment per established plan of care. Discharge Recommendations:  Joseph Goss  Further Equipment Recommendations for Discharge:  rolling walker    [facility]: Aby Mccullough scored a 17/24 on the AM-PAC short mobility form. SUBJECTIVE:   Patient stated Rosa Houston had a shit ton to drink the other night.     OBJECTIVE DATA SUMMARY:   Critical Behavior:  Neurologic State: Alert  Orientation Level: Oriented to person,Oriented to place,Oriented to time  Cognition: Follows commands  Safety/Judgement: Fall prevention,Decreased awareness of environment,Decreased awareness of need for assistance,Decreased awareness of need for safety  Functional Mobility Training:  Bed Mobility:  Supine to Sit: Stand-by assistance  Sit to Supine: Stand-by assistance  Transfers:  Sit to Stand: Minimum assistance (VCs for hand placement, elevated bed)  Stand to Sit: Minimum assistance  Balance:  Sitting: Intact  Sitting - Static: Good (unsupported)  Sitting - Dynamic: Good (unsupported)  Standing: Impaired; With support  Standing - Static: Good  Standing - Dynamic : Fair   Posture:   Posture (WDL): Exceptions to WDL   Posture Assessment: Rounded shoulders; Forward head  Ambulation/Gait Training:  Distance (ft): 50 Feet (ft)  Assistive Device: Walker, rolling  Ambulation - Level of Assistance: Contact guard assistance  Gait Description (WDL): Exceptions to WDL  Gait Abnormalities: Decreased step clearance  Speed/Sujata: Slow;Pace decreased (<100 feet/min)  Step Length: Right shortened;Left shortened    Pain:  Pain level pre-treatment: 0/10  Pain level post-treatment: 0/10   Pain Intervention(s): Medication (see MAR); Rest, Ice, Repositioning   Response to intervention: Nurse notified, See doc flow    Activity Tolerance:   Good  Please refer to the flowsheet for vital signs taken during this treatment.   After treatment:   [] Patient left in no apparent distress sitting up in chair  [x] Patient left in no apparent distress in bed  [x] Call bell left within reach  [x] Nursing notified  [] Caregiver present  [] Bed alarm activated  [] SCDs applied      COMMUNICATION/EDUCATION:   [x]         Role of Physical Therapy in the acute care setting. [x]         Fall prevention education was provided and the patient/caregiver indicated understanding. [x]         Patient/family have participated as able in working toward goals and plan of care. []         Patient/family agree to work toward stated goals and plan of care. []         Patient understands intent and goals of therapy, but is neutral about his/her participation. []         Patient is unable to participate in stated goals/plan of care: ongoing with therapy staff.  []         Other:        Elmer Rogers, PT   Time Calculation: 23 mins    [address] [facility] [address] [address] AM-PAC® Basic Mobility Inpatient Short Form (6-Clicks) Version 2    How much HELP from another person does the patient currently need    (If the patient hasn't done an activity recently, how much help from another person do you think he/she would need if he/she tried?)   Total (Total A or Dep)   A Lot  (Mod to Max A)   A Little (Sup or Min A)   None (Mod I to I)   Turning from your back to your side while in a flat bed without using bedrails? [] 1 [] 2 [x] 3 [] 4   2. Moving from lying on your back to sitting on the side of a flat bed without using bedrails? [] 1 [] 2 [x] 3 [] 4   3. Moving to and from a bed to a chair (including a wheelchair)? [] 1 [] 2 [x] 3 [] 4   4. Standing up from a chair using your arms (e.g., wheelchair, or bedside chair)? [] 1 [] 2 [x] 3 [] 4   5. Walking in hospital room? [] 1 [] 2 [x] 3 [] 4   6. Climbing 3-5 steps with a railing?+   [] 1 [x] 2 [] 3 [] 4   +If stair climbing cannot be assessed, skip item #6. Sum responses from items 1-5.      Current research shows that an AM-PAC score of 17 (13 if omitting stairs) or less is typically not associated with a discharge to the patient's home setting. Based on the patient's AM-PAC score and their current functional mobility deficits, it is recommended that the patient have 3-5 sessions per week of Physical Therapy at d/c to increase the patient's independence. Please see assessment section for further patient specific details.

## 2022-07-09 NOTE — PROGRESS NOTES
*ATTENTION:  This note has been created by a medical student for educational purposes only. Please do not refer to the content of this note for clinical decision-making, billing, or other purposes. Please see attending physicians note to obtain clinical information on this patient. [facility] Medicine  Medical Student Progress Note    Patient: Miquel Florence MRN: [id number]   SSN: [id number]  YOB: 1960   Age: 58 y.o. Sex: male      Admit Date: 7/3/2022    LOS: 6 days   Chief Complaint   Patient presents with    Fall    Alcohol Problem    Mental Health Problem       Subjective:     Nadya Rome is a 57 y/o M with a PMH of chronic alcoholism, T2DM, HTN, and malnutrition, who was admitted for alcohol withdrawal and UTI. Pt was pleasant and conversational. He complained of slight abdominal pain overnight which was resolved with Tylenol, nurse confirmed. He had no other complaints and stated he feels well. Pt still adamant on not wanting to go to rehab. When asked what his expectations are, he stated he would like to go home eventually. He provided his home address ([address] [place] [address], [address]) and the names of his roommates ([facility]). He stated he was willing to work with PT/OT. He added that at home he normally walks but does have a history of falls where he once fell and broke his ankle. ROS    Objective:     Visit Vitals  /75   Pulse 84   Temp 98.1 °F (36.7 °C)   Resp 16   Ht 6' 2\" (1.88 m)   Wt 150 lb 1.6 oz (68.1 kg)   SpO2 95%   BMI 19.27 kg/m²       Physical Exam:   General: Well appearing, well nourished, in no distress. Oriented x 3, normal mood and affect . Head: Normocephalic, atraumatic, no visible or palpable masses, depressions, or scaring.   Eyes: Visual acuity intact, conjunctiva clear, sclera non-icteric, EOM intact, PERRL, fundi  have normal optic discs and vessels, no exudates or hemorrhages  Heart: No cardiomegaly or thrills; regular rate and rhythm, no murmur or gallop  Lungs: Clear to auscultation and percussion  Abdomen: Bowel sounds normal, no tenderness, organomegaly, masses, or hernia  Extremities: No amputations or deformities, cyanosis, edema or varicosities, peripheral pulses  intact  Psychiatric: Oriented X3, intact recent and remote memory, judgment and insight, normal mood  and affect. Physical Exam     Intake and Output:  Current Shift: No intake/output data recorded.   Last three shifts: 07/07 1901 - 07/09 0700  In: 1080 [P.O.:1080]  Out: 26 [Urine:900]    Lab/Data Review:  Recent Results (from the past 12 hour(s))   GLUCOSE, POC    Collection Time: 07/09/22  8:42 AM   Result Value Ref Range    Glucose (POC) 98 70 - 110 mg/dL       Key Findings or tests:   DATE TEST RESULT OR IMPRESSION                              Telemetry ***NONE   Oxygen ***NONE     Assessment and Plan:     1) Chronic Alcohol Use Disorder/Withdrawl  Continue CIWA scores every 6 hours, Ativan PRN  Continue Thiamine 250 mg IV until 7/12  Continue Multivitamin Supplementation              Consult Nutrition Services  Continue monitoring BMP, Mag, Phos     2) Acute Cystitis  Resolved     3) Left Lower Extremity Pain  Acetaminophen PRN     4a) Generalized Weakness  Continue Fall precautions  Continue PT/OT recommendations     4b) CKD 3a  PO fluids as tolerated  Monitor I/O, BMP     5) HTN  Continue Lisinopril 10mg  Restart Indapamide 1.25 mg upon discharge     6) T2DM  POC Glucose before meals and at bedtime  Sliding Scale Insulin  Hypoglycemic precautions  Atorvastatin 40 mg daily     7) Depression  Prozac 20 mg dialy; PCP followup  Consult Social Work     8) Tobacco Use Disorder  Nicoderm patch during inpatient  Smoking cessation counselling    Diet    DVT Prophylaxis    GI Prophylaxis    Code status    Disposition      Point of Contact ***  Relationship:  ([phone])        Maurice Lau M3   July 9, 2022, 8:43 AM

## 2022-07-09 NOTE — PROGRESS NOTES
Rolling Walker delivered to patient, patient signed both [employer] forms, and signed copies given to patient. CM spoke with Lexie MARTINO and updated with discharge plan for today.                Rama Tran RN  Case Management [phone]

## 2022-07-12 ENCOUNTER — HOME CARE VISIT (OUTPATIENT)
Dept: HOME HEALTH SERVICES | Facility: HOME HEALTH | Age: 62
End: 2022-07-12

## 2022-07-13 ENCOUNTER — HOME CARE VISIT (OUTPATIENT)
Dept: SCHEDULING | Facility: HOME HEALTH | Age: 62
End: 2022-07-13

## 2022-07-13 ENCOUNTER — HOME CARE VISIT (OUTPATIENT)
Dept: HOME HEALTH SERVICES | Facility: HOME HEALTH | Age: 62
End: 2022-07-13

## 2022-07-13 NOTE — Clinical Note
Arrived to patient house for confirmed visit today but no answer to door. Unable to call patient as patient phone is not working. Cg (brother) doesn't live with the patient so I didn't call him. This is 2nd attempt. Please call #[phone] with any questions. Thank you! Dominik Costello

## 2022-07-14 NOTE — CASE COMMUNICATION
Arrived to patient house for confirmed visit today but no answer to door. Unable to call patient as patient phone is not working. Cg (brother) doesn't live with the patient so I didn't call him. This is 2nd attempt.

## 2022-07-14 NOTE — CASE COMMUNICATION
Patient is a non-admit to home health services due to unable to locate. SN has made 3 attempts without success. Patient had confirmed appointment on 7/11/22 and when arrived to the home the friend came to door and said patient needed to cancel to a different day. SN rescheduled for Tuesday. SN went to home and no answer. SN attempted today and no answer. SN called multiple times with no answer and unable to leave message.      Thank you,   Tha Garcia, RN
